# Patient Record
Sex: MALE | Race: WHITE | NOT HISPANIC OR LATINO | Employment: OTHER | URBAN - METROPOLITAN AREA
[De-identification: names, ages, dates, MRNs, and addresses within clinical notes are randomized per-mention and may not be internally consistent; named-entity substitution may affect disease eponyms.]

---

## 2017-09-26 ENCOUNTER — RECORDS - HEALTHEAST (OUTPATIENT)
Dept: LAB | Facility: CLINIC | Age: 72
End: 2017-09-26

## 2017-09-26 LAB
CHOLEST SERPL-MCNC: 187 MG/DL
FASTING STATUS PATIENT QL REPORTED: ABNORMAL
HDLC SERPL-MCNC: 43 MG/DL
LDLC SERPL CALC-MCNC: 84 MG/DL
PSA SERPL-MCNC: 1.7 NG/ML (ref 0–6.5)
TRIGL SERPL-MCNC: 300 MG/DL

## 2018-10-01 ENCOUNTER — RECORDS - HEALTHEAST (OUTPATIENT)
Dept: LAB | Facility: CLINIC | Age: 73
End: 2018-10-01

## 2018-10-01 LAB
ALBUMIN SERPL-MCNC: 3.9 G/DL (ref 3.5–5)
ALP SERPL-CCNC: 79 U/L (ref 45–120)
ALT SERPL W P-5'-P-CCNC: 17 U/L (ref 0–45)
ANION GAP SERPL CALCULATED.3IONS-SCNC: 7 MMOL/L (ref 5–18)
AST SERPL W P-5'-P-CCNC: 18 U/L (ref 0–40)
BILIRUB SERPL-MCNC: 0.4 MG/DL (ref 0–1)
BUN SERPL-MCNC: 16 MG/DL (ref 8–28)
CALCIUM SERPL-MCNC: 9.8 MG/DL (ref 8.5–10.5)
CHLORIDE BLD-SCNC: 103 MMOL/L (ref 98–107)
CHOLEST SERPL-MCNC: 175 MG/DL
CO2 SERPL-SCNC: 30 MMOL/L (ref 22–31)
CREAT SERPL-MCNC: 0.88 MG/DL (ref 0.7–1.3)
FASTING STATUS PATIENT QL REPORTED: ABNORMAL
GFR SERPL CREATININE-BSD FRML MDRD: >60 ML/MIN/1.73M2
GLUCOSE BLD-MCNC: 107 MG/DL (ref 70–125)
HDLC SERPL-MCNC: 48 MG/DL
LDLC SERPL CALC-MCNC: 92 MG/DL
POTASSIUM BLD-SCNC: 5 MMOL/L (ref 3.5–5)
PROT SERPL-MCNC: 6.8 G/DL (ref 6–8)
SODIUM SERPL-SCNC: 140 MMOL/L (ref 136–145)
TRIGL SERPL-MCNC: 174 MG/DL

## 2019-10-07 ENCOUNTER — AMBULATORY - HEALTHEAST (OUTPATIENT)
Dept: PULMONOLOGY | Facility: OTHER | Age: 74
End: 2019-10-07

## 2019-10-07 ENCOUNTER — RECORDS - HEALTHEAST (OUTPATIENT)
Dept: ADMINISTRATIVE | Facility: OTHER | Age: 74
End: 2019-10-07

## 2019-10-07 ENCOUNTER — COMMUNICATION - HEALTHEAST (OUTPATIENT)
Dept: PULMONOLOGY | Facility: OTHER | Age: 74
End: 2019-10-07

## 2019-10-07 ENCOUNTER — RECORDS - HEALTHEAST (OUTPATIENT)
Dept: LAB | Facility: CLINIC | Age: 74
End: 2019-10-07

## 2019-10-07 ENCOUNTER — TRANSFERRED RECORDS (OUTPATIENT)
Dept: HEALTH INFORMATION MANAGEMENT | Facility: CLINIC | Age: 74
End: 2019-10-07
Payer: MEDICARE

## 2019-10-07 DIAGNOSIS — J84.9 ILD (INTERSTITIAL LUNG DISEASE) (H): ICD-10-CM

## 2019-10-07 LAB
ALBUMIN SERPL-MCNC: 4 G/DL (ref 3.5–5)
ALP SERPL-CCNC: 75 U/L (ref 45–120)
ALT SERPL W P-5'-P-CCNC: 33 U/L (ref 0–45)
ANION GAP SERPL CALCULATED.3IONS-SCNC: 10 MMOL/L (ref 5–18)
AST SERPL W P-5'-P-CCNC: 26 U/L (ref 0–40)
BILIRUB SERPL-MCNC: 0.5 MG/DL (ref 0–1)
BUN SERPL-MCNC: 13 MG/DL (ref 8–28)
CALCIUM SERPL-MCNC: 9.8 MG/DL (ref 8.5–10.5)
CHLORIDE BLD-SCNC: 99 MMOL/L (ref 98–107)
CHOLEST SERPL-MCNC: 187 MG/DL
CO2 SERPL-SCNC: 26 MMOL/L (ref 22–31)
CREAT SERPL-MCNC: 1.01 MG/DL (ref 0.7–1.3)
FASTING STATUS PATIENT QL REPORTED: ABNORMAL
GFR SERPL CREATININE-BSD FRML MDRD: >60 ML/MIN/1.73M2
GLUCOSE BLD-MCNC: 103 MG/DL (ref 70–125)
HDLC SERPL-MCNC: 51 MG/DL
LDLC SERPL CALC-MCNC: 81 MG/DL
POTASSIUM BLD-SCNC: 4.2 MMOL/L (ref 3.5–5)
PROT SERPL-MCNC: 6.9 G/DL (ref 6–8)
PSA SERPL-MCNC: 1.4 NG/ML (ref 0–6.5)
SODIUM SERPL-SCNC: 135 MMOL/L (ref 136–145)
TRIGL SERPL-MCNC: 274 MG/DL

## 2019-10-14 ASSESSMENT — MIFFLIN-ST. JEOR: SCORE: 1812.2

## 2019-10-31 ENCOUNTER — SURGERY - HEALTHEAST (OUTPATIENT)
Dept: SURGERY | Facility: CLINIC | Age: 74
End: 2019-10-31

## 2019-10-31 ENCOUNTER — ANESTHESIA - HEALTHEAST (OUTPATIENT)
Dept: SURGERY | Facility: CLINIC | Age: 74
End: 2019-10-31

## 2019-10-31 ASSESSMENT — MIFFLIN-ST. JEOR: SCORE: 1823.09

## 2019-12-20 ENCOUNTER — OFFICE VISIT - HEALTHEAST (OUTPATIENT)
Dept: PULMONOLOGY | Facility: OTHER | Age: 74
End: 2019-12-20

## 2019-12-20 DIAGNOSIS — R01.1 HEART MURMUR: ICD-10-CM

## 2019-12-20 DIAGNOSIS — J84.9 ILD (INTERSTITIAL LUNG DISEASE) (H): ICD-10-CM

## 2019-12-20 LAB — RHEUMATOID FACT SERPL-ACNC: <15 IU/ML (ref 0–30)

## 2019-12-20 ASSESSMENT — MIFFLIN-ST. JEOR: SCORE: 1807.67

## 2019-12-23 LAB — ANA SER QL: 1.3 U

## 2019-12-24 LAB
ANCA IGG TITR SER IF: NORMAL {TITER}
CCP AB SER IA-ACNC: <0.5 U/ML
SCL-70 AUTOANTIBODIES - HISTORICAL: 1 EU
SS-A/RO AUTOANTIBODIES - HISTORICAL: 1 EU
SS-B/LA AUTOANTIBODIES - HISTORICAL: 0 EU

## 2020-05-12 ENCOUNTER — OFFICE VISIT - HEALTHEAST (OUTPATIENT)
Dept: PULMONOLOGY | Facility: OTHER | Age: 75
End: 2020-05-12

## 2020-05-12 DIAGNOSIS — J84.9 ILD (INTERSTITIAL LUNG DISEASE) (H): ICD-10-CM

## 2020-05-12 RX ORDER — ATORVASTATIN CALCIUM 10 MG/1
10 TABLET, FILM COATED ORAL AT BEDTIME
Status: SHIPPED | COMMUNITY
Start: 2020-05-12 | End: 2022-06-08

## 2020-05-12 RX ORDER — METOPROLOL SUCCINATE 50 MG/1
50 TABLET, EXTENDED RELEASE ORAL DAILY
Status: SHIPPED | COMMUNITY
Start: 2020-05-12

## 2020-05-12 ASSESSMENT — MIFFLIN-ST. JEOR: SCORE: 1789.52

## 2020-09-18 ENCOUNTER — HOSPITAL ENCOUNTER (OUTPATIENT)
Dept: CARDIOLOGY | Facility: HOSPITAL | Age: 75
Discharge: HOME | End: 2020-09-18
Attending: INTERNAL MEDICINE

## 2020-09-18 DIAGNOSIS — R01.1 HEART MURMUR: ICD-10-CM

## 2020-09-18 LAB
AORTIC ROOT: 3.4 CM
AORTIC VALVE MEAN VELOCITY: 188 CM/S
ASCENDING AORTA: 4 CM
AV DIMENSIONLESS INDEX VTI: 0.5
AV MEAN GRADIENT: 16 MMHG
AV PEAK GRADIENT: 24.6 MMHG
AV VALVE AREA: 1.6 CM2
BSA FOR ECHO PROCEDURE: 2.27 M2
CV BLOOD PRESSURE: ABNORMAL MMHG
CV ECHO HEIGHT: 70 IN
CV ECHO WEIGHT: 230 LBS
DOP CALC AO PEAK VEL: 248 CM/S
DOP CALC AO VTI: 53.2 CM
DOP CALC LVOT AREA: 3.46 CM2
DOP CALC LVOT DIAMETER: 2.1 CM
DOP CALC LVOT STROKE VOLUME: 87.6 CM3
DOP CALC MV VTI: 25.3 CM
DOP CALCLVOT PEAK VEL VTI: 25.3 CM
EJECTION FRACTION: 65 % (ref 55–75)
FRACTIONAL SHORTENING: 41.3 % (ref 28–44)
INTERVENTRICULAR SEPTUM IN END DIASTOLE: 1 CM (ref 0.6–1)
IVS/PW RATIO: 0.9
LA AREA 1: 16 CM2
LA AREA 2: 20.6 CM2
LEFT ATRIUM AREA: 20.6 CM2
LEFT ATRIUM LENGTH: 5.5 CM
LEFT ATRIUM SIZE: 3.6 CM
LEFT ATRIUM TO AORTIC ROOT RATIO: 1.06 NO UNITS
LEFT ATRIUM VOLUME INDEX: 22.4 ML/M2
LEFT ATRIUM VOLUME: 50.9 ML
LEFT VENTRICLE CARDIAC INDEX: 2.5 L/MIN/M2
LEFT VENTRICLE CARDIAC OUTPUT: 5.7 L/MIN
LEFT VENTRICLE DIASTOLIC VOLUME INDEX: 45.8 CM3/M2 (ref 34–74)
LEFT VENTRICLE DIASTOLIC VOLUME: 104 CM3 (ref 62–150)
LEFT VENTRICLE HEART RATE: 65 BPM
LEFT VENTRICLE MASS INDEX: 74.8 G/M2
LEFT VENTRICLE SYSTOLIC VOLUME INDEX: 15.9 CM3/M2 (ref 11–31)
LEFT VENTRICLE SYSTOLIC VOLUME: 36 CM3 (ref 21–61)
LEFT VENTRICULAR INTERNAL DIMENSION IN DIASTOLE: 4.6 CM (ref 4.2–5.8)
LEFT VENTRICULAR INTERNAL DIMENSION IN SYSTOLE: 2.7 CM (ref 2.5–4)
LEFT VENTRICULAR MASS: 169.9 G
LEFT VENTRICULAR OUTFLOW TRACT MEAN GRADIENT: 3 MMHG
LEFT VENTRICULAR OUTFLOW TRACT MEAN VELOCITY: 77.3 CM/S
LEFT VENTRICULAR POSTERIOR WALL IN END DIASTOLE: 1.1 CM (ref 0.6–1)
LV STROKE VOLUME INDEX: 38.6 ML/M2
MITRAL VALVE E/A RATIO: 0.6
MITRAL VALVE MEAN INFLOW VELOCITY: 54 CM/S
MITRAL VALVE PEAK VELOCITY: 102 CM/S
MV AREA VTI: 3.46 CM2
MV AVERAGE E/E' RATIO: 8 CM/S
MV DECELERATION TIME: 373 MS
MV E'TISSUE VEL-LAT: 8.87 CM/S
MV E'TISSUE VEL-MED: 5.75 CM/S
MV LATERAL E/E' RATIO: 6.6
MV MEAN GRADIENT: 1 MMHG
MV MEDIAL E/E' RATIO: 10.2
MV PEAK A VELOCITY: 90.8 CM/S
MV PEAK E VELOCITY: 58.7 CM/S
MV PEAK GRADIENT: 4.2 MMHG
MV VALVE AREA BY CONTINUITY EQUATION: 3.5 CM2
NUC REST DIASTOLIC VOLUME INDEX: 3680 LBS
NUC REST SYSTOLIC VOLUME INDEX: 70 IN
TRICUSPID VALVE ANULAR PLANE SYSTOLIC EXCURSION: 2.1 CM

## 2020-09-18 ASSESSMENT — MIFFLIN-ST. JEOR: SCORE: 1789.52

## 2020-10-16 ENCOUNTER — RECORDS - HEALTHEAST (OUTPATIENT)
Dept: LAB | Facility: CLINIC | Age: 75
End: 2020-10-16

## 2020-10-16 LAB
ALBUMIN SERPL-MCNC: 4.3 G/DL (ref 3.5–5)
ALP SERPL-CCNC: 83 U/L (ref 45–120)
ALT SERPL W P-5'-P-CCNC: 53 U/L (ref 0–45)
ANION GAP SERPL CALCULATED.3IONS-SCNC: 7 MMOL/L (ref 5–18)
AST SERPL W P-5'-P-CCNC: 53 U/L (ref 0–40)
BILIRUB SERPL-MCNC: 0.5 MG/DL (ref 0–1)
BUN SERPL-MCNC: 19 MG/DL (ref 8–28)
CALCIUM SERPL-MCNC: 9.8 MG/DL (ref 8.5–10.5)
CHLORIDE BLD-SCNC: 96 MMOL/L (ref 98–107)
CHOLEST SERPL-MCNC: 174 MG/DL
CO2 SERPL-SCNC: 30 MMOL/L (ref 22–31)
CREAT SERPL-MCNC: 0.91 MG/DL (ref 0.7–1.3)
FASTING STATUS PATIENT QL REPORTED: ABNORMAL
GFR SERPL CREATININE-BSD FRML MDRD: >60 ML/MIN/1.73M2
GLUCOSE BLD-MCNC: 96 MG/DL (ref 70–125)
HDLC SERPL-MCNC: 41 MG/DL
LDLC SERPL CALC-MCNC: 98 MG/DL
LDLC SERPL CALC-MCNC: ABNORMAL MG/DL
POTASSIUM BLD-SCNC: 4.3 MMOL/L (ref 3.5–5)
PROT SERPL-MCNC: 7.5 G/DL (ref 6–8)
SODIUM SERPL-SCNC: 133 MMOL/L (ref 136–145)
TRIGL SERPL-MCNC: 468 MG/DL

## 2020-10-29 ENCOUNTER — RECORDS - HEALTHEAST (OUTPATIENT)
Dept: ADMINISTRATIVE | Facility: OTHER | Age: 75
End: 2020-10-29

## 2020-11-09 ENCOUNTER — RECORDS - HEALTHEAST (OUTPATIENT)
Dept: LAB | Facility: CLINIC | Age: 75
End: 2020-11-09

## 2020-11-09 ENCOUNTER — RECORDS - HEALTHEAST (OUTPATIENT)
Dept: ADMINISTRATIVE | Facility: OTHER | Age: 75
End: 2020-11-09

## 2020-11-09 LAB
ALBUMIN SERPL-MCNC: 4.2 G/DL (ref 3.5–5)
ALP SERPL-CCNC: 69 U/L (ref 45–120)
ALT SERPL W P-5'-P-CCNC: 40 U/L (ref 0–45)
AST SERPL W P-5'-P-CCNC: 33 U/L (ref 0–40)
BILIRUB DIRECT SERPL-MCNC: 0.2 MG/DL
BILIRUB SERPL-MCNC: 0.6 MG/DL (ref 0–1)
PROT SERPL-MCNC: 7.3 G/DL (ref 6–8)

## 2020-11-10 ENCOUNTER — OFFICE VISIT - HEALTHEAST (OUTPATIENT)
Dept: PULMONOLOGY | Facility: OTHER | Age: 75
End: 2020-11-10

## 2020-11-10 DIAGNOSIS — R06.00 DYSPNEA, UNSPECIFIED TYPE: ICD-10-CM

## 2020-11-10 ASSESSMENT — MIFFLIN-ST. JEOR: SCORE: 1834.88

## 2020-11-11 ENCOUNTER — RECORDS - HEALTHEAST (OUTPATIENT)
Dept: RADIOLOGY | Facility: CLINIC | Age: 75
End: 2020-11-11

## 2021-04-06 ENCOUNTER — RECORDS - HEALTHEAST (OUTPATIENT)
Dept: ADMINISTRATIVE | Facility: OTHER | Age: 76
End: 2021-04-06

## 2021-04-07 ENCOUNTER — RECORDS - HEALTHEAST (OUTPATIENT)
Dept: ADMINISTRATIVE | Facility: OTHER | Age: 76
End: 2021-04-07

## 2021-04-07 ENCOUNTER — COMMUNICATION - HEALTHEAST (OUTPATIENT)
Dept: PULMONOLOGY | Facility: OTHER | Age: 76
End: 2021-04-07

## 2021-04-13 ENCOUNTER — RECORDS - HEALTHEAST (OUTPATIENT)
Dept: ADMINISTRATIVE | Facility: OTHER | Age: 76
End: 2021-04-13

## 2021-04-23 ENCOUNTER — HOSPITAL ENCOUNTER (OUTPATIENT)
Dept: CT IMAGING | Facility: HOSPITAL | Age: 76
Discharge: HOME | End: 2021-04-23
Attending: INTERNAL MEDICINE

## 2021-04-23 DIAGNOSIS — J84.9 ILD (INTERSTITIAL LUNG DISEASE) (H): ICD-10-CM

## 2021-05-04 ENCOUNTER — OFFICE VISIT - HEALTHEAST (OUTPATIENT)
Dept: PULMONOLOGY | Facility: OTHER | Age: 76
End: 2021-05-04

## 2021-05-04 DIAGNOSIS — J84.112 IPF (IDIOPATHIC PULMONARY FIBROSIS) (H): ICD-10-CM

## 2021-05-04 RX ORDER — ALBUTEROL SULFATE 90 UG/1
2 AEROSOL, METERED RESPIRATORY (INHALATION) EVERY 6 HOURS PRN
Qty: 1 INHALER | Refills: 6 | Status: SHIPPED | OUTPATIENT
Start: 2021-05-04 | End: 2023-07-28

## 2021-05-04 ASSESSMENT — MIFFLIN-ST. JEOR: SCORE: 1850.76

## 2021-05-27 VITALS
DIASTOLIC BLOOD PRESSURE: 70 MMHG | HEART RATE: 76 BPM | WEIGHT: 243.5 LBS | HEIGHT: 70 IN | BODY MASS INDEX: 34.86 KG/M2 | OXYGEN SATURATION: 93 % | SYSTOLIC BLOOD PRESSURE: 124 MMHG

## 2021-06-02 NOTE — ANESTHESIA PREPROCEDURE EVALUATION
Anesthesia Evaluation      Patient summary reviewed   No history of anesthetic complications     Airway   Mallampati: II  Neck ROM: full   Pulmonary - normal exam   (+) sleep apnea on CPAP, , a smoker                         Cardiovascular - normal exam  Exercise tolerance: > or = 4 METS  (+) hypertension, , hypercholesterolemia,     ECG reviewed        Neuro/Psych - negative ROS     Endo/Other    (+) arthritis, obesity,      GI/Hepatic/Renal    (+) GERD well controlled,             Dental - normal exam                        Anesthesia Plan  Planned anesthetic: spinal and peripheral nerve block  Spinal with adductor canal block for post op pain control per surgeon request.  ASA 3     Anesthetic plan and risks discussed with: patient    Post-op plan: routine recovery

## 2021-06-02 NOTE — ANESTHESIA PROCEDURE NOTES
Peripheral Block    Patient location during procedure: pre-op  Start time: 10/31/2019 9:23 AM  End time: 10/31/2019 9:28 AM  post-op analgesia per surgeon order as noted in medical record  Staffing:  Performing  Anesthesiologist: Ricardo Bright MD  Preanesthetic Checklist  Completed: patient identified, site marked, risks, benefits, and alternatives discussed, timeout performed, consent obtained, at patient's request, airway assessed, oxygen available, suction available, emergency drugs available and hand hygiene performed  Peripheral Block  Block type: saphenous, adductor canal block  Prep: ChloraPrep  Patient position: supine  Patient monitoring: blood pressure, heart rate, continuous pulse oximetry and cardiac monitor  Laterality: left  Injection technique: ultrasound guided    Ultrasound used to visualize needle placement in proximity to nerve being blocked: yes   Permanent ultrasound image captured for medical record  Sterile gel and probe cover used for ultrasound.    Needle  Needle type: Stimuplex   Needle gauge: 20G  Needle length: 4 in  no peripheral nerve catheter placed  Assessment  Injection assessment: no difficulty with injection, negative aspiration for heme, no paresthesia on injection and incremental injection

## 2021-06-02 NOTE — ANESTHESIA POSTPROCEDURE EVALUATION
Patient: Wyatt Gutierrez  LEFT MINIMALLY INVASIVE TOTAL KNEE ARTHROPLASTY  Anesthesia type: spinal    Patient location: PACU  Last vitals:   Vitals Value Taken Time   /67 10/31/2019  1:10 PM   Temp 36.1  C (97  F) 10/31/2019  1:10 PM   Pulse 71 10/31/2019  1:18 PM   Resp 18 10/31/2019  1:18 PM   SpO2 96 % 10/31/2019  1:18 PM   Vitals shown include unvalidated device data.  Post vital signs: stable  Level of consciousness: awake and responds to simple questions  Post-anesthesia pain: pain controlled  Post-anesthesia nausea and vomiting: no  Pulmonary: unassisted, room air, CPAP  Cardiovascular: stable and blood pressure at baseline  Hydration: adequate  Anesthetic events: no    QCDR Measures:  ASA# 11 - Joan-op Cardiac Arrest: ASA11B - Patient did NOT experience unanticipated cardiac arrest  ASA# 12 - Joan-op Mortality Rate: ASA12B - Patient did NOT die  ASA# 13 - PACU Re-Intubation Rate: NA - No ETT / LMA used for case  ASA# 10 - Composite Anes Safety: ASA10A - No serious adverse event    Additional Notes:

## 2021-06-02 NOTE — ANESTHESIA PROCEDURE NOTES
Spinal Block    Patient location during procedure: OR  Start time: 10/31/2019 11:10 AM  End time: 10/31/2019 11:14 AM  Reason for block: at surgeon's request and primary anesthetic    Staffing:  Performing  Anesthesiologist: Ricardo Bright MD    Preanesthetic Checklist  Completed: patient identified, risks, benefits, and alternatives discussed, timeout performed, consent obtained, at patient's request, airway assessed, oxygen available, suction available, emergency drugs available and hand hygiene performed  Spinal Block  Patient position: sitting  Prep: ChloraPrep  Patient monitoring: heart rate, cardiac monitor, continuous pulse ox and blood pressure  Approach: midline  Location: L3-4  Injection technique: single-shot  Needle type: pencil-tip   Needle gauge: 24 G    Assessment  Sensory level: T8

## 2021-06-02 NOTE — ANESTHESIA CARE TRANSFER NOTE
Last vitals:   Vitals:    10/31/19 1250   BP: 131/78   Pulse: 89   Resp: 25   Temp:    SpO2: 95%     Patient's level of consciousness is drowsy  Spontaneous respirations: yes  Maintains airway independently: yes  Dentition unchanged: yes  Oropharynx: oropharynx clear of all foreign objects    QCDR Measures:  ASA# 20 - Surgical Safety Checklist: WHO surgical safety checklist completed prior to induction    PQRS# 430 - Adult PONV Prevention: 4558F - Pt received => 2 anti-emetic agents (different classes) preop & intraop  ASA# 8 - Peds PONV Prevention: NA - Not pediatric patient, not GA or 2 or more risk factors NOT present  PQRS# 424 - Joan-op Temp Management: 4559F - At least one body temp DOCUMENTED => 35.5C or 95.9F within required timeframe  PQRS# 426 - PACU Transfer Protocol: - Transfer of care checklist used  ASA# 14 - Acute Post-op Pain: ASA14B - Patient did NOT experience pain >= 7 out of 10. To PACU coughing, placed on his own Cpap, coughing ceased. No c/o pain or nausea

## 2021-06-03 ENCOUNTER — RECORDS - HEALTHEAST (OUTPATIENT)
Dept: ADMINISTRATIVE | Facility: CLINIC | Age: 76
End: 2021-06-03

## 2021-06-03 VITALS — WEIGHT: 237.4 LBS | HEIGHT: 70 IN | BODY MASS INDEX: 33.99 KG/M2

## 2021-06-04 ENCOUNTER — OFFICE VISIT - HEALTHEAST (OUTPATIENT)
Dept: OTOLARYNGOLOGY | Facility: CLINIC | Age: 76
End: 2021-06-04

## 2021-06-04 VITALS — WEIGHT: 230 LBS | BODY MASS INDEX: 32.93 KG/M2 | HEIGHT: 70 IN

## 2021-06-04 VITALS
HEIGHT: 70 IN | BODY MASS INDEX: 33.5 KG/M2 | DIASTOLIC BLOOD PRESSURE: 82 MMHG | OXYGEN SATURATION: 97 % | HEART RATE: 80 BPM | WEIGHT: 234 LBS | RESPIRATION RATE: 17 BRPM | SYSTOLIC BLOOD PRESSURE: 128 MMHG

## 2021-06-04 DIAGNOSIS — H74.8X3 HEMATOTYMPANUM OF BOTH EARS: ICD-10-CM

## 2021-06-04 DIAGNOSIS — J30.2 SEASONAL ALLERGIC RHINITIS, UNSPECIFIED TRIGGER: ICD-10-CM

## 2021-06-04 DIAGNOSIS — Z48.00 ENCOUNTER FOR REMOVAL OF NASAL PACKING: ICD-10-CM

## 2021-06-04 DIAGNOSIS — R09.82 PND (POST-NASAL DRIP): ICD-10-CM

## 2021-06-04 DIAGNOSIS — R05.3 CHRONIC COUGH: ICD-10-CM

## 2021-06-04 RX ORDER — AZELASTINE 1 MG/ML
SPRAY, METERED NASAL
Qty: 30 ML | Refills: 12 | Status: ON HOLD | OUTPATIENT
Start: 2021-06-04 | End: 2024-06-11

## 2021-06-04 NOTE — PROGRESS NOTES
"Pulmonary Clinic Consult Note  Date of Service: 2019    Reason for Consultation: ILD    History:     HPI: Patient is a pleasant 74-year-old male, previous smoker, who was sent here for evaluation for ILD.    Patient was referred here for abnormal CXR concerning for ILD.  Pt notes that he has shortness of breath that is increasing over the past few years starting since .  Currently, patient is able to walk approximate a mile before having to rest.  He has a chronic dry cough.  He notes that his cough has improved after he quit tobacco on 2019.  He denies any hemoptysis. He describes his cough as a little productive but it is not that frequent.  He denies any fever or chills.  He is not on any inhalers.      On review of imaging, pt had a chest x-ray back in 2017.  On repeat follow-up chest x-ray done on 10/2019, there was stable mild peripheral fibrotic changes.  There was no evidence suggest any significant progression.  Patient noted that he was unable to do his PFTs on this visit because he did not know of the timing.     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 He will be there until 2020. He had been doing this since .    PMHx/PSHx:  KAITY  Hypertension  Hyperlipidemia  Total knee arthroplasty on 10/31/2019  Shoulder arthroscopy    Social Hx:    Ex Smoker. Quit smoking in 2019. Switched to vaping from 2019 to 10/2019. Then he used nicotine patch.  Used to smoke about 14 cigarettes per day for most of his life.   Work: used to work as an  before retiring. Worked at a desk.     Review of Systems - 10 point review of system negative except for what is mentioned in the HPI.    Meds and Allergies:  See EHR for the updated medication list and Allergies. These were reviewed.     Family Hx:   Father  at the age of 60 secondary to CVA.  Mother  at age 66 due to cancer.    Exam/Data:   /82   Pulse 80   Resp 17   Ht 5' 10\" (1.778 m)   Wt " (!) 234 lb (106.1 kg)   SpO2 97% Comment: RA  BMI 33.58 kg/m      EXAM:  GEN: comfortable, NAD  HEENT: NCAT, EMOI, mmm  LN: no cervical LAD   CVS: S1S2, RRR, systolic ejection murmur  Lung: good air entry bilaterally, no wheezing  Abd: soft, nt, + BS.   Ext: no c/c/e  Vasc: intact radial pulses bilaterally  Neuro: nonfocal  Skin: no visible rash  Musculoskeletal: FROM all extremities  Psych: normal affect    DATA    Chest x-ray from 5/2017: From [facility]  FINDINGS: Heart size within normal limits. Diffuse changes of interstitial fibrosis. Interstitium does appear slightly more prominent than on the comparison study, and a superimposed acute infectious process or mild fluid overload would be difficult to exclude. No pneumothorax or pleural effusion.    Chest x-ray on 10/2019 at [facility] compared to chest x-ray done on 5/2017: Official report:  Stable mild peripheral fibrotic change in both lungs would be consistent with underlying pulmonary fibrosis.  No acute new superimposed findings and nothing to suggest any significant progression.    Assessment/Plan:   Wyatt Gutierrez is a 74 y.o. male referred here for evaluation for pulmonary fibrosis.  Patient did not show up to PFTs this morning.  Per radiology based on reports, chest x-ray does not show significant change between 2019 and 2017, although not able to personally viewed these images at this time.    Recommendations:  PFTs next visit  HRCT  RA, CCP, AYDEN, cANCA, ssa, ssb, and scl 70  Echo given heart murmur    FOLLOW UP: 4 months after coming back from [place]    Whitney José MD  Pulmonary and Critical Care Medicine  12/20/2019        Allergies   Allergen Reactions     Mold      Other Drug Allergy (See Comments)      Pet dander, chalk     Trees        Medications:     Current Outpatient Medications   Medication Sig Dispense Refill     acetaminophen (TYLENOL) 500 MG tablet Take 2 tablets (1,000 mg total) by mouth 3 (three) times a day.  0      acyclovir (ZOVIRAX) 400 MG tablet Take 400 mg by mouth 3 (three) times a day as needed.              atenolol (TENORMIN) 50 MG tablet Take 50 mg by mouth at bedtime.       b complex vitamins tablet Take 1 tablet by mouth daily.       bisacodyl (DULCOLAX) 10 mg suppository Take if no bowel movement in 2- 3 days for relief of constipation.  0     glucosamine-chondroitin 500-400 mg cap Take 1 capsule by mouth 2 (two) times a day.       ibuprofen (ADVIL,MOTRIN) 200 MG tablet Take 2 tablets (400 mg total) by mouth 3 (three) times a day as needed for pain.  0     multivitamin therapeutic tablet Take 1 tablet by mouth daily.       omeprazole (PRILOSEC) 20 MG capsule Take 20 mg by mouth 2 (two) times a day before meals.       traMADol (ULTRAM) 50 mg tablet Take 1 tablet (50 mg total) by mouth every 6 (six) hours as needed for pain. 50 tablet 0     triamterene-hydrochlorothiazide (DYAZIDE) 37.5-25 mg per capsule Take 1 capsule by mouth every morning.       No current facility-administered medications for this visit.        Much or all of the text in this note was generated through the use of the Dragon Dictate voice-to-text software. Errors in spelling or words which seem out of context are unintentional. Sound alike errors, in particular, may have escaped editing.

## 2021-06-05 VITALS
HEIGHT: 70 IN | BODY MASS INDEX: 34.36 KG/M2 | WEIGHT: 240 LBS | OXYGEN SATURATION: 97 % | RESPIRATION RATE: 12 BRPM | HEART RATE: 77 BPM | SYSTOLIC BLOOD PRESSURE: 130 MMHG | DIASTOLIC BLOOD PRESSURE: 80 MMHG

## 2021-06-05 VITALS — BODY MASS INDEX: 32.93 KG/M2 | WEIGHT: 230 LBS | HEIGHT: 70 IN

## 2021-06-08 NOTE — PROGRESS NOTES
"Wyatt Gutierrez is a 75 y.o. male who is being evaluated via a billable telephone visit.      The patient has been notified of following:     \"This telephone visit will be conducted via a call between you and your physician/provider. We have found that certain health care needs can be provided without the need for a physical exam.  This service lets us provide the care you need with a short phone conversation.  If a prescription is necessary we can send it directly to your pharmacy.  If lab work is needed we can place an order for that and you can then stop by our lab to have the test done at a later time.    Telephone visits are billed at different rates depending on your insurance coverage. During this emergency period, for some insurers they may be billed the same as an in-person visit.  Please reach out to your insurance provider with any questions.    If during the course of the call the physician/provider feels a telephone visit is not appropriate, you will not be charged for this service.\"    Patient has given verbal consent to a Telephone visit? Yes    What phone number would you like to be contacted at? [phone]    Patient would like to receive their AVS by AVS Preference: Mail a copy.    Jennifer Wesley CMA    Additional provider notes: Following information was obtained from virtual visit as well as previous relevant clinical information from patient's chart.    PULMONARY CLINIC FOLLOW UP NOTE - Virtual visit    History:     HPI: Wyatt Gutierrez is a 75 y.o. male, previous smoker with PMH sig for KAITY, hypertension, and hyperlipidemia who was referred to us back on 12/20/2019 for abnormal chest x-ray that was concerning for ILD.  Patient has had chronic shortness of breath since at least 2010.  He quit smoking tobacco in June 2019.  Had smoked almost all his life.    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  He has been traveling back and forth since 2012.    Interval History: Pt " was called this morning. He came back from [place] back on 3/2020.  He denies any hospitalizations since he was last seen. He can walk about 3 miles but he notes that he gets shortness of breath. He has a chronic cough but no hemoptysis. No wheezing. He does not feel functionally limited.     PMHx/PSHx:  KAITY  Hypertension  Hyperlipidemia  Total knee arthroplasty on 10/31/2019  Shoulder arthroscopy     Social Hx:    Ex Smoker. Quit smoking in 6/2019. Switched to vaping from 6/2019 to 10/2019. Then he used nicotine patch.  Used to smoke about 14 cigarettes per day for most of his life.   Work: used to work as an  before retiring. Worked at a desk.     ROS: 10 point review of system done. Pertinent findings are noted in the HPI.    Exam/Data:     N/A    Data:     Labs personally reviewed.      IMAGING: personally reviewed images. Formal radiology interpretation noted below.  Chest x-ray from 5/2017: From [facility]  FINDINGS: Heart size within normal limits. Diffuse changes of interstitial fibrosis. Interstitium does appear slightly more prominent than on the comparison study, and a superimposed acute infectious process or mild fluid overload would be difficult to exclude. No pneumothorax or pleural effusion.     Chest x-ray on 10/2019 at [facility] compared to chest x-ray done on 5/2017: Official report:  Stable mild peripheral fibrotic change in both lungs would be consistent with underlying pulmonary fibrosis.  No acute new superimposed findings and nothing to suggest any significant progression.    Assessment/Plan:     Wyatt Gutierrez is a 75 y.o. male who was referred here for pulm fibrosis back on 12/2020. Unfortunately we don't have PFT's.  Per radiology based on reports, chest x-ray does not show significant change between 2019 and 2017, although not able to personally viewed these images at this time.    At this time, we will hold off HRCT and PFT's.  Serology for ILD was negative.      Recommendations:  HRCT and PFT's next vist.  Encouraged to lose weight    F/u in 4 months.    Phone call duration: 7 minutes      Whitney José MD  Pulmonary and Critical Care Medicine  Electronically Signed on 5/12/2020    Current Outpatient Medications   Medication Sig Dispense Refill     acyclovir (ZOVIRAX) 400 MG tablet Take 400 mg by mouth 3 (three) times a day as needed.              atenolol (TENORMIN) 50 MG tablet Take 50 mg by mouth at bedtime.       atorvastatin (LIPITOR) 10 MG tablet Take 10 mg by mouth at bedtime.       b complex vitamins tablet Take 1 tablet by mouth daily.       glucosamine-chondroitin 500-400 mg cap Take 1 capsule by mouth 2 (two) times a day.       ibuprofen (ADVIL,MOTRIN) 200 MG tablet Take 2 tablets (400 mg total) by mouth 3 (three) times a day as needed for pain.  0     metoprolol succinate (TOPROL-XL) 50 MG 24 hr tablet Take 50 mg by mouth daily.       multivitamin therapeutic tablet Take 1 tablet by mouth daily.       omeprazole (PRILOSEC) 20 MG capsule Take 20 mg by mouth 2 (two) times a day before meals.       triamterene-hydrochlorothiazide (DYAZIDE) 37.5-25 mg per capsule Take 1 capsule by mouth every morning.       acetaminophen (TYLENOL) 500 MG tablet Take 2 tablets (1,000 mg total) by mouth 3 (three) times a day.  0     bisacodyl (DULCOLAX) 10 mg suppository Take if no bowel movement in 2- 3 days for relief of constipation.  0     No current facility-administered medications for this visit.      Allergies   Allergen Reactions     Mold      Other Drug Allergy (See Comments)      Pet dander, chalk     Trees        Meds and Allergies: See EHR for the updated medication list and Allergies. These were reviewed.     Much or all of the text in this note was generated through the use of the Dragon Dictate voice-to-text software. Errors in spelling or words which seem out of context are unintentional. Sound alike errors, in particular, may have escaped editing.

## 2021-06-13 NOTE — PROGRESS NOTES
"PULMONARY CLINIC FOLLOW UP NOTE     History:     HPI: Wyatt Gutierrez is a 75 y.o. male, previous smoker with PMH sig for KAITY, hypertension, and hyperlipidemia who was referred to us back on 12/20/2019 for abnormal chest x-ray that was concerning for ILD.  Patient has had chronic shortness of breath since at least 2010.  He quit smoking tobacco in June 2019.  Had smoked almost all his life.    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  He has been traveling back and forth since 2012.    Interval History: pt is here for his scheduled visit.  He usually goes to [place] in the winter and come back to [place] in summer.  He was referred to us recently for possible ILD.  He did not have his high-resolution CT scan and PFTs earlier this year due to COVID-19 pandemic.  Patient notes that he is doing well.  He continues to stay active and still bikes, plays golf, and walks.  He notes he is able to walk 2-3 blocks before starting to feel short of breath.  He denies any hemoptysis but has a chronic cough that is unchanged.  He denies any antibiotics or steroids for respiratory related issues.    PMHx/PSHx:  KAITY  Hypertension  Hyperlipidemia  Total knee arthroplasty on 10/31/2019  Shoulder arthroscopy     Social Hx:    Ex Smoker. Quit smoking in 6/2019. Switched to vaping from 6/2019 to 10/2019. Then he used nicotine patch.  Used to smoke about 14 cigarettes per day for most of his life.   Work: used to work as an  before retiring. Worked at a desk.     ROS: 10 point review of system done. Pertinent findings are noted in the HPI.    Exam/Data:     /80   Pulse 77   Resp 12   Ht 5' 10\" (1.778 m)   Wt (!) 240 lb (108.9 kg)   SpO2 97%   BMI 34.44 kg/m      GEN: comfortable, NAD  HEENT: NCAT, EMOI, mmm, no LAD   CVS: S1S2, RRR  Lung: no wheezing, good air entry, bibasilar crackles  Abd: soft, nt, + BS.  Ext: no c/c/e  Neuro: non-focal  Psych: appropriate affect      Data:     Labs " personally reviewed.      IMAGING: personally reviewed images. Formal radiology interpretation noted below.  Chest x-ray from 5/2017: From [facility]  FINDINGS: Heart size within normal limits. Diffuse changes of interstitial fibrosis. Interstitium does appear slightly more prominent than on the comparison study, and a superimposed acute infectious process or mild fluid overload would be difficult to exclude. No pneumothorax or pleural effusion.     Chest x-ray on 10/2019 at [facility] compared to chest x-ray done on 5/2017: Official report:  Stable mild peripheral fibrotic change in both lungs would be consistent with underlying pulmonary fibrosis.  No acute new superimposed findings and nothing to suggest any significant progression.    Assessment/Plan:     Wyatt Gutierrez is a 75 y.o. male who was referred here for pulm fibrosis back on 12/2020. Unfortunately we don't have PFT's.  Per radiology based on reports, chest x-ray does not show significant change between 2019 and 2017, although not able to personally viewed these images at this time.  Serology for ILD was negative. HRCT and PFT were not done earlier in the year due to covid.     Recommendations:  HRCT and PFT's   Avoid eating 2-4 hours before going to bed  Recommend losing weight     F/u in 5-6 months.    Addendum: pt called to let us know he had a CT chest at [facility] and wonders if he needs another CT. Will hold off since he had one at [facility].        Whitney José MD  Pulmonary and Critical Care Medicine  Electronically Signed on 11/10/2020    Current Outpatient Medications   Medication Sig Dispense Refill     acyclovir (ZOVIRAX) 400 MG tablet Take 400 mg by mouth 3 (three) times a day as needed.              atenolol (TENORMIN) 50 MG tablet Take 50 mg by mouth at bedtime.       atorvastatin (LIPITOR) 10 MG tablet Take 10 mg by mouth at bedtime.       glucosamine-chondroitin 500-400 mg cap Take 1 capsule by mouth 2 (two) times a day.        ibuprofen (ADVIL,MOTRIN) 200 MG tablet Take 2 tablets (400 mg total) by mouth 3 (three) times a day as needed for pain.  0     metoprolol succinate (TOPROL-XL) 50 MG 24 hr tablet Take 50 mg by mouth daily.       multivitamin therapeutic tablet Take 1 tablet by mouth daily.       omeprazole (PRILOSEC) 20 MG capsule Take 20 mg by mouth 2 (two) times a day before meals.       triamterene-hydrochlorothiazide (DYAZIDE) 37.5-25 mg per capsule Take 1 capsule by mouth every morning.       acetaminophen (TYLENOL) 500 MG tablet Take 2 tablets (1,000 mg total) by mouth 3 (three) times a day.  0     No current facility-administered medications for this visit.      Allergies   Allergen Reactions     Mold        Meds and Allergies: See EHR for the updated medication list and Allergies. These were reviewed.     Much or all of the text in this note was generated through the use of the Dragon Dictate voice-to-text software. Errors in spelling or words which seem out of context are unintentional. Sound alike errors, in particular, may have escaped editing.

## 2021-06-14 ENCOUNTER — HOSPITAL ENCOUNTER (OUTPATIENT)
Dept: RESPIRATORY THERAPY | Facility: CLINIC | Age: 76
Discharge: HOME | End: 2021-06-14
Attending: INTERNAL MEDICINE
Payer: MEDICARE

## 2021-06-14 ENCOUNTER — RECORDS - HEALTHEAST (OUTPATIENT)
Dept: ADMINISTRATIVE | Facility: OTHER | Age: 76
End: 2021-06-14

## 2021-06-14 DIAGNOSIS — J84.112 IPF (IDIOPATHIC PULMONARY FIBROSIS) (H): ICD-10-CM

## 2021-06-16 PROBLEM — M17.0 PRIMARY OSTEOARTHRITIS OF KNEES, BILATERAL: Status: ACTIVE | Noted: 2019-10-31

## 2021-06-17 NOTE — PROGRESS NOTES
Oxygen saturation walk test    Patient oxygen saturation on RA at rest is 94%.  Oxygen saturation while ambulating 300ft on RA is 89-90%.      Patient is ambulatory within his/her home.

## 2021-06-17 NOTE — PROGRESS NOTES
"PULMONARY CLINIC FOLLOW UP NOTE     History:     HPI: Wyatt Gutierrez is a 75 y.o. male, previous smoker with PMH sig for KAITY, HTN, HPL who was referred to us back on 12/20/2019 for abnormal chest x-ray that was concerning for ILD.  Patient has had chronic shortness of breath since at least 2010.  He quit smoking tobacco in June 2019.  Had smoked almost all his life.    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  He has been traveling back and forth since 2012.    Interval History: Patient is here for his scheduled follow-up for his IPF.  He lives in [place] in the wasserman and [place] in the summers.  He denies any exacerbations or respiratory issues over the past year.  He notes that he has shortness of breath with exertion but is able to walk approximately 2-3 blocks.  He has a cough is unchanged.  He continues to play golf and rides bike.  He notes that he has gained some weight.  He received his vaccination for COVID-19.    PMHx/PSHx:  KAITY  HTN, HPL  Total knee arthroplasty on 10/31/2019  Shoulder arthroscopy     Social Hx:    Ex Smoker. Quit smoking in 6/2019. Switched to vaping from 6/2019 to 10/2019. Then he used nicotine patch.  Used to smoke about 14 cigarettes per day for most of his life.   Work: used to work as an  before retiring. Worked at a desk.     ROS: 10 point review of system done. Pertinent findings are noted in the HPI.    Exam/Data:     /70   Pulse 76   Ht 5' 10\" (1.778 m)   Wt (!) 243 lb 8 oz (110.5 kg)   SpO2 93%   BMI 34.94 kg/m      GEN: comfortable, NAD  HEENT: NCAT, EMOI, mmm, no LAD   CVS: S1S2, RRR  Lung: no wheezing, good air entry, bibasilar crackles  Abd: soft, nt, + BS.  Ext: no c/c/e  Neuro: non-focal  Psych: appropriate affect      Data:     Labs personally reviewed.      IMAGING: personally reviewed images. Formal radiology interpretation noted below.  Chest x-ray from 5/2017: From [facility]  FINDINGS: Heart size within " normal limits. Diffuse changes of interstitial fibrosis. Interstitium does appear slightly more prominent than on the comparison study, and a superimposed acute infectious process or mild fluid overload would be difficult to exclude. No pneumothorax or pleural effusion.     Chest x-ray on 10/2019 at [facility] compared to chest x-ray done on 5/2017: Official report:  Stable mild peripheral fibrotic change in both lungs would be consistent with underlying pulmonary fibrosis.  No acute new superimposed findings and nothing to suggest any significant progression.    CT chest at [facility] done on 11/2020:  Scattered bilateral interstitial fibrosis without additional lung parenchymal abnormality.  No significant bronchiectasis.    Desat study in clinic on 5/4/4021:  Patient oxygen saturation on RA at rest is 94%.  Oxygen saturation while ambulating 300ft on RA is 89-90%.    Assessment/Plan:     Wyatt Gutierrez is a 75 y.o. male who was referred here for pulm fibrosis back on 12/2020. Unfortunately we don't have PFT's.  Per radiology based on reports, chest x-ray does not show significant change between 2019 and 2017, although not able to personally viewed these images at this time.  Serology for ILD was negative. HRCT and PFT were not done earlier in the year due to covid.     Recommendations:  Discussed antifibrotic agents, would like to hold for now  desat study in clinic borderline negative.  We will repeat in a year or 2 or if patient becomes symptomatic sooner  pft's in [facility]  Avoid eating 2-4 hours before going to bed  Recommend losing weight     F/u in 6 months.      Whitney José MD  Pulmonary and Critical Care Medicine  Electronically Signed on 5/4/2021    Current Outpatient Medications   Medication Sig Dispense Refill     acyclovir (ZOVIRAX) 400 MG tablet Take 400 mg by mouth 3 (three) times a day as needed.              atenolol (TENORMIN) 50 MG tablet Take 50 mg by mouth at bedtime.       atorvastatin (LIPITOR)  10 MG tablet Take 10 mg by mouth at bedtime.       glucosamine-chondroitin 500-400 mg cap Take 1 capsule by mouth 2 (two) times a day.       ibuprofen (ADVIL,MOTRIN) 200 MG tablet Take 2 tablets (400 mg total) by mouth 3 (three) times a day as needed for pain.  0     metoprolol succinate (TOPROL-XL) 50 MG 24 hr tablet Take 50 mg by mouth daily.       multivitamin therapeutic tablet Take 1 tablet by mouth daily.       omeprazole (PRILOSEC) 20 MG capsule Take 20 mg by mouth 2 (two) times a day before meals.       triamterene-hydrochlorothiazide (DYAZIDE) 37.5-25 mg per capsule Take 1 capsule by mouth every morning.       No current facility-administered medications for this visit.      Allergies   Allergen Reactions     Mold        Meds and Allergies: See EHR for the updated medication list and Allergies. These were reviewed.     Much or all of the text in this note was generated through the use of the Dragon Dictate voice-to-text software. Errors in spelling or words which seem out of context are unintentional. Sound alike errors, in particular, may have escaped editing.

## 2021-06-19 NOTE — LETTER
Letter by Jose Virk MD at      Author: Jose Virk MD Service: -- Author Type: --    Filed:  Encounter Date: 10/7/2019 Status: Signed         Wyatt Gutierrez  [address] [place] [address]    October 7, 2019    Dear Shant Gutierrez,    Welcome to [facility]! Your appointment information is below.   Please bring the following to your appointment:    Insurance Card, so we may scan it for our records    Drivers license or valid ID, so we may scan it for our records    Co-pay (as applicable per your insurance plan)    A current list of your medications including over the counter products such as vitamins and supplements    Your medical records including copies of X-Ray films if you are transferring your care from another clinic.  If you do not have your records, please fill out the release of information form and we will request those records.     Provider: Jose Virk MD  Appointment Date:  Monday, November 18, 2019  Arrival Time:   10:00 PFT,  11:00 dr rutledge.    Location: [address]        [facility], [address]    **Please allow adequate time for your commute and parking. If you are more than 10 minutes late, you may be asked to reschedule.     If you need to cancel or reschedule your appointment, please notify us at least 24 hours prior to your appointment time so we are able to make this time available for another patient.    Thank you for choosing the [facility] for your health care needs. If you have any questions, please do not hesitate to contact us at any time at   [phone]. We look forward to caring for you.     Sincerely,     [facility] staff

## 2021-06-25 NOTE — PROGRESS NOTES
CHIEF COMPLAINT:   Epistaxis        HISTORY OF PRESENT ILLNESS    Wyatt Gutierrez   [facility].    Patient has had this several significant nosebleeds and is throughout his life.  He is not on anticoagulant.  He does believe he has seasonal allergic rhinitis but is not using any medications for this.  Also has complains of postnasal drip and chronic cough.  The postnasal drip is mostly at bedtime.  Reflux symptom questionnaire is administered the patient he scores a 17 on this questionnaire.  Moderate to severe problems include excessive mucus or postnasal drip as well as coughing after eating or lying down as well as sensation of something stuck in the throat.  Mild to moderate problem includes troublesome annoying noise some trouble or annoying cough troublesome annoying cough.  Mild problem includes clearing the throat, breathing difficulties and heartburn chest pain indigestion.             REVIEW OF SYSTEMS    Review of Systems: a 10-system review is reviewed at this encounter.  See scanned document.         PHYSICAL EXAM:        HEAD: Normal appearance and symmetry:  No cutaneous lesions.      EARS:    Normal TM's bilaterally. Normal auditory canals and external ears. Non-tender.  Hemotympanum noted bilaterally (left greater than right)         NOSE:    Dorsum:   straight  Septum: normal and deviated to left  Mucosa:  moist  Inferior turbinates:  normal    Rhinorocket in position LEFT nasal cavity (removed without difficulty)     ORAL CAVITY/OROPHARYNX:    Lips:  Normal.  Tongue: normal, midline  Mucosa:   no lesions  Tonsils:  1+      NECK:  Trachea:  midline.   Thyroid:  normal   Adenopathy:  none       NEURO:   Alert and Oriented       RESPIRATORY:   Symmetry and Respiratory effort    PSYCH:   normal mood and affect    SKIN:  warm and dry         IMPRESSION:    Encounter Diagnoses   Name Primary?     Encounter for removal of nasal packing Yes      Seasonal allergic rhinitis, unspecified trigger      Hematotympanum of both ears      PND (post-nasal drip)      Chronic cough        RECOMMENDATIONS:    No orders of the defined types were placed in this encounter.     Medications Ordered   Medications     azelastine (ASTELIN) 137 mcg (0.1 %) nasal spray     Sig: Use in each nostril as directed     Dispense:  30 mL     Refill:  12     2 sprays each nostril 1-2x daily as needed for nasal congestion (use nightly for first 2 week)        Discuss we will discuss trial of PPI agent at next visit.  We will plan laryngoscopy as well.  Patient return in 1 month with a hearing test.

## 2021-06-26 NOTE — PROGRESS NOTES
RESPIRATORY CARE NOTE     Patient Name: Wyatt Gutierrez  Today's Date: 6/14/2021     Complete PFT done. Pt performed tests with good effort. Test results meet ATS criteria. Albuterol neb given. Results scanned into epic. Pt left in no distress.       Ysabel Cristobal, LRT      RESPIRATORY CARE NOTE     Patient Name: Wyatt Gutierrez  Today's Date: 6/14/2021     Problem List  Patient Active Problem List   Diagnosis     Primary osteoarthritis of knees, bilateral     Status post total left knee replacement     Benign essential hypertension     Dyslipidemia     Gastroesophageal reflux disease, esophagitis presence not specified                           Ysabel Cristobal LRT

## 2021-07-03 NOTE — ADDENDUM NOTE
Addendum Note by Ricardo Bright MD at 10/31/2019  1:29 PM     Author: Ricardo Bright MD Service: -- Author Type: Physician    Filed: 10/31/2019  1:29 PM Date of Service: 10/31/2019  1:29 PM Status: Signed    : Ricardo Bright MD (Physician)       Addendum  created 10/31/19 1329 by Ricardo Bright MD    Order list changed, Order sets accessed

## 2021-07-12 ENCOUNTER — OFFICE VISIT (OUTPATIENT)
Dept: OTOLARYNGOLOGY | Facility: CLINIC | Age: 76
End: 2021-07-12
Payer: MEDICARE

## 2021-07-12 ENCOUNTER — OFFICE VISIT (OUTPATIENT)
Dept: AUDIOLOGY | Facility: CLINIC | Age: 76
End: 2021-07-12
Payer: MEDICARE

## 2021-07-12 DIAGNOSIS — H74.8X3 HEMATOTYMPANUM OF BOTH EARS: Primary | ICD-10-CM

## 2021-07-12 DIAGNOSIS — H90.3 ASNHL (ASYMMETRICAL SENSORINEURAL HEARING LOSS): Primary | ICD-10-CM

## 2021-07-12 DIAGNOSIS — H90.3 SNHL (SENSORY-NEURAL HEARING LOSS), ASYMMETRICAL: ICD-10-CM

## 2021-07-12 PROCEDURE — 92550 TYMPANOMETRY & REFLEX THRESH: CPT | Performed by: AUDIOLOGIST

## 2021-07-12 PROCEDURE — 99207 PR NO CHARGE LOS: CPT | Performed by: AUDIOLOGIST

## 2021-07-12 PROCEDURE — 92557 COMPREHENSIVE HEARING TEST: CPT | Performed by: AUDIOLOGIST

## 2021-07-12 PROCEDURE — 99213 OFFICE O/P EST LOW 20 MIN: CPT | Performed by: OTOLARYNGOLOGY

## 2021-07-12 RX ORDER — GLUCOSAMINE HCL 500 MG
TABLET ORAL
COMMUNITY
End: 2021-11-02

## 2021-07-12 RX ORDER — CHLORAL HYDRATE 500 MG
2000 CAPSULE ORAL
COMMUNITY
End: 2021-11-02

## 2021-07-12 RX ORDER — METOPROLOL TARTRATE 25 MG/1
50 TABLET, FILM COATED ORAL
COMMUNITY
End: 2021-11-02

## 2021-07-12 NOTE — LETTER
7/12/2021         RE: Wyatt Gutierrez  [address] [place] [address]        Dear Colleague,    Thank you for referring your patient, Wyatt Gutierrez, to the [facility]. Please see a copy of my visit note below.    CHIEF COMPLAINT:  Recheck      HISTORY OF PRESENT ILLNESS    Wyatt was seen in follow up for audiogram review.  He presents for audiogram review.  No concerns about the hearing.  He does a history of noise exposure from service in the  when he was younger in the [employer].  Denies any tinnitus or vertigo.  No further episodes of epistaxis.  Patient is also complained of excessive phlegm at nighttime along with cough.  He drinks up to 3 diet sodas a week.         REVIEW OF SYSTEMS    Review of Systems: a 10-system review is reviewed at this encounter.  See scanned document.     Dogs and Mold [molds & smuts]     PHYSICAL EXAM:        HEAD: Normal appearance and symmetry:  No cutaneous lesions.      EARS:   Auricles normal    Examination of the ear shows both middle ear clefts are clear of hemotympanum that was seen previously on exam     NOSE:    Dorsum:   straight       ORAL CAVITY/OROPHARYNX:    Lips:  Normal.     NECK:  Trachea:  midline       NEURO:   Alert and Oriented    GAIT AND STATION:  normal     RESPIRATORY:   Symmetry and Respiratory effort    PSYCH:   normal mood and affect    SKIN:  warm and dry     AUIDOGRAM:  3k notch left ear.  Excellent WR.  Audiogram is consistent with prior noise exposure from  service    IMPRESSION:       Encounter Diagnoses   Name Primary?     Hematotympanum of both ears Yes     SNHL (sensory-neural hearing loss), asymmetrical           RECOMMENDATIONS:    Avoid diet sodas  Return annually for hearing test  Return immediately for any sudden change in hearing          Again, thank you for allowing me to participate in the care of your patient.        Sincerely,        Capo Young MD

## 2021-07-12 NOTE — PATIENT INSTRUCTIONS
Upper Airway REflux.       Lifestyle changes:    Avoid eating 2-3 hours before bedtime.   You may find it helpful to elevate the head of your bed.     Avoid following foods that are likely to trigger acid reflux:    Coffee or tea (try LOW ACID coffee or herbal tea)  Anything that s fizzy or has caffeine in it  Alcohol   Citrus fruits, such as oranges and rafal  Tomato based foods (salsa, pizza, lasanga)  Chocolate   Mint or peppermint  Fatty foods (ice cream)  Spicy foods  Onions and garlic

## 2021-07-12 NOTE — PROGRESS NOTES
AUDIOLOGY REPORT    SUMMARY: Audiology visit completed. See audiogram for results.     RECOMMENDATIONS: Follow-up with ENT.    Xiomara Lee, [facility], [place]  [place] Licensed Audiologist #[id number]

## 2021-07-12 NOTE — PROGRESS NOTES
CHIEF COMPLAINT:  Recheck      HISTORY OF PRESENT ILLNESS    Wyatt was seen in follow up for audiogram review.  He presents for audiogram review.  No concerns about the hearing.  He does a history of noise exposure from service in the  when he was younger in the [employer].  Denies any tinnitus or vertigo.  No further episodes of epistaxis.  Patient is also complained of excessive phlegm at nighttime along with cough.  He drinks up to 3 diet sodas a week.         REVIEW OF SYSTEMS    Review of Systems: a 10-system review is reviewed at this encounter.  See scanned document.     Dogs and Mold [molds & smuts]     PHYSICAL EXAM:        HEAD: Normal appearance and symmetry:  No cutaneous lesions.      EARS:   Auricles normal    Examination of the ear shows both middle ear clefts are clear of hemotympanum that was seen previously on exam     NOSE:    Dorsum:   straight       ORAL CAVITY/OROPHARYNX:    Lips:  Normal.     NECK:  Trachea:  midline       NEURO:   Alert and Oriented    GAIT AND STATION:  normal     RESPIRATORY:   Symmetry and Respiratory effort    PSYCH:   normal mood and affect    SKIN:  warm and dry     AUIDOGRAM:  3k notch left ear.  Excellent WR.  Audiogram is consistent with prior noise exposure from  service    IMPRESSION:       Encounter Diagnoses   Name Primary?     Hematotympanum of both ears Yes     SNHL (sensory-neural hearing loss), asymmetrical           RECOMMENDATIONS:    Avoid diet sodas  Return annually for hearing test  Return immediately for any sudden change in hearing

## 2021-10-04 ENCOUNTER — LAB REQUISITION (OUTPATIENT)
Dept: LAB | Facility: CLINIC | Age: 76
End: 2021-10-04
Payer: MEDICARE

## 2021-10-04 DIAGNOSIS — I10 ESSENTIAL (PRIMARY) HYPERTENSION: ICD-10-CM

## 2021-10-04 DIAGNOSIS — Z12.5 ENCOUNTER FOR SCREENING FOR MALIGNANT NEOPLASM OF PROSTATE: ICD-10-CM

## 2021-10-04 DIAGNOSIS — E78.5 HYPERLIPIDEMIA, UNSPECIFIED: ICD-10-CM

## 2021-10-04 LAB
ALBUMIN SERPL-MCNC: 3.9 G/DL (ref 3.5–5)
ALP SERPL-CCNC: 100 U/L (ref 45–120)
ALT SERPL W P-5'-P-CCNC: 23 U/L (ref 0–45)
ANION GAP SERPL CALCULATED.3IONS-SCNC: 15 MMOL/L (ref 5–18)
AST SERPL W P-5'-P-CCNC: 25 U/L (ref 0–40)
BILIRUB SERPL-MCNC: 0.5 MG/DL (ref 0–1)
BUN SERPL-MCNC: 14 MG/DL (ref 8–28)
CALCIUM SERPL-MCNC: 9.9 MG/DL (ref 8.5–10.5)
CHLORIDE BLD-SCNC: 98 MMOL/L (ref 98–107)
CHOLEST SERPL-MCNC: 160 MG/DL
CO2 SERPL-SCNC: 23 MMOL/L (ref 22–31)
CREAT SERPL-MCNC: 0.9 MG/DL (ref 0.7–1.3)
FASTING STATUS PATIENT QL REPORTED: ABNORMAL
GFR SERPL CREATININE-BSD FRML MDRD: 84 ML/MIN/1.73M2
GLUCOSE BLD-MCNC: 89 MG/DL (ref 70–125)
HDLC SERPL-MCNC: 45 MG/DL
LDLC SERPL CALC-MCNC: 75 MG/DL
POTASSIUM BLD-SCNC: 4.2 MMOL/L (ref 3.5–5)
PROT SERPL-MCNC: 7.2 G/DL (ref 6–8)
PSA SERPL-MCNC: 1.62 UG/L (ref 0–6.5)
SODIUM SERPL-SCNC: 136 MMOL/L (ref 136–145)
TRIGL SERPL-MCNC: 200 MG/DL

## 2021-10-04 PROCEDURE — G0103 PSA SCREENING: HCPCS | Mod: ORL | Performed by: FAMILY MEDICINE

## 2021-10-04 PROCEDURE — 80053 COMPREHEN METABOLIC PANEL: CPT | Mod: ORL | Performed by: FAMILY MEDICINE

## 2021-10-04 PROCEDURE — 80061 LIPID PANEL: CPT | Mod: ORL | Performed by: FAMILY MEDICINE

## 2021-10-17 ENCOUNTER — HEALTH MAINTENANCE LETTER (OUTPATIENT)
Age: 76
End: 2021-10-17

## 2021-11-02 ENCOUNTER — OFFICE VISIT (OUTPATIENT)
Dept: PULMONOLOGY | Facility: OTHER | Age: 76
End: 2021-11-02
Payer: MEDICARE

## 2021-11-02 VITALS
WEIGHT: 230 LBS | BODY MASS INDEX: 33 KG/M2 | DIASTOLIC BLOOD PRESSURE: 70 MMHG | SYSTOLIC BLOOD PRESSURE: 124 MMHG | OXYGEN SATURATION: 95 % | HEART RATE: 75 BPM

## 2021-11-02 DIAGNOSIS — J84.112 IPF (IDIOPATHIC PULMONARY FIBROSIS) (H): Primary | ICD-10-CM

## 2021-11-02 PROCEDURE — 99213 OFFICE O/P EST LOW 20 MIN: CPT | Performed by: INTERNAL MEDICINE

## 2021-11-02 NOTE — PROGRESS NOTES
PULMONARY CLINIC FOLLOW UP NOTE     History:     HPI: Wyatt Gutierrez is a 75 y.o. male, previous smoker with PMH sig for KAITY, HTN, HPL who was referred to us back on 12/20/2019 for abnormal chest x-ray that was concerning for ILD.  Patient has had chronic shortness of breath since at least 2010.  He quit smoking tobacco in June 2019.  Had smoked almost all his life.    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  He has been traveling back and forth since 2012.    Interval History: pt is here for his scheduled follow up. No hospitalizations. No ED or urgent care visits. No exacerbations requiring abx/steroids.  He does some walking. He was about 2 miles. He has not done that in 6 months. Cough is still there, persistent. Does not wake him up from sleep.     He continues to play golf and rides bike but not as much.  He received his vaccination for COVID-19.  Since his last visit, he lost about 23 lbs - watching diet.     PMHx/PSHx:  KAITY - uses CPAP  HTN, HPL  Total knee arthroplasty on 10/31/2019  Shoulder arthroscopy     Social Hx:    Ex Smoker. Quit smoking in 6/2019. Switched to vaping from 6/2019 to 10/2019. Then he used nicotine patch.  Used to smoke about 14 cigarettes per day for most of his life.   Work: used to work as an  before retiring. Worked at a desk.     ROS: 10 point review of system done. Pertinent findings are noted in the HPI.    Exam/Data:     /70   Pulse 75   Wt 104.3 kg (230 lb)   SpO2 95%   BMI 33.00 kg/m        GEN: comfortable, NAD  HEENT: NCAT, EMOI, mmm, no LAD   CVS: S1S2, RRR, systolic murmur  Lung: no wheezing, good air entry, bibasilar crackles  Abd: soft, nt, + BS.  Ext: no c/c/e  Neuro: non-focal  Psych: appropriate affect      Data:     Labs personally reviewed.      IMAGING: personally reviewed images. Formal radiology interpretation noted below.  Chest x-ray from 5/2017: From [facility]  FINDINGS: Heart size within normal  limits. Diffuse changes of interstitial fibrosis. Interstitium does appear slightly more prominent than on the comparison study, and a superimposed acute infectious process or mild fluid overload would be difficult to exclude. No pneumothorax or pleural effusion.     Chest x-ray on 10/2019 at [facility] compared to chest x-ray done on 5/2017: Official report:  Stable mild peripheral fibrotic change in both lungs would be consistent with underlying pulmonary fibrosis.  No acute new superimposed findings and nothing to suggest any significant progression.    CT chest at [facility] done on 11/2020:  Scattered bilateral interstitial fibrosis without additional lung parenchymal abnormality.  No significant bronchiectasis.    Desat study in clinic on 5/4/4021:  Patient oxygen saturation on RA at rest is 94%.  Oxygen saturation while ambulating 300ft on RA is 89-90%.    PFT''s on 6/2021:  FEV1/FVC is 85 and is normal.  FEV1 is 89% predicted and is normal.  FVC is 78% predicted and is decreased  There was no improvement in spirometry after a single inhaled dose of bronchodilator.  TLC is 63% predicted and is reduced.  RV is 56% predicted and is reduced.  DLCO is 58% predicted and is reduced when it is corrected for hemoglobin. The flow volume loop is normal No.     Impression:  Full Pulmonary Function Test is abnormal. Spirometry is consistent with  restrictive ventilatory defect.  Spirometry is not consistent with reversibility.  There is no hyperinflation.  There is no air-trapping.  Diffusion capacity when corrected for hemoglobin is moderately reduced.    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 Differential diagnosis includes UIP/IPF and connective tissue disease associated ILD typically rheumatoid arthritis. The amount of lung   fibrosis is not changed from 10/29/2020    Assessment/Plan:     Wyatt Gutierrez is a  75 y.o. male who was referred here for pulm fibrosis back on 12/2020. Per radiology based on reports, chest x-ray does not show significant change between 2019 and 2017, although not able to personally viewed these images at this time.  Serology for ILD was negative. HRCT shows evidence of IPF.  PFTs consistent with restrictive pattern.      Recommendations:  Discussed antifibrotic agents.  Handout given.  Patient will think about it.  desat study in clinic previously borderline negative.  We will repeat in a year or 2 or if patient becomes symptomatic sooner  Avoid eating 2-4 hours before going to bed  On PPI for GERD  Advised to continue losing weight  Stay active  We will order PFTs next visit    F/u in 6 months.      Whitney José MD  Pulmonary and Critical Care Medicine  Electronically Signed on 5/4/2021    Meds and Allergies: See EHR for the updated medication list and Allergies. These were reviewed.     Much or all of the text in this note was generated through the use of the Dragon Dictate voice-to-text software. Errors in spelling or words which seem out of context are unintentional. Sound alike errors, in particular, may have escaped editing.

## 2022-01-15 ENCOUNTER — TRANSFERRED RECORDS (OUTPATIENT)
Dept: HEALTH INFORMATION MANAGEMENT | Facility: CLINIC | Age: 77
End: 2022-01-15
Payer: MEDICARE

## 2022-01-15 LAB
ALT SERPL-CCNC: 30 U/L (ref 16–63)
AST SERPL-CCNC: 27 U/L (ref 15–37)
INR (EXTERNAL): 1 (ref 0–2.5)

## 2022-01-16 ENCOUNTER — TRANSFERRED RECORDS (OUTPATIENT)
Dept: HEALTH INFORMATION MANAGEMENT | Facility: CLINIC | Age: 77
End: 2022-01-16
Payer: MEDICARE

## 2022-01-18 LAB
CREATININE (EXTERNAL): 0.9 MG/DL (ref 0.7–1.3)
GFR ESTIMATED (EXTERNAL): 88 ML/MIN
GFR ESTIMATED (IF AFRICAN AMERICAN) (EXTERNAL): 106 ML/MIN
GLUCOSE (EXTERNAL): 84.5 MG/DL (ref 70–100)
POTASSIUM (EXTERNAL): 4.1 MMOL/L (ref 3.5–5.1)

## 2022-02-02 ENCOUNTER — TELEPHONE (OUTPATIENT)
Dept: PULMONOLOGY | Facility: OTHER | Age: 77
End: 2022-02-02
Payer: MEDICARE

## 2022-02-02 NOTE — TELEPHONE ENCOUNTER
"Phone call from Matt. States he ended up hospitalized in [place] with pneumonia.  Just released and is asking advice on how to deal with the oxygen he was sent h ome with?    Instructed that he should keep his oxygen levels 88% or better.  States he is currently using 2 LPM when he walks. Has been walking twice daily outside and states that his oxyen sats at completion of his walk are around 88%.  When he rests, they quickly come up to 92-94%.  He does have an oximeter to watch his oxygen saturations.  Also has an incentive spirometer that he has been using daily as well.        States he has \"Zoom call\" set up with dr. José in March.  "

## 2022-04-12 ENCOUNTER — OFFICE VISIT (OUTPATIENT)
Dept: PULMONOLOGY | Facility: OTHER | Age: 77
End: 2022-04-12
Payer: MEDICARE

## 2022-04-12 VITALS
BODY MASS INDEX: 33.72 KG/M2 | OXYGEN SATURATION: 98 % | DIASTOLIC BLOOD PRESSURE: 80 MMHG | RESPIRATION RATE: 16 BRPM | WEIGHT: 235 LBS | HEART RATE: 74 BPM | SYSTOLIC BLOOD PRESSURE: 130 MMHG

## 2022-04-12 DIAGNOSIS — J84.9 ILD (INTERSTITIAL LUNG DISEASE) (H): Primary | ICD-10-CM

## 2022-04-12 DIAGNOSIS — I48.91 ATRIAL FIBRILLATION, UNSPECIFIED TYPE (H): ICD-10-CM

## 2022-04-12 PROCEDURE — 99214 OFFICE O/P EST MOD 30 MIN: CPT | Performed by: INTERNAL MEDICINE

## 2022-04-12 RX ORDER — METOPROLOL SUCCINATE 50 MG/1
1 TABLET, EXTENDED RELEASE ORAL DAILY
COMMUNITY
Start: 2022-03-04 | End: 2022-06-15

## 2022-04-12 RX ORDER — TRIAMTERENE CAPSULES 50 MG/1
25 CAPSULE ORAL DAILY
Status: ON HOLD | COMMUNITY
Start: 2022-03-04 | End: 2024-06-11

## 2022-04-12 RX ORDER — GLUCOSAM/CHONDRO/HERB 149/HYAL 750-100 MG
TABLET ORAL SEE ADMIN INSTRUCTIONS
COMMUNITY
End: 2022-06-15

## 2022-04-12 NOTE — PROGRESS NOTES
PULMONARY CLINIC FOLLOW UP NOTE     History:     HPI: Wyatt Gutierrez is a 75 y.o. male, previous smoker with PMH sig for KAITY, HTN, HPL who was referred to us back on 12/20/2019 for abnormal chest x-ray that was concerning for ILD.  Patient has had chronic shortness of breath since at least 2010.  He quit smoking tobacco in June 2019.  Had smoked almost all his life.    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  He has been traveling back and forth since 2012.    Interval History: pt is here for his scheduled follow up.  In January of this year 2022, patient was hospitalized in [place].  At that time he was found to have respiratory failure.  He had a CT scan of the chest that showed bilateral groundglass opacities.  He notes that he was given antibiotics for presumed pneumonia.  On February 2022, he was rehospitalized again with LUMA beltran with RVR.  He had established care with a cardiologist in [place] however would like to establish care here in [place].  He is on oxygen at 2 L/min which was started after his hospitalization on January 2022 in [place].  He notes that he has shortness of breath with minimal exertion.  He has a cough that is persistent.      Patient enjoys golfing.    PMHx/PSHx:  KAITY - uses CPAP  HTN, HPL  Total knee arthroplasty on 10/31/2019  Shoulder arthroscopy     Social Hx:    Ex Smoker. Quit smoking in 6/2019. Switched to vaping from 6/2019 to 10/2019. Then he used nicotine patch.  Used to smoke about 14 cigarettes per day for most of his life.   Work: used to work as an  before retiring. Worked at a desk.     ROS: 10 point review of system done. Pertinent findings are noted in the HPI.    Exam/Data:     /80 (BP Location: Right arm, Patient Position: Chair, Cuff Size: Adult Large)   Pulse 74   Resp 16   Wt 106.6 kg (235 lb)   SpO2 98%   BMI 33.72 kg/m    GEN: comfortable, NAD  HEENT: NCAT, EMOI, mmm, no LAD   CVS: S1S2, RRR, systolic  murmur  Lung: no wheezing, good air entry, bibasilar crackles  Abd: soft, nt, + BS.  Ext: no c/c/e  Neuro: non-focal  Psych: appropriate affect      Data:     Labs personally reviewed.      IMAGING: personally reviewed images. Formal radiology interpretation noted below.  Chest x-ray from 5/2017: From [facility]  FINDINGS: Heart size within normal limits. Diffuse changes of interstitial fibrosis. Interstitium does appear slightly more prominent than on the comparison study, and a superimposed acute infectious process or mild fluid overload would be difficult to exclude. No pneumothorax or pleural effusion.     Chest x-ray on 10/2019 at [facility] compared to chest x-ray done on 5/2017: Official report:  Stable mild peripheral fibrotic change in both lungs would be consistent with underlying pulmonary fibrosis.  No acute new superimposed findings and nothing to suggest any significant progression.    CT chest at [facility] done on 11/2020:  Scattered bilateral interstitial fibrosis without additional lung parenchymal abnormality.  No significant bronchiectasis.    Desat study in clinic on 5/4/4021:  Patient oxygen saturation on RA at rest is 94%.  Oxygen saturation while ambulating 300ft on RA is 89-90%.    PFT''s on 6/2021:  FEV1/FVC is 85 and is normal.  FEV1 is 89% predicted and is normal.  FVC is 78% predicted and is decreased  There was no improvement in spirometry after a single inhaled dose of bronchodilator.  TLC is 63% predicted and is reduced.  RV is 56% predicted and is reduced.  DLCO is 58% predicted and is reduced when it is corrected for hemoglobin. The flow volume loop is normal No.  Impression:  Full Pulmonary Function Test is abnormal. Spirometry is consistent with  restrictive ventilatory defect.  Spirometry is not consistent with reversibility.  There is no hyperinflation.  There is no air-trapping.  Diffusion capacity when corrected for hemoglobin is moderately reduced.    CT chest on  4/2021: IMPRESSION:   Findings of a diffuse fibrosing lung disorder/idiopathic interstitial pneumonia are present, definite UIP pattern. Differential diagnosis includes UIP/IPF and connective tissue disease associated ILD typically rheumatoid arthritis. The amount of lung   fibrosis is not changed from 10/29/2020    Assessment/Plan:     Wyatt Gutierrez is a 75 y.o. male who was referred here for pulm fibrosis back on 12/2020. Per radiology based on reports, chest x-ray does not show significant change between 2019 and 2017, although not able to personally viewed these images at this time.  Serology for ILD was negative. HRCT shows evidence of IPF.  PFTs consistent with restrictive pattern.      Patient was hospitalized on 1/2022 in [place] with presumed pneumonia.  CT scan of the chest was reviewed from that time.  That showed extensive groundglass opacities on top of pulmonary fibrosis.  Per radiology, suspicious for COVID-19.    Recommendations:  Discussed antifibrotic agents.  Handout given previously.   HRCT scan to evaluate lungs after his hospitalizations on 1/2022 for ? PNA and 2/2002 after afib.  On oxygen at 2-3 Liters per min with exertion  Would like cardiology referral - made.  Pulmonary rehab referral.    F/u in a few weeks after CT Chest   Discuss with pt starting antifibrotic agent mayelin clear worsening since he was hospitalized on 1/2022.    F/u in 3-4 weeks      Whitney José MD  Pulmonary and Critical Care Medicine  Electronically Signed on 5/4/2021    Meds and Allergies: See EHR for the updated medication list and Allergies. These were reviewed.     Much or all of the text in this note was generated through the use of the Dragon Dictate voice-to-text software. Errors in spelling or words which seem out of context are unintentional. Sound alike errors, in particular, may have escaped editing.

## 2022-04-26 ENCOUNTER — HOSPITAL ENCOUNTER (OUTPATIENT)
Dept: CT IMAGING | Facility: HOSPITAL | Age: 77
Discharge: HOME | End: 2022-04-26
Attending: INTERNAL MEDICINE | Admitting: INTERNAL MEDICINE
Payer: MEDICARE

## 2022-04-26 DIAGNOSIS — J84.9 ILD (INTERSTITIAL LUNG DISEASE) (H): ICD-10-CM

## 2022-04-26 PROCEDURE — 71250 CT THORAX DX C-: CPT | Mod: MG

## 2022-05-04 ENCOUNTER — OFFICE VISIT (OUTPATIENT)
Dept: PULMONOLOGY | Facility: OTHER | Age: 77
End: 2022-05-04
Payer: MEDICARE

## 2022-05-04 ENCOUNTER — LAB (OUTPATIENT)
Dept: LAB | Facility: HOSPITAL | Age: 77
End: 2022-05-04
Payer: MEDICARE

## 2022-05-04 VITALS
DIASTOLIC BLOOD PRESSURE: 72 MMHG | SYSTOLIC BLOOD PRESSURE: 112 MMHG | OXYGEN SATURATION: 97 % | HEART RATE: 81 BPM | WEIGHT: 241.2 LBS | BODY MASS INDEX: 34.61 KG/M2

## 2022-05-04 DIAGNOSIS — J84.9 ILD (INTERSTITIAL LUNG DISEASE) (H): ICD-10-CM

## 2022-05-04 DIAGNOSIS — J84.9 ILD (INTERSTITIAL LUNG DISEASE) (H): Primary | ICD-10-CM

## 2022-05-04 LAB
ALBUMIN SERPL-MCNC: 3.9 G/DL (ref 3.5–5)
ALP SERPL-CCNC: 81 U/L (ref 45–120)
ALT SERPL W P-5'-P-CCNC: 18 U/L (ref 0–45)
AST SERPL W P-5'-P-CCNC: 20 U/L (ref 0–40)
BILIRUB DIRECT SERPL-MCNC: <0.1 MG/DL
BILIRUB SERPL-MCNC: 0.3 MG/DL (ref 0–1)
PROT SERPL-MCNC: 7.6 G/DL (ref 6–8)

## 2022-05-04 PROCEDURE — 36415 COLL VENOUS BLD VENIPUNCTURE: CPT

## 2022-05-04 PROCEDURE — 99214 OFFICE O/P EST MOD 30 MIN: CPT | Performed by: INTERNAL MEDICINE

## 2022-05-04 PROCEDURE — 80076 HEPATIC FUNCTION PANEL: CPT

## 2022-05-04 NOTE — PROGRESS NOTES
PULMONARY CLINIC FOLLOW UP NOTE     History:     HPI: Wyatt Gutierrez is a 75 y.o. male, previous smoker with PMH sig for KAITY, HTN, HPL who was referred to us back on 12/20/2019 for abnormal chest x-ray that was concerning for ILD.  Patient has had chronic shortness of breath since at least 2010.  He quit smoking tobacco in June 2019.  Had smoked almost all his life.    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  He has been traveling back and forth since 2012.    Interval History: Patient is here for his regular scheduled follow-up visit.  He endorses a cough that is dry.  He endorses shortness of breath with exertion.  He does not do much in terms of activities although he likes and enjoys golfing and being outdoors.  He notes that he is scheduled for [facility] of this coming Monday.  He had a CT scan of the chest and would like to review.  He remains on oxygen at 2 L/min.  He notes that he would be seeing cardiology soon.    PMHx/PSHx:  KAITY - uses CPAP  HTN, HPL  Total knee arthroplasty on 10/31/2019  Shoulder arthroscopy     Social Hx:    Ex Smoker. Quit smoking in 6/2019. Switched to vaping from 6/2019 to 10/2019. Then he used nicotine patch.  Used to smoke about 14 cigarettes per day for most of his life.   Work: used to work as an  before retiring. Worked at a desk.     ROS: 10 point review of system done. Pertinent findings are noted in the HPI.    Exam/Data:     /72   Pulse 81   Wt 109.4 kg (241 lb 3.2 oz)   SpO2 97%   BMI 34.61 kg/m    GEN: comfortable, NAD  HEENT: NCAT, EMOI, mmm, no LAD   CVS: S1S2, RRR, systolic murmur  Lung: no wheezing, good air entry, bibasilar crackles  Abd: soft, nt, + BS.  Ext: no c/c/e  Neuro: non-focal  Psych: appropriate affect      Data:     Labs personally reviewed.      IMAGING: personally reviewed images. Formal radiology interpretation noted below.  Chest x-ray from 5/2017: From [facility]  FINDINGS:  Heart size within normal limits. Diffuse changes of interstitial fibrosis. Interstitium does appear slightly more prominent than on the comparison study, and a superimposed acute infectious process or mild fluid overload would be difficult to exclude. No pneumothorax or pleural effusion.     Chest x-ray on 10/2019 at [facility] compared to chest x-ray done on 5/2017: Official report:  Stable mild peripheral fibrotic change in both lungs would be consistent with underlying pulmonary fibrosis.  No acute new superimposed findings and nothing to suggest any significant progression.    CT chest at [facility] done on 11/2020:  Scattered bilateral interstitial fibrosis without additional lung parenchymal abnormality.  No significant bronchiectasis.    Desat study in clinic on 5/4/4021:  Patient oxygen saturation on RA at rest is 94%.  Oxygen saturation while ambulating 300ft on RA is 89-90%.    PFT''s on 6/2021:  FEV1/FVC is 85 and is normal.  FEV1 is 89% predicted and is normal.  FVC is 78% predicted and is decreased  There was no improvement in spirometry after a single inhaled dose of bronchodilator.  TLC is 63% predicted and is reduced.  RV is 56% predicted and is reduced.  DLCO is 58% predicted and is reduced when it is corrected for hemoglobin. The flow volume loop is normal No.  Impression:  Full Pulmonary Function Test is abnormal. Spirometry is consistent with  restrictive ventilatory defect.  Spirometry is not consistent with reversibility.  There is no hyperinflation.  There is no air-trapping.  Diffusion capacity when corrected for hemoglobin is moderately reduced.    CT chest on 4/2021: IMPRESSION:  Findings of a diffuse fibrosing lung disorder/idiopathic interstitial pneumonia are present, definite UIP pattern. Differential diagnosis includes UIP/IPF and connective tissue disease associated ILD typically rheumatoid arthritis. The amount of lung fibrosis is not changed from 10/29/2020    Assessment/Plan:      Wyatt Gutierrez is a 75 y.o. male who was referred here for pulm fibrosis back on 12/2020. Per radiology based on reports, chest x-ray does not show significant change between 2019 and 2017, although not able to personally viewed these images at this time.  Serology for ILD was negative. HRCT shows evidence of IPF.  PFTs consistent with restrictive pattern.      Patient was hospitalized on 1/2022 in [place] with presumed pneumonia.  CT scan of the chest was reviewed from that time.  That showed extensive groundglass opacities on top of pulmonary fibrosis.  Per radiology, suspicious for COVID-19.    Repeat CT scan of the chest on April 2022 showed improvement and resolution of groundglass opacities.  He does have stable diffuse fibrosing lung disease with typical UIP pattern.  Anterior distribution raises possibility of connective tissue disorder however RF, ANCA, AYDEN, anti-Ro and anti-La have been negative.    Recommendations:  Agreeable to start antifibrotic agent.  Started patient on nintedanib 150 mg twice daily  Check LFTs  Patient will need monthly LFTs x3 months, followed by LFTs every 3 months while on nintedanib.  Patient would like to have his LFTs done by his PCP.  Explained to patient risks of nausea, vomiting, liver injury, and diarrhea  Pulmonary rehab - will be starting on Monday  Referral made to [facility] for pulmonary fibrosis and possibly add pirfenidone to nintedanib  PFT's next visit. Will need yearly PFT's  Pt follows up with cardiology (murmur on exam)    F/u in 4 months    Whitney José MD  Pulmonary and Critical Care Medicine  Electronically Signed on 5/4/2021    Meds and Allergies: See EHR for the updated medication list and Allergies. These were reviewed.     Much or all of the text in this note was generated through the use of the Dragon Dictate voice-to-text software. Errors in spelling or words which seem out of context are unintentional. Sound alike errors, in  particular, may have escaped editing.

## 2022-05-05 ENCOUNTER — TELEPHONE (OUTPATIENT)
Dept: PULMONOLOGY | Facility: CLINIC | Age: 77
End: 2022-05-05
Payer: MEDICARE

## 2022-05-05 DIAGNOSIS — J84.9 ILD (INTERSTITIAL LUNG DISEASE) (H): Primary | ICD-10-CM

## 2022-05-05 NOTE — TELEPHONE ENCOUNTER
[facility] Call Center    Phone Message    May a detailed message be left on voicemail: yes     Reason for Call: Appointment Intake    Referring Provider Name: Whitney José MD in [facility]  Diagnosis and/or Symptoms: ILD (interstitial lung disease)    -Requesting Dr. Mayers     Action Taken: Message routed to:  [facility] ([facility]): Pulm.     Travel Screening: Not Applicable

## 2022-05-06 ENCOUNTER — TELEPHONE (OUTPATIENT)
Dept: PULMONOLOGY | Facility: OTHER | Age: 77
End: 2022-05-06
Payer: MEDICARE

## 2022-05-06 DIAGNOSIS — J84.9 ILD (INTERSTITIAL LUNG DISEASE) (H): ICD-10-CM

## 2022-05-06 NOTE — TELEPHONE ENCOUNTER
Prior Authorization Approval    Authorization Effective Date: 2/5/2022  Authorization Expiration Date: 5/6/2023  Medication: Ofev - Approved  Approved Dose/Quantity: 60 per 30 days  Reference #: CMM Key: [id number] - PA Case ID: [id number]   Insurance Company: [employer] - Phone [phone] Fax [phone]  Expected CoPay: $50     CoPay Card Available: No    Foundation Assistance Needed:    Which Pharmacy is filling the prescription (Not needed for infusion/clinic administered): [facility] - [place], [place] - [address]   Pharmacy Notified: Yes  Patient Notified: Yes

## 2022-05-06 NOTE — TELEPHONE ENCOUNTER
PA Initiation    Medication: Ofev - PA Pending  Insurance Company: [employer] - Phone [phone] Fax [phone]  Pharmacy Filling the Rx: [facility] - [place], [place] - [facility] [address]  Filling Pharmacy Phone:    Filling Pharmacy Fax:    Start Date: 5/6/2022    [facility] Key: [id number]

## 2022-05-06 NOTE — TELEPHONE ENCOUNTER
Patient will be seeing Dr. Estrada on 7/7/22 for new ILD Consult.  Testing orders placed for visit.     Mahogany Lara RN, BSN  ILD Nurse Care Coordinator  (P) [phone]

## 2022-05-09 ENCOUNTER — HOSPITAL ENCOUNTER (OUTPATIENT)
Dept: CARDIAC REHAB | Facility: HOSPITAL | Age: 77
Discharge: HOME | End: 2022-05-09
Attending: INTERNAL MEDICINE
Payer: MEDICARE

## 2022-05-09 DIAGNOSIS — J84.9 ILD (INTERSTITIAL LUNG DISEASE) (H): ICD-10-CM

## 2022-05-09 PROCEDURE — G0238 OTH RESP PROC, INDIV: HCPCS

## 2022-05-10 ENCOUNTER — HOSPITAL ENCOUNTER (OUTPATIENT)
Dept: CARDIAC REHAB | Facility: HOSPITAL | Age: 77
Discharge: HOME | End: 2022-05-10
Attending: INTERNAL MEDICINE
Payer: MEDICARE

## 2022-05-10 PROCEDURE — G0238 OTH RESP PROC, INDIV: HCPCS

## 2022-05-12 ENCOUNTER — HOSPITAL ENCOUNTER (OUTPATIENT)
Dept: CARDIAC REHAB | Facility: HOSPITAL | Age: 77
Discharge: HOME | End: 2022-05-12
Attending: INTERNAL MEDICINE
Payer: MEDICARE

## 2022-05-12 PROCEDURE — G0239 OTH RESP PROC, GROUP: HCPCS

## 2022-05-17 ENCOUNTER — HOSPITAL ENCOUNTER (OUTPATIENT)
Dept: RESPIRATORY THERAPY | Facility: CLINIC | Age: 77
Discharge: HOME | End: 2022-05-17
Attending: INTERNAL MEDICINE | Admitting: INTERNAL MEDICINE
Payer: MEDICARE

## 2022-05-17 ENCOUNTER — HOSPITAL ENCOUNTER (OUTPATIENT)
Dept: CARDIAC REHAB | Facility: HOSPITAL | Age: 77
Discharge: HOME | End: 2022-05-17
Attending: INTERNAL MEDICINE
Payer: MEDICARE

## 2022-05-17 DIAGNOSIS — J84.9 ILD (INTERSTITIAL LUNG DISEASE) (H): ICD-10-CM

## 2022-05-17 LAB — HGB BLD-MCNC: 11.6 G/DL (ref 13.3–17.7)

## 2022-05-17 PROCEDURE — 36415 COLL VENOUS BLD VENIPUNCTURE: CPT | Performed by: INTERNAL MEDICINE

## 2022-05-17 PROCEDURE — 94060 EVALUATION OF WHEEZING: CPT | Mod: 26 | Performed by: INTERNAL MEDICINE

## 2022-05-17 PROCEDURE — 85018 HEMOGLOBIN: CPT | Performed by: INTERNAL MEDICINE

## 2022-05-17 PROCEDURE — 94729 DIFFUSING CAPACITY: CPT | Mod: 26 | Performed by: INTERNAL MEDICINE

## 2022-05-17 PROCEDURE — 250N000009 HC RX 250: Performed by: INTERNAL MEDICINE

## 2022-05-17 PROCEDURE — 94726 PLETHYSMOGRAPHY LUNG VOLUMES: CPT

## 2022-05-17 PROCEDURE — 94726 PLETHYSMOGRAPHY LUNG VOLUMES: CPT | Mod: 26 | Performed by: INTERNAL MEDICINE

## 2022-05-17 PROCEDURE — 94729 DIFFUSING CAPACITY: CPT

## 2022-05-17 PROCEDURE — 999N000157 HC STATISTIC RCP TIME EA 10 MIN

## 2022-05-17 PROCEDURE — G0239 OTH RESP PROC, GROUP: HCPCS

## 2022-05-17 PROCEDURE — 94060 EVALUATION OF WHEEZING: CPT

## 2022-05-17 RX ORDER — ALBUTEROL SULFATE 0.83 MG/ML
2.5 SOLUTION RESPIRATORY (INHALATION) ONCE
Status: COMPLETED | OUTPATIENT
Start: 2022-05-17 | End: 2022-05-17

## 2022-05-17 RX ADMIN — ALBUTEROL SULFATE 2.5 MG: 2.5 SOLUTION RESPIRATORY (INHALATION) at 09:16

## 2022-05-17 NOTE — PROGRESS NOTES
RESPIRATORY CARE NOTE     Patient Name: Wyatt Gutierrez  Today's Date: 5/17/2022   Complete PFT done.The results of testing meet ATS criteria for acceptability & repeatability except Post FVC testing. PT was coughing and SOB.The results of Post FVC testing appear to be valid, although the ATS standard for three acceptable maneuvers was not met.   Good patient effort and cooperation.    Patient was given Albuterol 2.5 mg via nebulizer prior to Post-BD testing.  Patient left PFT lab without complaint and/or distress        Ysabel Cristobal, RT

## 2022-05-24 ENCOUNTER — HOSPITAL ENCOUNTER (OUTPATIENT)
Dept: CARDIAC REHAB | Facility: HOSPITAL | Age: 77
Discharge: HOME | End: 2022-05-24
Attending: INTERNAL MEDICINE
Payer: MEDICARE

## 2022-05-24 PROCEDURE — G0239 OTH RESP PROC, GROUP: HCPCS

## 2022-05-25 LAB
DLCOCOR-%PRED-PRE: 44 %
DLCOCOR-PRE: 11.24 ML/MIN/MMHG
DLCOUNC-%PRED-PRE: 40 %
DLCOUNC-PRE: 10.16 ML/MIN/MMHG
DLCOUNC-PRED: 25.13 ML/MIN/MMHG
ERV-%PRED-PRE: 95 %
ERV-PRE: 0.62 L
ERV-PRED: 0.65 L
EXPTIME-PRE: 5.53 SEC
FEF2575-%PRED-POST: 171 %
FEF2575-%PRED-PRE: 124 %
FEF2575-POST: 3.82 L/SEC
FEF2575-PRE: 2.78 L/SEC
FEF2575-PRED: 2.23 L/SEC
FEFMAX-%PRED-PRE: 125 %
FEFMAX-PRE: 9.81 L/SEC
FEFMAX-PRED: 7.83 L/SEC
FEV1-%PRED-PRE: 78 %
FEV1-PRE: 2.38 L
FEV1FEV6-PRE: 87 %
FEV1FEV6-PRED: 77 %
FEV1FVC-PRE: 87 %
FEV1FVC-PRED: 75 %
FEV1SVC-PRE: 92 %
FEV1SVC-PRED: 65 %
FIFMAX-PRE: 3.31 L/SEC
FRCPLETH-%PRED-PRE: 46 %
FRCPLETH-PRE: 1.73 L
FRCPLETH-PRED: 3.75 L
FVC-%PRED-PRE: 66 %
FVC-PRE: 2.72 L
FVC-PRED: 4.06 L
IC-%PRED-PRE: 49 %
IC-PRE: 1.97 L
IC-PRED: 4 L
RVPLETH-%PRED-PRE: 40 %
RVPLETH-PRE: 1.12 L
RVPLETH-PRED: 2.75 L
TLCPLETH-%PRED-PRE: 52 %
TLCPLETH-PRE: 3.71 L
TLCPLETH-PRED: 7.13 L
VA-%PRED-PRE: 63 %
VA-PRE: 4.04 L
VC-%PRED-PRE: 55 %
VC-PRE: 2.59 L
VC-PRED: 4.65 L

## 2022-05-31 ENCOUNTER — HOSPITAL ENCOUNTER (OUTPATIENT)
Dept: CARDIAC REHAB | Facility: HOSPITAL | Age: 77
Discharge: HOME | End: 2022-05-31
Attending: INTERNAL MEDICINE
Payer: MEDICARE

## 2022-05-31 PROCEDURE — G0239 OTH RESP PROC, GROUP: HCPCS

## 2022-06-02 ENCOUNTER — HOSPITAL ENCOUNTER (OUTPATIENT)
Dept: CARDIAC REHAB | Facility: HOSPITAL | Age: 77
Discharge: HOME | End: 2022-06-02
Attending: INTERNAL MEDICINE
Payer: MEDICARE

## 2022-06-02 PROCEDURE — G0239 OTH RESP PROC, GROUP: HCPCS

## 2022-06-07 ENCOUNTER — HOSPITAL ENCOUNTER (OUTPATIENT)
Dept: CARDIAC REHAB | Facility: HOSPITAL | Age: 77
Discharge: HOME | End: 2022-06-07
Attending: INTERNAL MEDICINE
Payer: MEDICARE

## 2022-06-07 ENCOUNTER — OFFICE VISIT (OUTPATIENT)
Dept: CARDIOLOGY | Facility: CLINIC | Age: 77
End: 2022-06-07
Payer: MEDICARE

## 2022-06-07 VITALS
DIASTOLIC BLOOD PRESSURE: 62 MMHG | BODY MASS INDEX: 34.03 KG/M2 | SYSTOLIC BLOOD PRESSURE: 120 MMHG | OXYGEN SATURATION: 96 % | WEIGHT: 237.2 LBS | HEART RATE: 77 BPM | RESPIRATION RATE: 18 BRPM

## 2022-06-07 DIAGNOSIS — I25.10 CORONARY ARTERY DISEASE INVOLVING NATIVE CORONARY ARTERY WITHOUT ANGINA PECTORIS, UNSPECIFIED WHETHER NATIVE OR TRANSPLANTED HEART: Primary | ICD-10-CM

## 2022-06-07 DIAGNOSIS — R06.09 DYSPNEA ON EXERTION: ICD-10-CM

## 2022-06-07 DIAGNOSIS — I35.0 NONRHEUMATIC AORTIC VALVE STENOSIS: ICD-10-CM

## 2022-06-07 DIAGNOSIS — E78.5 DYSLIPIDEMIA: ICD-10-CM

## 2022-06-07 PROCEDURE — 36415 COLL VENOUS BLD VENIPUNCTURE: CPT | Performed by: INTERNAL MEDICINE

## 2022-06-07 PROCEDURE — 83721 ASSAY OF BLOOD LIPOPROTEIN: CPT | Performed by: INTERNAL MEDICINE

## 2022-06-07 PROCEDURE — G0239 OTH RESP PROC, GROUP: HCPCS

## 2022-06-07 PROCEDURE — 99204 OFFICE O/P NEW MOD 45 MIN: CPT | Performed by: INTERNAL MEDICINE

## 2022-06-07 NOTE — LETTER
"6/7/2022    Noah Bonner MD  [facility] [address]  [facility] [address]    RE: Wyatt Gutierrez       Dear Colleague,     I had the pleasure of seeing Wyatt Gutierrez in the [facility].         [facility]   [address], [place] [address]   [email or web address]   OFFICE: [phone]          Thank you Dr. Ivory Physician for asking the [facility] team to participate in the care of your patient, Wyatt Gutierrez.     Impression and Plan     1.  Coronary artery disease.  Tai has history of coronary artery disease by virtue of CT scan of chest 24 April 2021 revealing mild to moderate coronary calcification notably in the proximal LAD. Wyatt does have chronic shortness of breath related to his pulmonary fibrosis.  He denies any actual chest pain though cannot discount possible \"silent\" ischemia.  Do feel would be reasonable and prudent to pursue ischemic evaluation to exclude possible coronary disease.    Regadenoson nuclear perfusion study.    2.  Aortic stenosis.  This was felt mild in degree on echocardiogram 18 September 2020.  Systolic murmur noted on exam.    Will obtain repeat echocardiogram to evaluate for any significant progression.    3.  Atrial fibrillation.  As noted below, Tai had suffered a syncopal spell in March 2022 and was noted to have evidence of atrial fibrillation with rapid ventricular response.  Parenthetically, documentation from [facility] indicates history of sleep apnea.  It was felt his atrial fibrillation may have been triggered in part by ethanol.  Documentation available reviewed and he was seen by cardiology in [place] and at what appeared given this was felt to be an isolated event that full anticoagulation at that time was deferred though he was set up for an ambulatory monitor that he wore for 24 days.  No atrial fibrillation was detected during the monitoring interval (see Cardiac " Diagnostic section below).  I discussed nonetheless, possibly pursuing full anticoagulation.  At this time, Wyatt states that he would rather defer and this is supported by his spouse, Alka, as well.    Continue aspirin for some mild degree of CVA prophylaxis..    Continue metoprolol.    4.  Hypertension.  Blood pressure is reasonable in the office today at 120/62 mmHg.    5.  Dyslipidemia.  Lipid profile for October 2021 revealed LDL 75 mg/dL and HDL 45 mg/dL.    Continue atorvastatin.    Will obtain direct LDL.    Follow-up and further recommendations pending test results.      40 minutes spent reviewing prior records (including documentation, laboratory studies, cardiac testing/imaging), interview with patient along with physical exam, planning, and subsequent documentation/crafting of note.       History of Present Illness    Once again I would like to thank you again for asking me to participate in the care of your patient, Wyatt Gutierrez.  As you know, but to reiterate for my own records, Wyatt Gutierrez is a 77 year old male with history of pulmonary fibrosis.  Patient has been followed in the pulmonary clinic by Dr. Whitney José.    Tai had suffered a syncopal spell in March 2022 and was evaluated at [facility] in [facility].  Episode felt possibly related to ethanol use though also had been noted to have evidence of atrial fibrillation with rapid ventricular response.    Tai also has history of coronary artery disease by virtue of CT scan of chest 24 April 2021 revealing mild to moderate coronary calcification notably in the proximal LAD.    On interview, Wyatt reports that he does have baseline dyspnea and shortness of breath due to his pulmonary fibrosis though this has been stable.  He has been participating in pulmonary rehabilitation.  He denies any actual chest pain.  He reports no subjective palpitations or lightheadedness.    Further review of systems is otherwise  negative/noncontributory (medical record and 13 point review of systems reviewed as well and pertinent positives noted).       Cardiac Diagnostics/imaging     Echocardiogram 18 September 2020 (personally reviewed):  1. Normal left ventricular size and systolic performance with a visually estimated ejection fraction of 55-60%.   2. There is mild aortic stenosis.   3. Normal right ventricular size and systolic performance.   4. There is mild enlargement of the proximal ascending aorta.    24-day ambulatory monitor March 2022:  1. Normal sinus rhythm.    2. Mobitz type I AV block during sleep.  3. No atrial fibrillation detected.    Twelve-lead ECG (personally reviewed) for March 2022: Sinus rhythm with first-degree AV block.  Nonspecific T wave changes.    CT scan of chest 24 April 2021:  1. 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 Differential diagnosis includes UIP/IPF and connective tissue disease associated ILD typically rheumatoid arthritis.   2. The amount of lung   3. fibrosis is not changed from 29 October 2020.  4. Coronary artery calcification: Mild to moderate specifically in proximal LAD.       Physical Examination       /62 (BP Location: Left arm, Patient Position: Sitting, Cuff Size: Adult Large)   Pulse 77   Resp 18   Wt 107.6 kg (237 lb 3.2 oz)   SpO2 96%   BMI 34.03 kg/m          Wt Readings from Last 3 Encounters:   06/07/22 107.6 kg (237 lb 3.2 oz)   05/04/22 109.4 kg (241 lb 3.2 oz)   04/12/22 106.6 kg (235 lb)     The patient is alert and oriented times three. Sclerae are anicteric. Mucosal membranes are moist. Jugular venous pressure is normal. No significant adenopathy/thyromegally appreciated. Lungs are clear with good expansion. On cardiovascular exam, the patient has a regular S1 and S2.  There is a 2/6 systolic murmur heard in a sash-like distribution.  Abdomen is soft and non-tender. Extremities reveal no  clubbing, cyanosis, or edema.       Family History/Social History/Risk Factors   Patient does not smoke.  Father with history of hypertension.       Medical History  Surgical History Family History Social History   Past Medical History:   Diagnosis Date     High cholesterol      Hypertension      Sleep apnea      Past Surgical History:   Procedure Laterality Date     ARTHROSCOPY SHOULDER       OTHER SURGICAL HISTORY      heel fracture     OTHER SURGICAL HISTORY      knee arthroscopies     ZZC TOTAL KNEE ARTHROPLASTY Left 10/31/2019    Procedure: LEFT MINIMALLY INVASIVE TOTAL KNEE ARTHROPLASTY;  Surgeon: Avelino Canada MD;  Location: [facility] OR;  Service: Orthopedics          Social History     Socioeconomic History     Marital status:      Spouse name: Not on file     Number of children: Not on file     Years of education: Not on file     Highest education level: Not on file   Occupational History     Not on file   Tobacco Use     Smoking status: Former Smoker     Packs/day: 0.50     Years: 55.00     Pack years: 27.50     Quit date: 10/1/2019     Years since quittin.6     Smokeless tobacco: Never Used   Substance and Sexual Activity     Alcohol use: Yes     Drug use: Not Currently     Sexual activity: Not on file   Other Topics Concern     Not on file   Social History Narrative     Not on file     Social Determinants of Health     Financial Resource Strain: Not on file   Food Insecurity: Not on file   Transportation Needs: Not on file   Physical Activity: Not on file   Stress: Not on file   Social Connections: Not on file   Intimate Partner Violence: Not on file   Housing Stability: Not on file           Medications  Allergies   Current Outpatient Medications   Medication Sig Dispense Refill     acyclovir (ZOVIRAX) 400 MG tablet [ACYCLOVIR (ZOVIRAX) 400 MG TABLET] Take 400 mg by mouth 3 (three) times a day as needed.              albuterol (PROAIR HFA;PROVENTIL HFA;VENTOLIN HFA) 90 mcg/actuation  inhaler [ALBUTEROL (PROAIR HFA;PROVENTIL HFA;VENTOLIN HFA) 90 MCG/ACTUATION INHALER] Inhale 2 puffs every 6 (six) hours as needed for wheezing or shortness of breath. 1 Inhaler 6     aspirin (ASA) 81 MG EC tablet Take by mouth every other day       atorvastatin (LIPITOR) 10 MG tablet [ATORVASTATIN (LIPITOR) 10 MG TABLET] Take 10 mg by mouth at bedtime.       azelastine (ASTELIN) 137 mcg (0.1 %) nasal spray [AZELASTINE (ASTELIN) 137 MCG (0.1 %) NASAL SPRAY] Use in each nostril as directed 30 mL 12     glucosamine-chondroitin 500-400 mg cap [GLUCOSAMINE-CHONDROITIN 500-400 MG CAP] Take 1 capsule by mouth 2 (two) times a day.       ibuprofen (ADVIL,MOTRIN) 200 MG tablet [IBUPROFEN (ADVIL,MOTRIN) 200 MG TABLET] Take 2 tablets (400 mg total) by mouth 3 (three) times a day as needed for pain.  0     metoprolol succinate (TOPROL-XL) 50 MG 24 hr tablet [METOPROLOL SUCCINATE (TOPROL-XL) 50 MG 24 HR TABLET] Take 50 mg by mouth daily.       multivitamin therapeutic tablet [MULTIVITAMIN THERAPEUTIC TABLET] Take 1 tablet by mouth daily.       nintedanib (OFEV) 150 MG capsule Take 1 capsule (150 mg) by mouth 2 times daily 60 capsule 4     omeprazole (PRILOSEC) 20 MG capsule [OMEPRAZOLE (PRILOSEC) 20 MG CAPSULE] Take 20 mg by mouth 2 (two) times a day before meals.       triamterene (DYRENIUM) 50 MG capsule        atenolol (TENORMIN) 50 MG tablet [ATENOLOL (TENORMIN) 50 MG TABLET] Take 50 mg by mouth at bedtime. (Patient not taking: No sig reported)       metoprolol succinate ER (TOPROL-XL) 50 MG 24 hr tablet Take 1 tablet by mouth in the morning. (Patient not taking: Reported on 6/7/2022)       Misc Natural Products (GLUCOSAMINE CHOND COMPLEX/MSM) TABS See Admin Instructions (Patient not taking: Reported on 6/7/2022)       omeprazole (PRILOSEC) 20 MG DR capsule Take 1 capsule by mouth in the morning and 1 capsule in the evening. (Patient not taking: Reported on 6/7/2022)       triamterene-hydrochlorothiazide (DYAZIDE) 37.5-25 mg  per capsule [TRIAMTERENE-HYDROCHLOROTHIAZIDE (DYAZIDE) 37.5-25 MG PER CAPSULE] Take 1 capsule by mouth every morning. (Patient not taking: Reported on 6/7/2022)         Allergies   Allergen Reactions     Dogs Other (See Comments)     Pet Dander, Maple trees, Chalk, Mold     Mold [Molds & Smuts] Unknown          Lab Results    Chemistry/lipid CBC Cardiac Enzymes/BNP/TSH/INR   Recent Labs   Lab Test 10/04/21  1207   CHOL 160   HDL 45   LDL 75   TRIG 200*     Recent Labs   Lab Test 10/04/21  1207 10/16/20  1605 10/07/19  1107   LDL 75 98 81     Recent Labs   Lab Test 10/04/21  1207      POTASSIUM 4.2   CHLORIDE 98   CO2 23   GLC 89   BUN 14   CR 0.90   GFRESTIMATED 84   SHALOM 9.9     Recent Labs   Lab Test 10/04/21  1207 10/16/20  1605 10/31/19  0907   CR 0.90 0.91 0.92     No results for input(s): A1C in the last 72095 hours.       Recent Labs   Lab Test 05/17/22  0901 05/30/21  1950   WBC  --  9.1   HGB 11.6* 14.7   HCT  --  41.9   MCV  --  89   PLT  --  207     Recent Labs   Lab Test 05/17/22  0901 05/30/21  1950 11/01/19  0540   HGB 11.6* 14.7 12.9*    No results for input(s): TROPONINI in the last 06464 hours.  No results for input(s): BNP, NTBNPI, NTBNP in the last 72149 hours.  No results for input(s): TSH in the last 12447 hours.  Recent Labs   Lab Test 01/15/22  0627 05/30/21  1950 10/31/19  0907   INR 1.0 1.00 0.95          Medications  Allergies   Current Outpatient Medications   Medication Sig Dispense Refill     acyclovir (ZOVIRAX) 400 MG tablet [ACYCLOVIR (ZOVIRAX) 400 MG TABLET] Take 400 mg by mouth 3 (three) times a day as needed.              albuterol (PROAIR HFA;PROVENTIL HFA;VENTOLIN HFA) 90 mcg/actuation inhaler [ALBUTEROL (PROAIR HFA;PROVENTIL HFA;VENTOLIN HFA) 90 MCG/ACTUATION INHALER] Inhale 2 puffs every 6 (six) hours as needed for wheezing or shortness of breath. 1 Inhaler 6     aspirin (ASA) 81 MG EC tablet Take by mouth every other day       atorvastatin (LIPITOR) 10 MG tablet  [ATORVASTATIN (LIPITOR) 10 MG TABLET] Take 10 mg by mouth at bedtime.       azelastine (ASTELIN) 137 mcg (0.1 %) nasal spray [AZELASTINE (ASTELIN) 137 MCG (0.1 %) NASAL SPRAY] Use in each nostril as directed 30 mL 12     glucosamine-chondroitin 500-400 mg cap [GLUCOSAMINE-CHONDROITIN 500-400 MG CAP] Take 1 capsule by mouth 2 (two) times a day.       ibuprofen (ADVIL,MOTRIN) 200 MG tablet [IBUPROFEN (ADVIL,MOTRIN) 200 MG TABLET] Take 2 tablets (400 mg total) by mouth 3 (three) times a day as needed for pain.  0     metoprolol succinate (TOPROL-XL) 50 MG 24 hr tablet [METOPROLOL SUCCINATE (TOPROL-XL) 50 MG 24 HR TABLET] Take 50 mg by mouth daily.       multivitamin therapeutic tablet [MULTIVITAMIN THERAPEUTIC TABLET] Take 1 tablet by mouth daily.       nintedanib (OFEV) 150 MG capsule Take 1 capsule (150 mg) by mouth 2 times daily 60 capsule 4     omeprazole (PRILOSEC) 20 MG capsule [OMEPRAZOLE (PRILOSEC) 20 MG CAPSULE] Take 20 mg by mouth 2 (two) times a day before meals.       triamterene (DYRENIUM) 50 MG capsule        atenolol (TENORMIN) 50 MG tablet [ATENOLOL (TENORMIN) 50 MG TABLET] Take 50 mg by mouth at bedtime. (Patient not taking: No sig reported)       metoprolol succinate ER (TOPROL-XL) 50 MG 24 hr tablet Take 1 tablet by mouth in the morning. (Patient not taking: Reported on 6/7/2022)       Misc Natural Products (GLUCOSAMINE CHOND COMPLEX/MSM) TABS See Admin Instructions (Patient not taking: Reported on 6/7/2022)       omeprazole (PRILOSEC) 20 MG DR capsule Take 1 capsule by mouth in the morning and 1 capsule in the evening. (Patient not taking: Reported on 6/7/2022)       triamterene-hydrochlorothiazide (DYAZIDE) 37.5-25 mg per capsule [TRIAMTERENE-HYDROCHLOROTHIAZIDE (DYAZIDE) 37.5-25 MG PER CAPSULE] Take 1 capsule by mouth every morning. (Patient not taking: Reported on 6/7/2022)        Allergies   Allergen Reactions     Dogs Other (See Comments)     Pet Dander, Maple trees, Chalk, Mold     Mold [Molds  & Smuts] Unknown        Lab Results   Lab Results   Component Value Date     10/04/2021    CO2 23 10/04/2021    BUN 14 10/04/2021     Lab Results   Component Value Date    WBC 9.1 05/30/2021    HGB 11.6 05/17/2022    HCT 41.9 05/30/2021    MCV 89 05/30/2021     05/30/2021     Lab Results   Component Value Date    CHOL 160 10/04/2021    TRIG 200 10/04/2021    HDL 45 10/04/2021     Lab Results   Component Value Date    INR 1.0 01/15/2022         Medical History  Surgical History   Past Medical History:   Diagnosis Date     High cholesterol      Hypertension      Sleep apnea       Past Surgical History:   Procedure Laterality Date     ARTHROSCOPY SHOULDER       OTHER SURGICAL HISTORY      heel fracture     OTHER SURGICAL HISTORY      knee arthroscopies     ZZC TOTAL KNEE ARTHROPLASTY Left 10/31/2019    Procedure: LEFT MINIMALLY INVASIVE TOTAL KNEE ARTHROPLASTY;  Surgeon: Avelino Canada MD;  Location: [facility];  Service: Orthopedics               Thank you for allowing me to participate in the care of your patient.      Sincerely,     Bubba Cao MD     [facility]  cc:   [facility] Outside Physician

## 2022-06-07 NOTE — PROGRESS NOTES
"       [facility]   [address], [place] [address]   [email or web address]   OFFICE: [phone]          Thank you Dr. Ivory Physician for asking the [facility] team to participate in the care of your patient, Wyatt Gutierrez.     Impression and Plan     1.  Coronary artery disease.  Tai has history of coronary artery disease by virtue of CT scan of chest 24 April 2021 revealing mild to moderate coronary calcification notably in the proximal LAD. Wyatt does have chronic shortness of breath related to his pulmonary fibrosis.  He denies any actual chest pain though cannot discount possible \"silent\" ischemia.  Do feel would be reasonable and prudent to pursue ischemic evaluation to exclude possible coronary disease.    Regadenoson nuclear perfusion study.    2.  Aortic stenosis.  This was felt mild in degree on echocardiogram 18 September 2020.  Systolic murmur noted on exam.    Will obtain repeat echocardiogram to evaluate for any significant progression.    3.  Atrial fibrillation.  As noted below, Tai had suffered a syncopal spell in March 2022 and was noted to have evidence of atrial fibrillation with rapid ventricular response.  Parenthetically, documentation from [facility] indicates history of sleep apnea.  It was felt his atrial fibrillation may have been triggered in part by ethanol.  Documentation available reviewed and he was seen by cardiology in [place] and at what appeared given this was felt to be an isolated event that full anticoagulation at that time was deferred though he was set up for an ambulatory monitor that he wore for 24 days.  No atrial fibrillation was detected during the monitoring interval (see Cardiac Diagnostic section below).  I discussed nonetheless, possibly pursuing full anticoagulation.  At this time, Wyatt states that he would rather defer and this is supported by his spouse, Alka, as well.    Continue aspirin " for some mild degree of CVA prophylaxis..    Continue metoprolol.    4.  Hypertension.  Blood pressure is reasonable in the office today at 120/62 mmHg.    5.  Dyslipidemia.  Lipid profile for October 2021 revealed LDL 75 mg/dL and HDL 45 mg/dL.    Continue atorvastatin.    Will obtain direct LDL.    Follow-up and further recommendations pending test results.      40 minutes spent reviewing prior records (including documentation, laboratory studies, cardiac testing/imaging), interview with patient along with physical exam, planning, and subsequent documentation/crafting of note.       History of Present Illness    Once again I would like to thank you again for asking me to participate in the care of your patient, Wyatt Gutierrez.  As you know, but to reiterate for my own records, Wyatt Gutierrez is a 77 year old male with history of pulmonary fibrosis.  Patient has been followed in the pulmonary clinic by Dr. Whitney José.    Tai had suffered a syncopal spell in March 2022 and was evaluated at [facility] in [facility].  Episode felt possibly related to ethanol use though also had been noted to have evidence of atrial fibrillation with rapid ventricular response.    Tai also has history of coronary artery disease by virtue of CT scan of chest 24 April 2021 revealing mild to moderate coronary calcification notably in the proximal LAD.    On interview, Wyatt reports that he does have baseline dyspnea and shortness of breath due to his pulmonary fibrosis though this has been stable.  He has been participating in pulmonary rehabilitation.  He denies any actual chest pain.  He reports no subjective palpitations or lightheadedness.    Further review of systems is otherwise negative/noncontributory (medical record and 13 point review of systems reviewed as well and pertinent positives noted).       Cardiac Diagnostics/imaging     Echocardiogram 18 September 2020 (personally reviewed):  1. Normal left ventricular  size and systolic performance with a visually estimated ejection fraction of 55-60%.   2. There is mild aortic stenosis.   3. Normal right ventricular size and systolic performance.   4. There is mild enlargement of the proximal ascending aorta.    24-day ambulatory monitor March 2022:  1. Normal sinus rhythm.    2. Mobitz type I AV block during sleep.  3. No atrial fibrillation detected.    Twelve-lead ECG (personally reviewed) for March 2022: Sinus rhythm with first-degree AV block.  Nonspecific T wave changes.    CT scan of chest 24 April 2021:  1. 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 Differential diagnosis includes UIP/IPF and connective tissue disease associated ILD typically rheumatoid arthritis.   2. The amount of lung   3. fibrosis is not changed from 29 October 2020.  4. Coronary artery calcification: Mild to moderate specifically in proximal LAD.       Physical Examination       /62 (BP Location: Left arm, Patient Position: Sitting, Cuff Size: Adult Large)   Pulse 77   Resp 18   Wt 107.6 kg (237 lb 3.2 oz)   SpO2 96%   BMI 34.03 kg/m          Wt Readings from Last 3 Encounters:   06/07/22 107.6 kg (237 lb 3.2 oz)   05/04/22 109.4 kg (241 lb 3.2 oz)   04/12/22 106.6 kg (235 lb)     The patient is alert and oriented times three. Sclerae are anicteric. Mucosal membranes are moist. Jugular venous pressure is normal. No significant adenopathy/thyromegally appreciated. Lungs are clear with good expansion. On cardiovascular exam, the patient has a regular S1 and S2.  There is a 2/6 systolic murmur heard in a sash-like distribution.  Abdomen is soft and non-tender. Extremities reveal no clubbing, cyanosis, or edema.       Family History/Social History/Risk Factors   Patient does not smoke.  Father with history of hypertension.       Medical History  Surgical History Family History Social History   Past Medical History:    Diagnosis Date     High cholesterol      Hypertension      Sleep apnea      Past Surgical History:   Procedure Laterality Date     ARTHROSCOPY SHOULDER       OTHER SURGICAL HISTORY      heel fracture     OTHER SURGICAL HISTORY      knee arthroscopies     ZZC TOTAL KNEE ARTHROPLASTY Left 10/31/2019    Procedure: LEFT MINIMALLY INVASIVE TOTAL KNEE ARTHROPLASTY;  Surgeon: Avelino Canada MD;  Location: [facility] OR;  Service: Orthopedics          Social History     Socioeconomic History     Marital status:      Spouse name: Not on file     Number of children: Not on file     Years of education: Not on file     Highest education level: Not on file   Occupational History     Not on file   Tobacco Use     Smoking status: Former Smoker     Packs/day: 0.50     Years: 55.00     Pack years: 27.50     Quit date: 10/1/2019     Years since quittin.6     Smokeless tobacco: Never Used   Substance and Sexual Activity     Alcohol use: Yes     Drug use: Not Currently     Sexual activity: Not on file   Other Topics Concern     Not on file   Social History Narrative     Not on file     Social Determinants of Health     Financial Resource Strain: Not on file   Food Insecurity: Not on file   Transportation Needs: Not on file   Physical Activity: Not on file   Stress: Not on file   Social Connections: Not on file   Intimate Partner Violence: Not on file   Housing Stability: Not on file           Medications  Allergies   Current Outpatient Medications   Medication Sig Dispense Refill     acyclovir (ZOVIRAX) 400 MG tablet [ACYCLOVIR (ZOVIRAX) 400 MG TABLET] Take 400 mg by mouth 3 (three) times a day as needed.              albuterol (PROAIR HFA;PROVENTIL HFA;VENTOLIN HFA) 90 mcg/actuation inhaler [ALBUTEROL (PROAIR HFA;PROVENTIL HFA;VENTOLIN HFA) 90 MCG/ACTUATION INHALER] Inhale 2 puffs every 6 (six) hours as needed for wheezing or shortness of breath. 1 Inhaler 6     aspirin (ASA) 81 MG EC tablet Take by mouth every  other day       atorvastatin (LIPITOR) 10 MG tablet [ATORVASTATIN (LIPITOR) 10 MG TABLET] Take 10 mg by mouth at bedtime.       azelastine (ASTELIN) 137 mcg (0.1 %) nasal spray [AZELASTINE (ASTELIN) 137 MCG (0.1 %) NASAL SPRAY] Use in each nostril as directed 30 mL 12     glucosamine-chondroitin 500-400 mg cap [GLUCOSAMINE-CHONDROITIN 500-400 MG CAP] Take 1 capsule by mouth 2 (two) times a day.       ibuprofen (ADVIL,MOTRIN) 200 MG tablet [IBUPROFEN (ADVIL,MOTRIN) 200 MG TABLET] Take 2 tablets (400 mg total) by mouth 3 (three) times a day as needed for pain.  0     metoprolol succinate (TOPROL-XL) 50 MG 24 hr tablet [METOPROLOL SUCCINATE (TOPROL-XL) 50 MG 24 HR TABLET] Take 50 mg by mouth daily.       multivitamin therapeutic tablet [MULTIVITAMIN THERAPEUTIC TABLET] Take 1 tablet by mouth daily.       nintedanib (OFEV) 150 MG capsule Take 1 capsule (150 mg) by mouth 2 times daily 60 capsule 4     omeprazole (PRILOSEC) 20 MG capsule [OMEPRAZOLE (PRILOSEC) 20 MG CAPSULE] Take 20 mg by mouth 2 (two) times a day before meals.       triamterene (DYRENIUM) 50 MG capsule        atenolol (TENORMIN) 50 MG tablet [ATENOLOL (TENORMIN) 50 MG TABLET] Take 50 mg by mouth at bedtime. (Patient not taking: No sig reported)       metoprolol succinate ER (TOPROL-XL) 50 MG 24 hr tablet Take 1 tablet by mouth in the morning. (Patient not taking: Reported on 6/7/2022)       Misc Natural Products (GLUCOSAMINE CHOND COMPLEX/MSM) TABS See Admin Instructions (Patient not taking: Reported on 6/7/2022)       omeprazole (PRILOSEC) 20 MG DR capsule Take 1 capsule by mouth in the morning and 1 capsule in the evening. (Patient not taking: Reported on 6/7/2022)       triamterene-hydrochlorothiazide (DYAZIDE) 37.5-25 mg per capsule [TRIAMTERENE-HYDROCHLOROTHIAZIDE (DYAZIDE) 37.5-25 MG PER CAPSULE] Take 1 capsule by mouth every morning. (Patient not taking: Reported on 6/7/2022)         Allergies   Allergen Reactions     Dogs Other (See Comments)      Pet Dander, Maple trees, Chalk, Mold     Mold [Molds & Smuts] Unknown          Lab Results    Chemistry/lipid CBC Cardiac Enzymes/BNP/TSH/INR   Recent Labs   Lab Test 10/04/21  1207   CHOL 160   HDL 45   LDL 75   TRIG 200*     Recent Labs   Lab Test 10/04/21  1207 10/16/20  1605 10/07/19  1107   LDL 75 98 81     Recent Labs   Lab Test 10/04/21  1207      POTASSIUM 4.2   CHLORIDE 98   CO2 23   GLC 89   BUN 14   CR 0.90   GFRESTIMATED 84   SHALOM 9.9     Recent Labs   Lab Test 10/04/21  1207 10/16/20  1605 10/31/19  0907   CR 0.90 0.91 0.92     No results for input(s): A1C in the last 10869 hours.       Recent Labs   Lab Test 05/17/22  0901 05/30/21  1950   WBC  --  9.1   HGB 11.6* 14.7   HCT  --  41.9   MCV  --  89   PLT  --  207     Recent Labs   Lab Test 05/17/22  0901 05/30/21  1950 11/01/19  0540   HGB 11.6* 14.7 12.9*    No results for input(s): TROPONINI in the last 52522 hours.  No results for input(s): BNP, NTBNPI, NTBNP in the last 17726 hours.  No results for input(s): TSH in the last 71995 hours.  Recent Labs   Lab Test 01/15/22  0627 05/30/21  1950 10/31/19  0907   INR 1.0 1.00 0.95          Medications  Allergies   Current Outpatient Medications   Medication Sig Dispense Refill     acyclovir (ZOVIRAX) 400 MG tablet [ACYCLOVIR (ZOVIRAX) 400 MG TABLET] Take 400 mg by mouth 3 (three) times a day as needed.              albuterol (PROAIR HFA;PROVENTIL HFA;VENTOLIN HFA) 90 mcg/actuation inhaler [ALBUTEROL (PROAIR HFA;PROVENTIL HFA;VENTOLIN HFA) 90 MCG/ACTUATION INHALER] Inhale 2 puffs every 6 (six) hours as needed for wheezing or shortness of breath. 1 Inhaler 6     aspirin (ASA) 81 MG EC tablet Take by mouth every other day       atorvastatin (LIPITOR) 10 MG tablet [ATORVASTATIN (LIPITOR) 10 MG TABLET] Take 10 mg by mouth at bedtime.       azelastine (ASTELIN) 137 mcg (0.1 %) nasal spray [AZELASTINE (ASTELIN) 137 MCG (0.1 %) NASAL SPRAY] Use in each nostril as directed 30 mL 12     glucosamine-chondroitin  500-400 mg cap [GLUCOSAMINE-CHONDROITIN 500-400 MG CAP] Take 1 capsule by mouth 2 (two) times a day.       ibuprofen (ADVIL,MOTRIN) 200 MG tablet [IBUPROFEN (ADVIL,MOTRIN) 200 MG TABLET] Take 2 tablets (400 mg total) by mouth 3 (three) times a day as needed for pain.  0     metoprolol succinate (TOPROL-XL) 50 MG 24 hr tablet [METOPROLOL SUCCINATE (TOPROL-XL) 50 MG 24 HR TABLET] Take 50 mg by mouth daily.       multivitamin therapeutic tablet [MULTIVITAMIN THERAPEUTIC TABLET] Take 1 tablet by mouth daily.       nintedanib (OFEV) 150 MG capsule Take 1 capsule (150 mg) by mouth 2 times daily 60 capsule 4     omeprazole (PRILOSEC) 20 MG capsule [OMEPRAZOLE (PRILOSEC) 20 MG CAPSULE] Take 20 mg by mouth 2 (two) times a day before meals.       triamterene (DYRENIUM) 50 MG capsule        atenolol (TENORMIN) 50 MG tablet [ATENOLOL (TENORMIN) 50 MG TABLET] Take 50 mg by mouth at bedtime. (Patient not taking: No sig reported)       metoprolol succinate ER (TOPROL-XL) 50 MG 24 hr tablet Take 1 tablet by mouth in the morning. (Patient not taking: Reported on 6/7/2022)       Misc Natural Products (GLUCOSAMINE CHOND COMPLEX/MSM) TABS See Admin Instructions (Patient not taking: Reported on 6/7/2022)       omeprazole (PRILOSEC) 20 MG DR capsule Take 1 capsule by mouth in the morning and 1 capsule in the evening. (Patient not taking: Reported on 6/7/2022)       triamterene-hydrochlorothiazide (DYAZIDE) 37.5-25 mg per capsule [TRIAMTERENE-HYDROCHLOROTHIAZIDE (DYAZIDE) 37.5-25 MG PER CAPSULE] Take 1 capsule by mouth every morning. (Patient not taking: Reported on 6/7/2022)        Allergies   Allergen Reactions     Dogs Other (See Comments)     Pet Dander, Maple trees, Chalk, Mold     Mold [Molds & Smuts] Unknown        Lab Results   Lab Results   Component Value Date     10/04/2021    CO2 23 10/04/2021    BUN 14 10/04/2021     Lab Results   Component Value Date    WBC 9.1 05/30/2021    HGB 11.6 05/17/2022    HCT 41.9 05/30/2021     MCV 89 05/30/2021     05/30/2021     Lab Results   Component Value Date    CHOL 160 10/04/2021    TRIG 200 10/04/2021    HDL 45 10/04/2021     Lab Results   Component Value Date    INR 1.0 01/15/2022         Medical History  Surgical History   Past Medical History:   Diagnosis Date     High cholesterol      Hypertension      Sleep apnea       Past Surgical History:   Procedure Laterality Date     ARTHROSCOPY SHOULDER       OTHER SURGICAL HISTORY      heel fracture     OTHER SURGICAL HISTORY      knee arthroscopies     ZZC TOTAL KNEE ARTHROPLASTY Left 10/31/2019    Procedure: LEFT MINIMALLY INVASIVE TOTAL KNEE ARTHROPLASTY;  Surgeon: Avelino Canada MD;  Location: [facility];  Service: Orthopedics

## 2022-06-08 DIAGNOSIS — E78.5 DYSLIPIDEMIA: ICD-10-CM

## 2022-06-08 DIAGNOSIS — I25.10 CORONARY ARTERY DISEASE INVOLVING NATIVE CORONARY ARTERY WITHOUT ANGINA PECTORIS, UNSPECIFIED WHETHER NATIVE OR TRANSPLANTED HEART: Primary | ICD-10-CM

## 2022-06-08 LAB — LDLC SERPL CALC-MCNC: 95 MG/DL

## 2022-06-08 RX ORDER — ATORVASTATIN CALCIUM 40 MG/1
40 TABLET, FILM COATED ORAL AT BEDTIME
Qty: 90 TABLET | Refills: 0 | Status: SHIPPED | OUTPATIENT
Start: 2022-06-08 | End: 2022-09-27

## 2022-06-09 ENCOUNTER — HOSPITAL ENCOUNTER (OUTPATIENT)
Dept: CARDIAC REHAB | Facility: HOSPITAL | Age: 77
Discharge: HOME | End: 2022-06-09
Attending: INTERNAL MEDICINE
Payer: MEDICARE

## 2022-06-09 PROCEDURE — G0239 OTH RESP PROC, GROUP: HCPCS

## 2022-06-13 ENCOUNTER — HOSPITAL ENCOUNTER (OUTPATIENT)
Dept: CARDIOLOGY | Facility: CLINIC | Age: 77
Discharge: HOME | End: 2022-06-13
Attending: INTERNAL MEDICINE | Admitting: INTERNAL MEDICINE
Payer: MEDICARE

## 2022-06-13 DIAGNOSIS — I35.0 NONRHEUMATIC AORTIC VALVE STENOSIS: ICD-10-CM

## 2022-06-13 DIAGNOSIS — R06.09 DYSPNEA ON EXERTION: ICD-10-CM

## 2022-06-13 DIAGNOSIS — I25.10 CORONARY ARTERY DISEASE INVOLVING NATIVE CORONARY ARTERY WITHOUT ANGINA PECTORIS, UNSPECIFIED WHETHER NATIVE OR TRANSPLANTED HEART: ICD-10-CM

## 2022-06-13 LAB — LVEF ECHO: NORMAL

## 2022-06-13 PROCEDURE — 93306 TTE W/DOPPLER COMPLETE: CPT

## 2022-06-13 PROCEDURE — 93306 TTE W/DOPPLER COMPLETE: CPT | Mod: 26 | Performed by: INTERNAL MEDICINE

## 2022-06-14 ENCOUNTER — HOSPITAL ENCOUNTER (OUTPATIENT)
Dept: CARDIAC REHAB | Facility: HOSPITAL | Age: 77
Discharge: HOME | End: 2022-06-14
Attending: INTERNAL MEDICINE
Payer: MEDICARE

## 2022-06-14 PROCEDURE — G0239 OTH RESP PROC, GROUP: HCPCS

## 2022-06-15 ENCOUNTER — HOSPITAL ENCOUNTER (OUTPATIENT)
Dept: NUCLEAR MEDICINE | Facility: CLINIC | Age: 77
Discharge: HOME | End: 2022-06-15
Attending: INTERNAL MEDICINE
Payer: MEDICARE

## 2022-06-15 ENCOUNTER — HOSPITAL ENCOUNTER (OUTPATIENT)
Dept: CARDIOLOGY | Facility: CLINIC | Age: 77
Discharge: HOME | End: 2022-06-15
Attending: INTERNAL MEDICINE
Payer: MEDICARE

## 2022-06-15 DIAGNOSIS — R06.09 DYSPNEA ON EXERTION: ICD-10-CM

## 2022-06-15 DIAGNOSIS — I25.10 CORONARY ARTERY DISEASE INVOLVING NATIVE CORONARY ARTERY WITHOUT ANGINA PECTORIS, UNSPECIFIED WHETHER NATIVE OR TRANSPLANTED HEART: ICD-10-CM

## 2022-06-15 LAB
CV BLOOD PRESSURE: 61 MMHG
CV STRESS CURRENT BP HE: NORMAL
CV STRESS CURRENT HR HE: 66
CV STRESS CURRENT HR HE: 67
CV STRESS CURRENT HR HE: 70
CV STRESS CURRENT HR HE: 70
CV STRESS CURRENT HR HE: 71
CV STRESS CURRENT HR HE: 77
CV STRESS CURRENT HR HE: 79
CV STRESS CURRENT HR HE: 80
CV STRESS CURRENT HR HE: 82
CV STRESS CURRENT HR HE: 83
CV STRESS DEVIATION TIME HE: NORMAL
CV STRESS ECHO PERCENT HR HE: NORMAL
CV STRESS EXERCISE STAGE HE: NORMAL
CV STRESS FINAL RESTING BP HE: NORMAL
CV STRESS FINAL RESTING HR HE: 71
CV STRESS MAX HR HE: 84
CV STRESS MAX TREADMILL GRADE HE: 0
CV STRESS MAX TREADMILL SPEED HE: 0
CV STRESS PEAK DIA BP HE: NORMAL
CV STRESS PEAK SYS BP HE: NORMAL
CV STRESS PHASE HE: NORMAL
CV STRESS PROTOCOL HE: NORMAL
CV STRESS RESTING PT POSITION HE: NORMAL
CV STRESS ST DEVIATION AMOUNT HE: NORMAL
CV STRESS ST DEVIATION ELEVATION HE: NORMAL
CV STRESS ST EVELATION AMOUNT HE: NORMAL
CV STRESS TEST TYPE HE: NORMAL
CV STRESS TOTAL STAGE TIME MIN 1 HE: NORMAL
RATE PRESSURE PRODUCT: NORMAL
STRESS ECHO BASELINE DIASTOLIC HE: 86
STRESS ECHO BASELINE HR: 67
STRESS ECHO BASELINE SYSTOLIC BP: 146
STRESS ECHO CALCULATED PERCENT HR: 58 %
STRESS ECHO LAST STRESS DIASTOLIC BP: 72
STRESS ECHO LAST STRESS HR: 77
STRESS ECHO LAST STRESS SYSTOLIC BP: 139
STRESS ECHO TARGET HR: 145

## 2022-06-15 PROCEDURE — A9500 TC99M SESTAMIBI: HCPCS | Performed by: INTERNAL MEDICINE

## 2022-06-15 PROCEDURE — 78452 HT MUSCLE IMAGE SPECT MULT: CPT | Mod: 26 | Performed by: INTERNAL MEDICINE

## 2022-06-15 PROCEDURE — 93018 CV STRESS TEST I&R ONLY: CPT | Performed by: INTERNAL MEDICINE

## 2022-06-15 PROCEDURE — 93016 CV STRESS TEST SUPVJ ONLY: CPT | Performed by: INTERNAL MEDICINE

## 2022-06-15 PROCEDURE — 343N000001 HC RX 343: Performed by: INTERNAL MEDICINE

## 2022-06-15 PROCEDURE — 78452 HT MUSCLE IMAGE SPECT MULT: CPT

## 2022-06-15 PROCEDURE — 93017 CV STRESS TEST TRACING ONLY: CPT

## 2022-06-15 PROCEDURE — 250N000011 HC RX IP 250 OP 636: Performed by: INTERNAL MEDICINE

## 2022-06-15 RX ORDER — ALBUTEROL SULFATE 0.83 MG/ML
2.5 SOLUTION RESPIRATORY (INHALATION)
Status: DISCONTINUED | OUTPATIENT
Start: 2022-06-15 | End: 2022-06-15 | Stop reason: HOSPADM

## 2022-06-15 RX ORDER — ATORVASTATIN CALCIUM 10 MG/1
TABLET, FILM COATED ORAL
COMMUNITY
Start: 2022-06-11 | End: 2022-06-15

## 2022-06-15 RX ORDER — CAFFEINE 200 MG
200 TABLET ORAL
Status: DISCONTINUED | OUTPATIENT
Start: 2022-06-15 | End: 2022-06-15 | Stop reason: HOSPADM

## 2022-06-15 RX ORDER — REGADENOSON 0.08 MG/ML
0.4 INJECTION, SOLUTION INTRAVENOUS ONCE
Status: COMPLETED | OUTPATIENT
Start: 2022-06-15 | End: 2022-06-15

## 2022-06-15 RX ORDER — AMINOPHYLLINE 25 MG/ML
50 INJECTION, SOLUTION INTRAVENOUS
Status: DISCONTINUED | OUTPATIENT
Start: 2022-06-15 | End: 2022-06-15 | Stop reason: HOSPADM

## 2022-06-15 RX ORDER — CAFFEINE CITRATE 20 MG/ML
60 SOLUTION INTRAVENOUS
Status: DISCONTINUED | OUTPATIENT
Start: 2022-06-15 | End: 2022-06-15 | Stop reason: HOSPADM

## 2022-06-15 RX ADMIN — Medication 7.83 MILLICURIE: at 07:42

## 2022-06-15 RX ADMIN — REGADENOSON 0.4 MG: 0.08 INJECTION, SOLUTION INTRAVENOUS at 07:51

## 2022-06-15 RX ADMIN — Medication 38.5 MILLICURIE: at 07:52

## 2022-06-16 ENCOUNTER — HOSPITAL ENCOUNTER (OUTPATIENT)
Dept: CARDIAC REHAB | Facility: HOSPITAL | Age: 77
Discharge: HOME | End: 2022-06-16
Attending: INTERNAL MEDICINE
Payer: MEDICARE

## 2022-06-16 DIAGNOSIS — I35.0 NONRHEUMATIC AORTIC VALVE STENOSIS: ICD-10-CM

## 2022-06-16 DIAGNOSIS — E78.5 DYSLIPIDEMIA: ICD-10-CM

## 2022-06-16 DIAGNOSIS — I25.10 CORONARY ARTERY DISEASE INVOLVING NATIVE CORONARY ARTERY WITHOUT ANGINA PECTORIS, UNSPECIFIED WHETHER NATIVE OR TRANSPLANTED HEART: Primary | ICD-10-CM

## 2022-06-16 DIAGNOSIS — R06.09 DYSPNEA ON EXERTION: ICD-10-CM

## 2022-06-16 PROCEDURE — G0239 OTH RESP PROC, GROUP: HCPCS

## 2022-06-20 ENCOUNTER — TELEPHONE (OUTPATIENT)
Dept: PULMONOLOGY | Facility: OTHER | Age: 77
End: 2022-06-20
Payer: MEDICARE

## 2022-06-20 NOTE — TELEPHONE ENCOUNTER
Ray called to request an appt walk test for pulse dose testing so he can purchase a portable oxygen tank. He will have the company fax us over an order so that we can have to nurse and doctor review and send back.

## 2022-06-21 ENCOUNTER — HOSPITAL ENCOUNTER (OUTPATIENT)
Dept: CARDIAC REHAB | Facility: HOSPITAL | Age: 77
Discharge: HOME | End: 2022-06-21
Attending: INTERNAL MEDICINE
Payer: MEDICARE

## 2022-06-21 PROCEDURE — G0239 OTH RESP PROC, GROUP: HCPCS

## 2022-06-23 ENCOUNTER — HOSPITAL ENCOUNTER (OUTPATIENT)
Dept: CARDIAC REHAB | Facility: HOSPITAL | Age: 77
Discharge: HOME | End: 2022-06-23
Attending: INTERNAL MEDICINE
Payer: MEDICARE

## 2022-06-23 PROCEDURE — G0239 OTH RESP PROC, GROUP: HCPCS

## 2022-06-27 ENCOUNTER — OFFICE VISIT (OUTPATIENT)
Dept: PULMONOLOGY | Facility: OTHER | Age: 77
End: 2022-06-27
Payer: MEDICARE

## 2022-06-27 ENCOUNTER — TELEPHONE (OUTPATIENT)
Dept: PULMONOLOGY | Facility: OTHER | Age: 77
End: 2022-06-27

## 2022-06-27 DIAGNOSIS — J44.1 COPD EXACERBATION (H): ICD-10-CM

## 2022-06-27 DIAGNOSIS — R05.3 CHRONIC COUGH: Primary | ICD-10-CM

## 2022-06-27 NOTE — PROGRESS NOTES
Pulse dose testing:    Pt oxygen sats at rest on pulse dose 3 are 95%-after  100 feets sats dropped to 82%.  Pulse dose setting 5-at room air sats are 96%-after  100 feet sats dropped to 85%    Mora Jean LPN

## 2022-06-27 NOTE — TELEPHONE ENCOUNTER
Patient's wife called with questions regarding pulse dose oxygen.     Patient here today for pulse dose testing and did not pass.  Oxygen levels drop significantly with ambulation on pulse dose settings all the way to setting of 5 pulse dose.  Patient requires the continuous flow oxygen.        Informed wife of this.  They will discuss when he gets home and decide what to do.  Currently has tanks but they tend to run out quickly.  Patient gets his oxygen from [facility] and they apparently cannot supply him with a POC (pulse dose or continuous flow).  Patient may wish to pursue purchasing his own unit in the future.

## 2022-06-28 ENCOUNTER — HOSPITAL ENCOUNTER (OUTPATIENT)
Dept: CARDIAC REHAB | Facility: HOSPITAL | Age: 77
Discharge: HOME | End: 2022-06-28
Attending: INTERNAL MEDICINE
Payer: MEDICARE

## 2022-06-28 PROCEDURE — G0239 OTH RESP PROC, GROUP: HCPCS

## 2022-07-05 ENCOUNTER — HOSPITAL ENCOUNTER (OUTPATIENT)
Dept: CARDIAC REHAB | Facility: HOSPITAL | Age: 77
Discharge: HOME | End: 2022-07-05
Attending: INTERNAL MEDICINE
Payer: MEDICARE

## 2022-07-05 PROCEDURE — G0239 OTH RESP PROC, GROUP: HCPCS

## 2022-07-06 ASSESSMENT — ENCOUNTER SYMPTOMS
RECTAL PAIN: 0
SHORTNESS OF BREATH: 1
INCREASED ENERGY: 1
HEMATURIA: 0
POLYPHAGIA: 0
CONSTIPATION: 0
VOMITING: 0
SPUTUM PRODUCTION: 1
HEMOPTYSIS: 0
FLANK PAIN: 0
JAUNDICE: 0
HALLUCINATIONS: 0
COUGH DISTURBING SLEEP: 1
CHILLS: 0
DYSURIA: 0
HEARTBURN: 1
BLOATING: 0
NAUSEA: 0
ALTERED TEMPERATURE REGULATION: 0
SNORES LOUDLY: 0
DECREASED APPETITE: 0
DYSPNEA ON EXERTION: 1
POLYDIPSIA: 0
DIARRHEA: 0
ABDOMINAL PAIN: 0
FATIGUE: 1
BOWEL INCONTINENCE: 0
WEIGHT LOSS: 0
DIFFICULTY URINATING: 0
POSTURAL DYSPNEA: 0
WHEEZING: 0
COUGH: 1
FEVER: 0
WEIGHT GAIN: 0
NIGHT SWEATS: 0
BLOOD IN STOOL: 0

## 2022-07-07 ENCOUNTER — LAB (OUTPATIENT)
Dept: LAB | Facility: CLINIC | Age: 77
End: 2022-07-07

## 2022-07-07 ENCOUNTER — REFERRAL (OUTPATIENT)
Dept: TRANSPLANT | Facility: CLINIC | Age: 77
End: 2022-07-07

## 2022-07-07 ENCOUNTER — OFFICE VISIT (OUTPATIENT)
Dept: PULMONOLOGY | Facility: CLINIC | Age: 77
End: 2022-07-07
Attending: INTERNAL MEDICINE
Payer: MEDICARE

## 2022-07-07 VITALS
BODY MASS INDEX: 34.01 KG/M2 | HEART RATE: 67 BPM | SYSTOLIC BLOOD PRESSURE: 126 MMHG | WEIGHT: 237 LBS | DIASTOLIC BLOOD PRESSURE: 71 MMHG | OXYGEN SATURATION: 98 %

## 2022-07-07 DIAGNOSIS — J84.9 ILD (INTERSTITIAL LUNG DISEASE) (H): ICD-10-CM

## 2022-07-07 DIAGNOSIS — J84.9 ILD (INTERSTITIAL LUNG DISEASE) (H): Primary | ICD-10-CM

## 2022-07-07 DIAGNOSIS — J84.112 IPF (IDIOPATHIC PULMONARY FIBROSIS) (H): ICD-10-CM

## 2022-07-07 DIAGNOSIS — R09.02 HYPOXIA: ICD-10-CM

## 2022-07-07 DIAGNOSIS — R05.3 CHRONIC COUGH: ICD-10-CM

## 2022-07-07 DIAGNOSIS — J84.112 IPF (IDIOPATHIC PULMONARY FIBROSIS) (H): Primary | ICD-10-CM

## 2022-07-07 LAB
6 MIN WALK (FT): 880 FT
6 MIN WALK (M): 268 M
ALBUMIN SERPL-MCNC: 3.6 G/DL (ref 3.4–5)
ALP SERPL-CCNC: 111 U/L (ref 40–150)
ALT SERPL W P-5'-P-CCNC: 25 U/L (ref 0–70)
ANION GAP SERPL CALCULATED.3IONS-SCNC: 8 MMOL/L (ref 3–14)
AST SERPL W P-5'-P-CCNC: 24 U/L (ref 0–45)
BILIRUB SERPL-MCNC: 0.5 MG/DL (ref 0.2–1.3)
BUN SERPL-MCNC: 10 MG/DL (ref 7–30)
CALCIUM SERPL-MCNC: 9.1 MG/DL (ref 8.5–10.1)
CHLORIDE BLD-SCNC: 97 MMOL/L (ref 94–109)
CK SERPL-CCNC: 112 U/L (ref 30–300)
CO2 SERPL-SCNC: 30 MMOL/L (ref 20–32)
CREAT SERPL-MCNC: 0.87 MG/DL (ref 0.66–1.25)
CRP SERPL-MCNC: 5.4 MG/L (ref 0–8)
ERYTHROCYTE [SEDIMENTATION RATE] IN BLOOD BY WESTERGREN METHOD: 39 MM/HR (ref 0–20)
GFR SERPL CREATININE-BSD FRML MDRD: 90 ML/MIN/1.73M2
GLUCOSE BLD-MCNC: 104 MG/DL (ref 70–99)
POTASSIUM BLD-SCNC: 3.9 MMOL/L (ref 3.4–5.3)
PROT SERPL-MCNC: 7.8 G/DL (ref 6.8–8.8)
SODIUM SERPL-SCNC: 135 MMOL/L (ref 133–144)

## 2022-07-07 PROCEDURE — 99205 OFFICE O/P NEW HI 60 MIN: CPT | Mod: 25 | Performed by: INTERNAL MEDICINE

## 2022-07-07 PROCEDURE — 80053 COMPREHEN METABOLIC PANEL: CPT | Performed by: PATHOLOGY

## 2022-07-07 PROCEDURE — 99000 SPECIMEN HANDLING OFFICE-LAB: CPT | Performed by: PATHOLOGY

## 2022-07-07 PROCEDURE — 86331 IMMUNODIFFUSION OUCHTERLONY: CPT | Mod: 90 | Performed by: PATHOLOGY

## 2022-07-07 PROCEDURE — 36415 COLL VENOUS BLD VENIPUNCTURE: CPT | Performed by: PATHOLOGY

## 2022-07-07 PROCEDURE — G0463 HOSPITAL OUTPT CLINIC VISIT: HCPCS | Mod: 25

## 2022-07-07 PROCEDURE — 82784 ASSAY IGA/IGD/IGG/IGM EACH: CPT | Performed by: INTERNAL MEDICINE

## 2022-07-07 PROCEDURE — 86036 ANCA SCREEN EACH ANTIBODY: CPT | Performed by: INTERNAL MEDICINE

## 2022-07-07 PROCEDURE — 94618 PULMONARY STRESS TESTING: CPT | Performed by: INTERNAL MEDICINE

## 2022-07-07 PROCEDURE — 85652 RBC SED RATE AUTOMATED: CPT | Performed by: PATHOLOGY

## 2022-07-07 PROCEDURE — 86606 ASPERGILLUS ANTIBODY: CPT | Mod: 90 | Performed by: PATHOLOGY

## 2022-07-07 PROCEDURE — 86140 C-REACTIVE PROTEIN: CPT | Performed by: PATHOLOGY

## 2022-07-07 PROCEDURE — 82550 ASSAY OF CK (CPK): CPT | Performed by: PATHOLOGY

## 2022-07-07 PROCEDURE — 86235 NUCLEAR ANTIGEN ANTIBODY: CPT | Performed by: INTERNAL MEDICINE

## 2022-07-07 PROCEDURE — 82085 ASSAY OF ALDOLASE: CPT | Mod: 90 | Performed by: PATHOLOGY

## 2022-07-07 RX ORDER — BUDESONIDE AND FORMOTEROL FUMARATE DIHYDRATE 160; 4.5 UG/1; UG/1
2 AEROSOL RESPIRATORY (INHALATION) 2 TIMES DAILY
Qty: 10.2 G | Refills: 4 | Status: SHIPPED | OUTPATIENT
Start: 2022-07-07 | End: 2022-12-15

## 2022-07-07 RX ORDER — BENZONATATE 200 MG/1
200 CAPSULE ORAL 3 TIMES DAILY PRN
Qty: 90 CAPSULE | Refills: 1 | Status: SHIPPED | OUTPATIENT
Start: 2022-07-07 | End: 2022-12-15

## 2022-07-07 NOTE — LETTER
July 20, 2022    From:  [facility]    To: Mr. Gutierrez  [address] [address]    Dear Wyatt Gutierrez,    Thank you for considering the [facility]. Dr. Gilson Estrada referred you to our program for consideration of lung transplant evaluation. We have reviewed your records and, unfortunately, you do not meet our criteria for lung transplant evaluation.  Although your lung disease may be severe enough to consider transplantation as an option, our opinion is that the following condition(s) would put your health and survival at great risk after lung transplantation.     Although we would like to transplant all patients with severe lung disease we unfortunately cannot overcome the negative effects of aging, despite our best technologies and medicines and we cannot offer you lung transplantation.        Thank you for considering the [facility] for your health care needs. Please feel free to contact us at [phone] if you or your physician would like to discuss our decision or with any concerns.     Sincerely,  [facility]    CC Primary Care Provider        Referring Provider

## 2022-07-07 NOTE — PROGRESS NOTES
"[facility]  Pulmonary Medicine  Visit Clinic Note  July 7, 2022    Dear patient. Thank you for visiting with me. I want you to feel respected, understood, and empowered. \"Respect\" is valuing you as much as I would a close family member. \"Empowerment\" happens when you are fully informed, and can make the best possible decision for you.  Please ask me questions!  Challenge anything that is not clear.       ASSESSMENT & PLAN     Patient is a 77 year old male who has presented for further work up of IPF.     #IPF:   -Diagnosed based on Ct chest in 2021. . Has been taking Ofev sinec May 2022.   -  He has been doing pulmonary rehab.  He has severe lung disease which has worsened after possible exacerbation in January.   - I have placed Lung transplant referral. His age and weight is a limitation.    -Will discuss if he is a candidate for any clinical trial.   -continue with PPI.   -Prescribed BReo inhaler as there was some air reactivity noted.   -Ordered ILd work up.   -I am worried that he has rapid worsening of disease.   -Will discus him in conference.    #Chronic Cough  -Tessalon perles.   -BREo inhaler.     #Hypoxemic Respiratory failure  -5 LPM with activity.       His oxygen requirements have increased over the last month. This is concerning as he did not require .       RTC in 3 months with PFts       62 minutes excluding the time spent on cigarette cessation was  spent on the date of the encounter doing chart review, history and exam, documentation and further activities as noted above.    These conclusions are made at the best of one's knowledge and belief based on the provided evidence such as patient's history and allergy test results and they can change over time or can be incomplete because of missing information's.    I explained the lab values, imagings and findings to the patient.  Patient expressed understanding I did not recognize any barriers to the understanding of the " patient.    The above note was dictated using voice recognition software and may include typographical errors. Please contact the author for any clarifications.    Gilson Estrada MD   RN Coordinators: Leticia/Radha: [phone]  ILD RN Coordinators: [phone]  Clinic Number: [phone]  Pager: [phone]       Today's visit note:     Chief Complaint: Wyatt Gutierrez is a 77 year old year old male who is being seen for Interstitial Lung Disease (ILD) (IPF)      HPI:    Wyatt Gutierrez is a 75 y.o. male, previous smoker with PMH sig for KAITY, HTN, HPL who was referred to us back on 12/20/2019 for abnormal chest x-ray that was concerning for ILD.  Patient has had chronic shortness of breath since at least 2010.  He quit smoking tobacco in Jan 2022.     -He had his Ild diagnosed in June 2021. He was not on oxygen.  At that time he was followed eventually he was started on Ofev in May 2022.  His major decompensation happened in 2022 January when he was in [place] and admitted to hospital and had to be started on oxygen after that.     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   Hot tub:  No   Portable humidifier:  Dehumidifier.   Brass or woodwind instruments:  Smoking tobacco or marijuana: Stopped smoking in Jan 2022. Smoked for almost 50 years.  Less than PPD.    Feather pillow/blanket:  Gardening:   Hobbies: golfing .  Chemotherapy or radiation therapy:  Fam hx of ILD:                 Medications:     Current Outpatient Medications   Medication     acyclovir (ZOVIRAX) 400 MG tablet     albuterol (PROAIR HFA;PROVENTIL HFA;VENTOLIN HFA) 90 mcg/actuation inhaler     aspirin (ASA) 81 MG EC tablet     atorvastatin (LIPITOR) 40 MG tablet     azelastine (ASTELIN) 137 mcg (0.1 %) nasal spray     glucosamine-chondroitin 500-400 mg cap     metoprolol succinate (TOPROL-XL) 50 MG 24 hr tablet     multivitamin therapeutic tablet     nintedanib  (OFEV) 150 MG capsule     omeprazole (PRILOSEC) 20 MG capsule     triamterene (DYRENIUM) 50 MG capsule     ibuprofen (ADVIL,MOTRIN) 200 MG tablet     No current facility-administered medications for this visit.            Review of Systems:       A complete 10 point review of systems was otherwise negative except as noted in the HPI.        PHYSICAL EXAM:  /71 (BP Location: Right arm, Patient Position: Chair, Cuff Size: Adult Large)   Pulse 67   Wt 107.5 kg (237 lb)   SpO2 98%   BMI 34.01 kg/m       General: Well developed, well nourished, No apparent distress  Eyes: Anicteric  Nose: Nasal mucosa with no edema or hyperemia.  No polyps  Ears: Hearing grossly normal  Mouth: Oral mucosa is moist, without any lesions. No oropharyngeal exudate.  Respiratory: Coarse breathing is noted.   Cardiac: RRR, normal S1, S2. No murmurs. No JVD  Abdomen: Soft, NT/ND  Musculoskeletal: Extremities normal. No clubbing. No cyanosis. No edema.  Skin: No rash on limited exam  Neuro: Normal mentation. Normal speech.  Psych:Normal affect           Data:   All laboratory and imaging data reviewed.      PFT:   5/2022 6/2022 6.2021    PFT Interpretation:  I personally reviewed and interpreted the PFTs.    ECHO:  2022  Interpretation Summary     Left ventricular size, wall motion and function are normal. The ejection  fraction is > 65%.  There is mild concentric left ventricular hypertrophy.  Normal right ventricle size and systolic function.  Moderate valvular aortic stenosis.  The ascending aorta is Mildly dilated.      Chest CT: I personally reviewed and interpreted the CT scan.  4/2022    Recent Results (from the past 168 hour(s))   6 minute walk test    Collection Time: 07/07/22 12:00 AM   Result Value Ref Range    6 min walk (FT) 880 ft    6 Min Walk (M) 268 m   General PFT Lab (Please always keep checked)    Collection Time: 07/07/22  9:55 AM   Result Value Ref Range    FIO2-Pre 21.00 %

## 2022-07-07 NOTE — LETTER
"    7/7/2022         RE: Wyatt Gutierrez  [address]  [place] [address]        Dear Colleague,    Thank you for referring your patient, Wyatt Gutierrez, to the [facility] FOR LUNG SCIENCE AND [facility]. Please see a copy of my visit note below.    [facility]  Pulmonary Medicine  Visit Clinic Note  July 7, 2022    Dear patient. Thank you for visiting with me. I want you to feel respected, understood, and empowered. \"Respect\" is valuing you as much as I would a close family member. \"Empowerment\" happens when you are fully informed, and can make the best possible decision for you.  Please ask me questions!  Challenge anything that is not clear.       ASSESSMENT & PLAN     Patient is a 77 year old male who has presented for further work up of IPF.     #IPF:   -Diagnosed based on Ct chest in 2021. . Has been taking Ofev sinec May 2022.   -  He has been doing pulmonary rehab.  He has severe lung disease which has worsened after possible exacerbation in January.   - I have placed Lung transplant referral. His age and weight is a limitation.    -Will discuss if he is a candidate for any clinical trial.   -continue with PPI.   -Prescribed BReo inhaler as there was some air reactivity noted.   -Ordered ILd work up.   -I am worried that he has rapid worsening of disease.   -Will discus him in conference.    #Chronic Cough  -Tessalon perles.   -BREo inhaler.     #Hypoxemic Respiratory failure  -5 LPM with activity.       His oxygen requirements have increased over the last month. This is concerning as he did not require .       RTC in 3 months with PFts       62 minutes excluding the time spent on cigarette cessation was  spent on the date of the encounter doing chart review, history and exam, documentation and further activities as noted above.    These conclusions are made at the best of one's knowledge and belief based on the provided evidence such as patient's history and " allergy test results and they can change over time or can be incomplete because of missing information's.    I explained the lab values, imagings and findings to the patient.  Patient expressed understanding I did not recognize any barriers to the understanding of the patient.    The above note was dictated using voice recognition software and may include typographical errors. Please contact the author for any clarifications.    Gilson Estrada MD   RN Coordinators: Maureen/Meme/Radha: [phone]  ILD RN Coordinators: [phone]  Clinic Number: [phone]  Pager: [phone]       Today's visit note:     Chief Complaint: Wyatt Gutierrez is a 77 year old year old male who is being seen for Interstitial Lung Disease (ILD) (IPF)      HPI:    Wyatt Gutierrez is a 75 y.o. male, previous smoker with PMH sig for KAITY, HTN, HPL who was referred to us back on 12/20/2019 for abnormal chest x-ray that was concerning for ILD.  Patient has had chronic shortness of breath since at least 2010.  He quit smoking tobacco in Jan 2022.     -He had his Ild diagnosed in June 2021. He was not on oxygen.  At that time he was followed eventually he was started on Ofev in May 2022.  His major decompensation happened in 2022 January when he was in [place] and admitted to hospital and had to be started on oxygen after that.     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   Hot tub:  No   Portable humidifier:  Dehumidifier.   Brass or woodwind instruments:  Smoking tobacco or marijuana: Stopped smoking in Jan 2022. Smoked for almost 50 years.  Less than PPD.    Feather pillow/blanket:  Gardening:   Hobbies: golfing .  Chemotherapy or radiation therapy:  Fam hx of ILD:                 Medications:     Current Outpatient Medications   Medication     acyclovir (ZOVIRAX) 400 MG tablet     albuterol (PROAIR HFA;PROVENTIL HFA;VENTOLIN HFA) 90 mcg/actuation inhaler      aspirin (ASA) 81 MG EC tablet     atorvastatin (LIPITOR) 40 MG tablet     azelastine (ASTELIN) 137 mcg (0.1 %) nasal spray     glucosamine-chondroitin 500-400 mg cap     metoprolol succinate (TOPROL-XL) 50 MG 24 hr tablet     multivitamin therapeutic tablet     nintedanib (OFEV) 150 MG capsule     omeprazole (PRILOSEC) 20 MG capsule     triamterene (DYRENIUM) 50 MG capsule     ibuprofen (ADVIL,MOTRIN) 200 MG tablet     No current facility-administered medications for this visit.            Review of Systems:       A complete 10 point review of systems was otherwise negative except as noted in the HPI.        PHYSICAL EXAM:  /71 (BP Location: Right arm, Patient Position: Chair, Cuff Size: Adult Large)   Pulse 67   Wt 107.5 kg (237 lb)   SpO2 98%   BMI 34.01 kg/m       General: Well developed, well nourished, No apparent distress  Eyes: Anicteric  Nose: Nasal mucosa with no edema or hyperemia.  No polyps  Ears: Hearing grossly normal  Mouth: Oral mucosa is moist, without any lesions. No oropharyngeal exudate.  Respiratory: Coarse breathing is noted.   Cardiac: RRR, normal S1, S2. No murmurs. No JVD  Abdomen: Soft, NT/ND  Musculoskeletal: Extremities normal. No clubbing. No cyanosis. No edema.  Skin: No rash on limited exam  Neuro: Normal mentation. Normal speech.  Psych:Normal affect           Data:   All laboratory and imaging data reviewed.      PFT:   5/2022 6/2022 6.2021    PFT Interpretation:  I personally reviewed and interpreted the PFTs.    ECHO:  2022  Interpretation Summary     Left ventricular size, wall motion and function are normal. The ejection  fraction is > 65%.  There is mild concentric left ventricular hypertrophy.  Normal right ventricle size and systolic function.  Moderate valvular aortic stenosis.  The ascending aorta is Mildly dilated.      Chest CT: I personally reviewed and interpreted the CT scan.  4/2022    Recent Results (from the past 168 hour(s))   6 minute walk test     Collection Time: 07/07/22 12:00 AM   Result Value Ref Range    6 min walk (FT) 880 ft    6 Min Walk (M) 268 m   General PFT Lab (Please always keep checked)    Collection Time: 07/07/22  9:55 AM   Result Value Ref Range    FIO2-Pre 21.00 %       Again, thank you for allowing me to participate in the care of your patient.        Sincerely,        Gilson Estrada MD

## 2022-07-07 NOTE — LETTER
July 14, 2022        Wyatt Gutierrez  [address]  [place] [address]        Dear Wyatt,       You have recently expressed interest in our Solid Organ Transplant Program and have requested to begin the evaluation process.  We have made several attempts to get in touch with you but have been unable to reach you.    If you are still interested and/or have any questions, please contact us at  [phone] or [phone] and ask to speak with the .      Monday - Friday, between the hours of 8:00am and 5:00pm central time.    We look forward to hearing from you.      Regards,     Solid Organ Transplant Intake Team  [facility]  [address]  [address], [facility] [address]  [place] [address]

## 2022-07-07 NOTE — NURSING NOTE
Chief Complaint   Patient presents with     Interstitial Lung Disease (ILD)     IPF     Vitals were taken and medications were reconciled.     DEMETRI Avendano

## 2022-07-07 NOTE — LETTER
July 18, 2022    Wyatt Gutierrez  [address] [facility] [address] [address]    Dear Tai,    Thank you for your interest in the Transplant Center at [facility]. We look forward to being a part of your care team and assisting you through the transplant process.    As we discussed, your transplant coordinator is Bree Guzmán (Lung).  You may call your coordinator at any time with questions or concerns.  Your first scheduled call will be on July 20, 2022 between 10:00 am and 12:00 pm. If this needs to change, call [phone].    Please complete the following.    1. Fill out and return the enclosed forms    Authorization for Electronic Communication    Authorization to Discuss Protected Health Information    Authorization for Release of Protected Health Information    2. Sign up for:    op5, access to your electronic medical record (see enclosed pamphlet)    [employer], a transplant education website    You can use these tools to learn more about your transplant, communicate with your care team, and track your medical details    Sincerely,    Solid Organ Transplant  [facility]    cc: Referring Physician PCP

## 2022-07-07 NOTE — NURSING NOTE
Chief Complaint   Patient presents with     Interstitial Lung Disease (ILD)     IPF     Vitals were taken and medications were reconciled.     Cassie SANCHEZA  11:12 AM

## 2022-07-08 ENCOUNTER — TELEPHONE (OUTPATIENT)
Dept: PULMONOLOGY | Facility: CLINIC | Age: 77
End: 2022-07-08

## 2022-07-08 LAB
ALDOLASE SERPL-CCNC: 4.7 U/L
ANCA AB PATTERN SER IF-IMP: NORMAL
C-ANCA TITR SER IF: NORMAL {TITER}
FIO2-PRE: 21 %
IGG SERPL-MCNC: 1089 MG/DL (ref 610–1616)
IGG1 SER-MCNC: 689 MG/DL (ref 382–929)
IGG2 SER-MCNC: 210 MG/DL (ref 242–700)
IGG3 SER-MCNC: 31 MG/DL (ref 22–176)
IGG4 SER-MCNC: 55 MG/DL (ref 4–86)
SUBCLASSES, PERCENT: 90 %

## 2022-07-08 NOTE — TELEPHONE ENCOUNTER
"Spoke with pt's pharmacy benefits after receiving fax stating that breo, that was recently prescribed yesterday after seeing patient for the first time, was denied as the \"preferred alternative is breo\". Spoke with Constantin who states the rejection was because \"refill too soon\". Discussed with her that this is a new prescription for a new medication for the patient and he has not filled it yet. She provided a help desk number the pharmacy should call [phone] to resolve this.     RANULFO Gillette, spoke to pharmacy regarding this and they state it actually was resolved yesterday and the issue was the prescription needed to be a brand.   "

## 2022-07-09 LAB
ENA JO1 AB SER IA-ACNC: 0.9 U/ML
ENA JO1 IGG SER-ACNC: NEGATIVE

## 2022-07-11 ENCOUNTER — TEAM CONFERENCE (OUTPATIENT)
Dept: PULMONOLOGY | Facility: CLINIC | Age: 77
End: 2022-07-11

## 2022-07-11 DIAGNOSIS — J84.9 ILD (INTERSTITIAL LUNG DISEASE) (H): Primary | ICD-10-CM

## 2022-07-12 NOTE — TELEPHONE ENCOUNTER
ILD Conference      Patient Name: Wyatt Gutierrez    Reason for conference discussion/Specific Question:  Does patient have a UIP pattern on CT scan?    Referring Physician:      Radiology Interpretation: Some honeycombing, fibrotic ILD, nobasilar predominance so not UIP. Abnormal colon. Could be UC?    Pathology Interpretation: N/A      There was a consensus recommendation for the following actions:   Refer for studies.  Refer to GI for possible UC.    Pulmonary/ILD Provider: Dr. Gilson Estrada

## 2022-07-13 LAB
A FLAVUS AB SER QL ID: NORMAL
A FUMIGATUS1 AB SER QL ID: NORMAL
A FUMIGATUS2 AB SER QL ID: NORMAL
A FUMIGATUS3 AB SER QL ID: NORMAL
A FUMIGATUS6 AB SER QL ID: NORMAL
A PULLULANS AB SER QL ID: NORMAL
PIGEON SERUM AB QL ID: NORMAL
S RECTIVIRGULA AB SER QL ID: NORMAL
S VIRIDIS AB SER QL ID: NORMAL
T CANDIDUS AB SER QL: NORMAL
T VULGARIS1 AB SER QL ID: NORMAL

## 2022-07-14 ENCOUNTER — TELEPHONE (OUTPATIENT)
Dept: TRANSPLANT | Facility: CLINIC | Age: 77
End: 2022-07-14

## 2022-07-14 NOTE — TELEPHONE ENCOUNTER
General  Route to LPN    Reason for call: Tai was returning missed call.     Call back needed? Yes  Return Call Needed  Same as documented in contacts section

## 2022-07-18 VITALS — BODY MASS INDEX: 33.64 KG/M2 | HEIGHT: 70 IN | WEIGHT: 235 LBS

## 2022-07-18 NOTE — TELEPHONE ENCOUNTER
SOT LUNG INTAKE MARIIA      Referring Provider: Dr. Gilson Estrada  Primary Provider: Dr. Eliot De La Rosa  Specialist: Cardiology: Dr. Bubba Cao  Diagnosis:IPF  Source/Facility: [facility]     Smoking/nicotine use history: Former smoker, quit 12/31/21 55 pack years  Alcohol use history: 1-2 glasses of wine/ 2x week  Drug use history: edible marijuana in the past  Cancer history:  no  Cardiac history:  Patient reports a heart murmur  BMI:33.7      Notes: Due to COVID, verbal consent received for Care Everywhere Authorization from the patient during this call.    Is patient in a group home/assisted living? no  Does patient have a guardian? no    Referral intake process completed.  Patient is aware that after financial approval is received, medical records will be requested.   Patient confirmed for a callback from transplant coordinator on July 20, 2022. (within 2 weeks)  Tentative evaluation date TBD. (within 4 weeks)    Confirmed coordinator will discuss evaluation process in more detail at the time of their call.   Patient is aware of the need to arrange age appropriate cancer screening, vaccinations, and dental care.  Reminded patient to complete questionnaire, complete medical records release, and review packet prior to evaluation visit .  Assessed patient for special needs (ie--wheelchair, assistance, guardian, and ):  none   Patient instructed to call [phone] with questions.

## 2022-07-19 ENCOUNTER — PATIENT OUTREACH (OUTPATIENT)
Dept: PULMONOLOGY | Facility: CLINIC | Age: 77
End: 2022-07-19

## 2022-07-19 ENCOUNTER — HOSPITAL ENCOUNTER (OUTPATIENT)
Dept: CARDIAC REHAB | Facility: HOSPITAL | Age: 77
Discharge: HOME | End: 2022-07-19
Attending: INTERNAL MEDICINE
Payer: MEDICARE

## 2022-07-19 PROCEDURE — G0239 OTH RESP PROC, GROUP: HCPCS

## 2022-07-19 NOTE — PROGRESS NOTES
Patient contacted regarding having to stop OFEV due to side effects, stomach pains. Stated the stomach pain have improved since stopping. Also cough is not improved with new cough medication Tessalon Perrles. Discussed that cough can be hard to control and told him to try a few more weeks with the Tessalon perrles, should give a trail of 4-6 weeks. Also recommended using Mucinex or OTC cough suppressants.    Then informed him of ILD conference and discussion. Patient stated he was not interested in studies since they go to [place] for the winter. He will contact his PCP about referral to GI.

## 2022-07-21 ENCOUNTER — HOSPITAL ENCOUNTER (OUTPATIENT)
Dept: CARDIAC REHAB | Facility: HOSPITAL | Age: 77
Discharge: HOME | End: 2022-07-21
Attending: INTERNAL MEDICINE
Payer: MEDICARE

## 2022-07-21 PROCEDURE — G0239 OTH RESP PROC, GROUP: HCPCS

## 2022-07-26 ENCOUNTER — HOSPITAL ENCOUNTER (OUTPATIENT)
Dept: CARDIAC REHAB | Facility: HOSPITAL | Age: 77
Discharge: HOME | End: 2022-07-26
Attending: INTERNAL MEDICINE
Payer: MEDICARE

## 2022-07-26 PROCEDURE — G0239 OTH RESP PROC, GROUP: HCPCS

## 2022-07-28 ENCOUNTER — HOSPITAL ENCOUNTER (OUTPATIENT)
Dept: CARDIAC REHAB | Facility: HOSPITAL | Age: 77
Discharge: HOME | End: 2022-07-28
Attending: INTERNAL MEDICINE
Payer: MEDICARE

## 2022-07-28 PROCEDURE — G0239 OTH RESP PROC, GROUP: HCPCS

## 2022-08-01 DIAGNOSIS — J84.9 ILD (INTERSTITIAL LUNG DISEASE) (H): ICD-10-CM

## 2022-08-01 DIAGNOSIS — R09.02 HYPOXIA: Primary | ICD-10-CM

## 2022-08-01 NOTE — PROGRESS NOTES
Patient contacted about needing new oxygen orders sent to [facility]. His current system is home concentrator that goes to 5 LPM and tanks. He is having trouble with them delivering enough tanks to last him a month, they are only delivering to him every 4 weeks he said. New orders faxed to [facility].

## 2022-08-02 ENCOUNTER — HOSPITAL ENCOUNTER (OUTPATIENT)
Dept: CARDIAC REHAB | Facility: HOSPITAL | Age: 77
Discharge: HOME | End: 2022-08-02
Attending: INTERNAL MEDICINE
Payer: MEDICARE

## 2022-08-02 PROCEDURE — G0239 OTH RESP PROC, GROUP: HCPCS

## 2022-08-04 ENCOUNTER — HOSPITAL ENCOUNTER (OUTPATIENT)
Dept: CARDIAC REHAB | Facility: HOSPITAL | Age: 77
Discharge: HOME | End: 2022-08-04
Attending: INTERNAL MEDICINE
Payer: MEDICARE

## 2022-08-04 PROCEDURE — G0239 OTH RESP PROC, GROUP: HCPCS

## 2022-08-09 ENCOUNTER — HOSPITAL ENCOUNTER (OUTPATIENT)
Dept: CARDIAC REHAB | Facility: HOSPITAL | Age: 77
Discharge: HOME | End: 2022-08-09
Attending: INTERNAL MEDICINE
Payer: MEDICARE

## 2022-08-09 PROCEDURE — G0239 OTH RESP PROC, GROUP: HCPCS

## 2022-08-11 ENCOUNTER — HOSPITAL ENCOUNTER (OUTPATIENT)
Dept: CARDIAC REHAB | Facility: HOSPITAL | Age: 77
Discharge: HOME | End: 2022-08-11
Attending: INTERNAL MEDICINE
Payer: MEDICARE

## 2022-08-11 PROCEDURE — G0239 OTH RESP PROC, GROUP: HCPCS

## 2022-08-16 ENCOUNTER — HOSPITAL ENCOUNTER (OUTPATIENT)
Dept: CARDIAC REHAB | Facility: HOSPITAL | Age: 77
Discharge: HOME | End: 2022-08-16
Attending: INTERNAL MEDICINE
Payer: MEDICARE

## 2022-08-16 PROCEDURE — G0239 OTH RESP PROC, GROUP: HCPCS

## 2022-08-18 ENCOUNTER — PATIENT OUTREACH (OUTPATIENT)
Dept: PULMONOLOGY | Facility: CLINIC | Age: 77
End: 2022-08-18

## 2022-08-18 ENCOUNTER — HOSPITAL ENCOUNTER (OUTPATIENT)
Dept: CARDIAC REHAB | Facility: HOSPITAL | Age: 77
Discharge: HOME | End: 2022-08-18
Attending: INTERNAL MEDICINE
Payer: MEDICARE

## 2022-08-18 PROCEDURE — G0238 OTH RESP PROC, INDIV: HCPCS

## 2022-08-18 NOTE — PROGRESS NOTES
Writer noted that patient had not been scheduled to see GI for incidental finding of intestinal thickening on CT that could be indicative of UC. (referral placed in July). Patient states that he will arrange a referral through his primary care provider as he would rather not travel to the [place].  He denies any GI symptoms but will follow through with referral.

## 2022-09-19 ENCOUNTER — TELEPHONE (OUTPATIENT)
Dept: PULMONOLOGY | Facility: CLINIC | Age: 77
End: 2022-09-19

## 2022-09-19 DIAGNOSIS — K21.9 GASTROESOPHAGEAL REFLUX DISEASE, UNSPECIFIED WHETHER ESOPHAGITIS PRESENT: Primary | ICD-10-CM

## 2022-09-19 RX ORDER — PANTOPRAZOLE SODIUM 40 MG/1
40 TABLET, DELAYED RELEASE ORAL DAILY
Qty: 90 TABLET | Refills: 1 | Status: SHIPPED | OUTPATIENT
Start: 2022-09-19 | End: 2023-07-06

## 2022-09-19 NOTE — TELEPHONE ENCOUNTER
Spoke to patient about cough. He is currently taking Omeprazole 20 mg BID. He is willing to switch to Protonix and see if that helps. We did discuss when his cough happens the most and he says when his oxygen levels go low and has higher heart rate. Currently he uses 5 LPM with activity and 8 LPM when on treadmill. Discuss that he should try increasing his oxygen level with activity to see if that will help his cough too. Also taking cough drops and cough syrups. He will continue to try those and will contact back if it does not help or worsens.

## 2022-09-19 NOTE — TELEPHONE ENCOUNTER
----- Message from Glison Estrada MD sent at 9/19/2022  1:51 PM CDT -----  Regarding: RE: cough  HI,    I dont think he has had any type of PPI.      Lets prescribne 40 daily of pantoprazole.  I sent prescription [facility].     Thanks,  Gilson  ----- Message -----  From: Dede Vicente RN  Sent: 9/19/2022  12:58 PM CDT  To: Gilson Estrada MD, #  Subject: cough                                            Hi Hem,    Patient left us message about his cough and that the benzonatate you prescribed has not helped.     Do you have another cough medication you want to try?    Leta Vicente RN  ILD Care Coordinator  [phone]

## 2022-09-27 ASSESSMENT — ENCOUNTER SYMPTOMS
MUSCLE CRAMPS: 1
COUGH: 1
JOINT SWELLING: 0
POSTURAL DYSPNEA: 0
SNORES LOUDLY: 0
HEMOPTYSIS: 0
HEMATURIA: 0
DYSPNEA ON EXERTION: 1
DIFFICULTY URINATING: 0
SPUTUM PRODUCTION: 1
ARTHRALGIAS: 0
WHEEZING: 0
SHORTNESS OF BREATH: 1
NECK PAIN: 0
STIFFNESS: 0
COUGH DISTURBING SLEEP: 1
FLANK PAIN: 0
MYALGIAS: 0
DYSURIA: 0
MUSCLE WEAKNESS: 0
BACK PAIN: 0

## 2022-09-29 ENCOUNTER — OFFICE VISIT (OUTPATIENT)
Dept: PULMONOLOGY | Facility: CLINIC | Age: 77
End: 2022-09-29
Attending: INTERNAL MEDICINE
Payer: MEDICARE

## 2022-09-29 VITALS
DIASTOLIC BLOOD PRESSURE: 71 MMHG | OXYGEN SATURATION: 97 % | SYSTOLIC BLOOD PRESSURE: 135 MMHG | HEART RATE: 73 BPM | BODY MASS INDEX: 33.5 KG/M2 | HEIGHT: 70 IN | WEIGHT: 234 LBS

## 2022-09-29 DIAGNOSIS — J84.112 IPF (IDIOPATHIC PULMONARY FIBROSIS) (H): Primary | ICD-10-CM

## 2022-09-29 DIAGNOSIS — J84.112 IPF (IDIOPATHIC PULMONARY FIBROSIS) (H): ICD-10-CM

## 2022-09-29 DIAGNOSIS — J96.11 HYPOXEMIC RESPIRATORY FAILURE, CHRONIC (H): ICD-10-CM

## 2022-09-29 DIAGNOSIS — J47.9 BRONCHIECTASIS WITHOUT COMPLICATION (H): ICD-10-CM

## 2022-09-29 LAB
DLCOUNC-%PRED-PRE: 37 %
DLCOUNC-PRE: 9.35 ML/MIN/MMHG
DLCOUNC-PRED: 25.13 ML/MIN/MMHG
ERV-%PRED-PRE: 86 %
ERV-PRE: 0.57 L
ERV-PRED: 0.66 L
EXPTIME-PRE: 6.56 SEC
FEF2575-%PRED-PRE: 139 %
FEF2575-PRE: 3.1 L/SEC
FEF2575-PRED: 2.23 L/SEC
FEFMAX-%PRED-PRE: 122 %
FEFMAX-PRE: 9.57 L/SEC
FEFMAX-PRED: 7.83 L/SEC
FEV1-%PRED-PRE: 72 %
FEV1-PRE: 2.2 L
FEV1FEV6-PRE: 91 %
FEV1FEV6-PRED: 77 %
FEV1FVC-PRE: 91 %
FEV1FVC-PRED: 75 %
FEV1SVC-PRE: 87 %
FEV1SVC-PRED: 65 %
FIFMAX-PRE: 6.39 L/SEC
FRCPLETH-%PRED-PRE: 45 %
FRCPLETH-PRE: 1.69 L
FRCPLETH-PRED: 3.75 L
FVC-%PRED-PRE: 59 %
FVC-PRE: 2.42 L
FVC-PRED: 4.06 L
IC-%PRED-PRE: 49 %
IC-PRE: 1.97 L
IC-PRED: 3.99 L
RVPLETH-%PRED-PRE: 40 %
RVPLETH-PRE: 1.12 L
RVPLETH-PRED: 2.75 L
TLCPLETH-%PRED-PRE: 51 %
TLCPLETH-PRE: 3.65 L
TLCPLETH-PRED: 7.13 L
VA-%PRED-PRE: 54 %
VA-PRE: 3.48 L
VC-%PRED-PRE: 54 %
VC-PRE: 2.54 L
VC-PRED: 4.65 L

## 2022-09-29 PROCEDURE — 94729 DIFFUSING CAPACITY: CPT | Performed by: INTERNAL MEDICINE

## 2022-09-29 PROCEDURE — 99214 OFFICE O/P EST MOD 30 MIN: CPT | Mod: 25 | Performed by: INTERNAL MEDICINE

## 2022-09-29 PROCEDURE — G0463 HOSPITAL OUTPT CLINIC VISIT: HCPCS | Mod: 25

## 2022-09-29 PROCEDURE — 94726 PLETHYSMOGRAPHY LUNG VOLUMES: CPT | Performed by: INTERNAL MEDICINE

## 2022-09-29 PROCEDURE — 94375 RESPIRATORY FLOW VOLUME LOOP: CPT | Performed by: INTERNAL MEDICINE

## 2022-09-29 RX ORDER — AZITHROMYCIN 250 MG/1
250 TABLET, FILM COATED ORAL
Qty: 12 TABLET | Refills: 0 | Status: SHIPPED | OUTPATIENT
Start: 2022-09-30 | End: 2022-10-30

## 2022-09-29 ASSESSMENT — PAIN SCALES - GENERAL: PAINLEVEL: NO PAIN (0)

## 2022-09-29 NOTE — NURSING NOTE
Chief Complaint   Patient presents with     Interstitial Lung Disease (ILD)     3 month follow up ILD      Vitals were taken and medications were reconciled.     Cassie Edwards RMA  7:25 AM

## 2022-09-29 NOTE — PROGRESS NOTES
"[facility]  Pulmonary Medicine  Visit Clinic Note  Dear patient. Thank you for visiting with me. I want you to feel respected, understood, and empowered. \"Respect\" is valuing you as much as I would a close family member. \"Empowerment\" happens when you are fully informed, and can make the best possible decision for you.  Please ask me questions!  Challenge anything that is not clear.       ASSESSMENT & PLAN     Patient is a 77 year old male who has presented for further work up of IPF.     #IPF:   -Diagnosed based on Ct chest in 2021. . Has been taking Ofev sinec May 2022.   -  He has been doing pulmonary rehab.  He has severe lung disease which has worsened after possible exacerbation in January.  His age is a limitation for lung transplant.   - Discussed clinical trials but he is not interested in it for now.   - Will go up on PPI.   -Prescribed BReo inhaler as there was some air reactivity noted.  Mild improvement with its use.   -I am worried that he has rapid worsening of disease.  Setting up a visit to address goals of care.   -He has not been able to tolerate ofev 150 BID. Will try to drop it to 100 BID and see how he responds.     #Chronic Cough  -Tessalon perles has not worked.  -Most lijkley due to his IPF/  -Will give a trial of three times a week azithromycin and see if that helps.   - Also prescribed nasal spray.   -BREo inhaler.     #Hypoxemic Respiratory failure  -5 LPM with activity.       #vaccines:  -Uptodate    -He will be driving to [place].  Gave info on physicians there.       RTC in 6 months with PFts and 6MWT      31 minutes excluding the time spent on cigarette cessation was  spent on the date of the encounter doing chart review, history and exam, documentation and further activities as noted above.    These conclusions are made at the best of one's knowledge and belief based on the provided evidence such as patient's history and allergy test results and they can change " over time or can be incomplete because of missing information's.    I explained the lab values, imagings and findings to the patient.  Patient expressed understanding I did not recognize any barriers to the understanding of the patient.    The above note was dictated using voice recognition software and may include typographical errors. Please contact the author for any clarifications.    Gilson Estrada MD   RN Coordinators: Maureen/Meme/Radha: [phone]  ILD RN Coordinators: [phone]  Clinic Number: [phone]  Pager: [phone]       Today's visit note:     Chief Complaint: Wyatt Gutierrez is a 77 year old year old male who is being seen for No chief complaint on file.      HPI:    Wyatt Gutierrez is a 75 y.o. male, previous smoker with PMH sig for KAITY, HTN, HPL who was referred to us back on 12/20/2019 for abnormal chest x-ray that was concerning for ILD.  Patient has had chronic shortness of breath since at least 2010.  He quit smoking tobacco in Jan 2022.     -He had his Ild diagnosed in June 2021. He was not on oxygen.  At that time he was followed eventually he was started on Ofev in May 2022.  His major decompensation happened in 2022 January when he was in [place] and admitted to hospital and had to be started on oxygen after that.     Interval History: 9/29/22:  -Since than he has had worsening oxygen requirement.  He continues to use treadmill with oxygen.  He will ntio be a transplant candidate.      -His mainly dry cough is bopthering him.     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   Hot tub:  No   Portable humidifier:  Dehumidifier.   Brass or woodwind instruments:  Smoking tobacco or marijuana: Stopped smoking in Jan 2022. Smoked for almost 50 years.  Less than PPD.    Feather pillow/blanket:  Gardening:   Hobbies: golfing .  Chemotherapy or radiation therapy:  Fam hx of ILD:                 Medications:      Current Outpatient Medications   Medication     acyclovir (ZOVIRAX) 400 MG tablet     albuterol (PROAIR HFA;PROVENTIL HFA;VENTOLIN HFA) 90 mcg/actuation inhaler     aspirin (ASA) 81 MG EC tablet     atorvastatin (LIPITOR) 40 MG tablet     azelastine (ASTELIN) 137 mcg (0.1 %) nasal spray     benzonatate (TESSALON) 200 MG capsule     budesonide-formoterol (SYMBICORT) 160-4.5 MCG/ACT Inhaler     fluticasone-vilanterol (BREO ELLIPTA) 100-25 MCG/INH inhaler     glucosamine-chondroitin 500-400 mg cap     ibuprofen (ADVIL,MOTRIN) 200 MG tablet     metoprolol succinate (TOPROL-XL) 50 MG 24 hr tablet     multivitamin therapeutic tablet     nintedanib (OFEV) 150 MG capsule     nintedanib (OFEV) 150 MG capsule     omeprazole (PRILOSEC) 20 MG capsule     pantoprazole (PROTONIX) 40 MG EC tablet     triamterene (DYRENIUM) 50 MG capsule     No current facility-administered medications for this visit.            Review of Systems:       A complete 10 point review of systems was otherwise negative except as noted in the HPI.        PHYSICAL EXAM:  There were no vitals taken for this visit.     General: Well developed, well nourished, No apparent distress  Eyes: Anicteric  Nose: Nasal mucosa with no edema or hyperemia.  No polyps  Ears: Hearing grossly normal  Mouth: Oral mucosa is moist, without any lesions. No oropharyngeal exudate.  Respiratory: Coarse breathing is noted.   Cardiac: RRR, normal S1, S2. No murmurs. No JVD  Abdomen: Soft, NT/ND  Musculoskeletal: Extremities normal. No clubbing. No cyanosis. No edema.  Skin: No rash on limited exam  Neuro: Normal mentation. Normal speech.  Psych:Normal affect           Data:   All laboratory and imaging data reviewed.      PFT:           5/2022 6/2022 6.2021    PFT Interpretation:  I personally reviewed and interpreted the PFTs.    ECHO:  2022  Interpretation Summary     Left ventricular size, wall motion and function are normal. The ejection  fraction is > 65%.  There  is mild concentric left ventricular hypertrophy.  Normal right ventricle size and systolic function.  Moderate valvular aortic stenosis.  The ascending aorta is Mildly dilated.      Chest CT: I personally reviewed and interpreted the CT scan.  4/2022    Recent Results (from the past 168 hour(s))   General PFT Lab (Please always keep checked)    Collection Time: 09/29/22  6:46 AM   Result Value Ref Range    FVC-Pred 4.06 L    FVC-Pre 2.42 L    FVC-%Pred-Pre 59 %    FEV1-Pre 2.20 L    FEV1-%Pred-Pre 72 %    FEV1FVC-Pred 75 %    FEV1FVC-Pre 91 %    FEFMax-Pred 7.83 L/sec    FEFMax-Pre 9.57 L/sec    FEFMax-%Pred-Pre 122 %    FEF2575-Pred 2.23 L/sec    FEF2575-Pre 3.10 L/sec    PEO3629-%Pred-Pre 139 %    ExpTime-Pre 6.56 sec    FIFMax-Pre 6.39 L/sec    VC-Pred 4.65 L    VC-Pre 2.54 L    VC-%Pred-Pre 54 %    IC-Pred 3.99 L    IC-Pre 1.97 L    IC-%Pred-Pre 49 %    ERV-Pred 0.66 L    ERV-Pre 0.57 L    ERV-%Pred-Pre 86 %    FEV1FEV6-Pred 77 %    FEV1FEV6-Pre 91 %    FRCPleth-Pred 3.75 L    FRCPleth-Pre 1.69 L    FRCPleth-%Pred-Pre 45 %    RVPleth-Pred 2.75 L    RVPleth-Pre 1.12 L    RVPleth-%Pred-Pre 40 %    TLCPleth-Pred 7.13 L    TLCPleth-Pre 3.65 L    TLCPleth-%Pred-Pre 51 %    DLCOunc-Pred 25.13 ml/min/mmHg    DLCOunc-Pre 9.35 ml/min/mmHg    DLCOunc-%Pred-Pre 37 %    VA-Pre 3.48 L    VA-%Pred-Pre 54 %    FEV1SVC-Pred 65 %    FEV1SVC-Pre 87 %

## 2022-09-29 NOTE — LETTER
"    9/29/2022         RE: Wyatt Gutierrez  [address] [address]        Dear Colleague,    Thank you for referring your patient, Wyatt Gutierrez, to the [facility] FOR LUNG SCIENCE AND [facility]. Please see a copy of my visit note below.    [facility]  Pulmonary Medicine  Visit Clinic Note  Dear patient. Thank you for visiting with me. I want you to feel respected, understood, and empowered. \"Respect\" is valuing you as much as I would a close family member. \"Empowerment\" happens when you are fully informed, and can make the best possible decision for you.  Please ask me questions!  Challenge anything that is not clear.       ASSESSMENT & PLAN     Patient is a 77 year old male who has presented for further work up of IPF.     #IPF:   -Diagnosed based on Ct chest in 2021. . Has been taking Ofev sinec May 2022.   -  He has been doing pulmonary rehab.  He has severe lung disease which has worsened after possible exacerbation in January.  His age is a limitation for lung transplant.   - Discussed clinical trials but he is not interested in it for now.   - Will go up on PPI.   -Prescribed BReo inhaler as there was some air reactivity noted.  Mild improvement with its use.   -I am worried that he has rapid worsening of disease.  Setting up a visit to address goals of care.   -He has not been able to tolerate ofev 150 BID. Will try to drop it to 100 BID and see how he responds.     #Chronic Cough  -Tessalon perles has not worked.  -Most lijkley due to his IPF/  -Will give a trial of three times a week azithromycin and see if that helps.   - Also prescribed nasal spray.   -BREo inhaler.     #Hypoxemic Respiratory failure  -5 LPM with activity.       #vaccines:  -Uptodate    -He will be driving to [place].  Gave info on physicians there.       RTC in 6 months with PFts and 6MWT      31 minutes excluding the time spent on cigarette cessation was  spent on the " date of the encounter doing chart review, history and exam, documentation and further activities as noted above.    These conclusions are made at the best of one's knowledge and belief based on the provided evidence such as patient's history and allergy test results and they can change over time or can be incomplete because of missing information's.    I explained the lab values, imagings and findings to the patient.  Patient expressed understanding I did not recognize any barriers to the understanding of the patient.    The above note was dictated using voice recognition software and may include typographical errors. Please contact the author for any clarifications.    Gilson Estrada MD   RN Coordinators: Maureen/Meme/Radha: [phone]  ILD RN Coordinators: [phone]  Clinic Number: [phone]  Pager: [phone]       Today's visit note:     Chief Complaint: Wyatt Gutierrez is a 77 year old year old male who is being seen for No chief complaint on file.      HPI:    Wyatt Gutierrez is a 75 y.o. male, previous smoker with PMH sig for KAITY, HTN, HPL who was referred to us back on 12/20/2019 for abnormal chest x-ray that was concerning for ILD.  Patient has had chronic shortness of breath since at least 2010.  He quit smoking tobacco in Jan 2022.     -He had his Ild diagnosed in June 2021. He was not on oxygen.  At that time he was followed eventually he was started on Ofev in May 2022.  His major decompensation happened in 2022 January when he was in [place] and admitted to hospital and had to be started on oxygen after that.     Interval History: 9/29/22:  -Since than he has had worsening oxygen requirement.  He continues to use treadmill with oxygen.  He will ntio be a transplant candidate.      -His mainly dry cough is bopthering him.     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   Hot tub:  No   Portable humidifier:   Dehumidifier.   Brass or woodwind instruments:  Smoking tobacco or marijuana: Stopped smoking in Jan 2022. Smoked for almost 50 years.  Less than PPD.    Feather pillow/blanket:  Gardening:   Hobbies: golfing .  Chemotherapy or radiation therapy:  Fam hx of ILD:                 Medications:     Current Outpatient Medications   Medication     acyclovir (ZOVIRAX) 400 MG tablet     albuterol (PROAIR HFA;PROVENTIL HFA;VENTOLIN HFA) 90 mcg/actuation inhaler     aspirin (ASA) 81 MG EC tablet     atorvastatin (LIPITOR) 40 MG tablet     azelastine (ASTELIN) 137 mcg (0.1 %) nasal spray     benzonatate (TESSALON) 200 MG capsule     budesonide-formoterol (SYMBICORT) 160-4.5 MCG/ACT Inhaler     fluticasone-vilanterol (BREO ELLIPTA) 100-25 MCG/INH inhaler     glucosamine-chondroitin 500-400 mg cap     ibuprofen (ADVIL,MOTRIN) 200 MG tablet     metoprolol succinate (TOPROL-XL) 50 MG 24 hr tablet     multivitamin therapeutic tablet     nintedanib (OFEV) 150 MG capsule     nintedanib (OFEV) 150 MG capsule     omeprazole (PRILOSEC) 20 MG capsule     pantoprazole (PROTONIX) 40 MG EC tablet     triamterene (DYRENIUM) 50 MG capsule     No current facility-administered medications for this visit.            Review of Systems:       A complete 10 point review of systems was otherwise negative except as noted in the HPI.        PHYSICAL EXAM:  There were no vitals taken for this visit.     General: Well developed, well nourished, No apparent distress  Eyes: Anicteric  Nose: Nasal mucosa with no edema or hyperemia.  No polyps  Ears: Hearing grossly normal  Mouth: Oral mucosa is moist, without any lesions. No oropharyngeal exudate.  Respiratory: Coarse breathing is noted.   Cardiac: RRR, normal S1, S2. No murmurs. No JVD  Abdomen: Soft, NT/ND  Musculoskeletal: Extremities normal. No clubbing. No cyanosis. No edema.  Skin: No rash on limited exam  Neuro: Normal mentation. Normal speech.  Psych:Normal affect           Data:   All laboratory  and imaging data reviewed.      PFT:           5/2022 6/2022 6.2021    PFT Interpretation:  I personally reviewed and interpreted the PFTs.    ECHO:  2022  Interpretation Summary     Left ventricular size, wall motion and function are normal. The ejection  fraction is > 65%.  There is mild concentric left ventricular hypertrophy.  Normal right ventricle size and systolic function.  Moderate valvular aortic stenosis.  The ascending aorta is Mildly dilated.      Chest CT: I personally reviewed and interpreted the CT scan.  4/2022    Recent Results (from the past 168 hour(s))   General PFT Lab (Please always keep checked)    Collection Time: 09/29/22  6:46 AM   Result Value Ref Range    FVC-Pred 4.06 L    FVC-Pre 2.42 L    FVC-%Pred-Pre 59 %    FEV1-Pre 2.20 L    FEV1-%Pred-Pre 72 %    FEV1FVC-Pred 75 %    FEV1FVC-Pre 91 %    FEFMax-Pred 7.83 L/sec    FEFMax-Pre 9.57 L/sec    FEFMax-%Pred-Pre 122 %    FEF2575-Pred 2.23 L/sec    FEF2575-Pre 3.10 L/sec    DUW9499-%Pred-Pre 139 %    ExpTime-Pre 6.56 sec    FIFMax-Pre 6.39 L/sec    VC-Pred 4.65 L    VC-Pre 2.54 L    VC-%Pred-Pre 54 %    IC-Pred 3.99 L    IC-Pre 1.97 L    IC-%Pred-Pre 49 %    ERV-Pred 0.66 L    ERV-Pre 0.57 L    ERV-%Pred-Pre 86 %    FEV1FEV6-Pred 77 %    FEV1FEV6-Pre 91 %    FRCPleth-Pred 3.75 L    FRCPleth-Pre 1.69 L    FRCPleth-%Pred-Pre 45 %    RVPleth-Pred 2.75 L    RVPleth-Pre 1.12 L    RVPleth-%Pred-Pre 40 %    TLCPleth-Pred 7.13 L    TLCPleth-Pre 3.65 L    TLCPleth-%Pred-Pre 51 %    DLCOunc-Pred 25.13 ml/min/mmHg    DLCOunc-Pre 9.35 ml/min/mmHg    DLCOunc-%Pred-Pre 37 %    VA-Pre 3.48 L    VA-%Pred-Pre 54 %    FEV1SVC-Pred 65 %    FEV1SVC-Pre 87 %          Again, thank you for allowing me to participate in the care of your patient.        Sincerely,        Gilson Estrada MD

## 2022-10-04 ENCOUNTER — VIRTUAL VISIT (OUTPATIENT)
Dept: PULMONOLOGY | Facility: CLINIC | Age: 77
End: 2022-10-04
Attending: INTERNAL MEDICINE
Payer: MEDICARE

## 2022-10-04 ENCOUNTER — LAB REQUISITION (OUTPATIENT)
Dept: LAB | Facility: CLINIC | Age: 77
End: 2022-10-04
Payer: MEDICARE

## 2022-10-04 DIAGNOSIS — E78.5 HYPERLIPIDEMIA, UNSPECIFIED: ICD-10-CM

## 2022-10-04 DIAGNOSIS — J96.11 HYPOXEMIC RESPIRATORY FAILURE, CHRONIC (H): Primary | ICD-10-CM

## 2022-10-04 DIAGNOSIS — Z12.5 ENCOUNTER FOR SCREENING FOR MALIGNANT NEOPLASM OF PROSTATE: ICD-10-CM

## 2022-10-04 LAB
ALBUMIN SERPL BCG-MCNC: 4.2 G/DL (ref 3.5–5.2)
ALP SERPL-CCNC: 122 U/L (ref 40–129)
ALT SERPL W P-5'-P-CCNC: 25 U/L (ref 10–50)
ANION GAP SERPL CALCULATED.3IONS-SCNC: 9 MMOL/L (ref 7–15)
AST SERPL W P-5'-P-CCNC: 27 U/L (ref 10–50)
BILIRUB SERPL-MCNC: 0.3 MG/DL
BUN SERPL-MCNC: 11.1 MG/DL (ref 8–23)
CALCIUM SERPL-MCNC: 9.5 MG/DL (ref 8.8–10.2)
CHLORIDE SERPL-SCNC: 95 MMOL/L (ref 98–107)
CHOLEST SERPL-MCNC: 126 MG/DL
CREAT SERPL-MCNC: 0.9 MG/DL (ref 0.67–1.17)
DEPRECATED HCO3 PLAS-SCNC: 30 MMOL/L (ref 22–29)
GFR SERPL CREATININE-BSD FRML MDRD: 89 ML/MIN/1.73M2
GLUCOSE SERPL-MCNC: 114 MG/DL (ref 70–99)
HDLC SERPL-MCNC: 45 MG/DL
LDLC SERPL CALC-MCNC: 57 MG/DL
NONHDLC SERPL-MCNC: 81 MG/DL
POTASSIUM SERPL-SCNC: 4.6 MMOL/L (ref 3.4–5.3)
PROT SERPL-MCNC: 6.9 G/DL (ref 6.4–8.3)
PSA SERPL-MCNC: 1.53 NG/ML (ref 0–6.5)
SODIUM SERPL-SCNC: 134 MMOL/L (ref 136–145)
TRIGL SERPL-MCNC: 122 MG/DL

## 2022-10-04 PROCEDURE — 80061 LIPID PANEL: CPT | Mod: ORL | Performed by: FAMILY MEDICINE

## 2022-10-04 PROCEDURE — 99213 OFFICE O/P EST LOW 20 MIN: CPT | Mod: 95 | Performed by: INTERNAL MEDICINE

## 2022-10-04 PROCEDURE — G0103 PSA SCREENING: HCPCS | Mod: ORL | Performed by: FAMILY MEDICINE

## 2022-10-04 PROCEDURE — 80053 COMPREHEN METABOLIC PANEL: CPT | Mod: ORL | Performed by: FAMILY MEDICINE

## 2022-10-04 PROCEDURE — G0463 HOSPITAL OUTPT CLINIC VISIT: HCPCS | Mod: PN,RTG | Performed by: INTERNAL MEDICINE

## 2022-10-04 NOTE — PROGRESS NOTES
"Wyatt is a 77 year old who is being evaluated via a billable video visit.      How would you like to obtain your AVS? MyChart  If the video visit is dropped, the invitation should be resent by: Send to e-mail at: [email or web address]  Will anyone else be joining your video visit? No        Video-Visit Details    Video Start Time: 10:13 AM    Type of service:  Video Visit    Video End Time: 10:35    Originating Location (pt. Location): Home    Distant Location (provider location):  [facility] LUNG SCIENCE AND [facility]     Platform used for Video Visit: [facility]  Pulmonary Medicine  Visit Clinic Note  Dear patient. Thank you for visiting with me. I want you to feel respected, understood, and empowered. \"Respect\" is valuing you as much as I would a close family member. \"Empowerment\" happens when you are fully informed, and can make the best possible decision for you.  Please ask me questions!  Challenge anything that is not clear.       ASSESSMENT & PLAN     Patient is a 77 year old male who has presented for further work up of IPF.     #IPF:   -Diagnosed based on Ct chest in 2021. . Has been taking Ofev sinec May 2022.   -  He has been doing pulmonary rehab.  He has severe lung disease which has worsened after possible exacerbation in January.  His age is a limitation for lung transplant.   - Discussed clinical trials but he is not interested in it for now.   - Will go up on PPI.   -Prescribed BReo inhaler as there was some air reactivity noted.  Mild improvement with its use.   -I am worried that he has rapid worsening of disease.    -[facility] goals of care was held with this patient.  He will dsicuss with his wife and decide on proper goals of care. Short term intubation for a clearly identifiable infectious pneumonia may be a possiblly reasonable.   - But, as his oxygen requiremetns increases and IPF worsenes it will not be advisable to stay full " code.   -He has not been able to tolerate ofev 150 BID. Will try to drop it to 100 BID and see how he responds.     #Chronic Cough  -Tessalon perles has not worked.  -Most elver due to his IPF/  -Will give a trial of three times a week azithromycin and see if that helps.   - Also prescribed nasal spray.   -BREo inhaler.     #Hypoxemic Respiratory failure  -5 LPM with activity.        #vaccines:  -Uptodate    -He will be driving to [place].  Gave info on physicians there.       RTC in 3 months with PFts and 6MWT      23 minutes excluding the time spent on cigarette cessation was  spent on the date of the encounter doing chart review, history and exam, documentation and further activities as noted above.    These conclusions are made at the best of one's knowledge and belief based on the provided evidence such as patient's history and allergy test results and they can change over time or can be incomplete because of missing information's.    I explained the lab values, imagings and findings to the patient.  Patient expressed understanding I did not recognize any barriers to the understanding of the patient.    The above note was dictated using voice recognition software and may include typographical errors. Please contact the author for any clarifications.    Gilson Estrada MD   RN Coordinators: Maureen/Meme/Radha: [phone]  ILD RN Coordinators: [phone]  Clinic Number: [phone]  Pager: [phone]       Today's visit note:     Chief Complaint: Wyatt Gutierrez is a 77 year old year old male who is being seen for Video Visit (Follow up )      HPI:    Wyatt Gutierrez is a 75 y.o. male, previous smoker with PMH sig for KAITY, HTN, HPL who was referred to us back on 12/20/2019 for abnormal chest x-ray that was concerning for ILD.  Patient has had chronic shortness of breath since at least 2010.  He quit smoking tobacco in Jan 2022.     -He had his Ild diagnosed in June 2021. He was not on oxygen.  At that time he was  followed eventually he was started on Ofev in May 2022.  His major decompensation happened in 2022 January when he was in [place] and admitted to hospital and had to be started on oxygen after that.     Interval History: 9/29/22:  -Since than he has had worsening oxygen requirement.  He continues to use treadmill with oxygen.  He will ntio be a transplant candidate.      -His mainly dry cough is bopthering him.     #Interval History: 10/4  -Discussed goals of care in detail.     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   Hot tub:  No   Portable humidifier:  Dehumidifier.   Brass or woodwind instruments:  Smoking tobacco or marijuana: Stopped smoking in Jan 2022. Smoked for almost 50 years.  Less than PPD.    Feather pillow/blanket:  Gardening:   Hobbies: golfing .  Chemotherapy or radiation therapy:  Fam hx of ILD:                 Medications:     Current Outpatient Medications   Medication     acyclovir (ZOVIRAX) 400 MG tablet     albuterol (PROAIR HFA;PROVENTIL HFA;VENTOLIN HFA) 90 mcg/actuation inhaler     aspirin (ASA) 81 MG EC tablet     atorvastatin (LIPITOR) 40 MG tablet     azelastine (ASTELIN) 137 mcg (0.1 %) nasal spray     azithromycin (ZITHROMAX) 250 MG tablet     fluticasone-vilanterol (BREO ELLIPTA) 100-25 MCG/INH inhaler     glucosamine-chondroitin 500-400 mg cap     metoprolol succinate (TOPROL-XL) 50 MG 24 hr tablet     multivitamin therapeutic tablet     pantoprazole (PROTONIX) 40 MG EC tablet     triamterene (DYRENIUM) 50 MG capsule     benzonatate (TESSALON) 200 MG capsule     budesonide-formoterol (SYMBICORT) 160-4.5 MCG/ACT Inhaler     ibuprofen (ADVIL,MOTRIN) 200 MG tablet     nintedanib (OFEV) 100 MG capsule     omeprazole (PRILOSEC) 20 MG capsule     No current facility-administered medications for this visit.            Review of Systems:       A complete 10 point review of systems was otherwise negative  except as noted in the HPI.        PHYSICAL EXAM:  There were no vitals taken for this visit.     General: Well developed, well nourished, No apparent distress  Eyes: Anicteric  Nose: Nasal mucosa with no edema or hyperemia.  No polyps  Ears: Hearing grossly normal  Mouth: Oral mucosa is moist, without any lesions. No oropharyngeal exudate.  Respiratory: Coarse breathing is noted.   Cardiac: RRR, normal S1, S2. No murmurs. No JVD  Abdomen: Soft, NT/ND  Musculoskeletal: Extremities normal. No clubbing. No cyanosis. No edema.  Skin: No rash on limited exam  Neuro: Normal mentation. Normal speech.  Psych:Normal affect           Data:   All laboratory and imaging data reviewed.      PFT:           5/2022 6/2022 6.2021    PFT Interpretation:  I personally reviewed and interpreted the PFTs.    ECHO:  2022  Interpretation Summary     Left ventricular size, wall motion and function are normal. The ejection  fraction is > 65%.  There is mild concentric left ventricular hypertrophy.  Normal right ventricle size and systolic function.  Moderate valvular aortic stenosis.  The ascending aorta is Mildly dilated.      Chest CT: I personally reviewed and interpreted the CT scan.  4/2022    Recent Results (from the past 168 hour(s))   General PFT Lab (Please always keep checked)    Collection Time: 09/29/22  6:46 AM   Result Value Ref Range    FVC-Pred 4.06 L    FVC-Pre 2.42 L    FVC-%Pred-Pre 59 %    FEV1-Pre 2.20 L    FEV1-%Pred-Pre 72 %    FEV1FVC-Pred 75 %    FEV1FVC-Pre 91 %    FEFMax-Pred 7.83 L/sec    FEFMax-Pre 9.57 L/sec    FEFMax-%Pred-Pre 122 %    FEF2575-Pred 2.23 L/sec    FEF2575-Pre 3.10 L/sec    VRY2471-%Pred-Pre 139 %    ExpTime-Pre 6.56 sec    FIFMax-Pre 6.39 L/sec    VC-Pred 4.65 L    VC-Pre 2.54 L    VC-%Pred-Pre 54 %    IC-Pred 3.99 L    IC-Pre 1.97 L    IC-%Pred-Pre 49 %    ERV-Pred 0.66 L    ERV-Pre 0.57 L    ERV-%Pred-Pre 86 %    FEV1FEV6-Pred 77 %    FEV1FEV6-Pre 91 %    FRCPleth-Pred 3.75 L     FRCPleth-Pre 1.69 L    FRCPleth-%Pred-Pre 45 %    RVPleth-Pred 2.75 L    RVPleth-Pre 1.12 L    RVPleth-%Pred-Pre 40 %    TLCPleth-Pred 7.13 L    TLCPleth-Pre 3.65 L    TLCPleth-%Pred-Pre 51 %    DLCOunc-Pred 25.13 ml/min/mmHg    DLCOunc-Pre 9.35 ml/min/mmHg    DLCOunc-%Pred-Pre 37 %    VA-Pre 3.48 L    VA-%Pred-Pre 54 %    FEV1SVC-Pred 65 %    FEV1SVC-Pre 87 %

## 2022-10-04 NOTE — LETTER
"    10/4/2022         RE: Wyatt Gutierrez  [address] [address]        Dear Colleague,    Thank you for referring your patient, Wyatt Gutierrez, to the [facility] FOR LUNG SCIENCE AND [facility]. Please see a copy of my visit note below.        [facility]  Pulmonary Medicine  Visit Clinic Note  Dear patient. Thank you for visiting with me. I want you to feel respected, understood, and empowered. \"Respect\" is valuing you as much as I would a close family member. \"Empowerment\" happens when you are fully informed, and can make the best possible decision for you.  Please ask me questions!  Challenge anything that is not clear.       ASSESSMENT & PLAN     Patient is a 77 year old male who has presented for further work up of IPF.     #IPF:   -Diagnosed based on Ct chest in 2021. . Has been taking Ofev sinec May 2022.   -  He has been doing pulmonary rehab.  He has severe lung disease which has worsened after possible exacerbation in January.  His age is a limitation for lung transplant.   - Discussed clinical trials but he is not interested in it for now.   - Will go up on PPI.   -Prescribed BReo inhaler as there was some air reactivity noted.  Mild improvement with its use.   -I am worried that he has rapid worsening of disease.    -[facility] goals of care was held with this patient.  He will dsicuss with his wife and decide on proper goals of care. Short term intubation for a clearly identifiable infectious pneumonia may be a possiblly reasonable.   - But, as his oxygen requiremetns increases and IPF worsenes it will not be advisable to stay full code.   -He has not been able to tolerate ofev 150 BID. Will try to drop it to 100 BID and see how he responds.     #Chronic Cough  -Tessalon perles has not worked.  -Most lijkley due to his IPF/  -Will give a trial of three times a week azithromycin and see if that helps.   - Also prescribed nasal spray.   -BREo " inhaler.     #Hypoxemic Respiratory failure  -5 LPM with activity.        #vaccines:  -Uptodate    -He will be driving to [place].  Gave info on physicians there.       RTC in 3 months with PFts and 6MWT      23 minutes excluding the time spent on cigarette cessation was  spent on the date of the encounter doing chart review, history and exam, documentation and further activities as noted above.    These conclusions are made at the best of one's knowledge and belief based on the provided evidence such as patient's history and allergy test results and they can change over time or can be incomplete because of missing information's.    I explained the lab values, imagings and findings to the patient.  Patient expressed understanding I did not recognize any barriers to the understanding of the patient.    The above note was dictated using voice recognition software and may include typographical errors. Please contact the author for any clarifications.    Gilson Estrada MD   RN Coordinators: Leticia/Radha: [phone]  ILD RN Coordinators: [phone]  Clinic Number: [phone]  Pager: [phone]       Today's visit note:     Chief Complaint: Wyatt Gutierrez is a 77 year old year old male who is being seen for Video Visit (Follow up )      HPI:    Wyatt Gutierrez is a 75 y.o. male, previous smoker with PMH sig for KAITY, HTN, HPL who was referred to us back on 12/20/2019 for abnormal chest x-ray that was concerning for ILD.  Patient has had chronic shortness of breath since at least 2010.  He quit smoking tobacco in Jan 2022.     -He had his Ild diagnosed in June 2021. He was not on oxygen.  At that time he was followed eventually he was started on Ofev in May 2022.  His major decompensation happened in 2022 January when he was in [place] and admitted to hospital and had to be started on oxygen after that.     Interval History: 9/29/22:  -Since than he has had worsening oxygen requirement.  He continues to use treadmill  with oxygen.  He will ntio be a transplant candidate.      -His mainly dry cough is bopthering him.     #Interval History: 10/4  -Discussed goals of care in detail.     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   Hot tub:  No   Portable humidifier:  Dehumidifier.   Brass or woodwind instruments:  Smoking tobacco or marijuana: Stopped smoking in Jan 2022. Smoked for almost 50 years.  Less than PPD.    Feather pillow/blanket:  Gardening:   Hobbies: golfing .  Chemotherapy or radiation therapy:  Fam hx of ILD:                 Medications:     Current Outpatient Medications   Medication     acyclovir (ZOVIRAX) 400 MG tablet     albuterol (PROAIR HFA;PROVENTIL HFA;VENTOLIN HFA) 90 mcg/actuation inhaler     aspirin (ASA) 81 MG EC tablet     atorvastatin (LIPITOR) 40 MG tablet     azelastine (ASTELIN) 137 mcg (0.1 %) nasal spray     azithromycin (ZITHROMAX) 250 MG tablet     fluticasone-vilanterol (BREO ELLIPTA) 100-25 MCG/INH inhaler     glucosamine-chondroitin 500-400 mg cap     metoprolol succinate (TOPROL-XL) 50 MG 24 hr tablet     multivitamin therapeutic tablet     pantoprazole (PROTONIX) 40 MG EC tablet     triamterene (DYRENIUM) 50 MG capsule     benzonatate (TESSALON) 200 MG capsule     budesonide-formoterol (SYMBICORT) 160-4.5 MCG/ACT Inhaler     ibuprofen (ADVIL,MOTRIN) 200 MG tablet     nintedanib (OFEV) 100 MG capsule     omeprazole (PRILOSEC) 20 MG capsule     No current facility-administered medications for this visit.            Review of Systems:       A complete 10 point review of systems was otherwise negative except as noted in the HPI.        PHYSICAL EXAM:  There were no vitals taken for this visit.     General: Well developed, well nourished, No apparent distress  Eyes: Anicteric  Nose: Nasal mucosa with no edema or hyperemia.  No polyps  Ears: Hearing grossly normal  Mouth: Oral mucosa is moist, without any lesions.  No oropharyngeal exudate.  Respiratory: Coarse breathing is noted.   Cardiac: RRR, normal S1, S2. No murmurs. No JVD  Abdomen: Soft, NT/ND  Musculoskeletal: Extremities normal. No clubbing. No cyanosis. No edema.  Skin: No rash on limited exam  Neuro: Normal mentation. Normal speech.  Psych:Normal affect           Data:   All laboratory and imaging data reviewed.      PFT:           5/2022 6/2022 6.2021    PFT Interpretation:  I personally reviewed and interpreted the PFTs.    ECHO:  2022  Interpretation Summary     Left ventricular size, wall motion and function are normal. The ejection  fraction is > 65%.  There is mild concentric left ventricular hypertrophy.  Normal right ventricle size and systolic function.  Moderate valvular aortic stenosis.  The ascending aorta is Mildly dilated.      Chest CT: I personally reviewed and interpreted the CT scan.  4/2022    Recent Results (from the past 168 hour(s))   General PFT Lab (Please always keep checked)    Collection Time: 09/29/22  6:46 AM   Result Value Ref Range    FVC-Pred 4.06 L    FVC-Pre 2.42 L    FVC-%Pred-Pre 59 %    FEV1-Pre 2.20 L    FEV1-%Pred-Pre 72 %    FEV1FVC-Pred 75 %    FEV1FVC-Pre 91 %    FEFMax-Pred 7.83 L/sec    FEFMax-Pre 9.57 L/sec    FEFMax-%Pred-Pre 122 %    FEF2575-Pred 2.23 L/sec    FEF2575-Pre 3.10 L/sec    OBU2271-%Pred-Pre 139 %    ExpTime-Pre 6.56 sec    FIFMax-Pre 6.39 L/sec    VC-Pred 4.65 L    VC-Pre 2.54 L    VC-%Pred-Pre 54 %    IC-Pred 3.99 L    IC-Pre 1.97 L    IC-%Pred-Pre 49 %    ERV-Pred 0.66 L    ERV-Pre 0.57 L    ERV-%Pred-Pre 86 %    FEV1FEV6-Pred 77 %    FEV1FEV6-Pre 91 %    FRCPleth-Pred 3.75 L    FRCPleth-Pre 1.69 L    FRCPleth-%Pred-Pre 45 %    RVPleth-Pred 2.75 L    RVPleth-Pre 1.12 L    RVPleth-%Pred-Pre 40 %    TLCPleth-Pred 7.13 L    TLCPleth-Pre 3.65 L    TLCPleth-%Pred-Pre 51 %    DLCOunc-Pred 25.13 ml/min/mmHg    DLCOunc-Pre 9.35 ml/min/mmHg    DLCOunc-%Pred-Pre 37 %    VA-Pre 3.48 L    VA-%Pred-Pre 54 %     FEV1SVC-Pred 65 %    FEV1SVC-Pre 87 %         Again, thank you for allowing me to participate in the care of your patient.        Sincerely,        Gilson Estrada MD

## 2022-10-12 DIAGNOSIS — J84.112 IPF (IDIOPATHIC PULMONARY FIBROSIS) (H): ICD-10-CM

## 2022-10-12 DIAGNOSIS — J84.9 ILD (INTERSTITIAL LUNG DISEASE) (H): Primary | ICD-10-CM

## 2022-10-24 ENCOUNTER — TELEPHONE (OUTPATIENT)
Dept: PULMONOLOGY | Facility: CLINIC | Age: 77
End: 2022-10-24

## 2022-10-24 NOTE — TELEPHONE ENCOUNTER
Patient called to update that pantoprazole not as effective as omeprazole; he has resumed omeprazole and stopped pantoprazole.   Update routed to Dr. Estrada.     Mahogany Lara, RN, BSN  ILD Nurse Care Coordinator  (P) [phone]

## 2022-11-08 DIAGNOSIS — J47.9 BRONCHIECTASIS WITHOUT COMPLICATION (H): Primary | ICD-10-CM

## 2022-11-08 DIAGNOSIS — J84.9 ILD (INTERSTITIAL LUNG DISEASE) (H): ICD-10-CM

## 2022-11-08 NOTE — TELEPHONE ENCOUNTER
Pharmacy requesting to refill Azithromycin 250MG tabs.  This medication is not on the patient's list.    Pharmacy:   Phone:   Fax:

## 2022-11-09 RX ORDER — AZITHROMYCIN 250 MG/1
250 TABLET, FILM COATED ORAL
Qty: 12 TABLET | Refills: 1 | Status: SHIPPED | OUTPATIENT
Start: 2022-11-09 | End: 2023-01-10

## 2022-11-09 RX ORDER — FLUTICASONE FUROATE AND VILANTEROL 100; 25 UG/1; UG/1
1 POWDER RESPIRATORY (INHALATION) DAILY
Qty: 1 EACH | Refills: 3 | Status: SHIPPED | OUTPATIENT
Start: 2022-11-09 | End: 2023-03-27

## 2022-12-12 ENCOUNTER — OFFICE VISIT (OUTPATIENT)
Dept: CARDIOLOGY | Facility: CLINIC | Age: 77
End: 2022-12-12
Attending: INTERNAL MEDICINE
Payer: MEDICARE

## 2022-12-12 VITALS
DIASTOLIC BLOOD PRESSURE: 76 MMHG | WEIGHT: 239 LBS | RESPIRATION RATE: 20 BRPM | SYSTOLIC BLOOD PRESSURE: 130 MMHG | HEART RATE: 94 BPM | BODY MASS INDEX: 34.29 KG/M2

## 2022-12-12 DIAGNOSIS — I48.0 PAROXYSMAL ATRIAL FIBRILLATION (H): Primary | ICD-10-CM

## 2022-12-12 DIAGNOSIS — E78.5 DYSLIPIDEMIA: ICD-10-CM

## 2022-12-12 DIAGNOSIS — I35.0 NONRHEUMATIC AORTIC VALVE STENOSIS: ICD-10-CM

## 2022-12-12 DIAGNOSIS — I25.10 CORONARY ARTERY DISEASE INVOLVING NATIVE CORONARY ARTERY WITHOUT ANGINA PECTORIS, UNSPECIFIED WHETHER NATIVE OR TRANSPLANTED HEART: ICD-10-CM

## 2022-12-12 DIAGNOSIS — R06.09 DYSPNEA ON EXERTION: ICD-10-CM

## 2022-12-12 PROCEDURE — 99214 OFFICE O/P EST MOD 30 MIN: CPT | Performed by: INTERNAL MEDICINE

## 2022-12-12 NOTE — PROGRESS NOTES
[facility] HEART CARE   [address], [place] [address]   [email or web address]   OFFICE: [phone]          Thank you Eliot Dean for asking the [facility] Heart Care team to participate in the care of your patient, Wyatt Gutierrez.     Impression and Plan     1.  Coronary artery disease.  Tai has history of coronary artery disease by virtue of CT scan of chest 24 April 2021 revealing mild to moderate coronary calcification notably in the proximal LAD. Wyatt underwent a regadenoson nuclear perfusion study 15 Kaylie 2022 that revealed no evidence of infarct or ischemia.    2.  Aortic stenosis.  This was felt moderate in degree on echocardiogram 13 June 2022.    Plan on follow-up surveillance echocardiogram in June 2023.     3.  Atrial fibrillation.  As noted below, Tai had suffered a syncopal spell in March 2022 and was noted to have evidence of atrial fibrillation with rapid ventricular response.  Parenthetically, documentation from [facility] indicates history of sleep apnea.  It was felt his atrial fibrillation may have been triggered in part by ethanol.  Documentation available reviewed and he was seen by cardiology in [place] and at what appeared given this was felt to be an isolated event that full anticoagulation at that time was deferred though he was set up for an ambulatory monitor that he wore for 24 days.  No atrial fibrillation was detected during the monitoring interval (see Cardiac Diagnostic section below).  At last visit I discussed nonetheless, possibly pursuing full anticoagulation.  Wyatt states that he would rather defer and this is supported by his spouse, Alka, as well.  I feel this is reasonable given no subjective or objective recurrence.    Continue aspirin for some mild degree of CVA prophylaxis..    Continue metoprolol.     4.  Hypertension.  Blood pressure is fairly reasonable in the office today.       5.  Dyslipidemia.   Lipid profile for October 2022 revealed LDL 57 mg/dL and HDL 45 mg/dL.    Continue atorvastatin.     Plan on follow-up June 2023 with echocardiogram just prior to follow-up visit.    35 minutes spent reviewing prior records (including documentation, laboratory studies, cardiac testing/imaging), interview with patient along with physical exam, planning, and subsequent documentation/crafting of note).           History of Present Illness    Once again I would like to thank you again for asking me to participate in the care of your patient, Wyatt Gutierrez.  As you know, but to reiterate for my own records, Wyatt Gutierrez is a 77 year old male with history of pulmonary fibrosis.  Patient has been followed in the pulmonary clinic by Dr. Whitney José.     Tai had suffered a syncopal spell in March 2022 and was evaluated at [facility] in [facility].  Episode felt possibly related to ethanol use though also had been noted to have evidence of atrial fibrillation with rapid ventricular response.     Tai also has history of coronary artery disease by virtue of CT scan of chest 24 April 2021 revealing mild to moderate coronary calcification notably in the proximal LAD.     On interview, Wyatt reports that he does have baseline dyspnea and shortness of breath due to his pulmonary fibrosis though this has been stable.  He has been participating in pulmonary rehabilitation.  He denies any actual chest pain.  He reports no subjective palpitations suggestive of recurrent atrial fibrillation.  No lightheadedness    Further review of systems is otherwise negative/noncontributory (medical record and 13 point review of systems reviewed as well and pertinent positives noted).         Cardiac Diagnostics      Echocardiogram 13 June 2022:  1. Normal left ventricular size and systolic performance with ejection fraction greater than 65%.  2. Mild increase in left ventricular wall thickness.  3. Moderate aortic stenosis.  4. The  mean gradient is measured at 25 mmHg with peak velocity of 3.1 m/s.  Normal right ventricular size and systolic performance.  5. Mild enlargement of ascending aorta.     Echocardiogram 18 September 2020 (personally reviewed):  1. Normal left ventricular size and systolic performance with a visually estimated ejection fraction of 55-60%.   2. There is mild aortic stenosis.   3. Normal right ventricular size and systolic performance.   4. There is mild enlargement of the proximal ascending aorta.    Regadenoson nuclear perfusion study 15 Kaylie 2022:  1. No evidence of infarct or ischemia.  2. Normal left ventricular systolic performance with ejection fraction of 61% normal regional wall motion.    24-day ambulatory monitor March 2022:  1. Normal sinus rhythm.    2. Mobitz type I AV block during sleep.  3. No atrial fibrillation detected.    Twelve-lead ECG (personally reviewed) for March 2022: Sinus rhythm with first-degree AV block.  Nonspecific T wave changes.    CT scan of chest 24 April 2021:  1. 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 Differential diagnosis includes UIP/IPF and connective tissue disease associated ILD typically rheumatoid arthritis.   2. The amount of lung   3. fibrosis is not changed from 29 October 2020.  4. Coronary artery calcification: Mild to moderate specifically in proximal LAD.            Physical Examination       /76 (BP Location: Left arm, Patient Position: Sitting, Cuff Size: Adult Regular)   Pulse 94   Resp 20   Wt 108.4 kg (239 lb)   BMI 34.29 kg/m          Wt Readings from Last 3 Encounters:   12/12/22 108.4 kg (239 lb)   09/29/22 106.1 kg (234 lb)   07/18/22 106.6 kg (235 lb)       The patient is alert and oriented times three. Sclerae are anicteric. Mucosal membranes are moist. Jugular venous pressure is normal. No significant adenopathy/thyromegally appreciated. Lungs are diminished bilaterally.  On cardiovascular exam, the patient has a regular S1 and S2.  There is a 3/6 systolic murmur heard in a sash-like distribution.  Abdomen is soft and non-tender. Extremities reveal no clubbing, cyanosis, or edema.         Medications  Allergies   Current Outpatient Medications   Medication Sig Dispense Refill     acyclovir (ZOVIRAX) 400 MG tablet [ACYCLOVIR (ZOVIRAX) 400 MG TABLET] Take 400 mg by mouth 3 (three) times a day as needed.              albuterol (PROAIR HFA;PROVENTIL HFA;VENTOLIN HFA) 90 mcg/actuation inhaler [ALBUTEROL (PROAIR HFA;PROVENTIL HFA;VENTOLIN HFA) 90 MCG/ACTUATION INHALER] Inhale 2 puffs every 6 (six) hours as needed for wheezing or shortness of breath. 1 Inhaler 6     aspirin (ASA) 81 MG EC tablet Take by mouth every other day       atorvastatin (LIPITOR) 40 MG tablet TAKE 1 TABLET(40 MG) BY MOUTH AT BEDTIME 90 tablet 1     azelastine (ASTELIN) 137 mcg (0.1 %) nasal spray [AZELASTINE (ASTELIN) 137 MCG (0.1 %) NASAL SPRAY] Use in each nostril as directed 30 mL 12     azithromycin (ZITHROMAX) 250 MG tablet Take 1 tablet (250 mg) by mouth Every Mon, Wed, Fri Morning 12 tablet 1     fluticasone-vilanterol (BREO ELLIPTA) 100-25 MCG/ACT inhaler Inhale 1 puff into the lungs daily 1 each 3     glucosamine-chondroitin 500-400 mg cap [GLUCOSAMINE-CHONDROITIN 500-400 MG CAP] Take 1 capsule by mouth 2 (two) times a day.       metoprolol succinate (TOPROL-XL) 50 MG 24 hr tablet [METOPROLOL SUCCINATE (TOPROL-XL) 50 MG 24 HR TABLET] Take 50 mg by mouth daily.       multivitamin therapeutic tablet [MULTIVITAMIN THERAPEUTIC TABLET] Take 1 tablet by mouth daily.       nintedanib (OFEV) 100 MG capsule Take 1 capsule (100 mg) by mouth 2 times daily 60 capsule 11     pantoprazole (PROTONIX) 40 MG EC tablet Take 1 tablet (40 mg) by mouth daily 90 tablet 1     triamterene (DYRENIUM) 50 MG capsule        benzonatate (TESSALON) 200 MG capsule Take 1 capsule (200 mg) by mouth 3 times daily as needed for cough 90  capsule 1     budesonide-formoterol (SYMBICORT) 160-4.5 MCG/ACT Inhaler Inhale 2 puffs into the lungs 2 times daily (Patient not taking: Reported on 10/4/2022) 10.2 g 4     ibuprofen (ADVIL,MOTRIN) 200 MG tablet [IBUPROFEN (ADVIL,MOTRIN) 200 MG TABLET] Take 2 tablets (400 mg total) by mouth 3 (three) times a day as needed for pain.  0     omeprazole (PRILOSEC) 20 MG capsule [OMEPRAZOLE (PRILOSEC) 20 MG CAPSULE] Take 20 mg by mouth 2 (two) times a day before meals.         Allergies   Allergen Reactions     Animal Dander Unknown     Chalk      Dogs Other (See Comments)     Pet Dander     Maple Tree      Mold [Molds & Smuts] Unknown          Lab Results    Chemistry/lipid CBC Cardiac Enzymes/BNP/TSH/INR   Recent Labs   Lab Test 10/04/22  0848   CHOL 126   HDL 45   LDL 57   TRIG 122     Recent Labs   Lab Test 10/04/22  0848 06/07/22  1407 10/04/21  1207   LDL 57 95 75     Recent Labs   Lab Test 10/04/22  0848   *   POTASSIUM 4.6   CHLORIDE 95*   CO2 30*   *   BUN 11.1   CR 0.90   GFRESTIMATED 89   SHALOM 9.5     Recent Labs   Lab Test 10/04/22  0848 07/07/22  1247 10/04/21  1207   CR 0.90 0.87 0.90     No results for input(s): A1C in the last 63725 hours.       Recent Labs   Lab Test 05/17/22  0901 05/30/21  1950   WBC  --  9.1   HGB 11.6* 14.7   HCT  --  41.9   MCV  --  89   PLT  --  207     Recent Labs   Lab Test 05/17/22  0901 05/30/21  1950 11/01/19  0540   HGB 11.6* 14.7 12.9*    No results for input(s): TROPONINI in the last 10719 hours.  No results for input(s): BNP, NTBNPI, NTBNP in the last 32872 hours.  No results for input(s): TSH in the last 15119 hours.  Recent Labs   Lab Test 01/15/22  0627 05/30/21  1950 10/31/19  0907   INR 1.0 1.00 0.95        Medical History  Surgical History Family History Social History   Past Medical History:   Diagnosis Date     Arthritis      High cholesterol      Hypertension      Sleep apnea      Past Surgical History:   Procedure Laterality Date     ARTHROSCOPY  SHOULDER       COLONOSCOPY       OTHER SURGICAL HISTORY Right     heel fracture     OTHER SURGICAL HISTORY      knee arthroscopies     ZZC TOTAL KNEE ARTHROPLASTY Left 10/31/2019    Procedure: LEFT MINIMALLY INVASIVE TOTAL KNEE ARTHROPLASTY;  Surgeon: Avelino Canada MD;  Location: [facility] OR;  Service: Orthopedics     No family history on file.     Social History     Socioeconomic History     Marital status:      Spouse name: Not on file     Number of children: Not on file     Years of education: Not on file     Highest education level: Not on file   Occupational History     Not on file   Tobacco Use     Smoking status: Former     Packs/day: 0.50     Years: 55.00     Pack years: 27.50     Types: Cigarettes     Quit date: 2021     Years since quittin.9     Smokeless tobacco: Never   Substance and Sexual Activity     Alcohol use: Yes     Comment: 1-2 glasses of wine/ 2 x-week     Drug use: Not Currently     Comment: edible marijuana in the past     Sexual activity: Not on file   Other Topics Concern     Not on file   Social History Narrative     Not on file     Social Determinants of Health     Financial Resource Strain: Not on file   Food Insecurity: Not on file   Transportation Needs: Not on file   Physical Activity: Not on file   Stress: Not on file   Social Connections: Not on file   Intimate Partner Violence: Not on file   Housing Stability: Not on file

## 2022-12-12 NOTE — LETTER
12/12/2022    Eliot De La Rosa MD  [address]  [facility] [address]    RE: Wyatt Gutierrez       Dear Colleague,     I had the pleasure of seeing Wyatt Gutierrez in the [facility].         [facility] HEART CARE   [address], [place] [address]   [email or web address]   OFFICE: [phone]          Thank you Eliot Dean for asking the [facility] Heart Care team to participate in the care of your patient, Wyatt Gutierrez.     Impression and Plan     1.  Coronary artery disease.  Tai has history of coronary artery disease by virtue of CT scan of chest 24 April 2021 revealing mild to moderate coronary calcification notably in the proximal LAD. Wyatt underwent a regadenoson nuclear perfusion study 15 Kaylie 2022 that revealed no evidence of infarct or ischemia.    2.  Aortic stenosis.  This was felt moderate in degree on echocardiogram 13 June 2022.    Plan on follow-up surveillance echocardiogram in June 2023.     3.  Atrial fibrillation.  As noted below, Tai had suffered a syncopal spell in March 2022 and was noted to have evidence of atrial fibrillation with rapid ventricular response.  Parenthetically, documentation from [facility] indicates history of sleep apnea.  It was felt his atrial fibrillation may have been triggered in part by ethanol.  Documentation available reviewed and he was seen by cardiology in [place] and at what appeared given this was felt to be an isolated event that full anticoagulation at that time was deferred though he was set up for an ambulatory monitor that he wore for 24 days.  No atrial fibrillation was detected during the monitoring interval (see Cardiac Diagnostic section below).  At last visit I discussed nonetheless, possibly pursuing full anticoagulation.  Wyatt states that he would rather defer and this is supported by his spouse, lAka, as well.  I feel this is reasonable given no subjective or objective  recurrence.    Continue aspirin for some mild degree of CVA prophylaxis..    Continue metoprolol.     4.  Hypertension.  Blood pressure is fairly reasonable in the office today.       5.  Dyslipidemia.  Lipid profile for October 2022 revealed LDL 57 mg/dL and HDL 45 mg/dL.    Continue atorvastatin.     Plan on follow-up June 2023 with echocardiogram just prior to follow-up visit.    35 minutes spent reviewing prior records (including documentation, laboratory studies, cardiac testing/imaging), interview with patient along with physical exam, planning, and subsequent documentation/crafting of note).           History of Present Illness    Once again I would like to thank you again for asking me to participate in the care of your patient, Wyatt Gutierrez.  As you know, but to reiterate for my own records, Wyatt Gutierrez is a 77 year old male with history of pulmonary fibrosis.  Patient has been followed in the pulmonary clinic by Dr. Whitney José.     Tai had suffered a syncopal spell in March 2022 and was evaluated at [facility] in [facility].  Episode felt possibly related to ethanol use though also had been noted to have evidence of atrial fibrillation with rapid ventricular response.     Tai also has history of coronary artery disease by virtue of CT scan of chest 24 April 2021 revealing mild to moderate coronary calcification notably in the proximal LAD.     On interview, Wyatt reports that he does have baseline dyspnea and shortness of breath due to his pulmonary fibrosis though this has been stable.  He has been participating in pulmonary rehabilitation.  He denies any actual chest pain.  He reports no subjective palpitations suggestive of recurrent atrial fibrillation.  No lightheadedness    Further review of systems is otherwise negative/noncontributory (medical record and 13 point review of systems reviewed as well and pertinent positives noted).         Cardiac Diagnostics      Echocardiogram 13  June 2022:  1. Normal left ventricular size and systolic performance with ejection fraction greater than 65%.  2. Mild increase in left ventricular wall thickness.  3. Moderate aortic stenosis.  4. The mean gradient is measured at 25 mmHg with peak velocity of 3.1 m/s.  Normal right ventricular size and systolic performance.  5. Mild enlargement of ascending aorta.     Echocardiogram 18 September 2020 (personally reviewed):  1. Normal left ventricular size and systolic performance with a visually estimated ejection fraction of 55-60%.   2. There is mild aortic stenosis.   3. Normal right ventricular size and systolic performance.   4. There is mild enlargement of the proximal ascending aorta.    Regadenoson nuclear perfusion study 15 Kaylie 2022:  1. No evidence of infarct or ischemia.  2. Normal left ventricular systolic performance with ejection fraction of 61% normal regional wall motion.    24-day ambulatory monitor March 2022:  1. Normal sinus rhythm.    2. Mobitz type I AV block during sleep.  3. No atrial fibrillation detected.    Twelve-lead ECG (personally reviewed) for March 2022: Sinus rhythm with first-degree AV block.  Nonspecific T wave changes.    CT scan of chest 24 April 2021:  1. 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 Differential diagnosis includes UIP/IPF and connective tissue disease associated ILD typically rheumatoid arthritis.   2. The amount of lung   3. fibrosis is not changed from 29 October 2020.  4. Coronary artery calcification: Mild to moderate specifically in proximal LAD.            Physical Examination       /76 (BP Location: Left arm, Patient Position: Sitting, Cuff Size: Adult Regular)   Pulse 94   Resp 20   Wt 108.4 kg (239 lb)   BMI 34.29 kg/m          Wt Readings from Last 3 Encounters:   12/12/22 108.4 kg (239 lb)   09/29/22 106.1 kg (234 lb)   07/18/22 106.6 kg (235 lb)       The patient is  alert and oriented times three. Sclerae are anicteric. Mucosal membranes are moist. Jugular venous pressure is normal. No significant adenopathy/thyromegally appreciated. Lungs are diminished bilaterally. On cardiovascular exam, the patient has a regular S1 and S2.  There is a 3/6 systolic murmur heard in a sash-like distribution.  Abdomen is soft and non-tender. Extremities reveal no clubbing, cyanosis, or edema.         Medications  Allergies   Current Outpatient Medications   Medication Sig Dispense Refill     acyclovir (ZOVIRAX) 400 MG tablet [ACYCLOVIR (ZOVIRAX) 400 MG TABLET] Take 400 mg by mouth 3 (three) times a day as needed.              albuterol (PROAIR HFA;PROVENTIL HFA;VENTOLIN HFA) 90 mcg/actuation inhaler [ALBUTEROL (PROAIR HFA;PROVENTIL HFA;VENTOLIN HFA) 90 MCG/ACTUATION INHALER] Inhale 2 puffs every 6 (six) hours as needed for wheezing or shortness of breath. 1 Inhaler 6     aspirin (ASA) 81 MG EC tablet Take by mouth every other day       atorvastatin (LIPITOR) 40 MG tablet TAKE 1 TABLET(40 MG) BY MOUTH AT BEDTIME 90 tablet 1     azelastine (ASTELIN) 137 mcg (0.1 %) nasal spray [AZELASTINE (ASTELIN) 137 MCG (0.1 %) NASAL SPRAY] Use in each nostril as directed 30 mL 12     azithromycin (ZITHROMAX) 250 MG tablet Take 1 tablet (250 mg) by mouth Every Mon, Wed, Fri Morning 12 tablet 1     fluticasone-vilanterol (BREO ELLIPTA) 100-25 MCG/ACT inhaler Inhale 1 puff into the lungs daily 1 each 3     glucosamine-chondroitin 500-400 mg cap [GLUCOSAMINE-CHONDROITIN 500-400 MG CAP] Take 1 capsule by mouth 2 (two) times a day.       metoprolol succinate (TOPROL-XL) 50 MG 24 hr tablet [METOPROLOL SUCCINATE (TOPROL-XL) 50 MG 24 HR TABLET] Take 50 mg by mouth daily.       multivitamin therapeutic tablet [MULTIVITAMIN THERAPEUTIC TABLET] Take 1 tablet by mouth daily.       nintedanib (OFEV) 100 MG capsule Take 1 capsule (100 mg) by mouth 2 times daily 60 capsule 11     pantoprazole (PROTONIX) 40 MG EC tablet Take  1 tablet (40 mg) by mouth daily 90 tablet 1     triamterene (DYRENIUM) 50 MG capsule        benzonatate (TESSALON) 200 MG capsule Take 1 capsule (200 mg) by mouth 3 times daily as needed for cough 90 capsule 1     budesonide-formoterol (SYMBICORT) 160-4.5 MCG/ACT Inhaler Inhale 2 puffs into the lungs 2 times daily (Patient not taking: Reported on 10/4/2022) 10.2 g 4     ibuprofen (ADVIL,MOTRIN) 200 MG tablet [IBUPROFEN (ADVIL,MOTRIN) 200 MG TABLET] Take 2 tablets (400 mg total) by mouth 3 (three) times a day as needed for pain.  0     omeprazole (PRILOSEC) 20 MG capsule [OMEPRAZOLE (PRILOSEC) 20 MG CAPSULE] Take 20 mg by mouth 2 (two) times a day before meals.         Allergies   Allergen Reactions     Animal Dander Unknown     Chalk      Dogs Other (See Comments)     Pet Dander     Maple Tree      Mold [Molds & Smuts] Unknown          Lab Results    Chemistry/lipid CBC Cardiac Enzymes/BNP/TSH/INR   Recent Labs   Lab Test 10/04/22  0848   CHOL 126   HDL 45   LDL 57   TRIG 122     Recent Labs   Lab Test 10/04/22  0848 06/07/22  1407 10/04/21  1207   LDL 57 95 75     Recent Labs   Lab Test 10/04/22  0848   *   POTASSIUM 4.6   CHLORIDE 95*   CO2 30*   *   BUN 11.1   CR 0.90   GFRESTIMATED 89   SHALOM 9.5     Recent Labs   Lab Test 10/04/22  0848 07/07/22  1247 10/04/21  1207   CR 0.90 0.87 0.90     No results for input(s): A1C in the last 11314 hours.       Recent Labs   Lab Test 05/17/22  0901 05/30/21  1950   WBC  --  9.1   HGB 11.6* 14.7   HCT  --  41.9   MCV  --  89   PLT  --  207     Recent Labs   Lab Test 05/17/22  0901 05/30/21  1950 11/01/19  0540   HGB 11.6* 14.7 12.9*    No results for input(s): TROPONINI in the last 00457 hours.  No results for input(s): BNP, NTBNPI, NTBNP in the last 93099 hours.  No results for input(s): TSH in the last 25691 hours.  Recent Labs   Lab Test 01/15/22  0627 05/30/21  1950 10/31/19  0907   INR 1.0 1.00 0.95        Medical History  Surgical History Family History  Social History   Past Medical History:   Diagnosis Date     Arthritis      High cholesterol      Hypertension      Sleep apnea      Past Surgical History:   Procedure Laterality Date     ARTHROSCOPY SHOULDER       COLONOSCOPY       OTHER SURGICAL HISTORY Right     heel fracture     OTHER SURGICAL HISTORY      knee arthroscopies     ZZC TOTAL KNEE ARTHROPLASTY Left 10/31/2019    Procedure: LEFT MINIMALLY INVASIVE TOTAL KNEE ARTHROPLASTY;  Surgeon: Avelino Canada MD;  Location: [facility];  Service: Orthopedics     No family history on file.     Social History     Socioeconomic History     Marital status:      Spouse name: Not on file     Number of children: Not on file     Years of education: Not on file     Highest education level: Not on file   Occupational History     Not on file   Tobacco Use     Smoking status: Former     Packs/day: 0.50     Years: 55.00     Pack years: 27.50     Types: Cigarettes     Quit date: 2021     Years since quittin.9     Smokeless tobacco: Never   Substance and Sexual Activity     Alcohol use: Yes     Comment: 1-2 glasses of wine/ 2 x-week     Drug use: Not Currently     Comment: edible marijuana in the past     Sexual activity: Not on file   Other Topics Concern     Not on file   Social History Narrative     Not on file     Social Determinants of Health     Financial Resource Strain: Not on file   Food Insecurity: Not on file   Transportation Needs: Not on file   Physical Activity: Not on file   Stress: Not on file   Social Connections: Not on file   Intimate Partner Violence: Not on file   Housing Stability: Not on file                      Thank you for allowing me to participate in the care of your patient.      Sincerely,     Bubba Cao MD     [facility] Heart Care  cc:   Bubba Cao MD  [address] [place] [address]

## 2022-12-15 ENCOUNTER — OFFICE VISIT (OUTPATIENT)
Dept: PULMONOLOGY | Facility: CLINIC | Age: 77
End: 2022-12-15
Attending: INTERNAL MEDICINE
Payer: MEDICARE

## 2022-12-15 ENCOUNTER — APPOINTMENT (OUTPATIENT)
Dept: LAB | Facility: CLINIC | Age: 77
End: 2022-12-15
Attending: INTERNAL MEDICINE
Payer: MEDICARE

## 2022-12-15 ENCOUNTER — LAB (OUTPATIENT)
Dept: LAB | Facility: CLINIC | Age: 77
End: 2022-12-15
Payer: MEDICARE

## 2022-12-15 VITALS — SYSTOLIC BLOOD PRESSURE: 120 MMHG | OXYGEN SATURATION: 98 % | HEART RATE: 63 BPM | DIASTOLIC BLOOD PRESSURE: 81 MMHG

## 2022-12-15 DIAGNOSIS — E78.5 DYSLIPIDEMIA: ICD-10-CM

## 2022-12-15 DIAGNOSIS — J84.112 IPF (IDIOPATHIC PULMONARY FIBROSIS) (H): ICD-10-CM

## 2022-12-15 DIAGNOSIS — J84.112 IPF (IDIOPATHIC PULMONARY FIBROSIS) (H): Primary | ICD-10-CM

## 2022-12-15 DIAGNOSIS — I10 BENIGN ESSENTIAL HYPERTENSION: ICD-10-CM

## 2022-12-15 DIAGNOSIS — I25.10 CORONARY ARTERY DISEASE INVOLVING NATIVE CORONARY ARTERY WITHOUT ANGINA PECTORIS, UNSPECIFIED WHETHER NATIVE OR TRANSPLANTED HEART: ICD-10-CM

## 2022-12-15 DIAGNOSIS — J96.11 HYPOXEMIC RESPIRATORY FAILURE, CHRONIC (H): ICD-10-CM

## 2022-12-15 DIAGNOSIS — J96.11 HYPOXEMIC RESPIRATORY FAILURE, CHRONIC (H): Primary | ICD-10-CM

## 2022-12-15 LAB
6 MIN WALK (FT): 700 FT
6 MIN WALK (M): 213 M
ALBUMIN SERPL BCG-MCNC: 4.3 G/DL (ref 3.5–5.2)
ALP SERPL-CCNC: 105 U/L (ref 40–129)
ALT SERPL W P-5'-P-CCNC: 23 U/L (ref 10–50)
ANION GAP SERPL CALCULATED.3IONS-SCNC: 9 MMOL/L (ref 7–15)
AST SERPL W P-5'-P-CCNC: 26 U/L (ref 10–50)
BILIRUB SERPL-MCNC: 0.4 MG/DL
BUN SERPL-MCNC: 16.2 MG/DL (ref 8–23)
CALCIUM SERPL-MCNC: 9.9 MG/DL (ref 8.8–10.2)
CHLORIDE SERPL-SCNC: 96 MMOL/L (ref 98–107)
CHOLEST SERPL-MCNC: 134 MG/DL
CREAT SERPL-MCNC: 0.97 MG/DL (ref 0.67–1.17)
DEPRECATED HCO3 PLAS-SCNC: 30 MMOL/L (ref 22–29)
GFR SERPL CREATININE-BSD FRML MDRD: 81 ML/MIN/1.73M2
GLUCOSE SERPL-MCNC: 113 MG/DL (ref 70–99)
HDLC SERPL-MCNC: 47 MG/DL
LDLC SERPL CALC-MCNC: 54 MG/DL
NONHDLC SERPL-MCNC: 87 MG/DL
POTASSIUM SERPL-SCNC: 4.2 MMOL/L (ref 3.4–5.3)
PROT SERPL-MCNC: 7.6 G/DL (ref 6.4–8.3)
SODIUM SERPL-SCNC: 135 MMOL/L (ref 136–145)
TRIGL SERPL-MCNC: 167 MG/DL

## 2022-12-15 PROCEDURE — 36415 COLL VENOUS BLD VENIPUNCTURE: CPT | Performed by: PATHOLOGY

## 2022-12-15 PROCEDURE — G0463 HOSPITAL OUTPT CLINIC VISIT: HCPCS | Mod: 25 | Performed by: INTERNAL MEDICINE

## 2022-12-15 PROCEDURE — 93005 ELECTROCARDIOGRAM TRACING: CPT | Mod: RTG

## 2022-12-15 PROCEDURE — 94618 PULMONARY STRESS TESTING: CPT | Performed by: INTERNAL MEDICINE

## 2022-12-15 PROCEDURE — 80053 COMPREHEN METABOLIC PANEL: CPT | Performed by: PATHOLOGY

## 2022-12-15 PROCEDURE — 80061 LIPID PANEL: CPT | Performed by: PATHOLOGY

## 2022-12-15 PROCEDURE — 99214 OFFICE O/P EST MOD 30 MIN: CPT | Mod: 25 | Performed by: INTERNAL MEDICINE

## 2022-12-15 PROCEDURE — G0463 HOSPITAL OUTPT CLINIC VISIT: HCPCS | Mod: 25

## 2022-12-15 RX ORDER — IPRATROPIUM BROMIDE AND ALBUTEROL SULFATE 2.5; .5 MG/3ML; MG/3ML
1 SOLUTION RESPIRATORY (INHALATION) EVERY 6 HOURS PRN
Qty: 90 ML | Refills: 1 | Status: SHIPPED | OUTPATIENT
Start: 2022-12-15 | End: 2023-09-19

## 2022-12-15 NOTE — PROGRESS NOTES
"[facility]  Pulmonary Medicine  Visit Clinic Note  Dear patient. Thank you for visiting with me. I want you to feel respected, understood, and empowered. \"Respect\" is valuing you as much as I would a close family member. \"Empowerment\" happens when you are fully informed, and can make the best possible decision for you.  Please ask me questions!  Challenge anything that is not clear.       ASSESSMENT & PLAN     Patient is a 77 year old male who has presented for further work up of IPF.     #IPF:   -Diagnosed based on Ct chest in 2021. . Has been taking Ofev sinec May 2022.   -  He has been doing pulmonary rehab.  He has severe lung disease which has worsened after possible exacerbation in January.  His age is a limitation for lung transplant.   - Discussed clinical trials but he is not interested in it for now.   -  On PPI.   -Prescribed BReo inhaler as there was some air reactivity noted.  Mild improvement with its use.   -I am worried that he has rapid worsening of disease.    -[facility] goals of care was held with this patient.  He will dsicuss with his wife and decide on proper goals of care. Short term intubation for a clearly identifiable infectious pneumonia may be a possiblly reasonable.   - But, as his oxygen requiremetns increases and IPF worsenes it will not be advisable to stay full code.   -He has not been able to tolerate ofev 150 BID.  His Ofev is switched to 100 BID which he has been able to tolerate well.   -May consider Pirfenidone switch later once he comes back from [place]. As probably he is not on maximal treatment for IPF with 100 Bid Ofev.     -Face to face encounter for nebulizer is held.  -Duonebs will be prescribed.     #Chronic Cough  -Tessalon perles has not worked.  -Most lijkley due to his IPF.    -He has responded well to Azithromycin three times a week with significant improvement.  Will continue with it.  EKG today  -BREo inhaler.     #Hypoxemic Respiratory " failure  -6 LPM with activity.        #vaccines:  -Uptodate    -He will be driving to [place].  Gave info on physicians there.       RTC in 3 months with PFts and 6MWT       24 minutes excluding the time spent on cigarette cessation was  spent on the date of the encounter doing chart review, history and exam, documentation and further activities as noted above.    These conclusions are made at the best of one's knowledge and belief based on the provided evidence such as patient's history and allergy test results and they can change over time or can be incomplete because of missing information's.    I explained the lab values, imagings and findings to the patient.  Patient expressed understanding I did not recognize any barriers to the understanding of the patient.    The above note was dictated using voice recognition software and may include typographical errors. Please contact the author for any clarifications.    Gilson Estrada MD   RN Coordinators: Leticia/Radha: [phone]  ILD RN Coordinators: [phone]  Clinic Number: [phone]  Pager: [phone]       Today's visit note:     Chief Complaint: Wyatt Gutierrez is a 77 year old year old male who is being seen for Interstitial Lung Disease (ILD) (ILD Follow up )      HPI:    Wyatt Gutierrez is a 75 y.o. male, previous smoker with PMH sig for KAITY, HTN, HPL who was referred to us back on 12/20/2019 for abnormal chest x-ray that was concerning for ILD.  Patient has had chronic shortness of breath since at least 2010.  He quit smoking tobacco in Jan 2022.     -He had his Ild diagnosed in June 2021. He was not on oxygen.  At that time he was followed eventually he was started on Ofev in May 2022.  His major decompensation happened in 2022 January when he was in [place] and admitted to hospital and had to be started on oxygen after that.     Interval History: 9/29/22:  -Since than he has had worsening oxygen requirement.  He continues to use treadmill with  oxygen.  He will ntio be a transplant candidate.      -His mainly dry cough is bopthering him.     #Interval History: 10/4  -Discussed goals of care in detail.     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   Hot tub:  No   Portable humidifier:  Dehumidifier.   Brass or woodwind instruments:  Smoking tobacco or marijuana: Stopped smoking in Jan 2022. Smoked for almost 50 years.  Less than PPD.    Feather pillow/blanket:  Gardening:   Hobbies: golfing .  Chemotherapy or radiation therapy:  Fam hx of ILD:                 Medications:     Current Outpatient Medications   Medication     acyclovir (ZOVIRAX) 400 MG tablet     albuterol (PROAIR HFA;PROVENTIL HFA;VENTOLIN HFA) 90 mcg/actuation inhaler     aspirin (ASA) 81 MG EC tablet     atorvastatin (LIPITOR) 40 MG tablet     azelastine (ASTELIN) 137 mcg (0.1 %) nasal spray     azithromycin (ZITHROMAX) 250 MG tablet     benzonatate (TESSALON) 200 MG capsule     budesonide-formoterol (SYMBICORT) 160-4.5 MCG/ACT Inhaler     fluticasone-vilanterol (BREO ELLIPTA) 100-25 MCG/ACT inhaler     glucosamine-chondroitin 500-400 mg cap     ibuprofen (ADVIL,MOTRIN) 200 MG tablet     metoprolol succinate (TOPROL-XL) 50 MG 24 hr tablet     multivitamin therapeutic tablet     nintedanib (OFEV) 100 MG capsule     omeprazole (PRILOSEC) 20 MG capsule     pantoprazole (PROTONIX) 40 MG EC tablet     triamterene (DYRENIUM) 50 MG capsule     No current facility-administered medications for this visit.            Review of Systems:       A complete 10 point review of systems was otherwise negative except as noted in the HPI.        PHYSICAL EXAM:  /81   Pulse 63   SpO2 98%      General: Well developed, well nourished, No apparent distress  Eyes: Anicteric  Nose: Nasal mucosa with no edema or hyperemia.  No polyps  Ears: Hearing grossly normal  Mouth: Oral mucosa is moist, without any lesions. No  oropharyngeal exudate.  Respiratory: Coarse breathing is noted.   Cardiac: RRR, normal S1, S2. No murmurs. No JVD  Abdomen: Soft, NT/ND  Musculoskeletal: Extremities normal. No clubbing. No cyanosis. No edema.  Skin: No rash on limited exam  Neuro: Normal mentation. Normal speech.  Psych:Normal affect           Data:   All laboratory and imaging data reviewed.      PFT:           9/2022 6.2021    PFT Interpretation:  I personally reviewed and interpreted the PFTs.    ECHO:  2022  Interpretation Summary     Left ventricular size, wall motion and function are normal. The ejection  fraction is > 65%.  There is mild concentric left ventricular hypertrophy.  Normal right ventricle size and systolic function.  Moderate valvular aortic stenosis.  The ascending aorta is Mildly dilated.      Chest CT: I personally reviewed and interpreted the CT scan.  4/2022    Recent Results (from the past 168 hour(s))   6 minute walk test    Collection Time: 12/15/22 12:00 AM   Result Value Ref Range    6 min walk (FT) 700 ft    6 Min Walk (M) 213 m   General PFT Lab (Please always keep checked)    Collection Time: 12/15/22  9:58 AM   Result Value Ref Range    FIO2-Pre 44.00 %

## 2022-12-15 NOTE — LETTER
"    12/15/2022         RE: Wyatt Gutierrez  [address] [address]        Dear Colleague,    Thank you for referring your patient, Wyatt Gutierrez, to the [facility] FOR LUNG SCIENCE AND [facility]. Please see a copy of my visit note below.    [facility]  Pulmonary Medicine  Visit Clinic Note  Dear patient. Thank you for visiting with me. I want you to feel respected, understood, and empowered. \"Respect\" is valuing you as much as I would a close family member. \"Empowerment\" happens when you are fully informed, and can make the best possible decision for you.  Please ask me questions!  Challenge anything that is not clear.       ASSESSMENT & PLAN     Patient is a 77 year old male who has presented for further work up of IPF.     #IPF:   -Diagnosed based on Ct chest in 2021. . Has been taking Ofev sinec May 2022.   -  He has been doing pulmonary rehab.  He has severe lung disease which has worsened after possible exacerbation in January.  His age is a limitation for lung transplant.   - Discussed clinical trials but he is not interested in it for now.   -  On PPI.   -Prescribed BReo inhaler as there was some air reactivity noted.  Mild improvement with its use.   -I am worried that he has rapid worsening of disease.    -[facility] goals of care was held with this patient.  He will dsicuss with his wife and decide on proper goals of care. Short term intubation for a clearly identifiable infectious pneumonia may be a possiblly reasonable.   - But, as his oxygen requiremetns increases and IPF worsenes it will not be advisable to stay full code.   -He has not been able to tolerate ofev 150 BID.  His Ofev is switched to 100 BID which he has been able to tolerate well.   -May consider Pirfenidone switch later once he comes back from [place]. As probably he is not on maximal treatment for IPF with 100 Bid Ofev.     -Face to face encounter for nebulizer is " held.  -Duonebs will be prescribed.     #Chronic Cough  -Tessalon perles has not worked.  -Most lijkley due to his IPF.    -He has responded well to Azithromycin three times a week with significant improvement.  Will continue with it.  EKG today  -BREo inhaler.     #Hypoxemic Respiratory failure  -6 LPM with activity.        #vaccines:  -Uptodate    -He will be driving to [place].  Gave info on physicians there.       RTC in 3 months with PFts and 6MWT       24 minutes excluding the time spent on cigarette cessation was  spent on the date of the encounter doing chart review, history and exam, documentation and further activities as noted above.    These conclusions are made at the best of one's knowledge and belief based on the provided evidence such as patient's history and allergy test results and they can change over time or can be incomplete because of missing information's.    I explained the lab values, imagings and findings to the patient.  Patient expressed understanding I did not recognize any barriers to the understanding of the patient.    The above note was dictated using voice recognition software and may include typographical errors. Please contact the author for any clarifications.    Gilson Estrada MD   RN Coordinators: Maureen/Meme/Radha: [phone]  ILD RN Coordinators: [phone]  Clinic Number: [phone]  Pager: [phone]       Today's visit note:     Chief Complaint: Wyatt Gutierrez is a 77 year old year old male who is being seen for Interstitial Lung Disease (ILD) (ILD Follow up )      HPI:    Wyatt Gutierrez is a 75 y.o. male, previous smoker with PMH sig for KAITY, HTN, HPL who was referred to us back on 12/20/2019 for abnormal chest x-ray that was concerning for ILD.  Patient has had chronic shortness of breath since at least 2010.  He quit smoking tobacco in Jan 2022.     -He had his Ild diagnosed in June 2021. He was not on oxygen.  At that time he was followed eventually he was started  on Ofev in May 2022.  His major decompensation happened in 2022 January when he was in [place] and admitted to hospital and had to be started on oxygen after that.     Interval History: 9/29/22:  -Since than he has had worsening oxygen requirement.  He continues to use treadmill with oxygen.  He will ntio be a transplant candidate.      -His mainly dry cough is bopthering him.     #Interval History: 10/4  -Discussed goals of care in detail.     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   Hot tub:  No   Portable humidifier:  Dehumidifier.   Brass or woodwind instruments:  Smoking tobacco or marijuana: Stopped smoking in Jan 2022. Smoked for almost 50 years.  Less than PPD.    Feather pillow/blanket:  Gardening:   Hobbies: golfing .  Chemotherapy or radiation therapy:  Fam hx of ILD:                 Medications:     Current Outpatient Medications   Medication     acyclovir (ZOVIRAX) 400 MG tablet     albuterol (PROAIR HFA;PROVENTIL HFA;VENTOLIN HFA) 90 mcg/actuation inhaler     aspirin (ASA) 81 MG EC tablet     atorvastatin (LIPITOR) 40 MG tablet     azelastine (ASTELIN) 137 mcg (0.1 %) nasal spray     azithromycin (ZITHROMAX) 250 MG tablet     benzonatate (TESSALON) 200 MG capsule     budesonide-formoterol (SYMBICORT) 160-4.5 MCG/ACT Inhaler     fluticasone-vilanterol (BREO ELLIPTA) 100-25 MCG/ACT inhaler     glucosamine-chondroitin 500-400 mg cap     ibuprofen (ADVIL,MOTRIN) 200 MG tablet     metoprolol succinate (TOPROL-XL) 50 MG 24 hr tablet     multivitamin therapeutic tablet     nintedanib (OFEV) 100 MG capsule     omeprazole (PRILOSEC) 20 MG capsule     pantoprazole (PROTONIX) 40 MG EC tablet     triamterene (DYRENIUM) 50 MG capsule     No current facility-administered medications for this visit.            Review of Systems:       A complete 10 point review of systems was otherwise negative except as noted in the  HPI.        PHYSICAL EXAM:  /81   Pulse 63   SpO2 98%      General: Well developed, well nourished, No apparent distress  Eyes: Anicteric  Nose: Nasal mucosa with no edema or hyperemia.  No polyps  Ears: Hearing grossly normal  Mouth: Oral mucosa is moist, without any lesions. No oropharyngeal exudate.  Respiratory: Coarse breathing is noted.   Cardiac: RRR, normal S1, S2. No murmurs. No JVD  Abdomen: Soft, NT/ND  Musculoskeletal: Extremities normal. No clubbing. No cyanosis. No edema.  Skin: No rash on limited exam  Neuro: Normal mentation. Normal speech.  Psych:Normal affect           Data:   All laboratory and imaging data reviewed.      PFT:           9/2022 6.2021    PFT Interpretation:  I personally reviewed and interpreted the PFTs.    ECHO:  2022  Interpretation Summary     Left ventricular size, wall motion and function are normal. The ejection  fraction is > 65%.  There is mild concentric left ventricular hypertrophy.  Normal right ventricle size and systolic function.  Moderate valvular aortic stenosis.  The ascending aorta is Mildly dilated.      Chest CT: I personally reviewed and interpreted the CT scan.  4/2022    Recent Results (from the past 168 hour(s))   6 minute walk test    Collection Time: 12/15/22 12:00 AM   Result Value Ref Range    6 min walk (FT) 700 ft    6 Min Walk (M) 213 m   General PFT Lab (Please always keep checked)    Collection Time: 12/15/22  9:58 AM   Result Value Ref Range    FIO2-Pre 44.00 %       Again, thank you for allowing me to participate in the care of your patient.        Sincerely,        Gilson Estrada MD

## 2022-12-16 DIAGNOSIS — J44.1 COPD EXACERBATION (H): Primary | ICD-10-CM

## 2022-12-16 DIAGNOSIS — E78.5 DYSLIPIDEMIA: Primary | ICD-10-CM

## 2022-12-16 LAB
ATRIAL RATE - MUSE: 64 BPM
DIASTOLIC BLOOD PRESSURE - MUSE: NORMAL MMHG
FIO2-PRE: 44 %
INTERPRETATION ECG - MUSE: NORMAL
P AXIS - MUSE: 29 DEGREES
PR INTERVAL - MUSE: 230 MS
QRS DURATION - MUSE: 86 MS
QT - MUSE: 394 MS
QTC - MUSE: 406 MS
R AXIS - MUSE: -40 DEGREES
SYSTOLIC BLOOD PRESSURE - MUSE: NORMAL MMHG
T AXIS - MUSE: -2 DEGREES
VENTRICULAR RATE- MUSE: 64 BPM

## 2022-12-16 RX ORDER — ALBUTEROL SULFATE 0.83 MG/ML
2.5 SOLUTION RESPIRATORY (INHALATION) 4 TIMES DAILY
Qty: 90 ML | Refills: 3 | Status: ON HOLD | OUTPATIENT
Start: 2022-12-16 | End: 2024-06-11

## 2022-12-26 ENCOUNTER — TELEPHONE (OUTPATIENT)
Dept: PULMONOLOGY | Facility: CLINIC | Age: 77
End: 2022-12-26

## 2022-12-26 DIAGNOSIS — B34.9 VIRAL ILLNESS: ICD-10-CM

## 2022-12-26 DIAGNOSIS — R06.02 SHORTNESS OF BREATH: ICD-10-CM

## 2022-12-26 DIAGNOSIS — J12.82 PNEUMONIA DUE TO 2019 NOVEL CORONAVIRUS: Primary | ICD-10-CM

## 2022-12-26 DIAGNOSIS — U07.1 PNEUMONIA DUE TO 2019 NOVEL CORONAVIRUS: Primary | ICD-10-CM

## 2022-12-26 NOTE — CONFIDENTIAL NOTE
"Received call from patient's wife (Alka) regarding symptoms and positive COVID-19 home test.    Briefly, Tai is a 77 year old gentleman with history of IPF currently managed with Ofev. Started developing increased shortness of breath and \"head cold\" symptoms yesterday. Wife notes several family members who were with them at a holiday gathering are also now sick. Currently at baseline oxygen requirements and SpO2 remains mostly > 90%. No objective fevers, but patient felt feverish this morning. No hemoptysis or other concerns at this time.    Recommendations:  - high risk patient for severe COVID-19 pneumonia given his age and underlying ILD  - recommend initiation of Paxlovid for five days   - no renal dose adjustments necessary, normal renal function based on recent CMP.  - close observation at home  - ED evaluation if symptoms or hypoxia worsen  - Wife agrees with the above plan.     All questions and concerns addressed.    Juan Carlos Buitrago M.D.  Pulmonary and Critical Care Medicine Fellow  12/26/2022  "

## 2023-01-09 DIAGNOSIS — J47.9 BRONCHIECTASIS WITHOUT COMPLICATION (H): ICD-10-CM

## 2023-01-10 RX ORDER — AZITHROMYCIN 250 MG/1
250 TABLET, FILM COATED ORAL
Qty: 12 TABLET | Refills: 1 | Status: SHIPPED | OUTPATIENT
Start: 2023-01-11 | End: 2023-03-03

## 2023-02-05 ENCOUNTER — HEALTH MAINTENANCE LETTER (OUTPATIENT)
Age: 78
End: 2023-02-05

## 2023-03-03 DIAGNOSIS — J47.9 BRONCHIECTASIS WITHOUT COMPLICATION (H): ICD-10-CM

## 2023-03-03 RX ORDER — AZITHROMYCIN 250 MG/1
250 TABLET, FILM COATED ORAL
Qty: 12 TABLET | Refills: 1 | Status: SHIPPED | OUTPATIENT
Start: 2023-03-03 | End: 2023-05-08

## 2023-03-27 DIAGNOSIS — J84.9 ILD (INTERSTITIAL LUNG DISEASE) (H): ICD-10-CM

## 2023-03-27 RX ORDER — FLUTICASONE FUROATE AND VILANTEROL 100; 25 UG/1; UG/1
1 POWDER RESPIRATORY (INHALATION) DAILY
Qty: 1 EACH | Refills: 3 | Status: SHIPPED | OUTPATIENT
Start: 2023-03-27 | End: 2023-07-03

## 2023-04-13 ENCOUNTER — LAB (OUTPATIENT)
Dept: LAB | Facility: CLINIC | Age: 78
End: 2023-04-13
Payer: MEDICARE

## 2023-04-13 ENCOUNTER — OFFICE VISIT (OUTPATIENT)
Dept: PULMONOLOGY | Facility: CLINIC | Age: 78
End: 2023-04-13
Attending: INTERNAL MEDICINE
Payer: MEDICARE

## 2023-04-13 VITALS
HEIGHT: 70 IN | DIASTOLIC BLOOD PRESSURE: 86 MMHG | OXYGEN SATURATION: 97 % | HEART RATE: 65 BPM | SYSTOLIC BLOOD PRESSURE: 142 MMHG | BODY MASS INDEX: 33.93 KG/M2 | WEIGHT: 237 LBS

## 2023-04-13 DIAGNOSIS — J84.9 ILD (INTERSTITIAL LUNG DISEASE) (H): ICD-10-CM

## 2023-04-13 DIAGNOSIS — E78.5 DYSLIPIDEMIA: ICD-10-CM

## 2023-04-13 DIAGNOSIS — J84.9 ILD (INTERSTITIAL LUNG DISEASE) (H): Primary | ICD-10-CM

## 2023-04-13 DIAGNOSIS — J47.9 BRONCHIECTASIS WITHOUT COMPLICATION (H): Primary | ICD-10-CM

## 2023-04-13 DIAGNOSIS — J84.112 IPF (IDIOPATHIC PULMONARY FIBROSIS) (H): ICD-10-CM

## 2023-04-13 DIAGNOSIS — I25.10 CORONARY ARTERY DISEASE INVOLVING NATIVE CORONARY ARTERY WITHOUT ANGINA PECTORIS, UNSPECIFIED WHETHER NATIVE OR TRANSPLANTED HEART: ICD-10-CM

## 2023-04-13 DIAGNOSIS — J96.11 HYPOXEMIC RESPIRATORY FAILURE, CHRONIC (H): ICD-10-CM

## 2023-04-13 DIAGNOSIS — I35.0 NONRHEUMATIC AORTIC VALVE STENOSIS: ICD-10-CM

## 2023-04-13 LAB
6 MIN WALK (FT): 715 FT
6 MIN WALK (M): 218 M
ALBUMIN SERPL BCG-MCNC: 4.1 G/DL (ref 3.5–5.2)
ALP SERPL-CCNC: 84 U/L (ref 40–129)
ALT SERPL W P-5'-P-CCNC: 18 U/L (ref 10–50)
AST SERPL W P-5'-P-CCNC: 23 U/L (ref 10–50)
BILIRUB DIRECT SERPL-MCNC: <0.2 MG/DL (ref 0–0.3)
BILIRUB SERPL-MCNC: 0.4 MG/DL
PROT SERPL-MCNC: 7.2 G/DL (ref 6.4–8.3)

## 2023-04-13 PROCEDURE — G0463 HOSPITAL OUTPT CLINIC VISIT: HCPCS | Performed by: INTERNAL MEDICINE

## 2023-04-13 PROCEDURE — 94375 RESPIRATORY FLOW VOLUME LOOP: CPT | Performed by: INTERNAL MEDICINE

## 2023-04-13 PROCEDURE — 94729 DIFFUSING CAPACITY: CPT | Performed by: INTERNAL MEDICINE

## 2023-04-13 PROCEDURE — 99207 PR NO CHARGE LOS: CPT

## 2023-04-13 PROCEDURE — 80076 HEPATIC FUNCTION PANEL: CPT | Performed by: PATHOLOGY

## 2023-04-13 PROCEDURE — 94618 PULMONARY STRESS TESTING: CPT | Performed by: INTERNAL MEDICINE

## 2023-04-13 PROCEDURE — 99214 OFFICE O/P EST MOD 30 MIN: CPT | Mod: 25 | Performed by: INTERNAL MEDICINE

## 2023-04-13 PROCEDURE — 36415 COLL VENOUS BLD VENIPUNCTURE: CPT | Performed by: PATHOLOGY

## 2023-04-13 RX ORDER — ATORVASTATIN CALCIUM 40 MG/1
TABLET, FILM COATED ORAL
Qty: 90 TABLET | Refills: 3 | Status: SHIPPED | OUTPATIENT
Start: 2023-04-13 | End: 2024-04-23

## 2023-04-13 RX ORDER — IPRATROPIUM BROMIDE AND ALBUTEROL SULFATE 2.5; .5 MG/3ML; MG/3ML
1 SOLUTION RESPIRATORY (INHALATION) EVERY 6 HOURS PRN
Qty: 90 ML | Refills: 3 | Status: SHIPPED | OUTPATIENT
Start: 2023-04-13 | End: 2023-09-19

## 2023-04-13 RX ORDER — PIRFENIDONE 267 MG/1
CAPSULE ORAL
Qty: 270 CAPSULE | Refills: 11 | Status: SHIPPED | OUTPATIENT
Start: 2023-04-13 | End: 2023-07-18 | Stop reason: SINTOL

## 2023-04-13 ASSESSMENT — PAIN SCALES - GENERAL: PAINLEVEL: NO PAIN (1)

## 2023-04-13 NOTE — PROGRESS NOTES
"[facility]  Pulmonary Medicine  Visit Clinic Note  Dear patient. Thank you for visiting with me. I want you to feel respected, understood, and empowered. \"Respect\" is valuing you as much as I would a close family member. \"Empowerment\" happens when you are fully informed, and can make the best possible decision for you.  Please ask me questions!  Challenge anything that is not clear.       ASSESSMENT & PLAN     Patient is a 77 year old male who has presented for further work up of IPF.     #IPF:   -Diagnosed based on Ct chest in 2021. . Has been taking Ofev sinec May 2022.   -  He has been doing pulmonary rehab.  He has severe lung disease which has worsened after possible exacerbation in January.  His age is a limitation for lung transplant.   - Discussed clinical trials but he is not interested in it for now.   -  On PPI.   -Prescribed BReo inhaler as there was some air reactivity noted.  Mild improvement with its use.   -I am worried that he has rapid worsening of disease.    -[facility] goals of care was held with this patient.  He will dsicuss with his wife and decide on proper goals of care. Short term intubation for a clearly identifiable infectious pneumonia may be a possiblly reasonable.   - But, as his oxygen requiremetns increases and IPF worsenes it will not be advisable to stay full code.   -He has not been able to tolerate ofev 150 BID.  His Ofev is switched to 100 BID which he has been able to tolerate well.     -Switching him to Esbriety to make sure maximal therapy is provided.     -Face to face encounter for nebulizer is held.  -Duonebs is prescribed.     #Chronic Cough  -Tessalon perles has not worked.  -Most lijkley due to his IPF.    -He has responded well to Azithromycin three times a week with significant improvement.  Will continue with it.  It is 250 mg.   -BREo inhaler.     #Hypoxemic Respiratory failure  -6 LPM with activity.        #vaccines:  -Uptodate       RTC in 3 " months with PFts and 6MWT       34 minutes excluding the time spent on cigarette cessation was  spent on the date of the encounter doing chart review, history and exam, documentation and further activities as noted above.    These conclusions are made at the best of one's knowledge and belief based on the provided evidence such as patient's history and allergy test results and they can change over time or can be incomplete because of missing information's.    I explained the lab values, imagings and findings to the patient.  Patient expressed understanding I did not recognize any barriers to the understanding of the patient.    The above note was dictated using voice recognition software and may include typographical errors. Please contact the author for any clarifications.    Gilson Estrada MD   RN Coordinators: Maureen/Meme/Radha: [phone]  ILD RN Coordinators: [phone]  Clinic Number: [phone]  Pager: [phone]       Today's visit note:     Chief Complaint: Wytat Gutierrez is a 77 year old year old male who is being seen for Interstitial Lung Disease (ILD) (ILD Follow up )      HPI:    Wyatt Gutierrez is a 75 y.o. male, previous smoker with PMH sig for KAITY, HTN, HPL who was referred to us back on 12/20/2019 for abnormal chest x-ray that was concerning for ILD.  Patient has had chronic shortness of breath since at least 2010.  He quit smoking tobacco in Jan 2022.     -He had his Ild diagnosed in June 2021. He was not on oxygen.  At that time he was followed eventually he was started on Ofev in May 2022.  His major decompensation happened in 2022 January when he was in [place] and admitted to hospital and had to be started on oxygen after that.     Interval History: 9/29/22:  -Since than he has had worsening oxygen requirement.  He continues to use treadmill with oxygen.  He will ntio be a transplant candidate.      -His mainly dry cough is bopthering him.     #Interval History: 10/4  -Discussed goals of  "care in detail.    #  Interval History: 4/13  -Patient is doing well.  His trip to [place] was well.  No significant IPF exacerbation was noted.  He was able to play golf.  - He is establish care with Dr. Maciel at [facility].  He just finished his Ofev 1 week ago.  He will be switched to Esbriet starting today.       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   Hot tub:  No   Portable humidifier:  Dehumidifier.   Brass or woodwind instruments:  Smoking tobacco or marijuana: Stopped smoking in Jan 2022. Smoked for almost 50 years.  Less than PPD.    Feather pillow/blanket:  Gardening:   Hobbies: golfing .  Chemotherapy or radiation therapy:  Fam hx of ILD:                 Medications:     Current Outpatient Medications   Medication     acyclovir (ZOVIRAX) 400 MG tablet     albuterol (PROAIR HFA;PROVENTIL HFA;VENTOLIN HFA) 90 mcg/actuation inhaler     albuterol (PROVENTIL) (2.5 MG/3ML) 0.083% neb solution     aspirin (ASA) 81 MG EC tablet     atorvastatin (LIPITOR) 40 MG tablet     azelastine (ASTELIN) 137 mcg (0.1 %) nasal spray     azithromycin (ZITHROMAX) 250 MG tablet     fluticasone-vilanterol (BREO ELLIPTA) 100-25 MCG/ACT inhaler     glucosamine-chondroitin 500-400 mg cap     ipratropium (ATROVENT) 0.02 % neb solution     ipratropium - albuterol 0.5 mg/2.5 mg/3 mL (DUONEB) 0.5-2.5 (3) MG/3ML neb solution     metoprolol succinate (TOPROL-XL) 50 MG 24 hr tablet     multivitamin therapeutic tablet     nintedanib (OFEV) 100 MG capsule     pantoprazole (PROTONIX) 40 MG EC tablet     triamterene (DYRENIUM) 50 MG capsule     omeprazole (PRILOSEC) 20 MG capsule     No current facility-administered medications for this visit.            Review of Systems:       A complete 10 point review of systems was otherwise negative except as noted in the HPI.        PHYSICAL EXAM:  Ht 1.778 m (5' 10\")   Wt 107.5 kg (237 lb)   BMI " 34.01 kg/m       General: Well developed, well nourished, No apparent distress  Eyes: Anicteric  Nose: Nasal mucosa with no edema or hyperemia.  No polyps  Ears: Hearing grossly normal  Mouth: Oral mucosa is moist, without any lesions. No oropharyngeal exudate.  Respiratory: Coarse breathing is noted.   Cardiac: RRR, normal S1, S2. No murmurs. No JVD  Abdomen: Soft, NT/ND  Musculoskeletal: Extremities normal. No clubbing. No cyanosis. No edema.  Skin: No rash on limited exam  Neuro: Normal mentation. Normal speech.  Psych:Normal affect           Data:   All laboratory and imaging data reviewed.      PFT:           9/2022 6.2021    PFT Interpretation:  I personally reviewed and interpreted the PFTs.    ECHO:  2022  Interpretation Summary     Left ventricular size, wall motion and function are normal. The ejection  fraction is > 65%.  There is mild concentric left ventricular hypertrophy.  Normal right ventricle size and systolic function.  Moderate valvular aortic stenosis.  The ascending aorta is Mildly dilated.      Chest CT: I personally reviewed and interpreted the CT scan.  4/2022    Recent Results (from the past 168 hour(s))   6 minute walk test    Collection Time: 04/13/23 12:00 AM   Result Value Ref Range    6 min walk (FT) 715 1,241 ft    6 Min Walk (M) 218 378 m   General PFT Lab (Please always keep checked)    Collection Time: 04/13/23  8:59 AM   Result Value Ref Range    FVC-Pred 3.74 L    FVC-Pre 2.43 L    FVC-%Pred-Pre 64 %    FEV1-Pre 2.17 L    FEV1-%Pred-Pre 76 %    FEV1FVC-Pred 76 %    FEV1FVC-Pre 89 %    FEFMax-Pred 7.83 L/sec    FEFMax-Pre 10.07 L/sec    FEFMax-%Pred-Pre 128 %    FEF2575-Pred 2.11 L/sec    FEF2575-Pre 2.88 L/sec    TFU6792-%Pred-Pre 136 %    ExpTime-Pre 6.86 sec    FIFMax-Pre 6.61 L/sec    VC-Pred 4.65 L    VC-Pre 2.51 L    VC-%Pred-Pre 53 %    IC-Pred 4.01 L    IC-Pre 1.95 L    IC-%Pred-Pre 48 %    ERV-Pred 0.64 L    ERV-Pre 0.56 L    ERV-%Pred-Pre 87 %    FEV1FEV6-Pred  77 %    FEV1FEV6-Pre 90 %    DLCOunc-Pred 25.13 ml/min/mmHg    DLCOunc-Pre 8.74 ml/min/mmHg    DLCOunc-%Pred-Pre 34 %    VA-Pre 3.42 L    VA-%Pred-Pre 53 %    FEV1SVC-Pred 61 %    FEV1SVC-Pre 87 %

## 2023-04-13 NOTE — NURSING NOTE
Chief Complaint   Patient presents with     Interstitial Lung Disease (ILD)     ILD Follow up      Vitals were taken and medications were reconciled.     Cassie SANCHEZA  10:13 AM

## 2023-04-13 NOTE — LETTER
"    4/13/2023         RE: Wyatt Gutierrez  [address] [facility] [address]        Dear Colleague,    Thank you for referring your patient, Wyatt Gutierrez, to the [facility] FOR LUNG SCIENCE AND [facility]. Please see a copy of my visit note below.    [facility]  Pulmonary Medicine  Visit Clinic Note  Dear patient. Thank you for visiting with me. I want you to feel respected, understood, and empowered. \"Respect\" is valuing you as much as I would a close family member. \"Empowerment\" happens when you are fully informed, and can make the best possible decision for you.  Please ask me questions!  Challenge anything that is not clear.       ASSESSMENT & PLAN     Patient is a 77 year old male who has presented for further work up of IPF.     #IPF:   -Diagnosed based on Ct chest in 2021. . Has been taking Ofev sinec May 2022.   -  He has been doing pulmonary rehab.  He has severe lung disease which has worsened after possible exacerbation in January.  His age is a limitation for lung transplant.   - Discussed clinical trials but he is not interested in it for now.   -  On PPI.   -Prescribed BReo inhaler as there was some air reactivity noted.  Mild improvement with its use.   -I am worried that he has rapid worsening of disease.    -[facility] goals of care was held with this patient.  He will dsicuss with his wife and decide on proper goals of care. Short term intubation for a clearly identifiable infectious pneumonia may be a possiblly reasonable.   - But, as his oxygen requiremetns increases and IPF worsenes it will not be advisable to stay full code.   -He has not been able to tolerate ofev 150 BID.  His Ofev is switched to 100 BID which he has been able to tolerate well.     -Switching him to Esbriety to make sure maximal therapy is provided.     -Face to face encounter for nebulizer is held.  -Duonebs is prescribed.     #Chronic Cough  -Tessalon perles has not " worked.  -Most elver due to his IPF.    -He has responded well to Azithromycin three times a week with significant improvement.  Will continue with it.  It is 250 mg.   -BREo inhaler.     #Hypoxemic Respiratory failure  -6 LPM with activity.        #vaccines:  -Uptodate       RTC in 3 months with PFts and 6MWT       34 minutes excluding the time spent on cigarette cessation was  spent on the date of the encounter doing chart review, history and exam, documentation and further activities as noted above.    These conclusions are made at the best of one's knowledge and belief based on the provided evidence such as patient's history and allergy test results and they can change over time or can be incomplete because of missing information's.    I explained the lab values, imagings and findings to the patient.  Patient expressed understanding I did not recognize any barriers to the understanding of the patient.    The above note was dictated using voice recognition software and may include typographical errors. Please contact the author for any clarifications.    Gilson Estrada MD   RN Coordinators: Maureen/Meme/Radha: [phone]  ILD RN Coordinators: [phone]  Clinic Number: [phone]  Pager: [phone]       Today's visit note:     Chief Complaint: Wyatt Gutierrez is a 77 year old year old male who is being seen for Interstitial Lung Disease (ILD) (ILD Follow up )      HPI:    Wyatt Gutierrez is a 75 y.o. male, previous smoker with PMH sig for KAITY, HTN, HPL who was referred to us back on 12/20/2019 for abnormal chest x-ray that was concerning for ILD.  Patient has had chronic shortness of breath since at least 2010.  He quit smoking tobacco in Jan 2022.     -He had his Ild diagnosed in June 2021. He was not on oxygen.  At that time he was followed eventually he was started on Ofev in May 2022.  His major decompensation happened in 2022 January when he was in [place] and admitted to hospital and had to be started on  oxygen after that.     Interval History: 9/29/22:  -Since than he has had worsening oxygen requirement.  He continues to use treadmill with oxygen.  He will ntio be a transplant candidate.      -His mainly dry cough is bopthering him.     #Interval History: 10/4  -Discussed goals of care in detail.    #  Interval History: 4/13  -Patient is doing well.  His trip to [place] was well.  No significant IPF exacerbation was noted.  He was able to play golf.  - He is establish care with Dr. Maciel at [facility].  He just finished his Ofev 1 week ago.  He will be switched to Esbriet starting today.       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   Hot tub:  No   Portable humidifier:  Dehumidifier.   Brass or woodwind instruments:  Smoking tobacco or marijuana: Stopped smoking in Jan 2022. Smoked for almost 50 years.  Less than PPD.    Feather pillow/blanket:  Gardening:   Hobbies: golfing .  Chemotherapy or radiation therapy:  Fam hx of ILD:                 Medications:     Current Outpatient Medications   Medication     acyclovir (ZOVIRAX) 400 MG tablet     albuterol (PROAIR HFA;PROVENTIL HFA;VENTOLIN HFA) 90 mcg/actuation inhaler     albuterol (PROVENTIL) (2.5 MG/3ML) 0.083% neb solution     aspirin (ASA) 81 MG EC tablet     atorvastatin (LIPITOR) 40 MG tablet     azelastine (ASTELIN) 137 mcg (0.1 %) nasal spray     azithromycin (ZITHROMAX) 250 MG tablet     fluticasone-vilanterol (BREO ELLIPTA) 100-25 MCG/ACT inhaler     glucosamine-chondroitin 500-400 mg cap     ipratropium (ATROVENT) 0.02 % neb solution     ipratropium - albuterol 0.5 mg/2.5 mg/3 mL (DUONEB) 0.5-2.5 (3) MG/3ML neb solution     metoprolol succinate (TOPROL-XL) 50 MG 24 hr tablet     multivitamin therapeutic tablet     nintedanib (OFEV) 100 MG capsule     pantoprazole (PROTONIX) 40 MG EC tablet     triamterene (DYRENIUM) 50 MG capsule     omeprazole  "(PRILOSEC) 20 MG capsule     No current facility-administered medications for this visit.            Review of Systems:       A complete 10 point review of systems was otherwise negative except as noted in the HPI.        PHYSICAL EXAM:  Ht 1.778 m (5' 10\")   Wt 107.5 kg (237 lb)   BMI 34.01 kg/m       General: Well developed, well nourished, No apparent distress  Eyes: Anicteric  Nose: Nasal mucosa with no edema or hyperemia.  No polyps  Ears: Hearing grossly normal  Mouth: Oral mucosa is moist, without any lesions. No oropharyngeal exudate.  Respiratory: Coarse breathing is noted.   Cardiac: RRR, normal S1, S2. No murmurs. No JVD  Abdomen: Soft, NT/ND  Musculoskeletal: Extremities normal. No clubbing. No cyanosis. No edema.  Skin: No rash on limited exam  Neuro: Normal mentation. Normal speech.  Psych:Normal affect           Data:   All laboratory and imaging data reviewed.      PFT:           9/2022 6.2021    PFT Interpretation:  I personally reviewed and interpreted the PFTs.    ECHO:  2022  Interpretation Summary     Left ventricular size, wall motion and function are normal. The ejection  fraction is > 65%.  There is mild concentric left ventricular hypertrophy.  Normal right ventricle size and systolic function.  Moderate valvular aortic stenosis.  The ascending aorta is Mildly dilated.      Chest CT: I personally reviewed and interpreted the CT scan.  4/2022    Recent Results (from the past 168 hour(s))   6 minute walk test    Collection Time: 04/13/23 12:00 AM   Result Value Ref Range    6 min walk (FT) 715 1,241 ft    6 Min Walk (M) 218 378 m   General PFT Lab (Please always keep checked)    Collection Time: 04/13/23  8:59 AM   Result Value Ref Range    FVC-Pred 3.74 L    FVC-Pre 2.43 L    FVC-%Pred-Pre 64 %    FEV1-Pre 2.17 L    FEV1-%Pred-Pre 76 %    FEV1FVC-Pred 76 %    FEV1FVC-Pre 89 %    FEFMax-Pred 7.83 L/sec    FEFMax-Pre 10.07 L/sec    FEFMax-%Pred-Pre 128 %    FEF2575-Pred 2.11 L/sec "    FEF2575-Pre 2.88 L/sec    YKL6312-%Pred-Pre 136 %    ExpTime-Pre 6.86 sec    FIFMax-Pre 6.61 L/sec    VC-Pred 4.65 L    VC-Pre 2.51 L    VC-%Pred-Pre 53 %    IC-Pred 4.01 L    IC-Pre 1.95 L    IC-%Pred-Pre 48 %    ERV-Pred 0.64 L    ERV-Pre 0.56 L    ERV-%Pred-Pre 87 %    FEV1FEV6-Pred 77 %    FEV1FEV6-Pre 90 %    DLCOunc-Pred 25.13 ml/min/mmHg    DLCOunc-Pre 8.74 ml/min/mmHg    DLCOunc-%Pred-Pre 34 %    VA-Pre 3.42 L    VA-%Pred-Pre 53 %    FEV1SVC-Pred 61 %    FEV1SVC-Pre 87 %                                       Again, thank you for allowing me to participate in the care of your patient.        Sincerely,        Gilson Estrada MD

## 2023-04-14 ENCOUNTER — TELEPHONE (OUTPATIENT)
Dept: PULMONOLOGY | Facility: CLINIC | Age: 78
End: 2023-04-14
Payer: MEDICARE

## 2023-04-14 LAB
DLCOUNC-%PRED-PRE: 34 %
DLCOUNC-PRE: 8.74 ML/MIN/MMHG
DLCOUNC-PRED: 25.13 ML/MIN/MMHG
ERV-%PRED-PRE: 87 %
ERV-PRE: 0.56 L
ERV-PRED: 0.64 L
EXPTIME-PRE: 6.86 SEC
FEF2575-%PRED-PRE: 136 %
FEF2575-PRE: 2.88 L/SEC
FEF2575-PRED: 2.11 L/SEC
FEFMAX-%PRED-PRE: 128 %
FEFMAX-PRE: 10.07 L/SEC
FEFMAX-PRED: 7.83 L/SEC
FEV1-%PRED-PRE: 76 %
FEV1-PRE: 2.17 L
FEV1FEV6-PRE: 90 %
FEV1FEV6-PRED: 77 %
FEV1FVC-PRE: 89 %
FEV1FVC-PRED: 76 %
FEV1SVC-PRE: 87 %
FEV1SVC-PRED: 61 %
FIFMAX-PRE: 6.61 L/SEC
FVC-%PRED-PRE: 64 %
FVC-PRE: 2.43 L
FVC-PRED: 3.74 L
IC-%PRED-PRE: 48 %
IC-PRE: 1.95 L
IC-PRED: 4.01 L
VA-%PRED-PRE: 53 %
VA-PRE: 3.42 L
VC-%PRED-PRE: 53 %
VC-PRE: 2.51 L
VC-PRED: 4.65 L

## 2023-04-14 NOTE — TELEPHONE ENCOUNTER
PA Initiation    Medication: Pirfenidone - PA Pending  Insurance Company: [employer] - Phone [phone] Fax [phone]  Pharmacy Filling the Rx:    Filling Pharmacy Phone:    Filling Pharmacy Fax:    Start Date: 4/14/2023    Key: [id number]

## 2023-04-18 DIAGNOSIS — J47.9 BRONCHIECTASIS WITHOUT COMPLICATION (H): ICD-10-CM

## 2023-04-18 DIAGNOSIS — J84.112 IPF (IDIOPATHIC PULMONARY FIBROSIS) (H): Primary | ICD-10-CM

## 2023-04-18 DIAGNOSIS — J84.9 ILD (INTERSTITIAL LUNG DISEASE) (H): ICD-10-CM

## 2023-04-18 DIAGNOSIS — J96.11 HYPOXEMIC RESPIRATORY FAILURE, CHRONIC (H): ICD-10-CM

## 2023-04-18 NOTE — TELEPHONE ENCOUNTER
Prior Authorization Approval    Authorization Effective Date: 1/14/2023  Authorization Expiration Date: 4/14/2024  Medication: Pirfenidone - PA Approved  Approved Dose/Quantity: 270 per 37 days  Reference #: Key: [id number]   Insurance Company: [employer] - Phone [phone] Fax [phone]  Expected CoPay: $100.00     CoPay Card Available: Other (see comments) Comment:  NA/Medicare  Foundation Assistance Needed:    Which Pharmacy is filling the prescription (Not needed for infusion/clinic administered):    Pharmacy Notified: Yes  Patient Notified: Yes

## 2023-05-08 DIAGNOSIS — J47.9 BRONCHIECTASIS WITHOUT COMPLICATION (H): ICD-10-CM

## 2023-05-08 RX ORDER — AZITHROMYCIN 250 MG/1
250 TABLET, FILM COATED ORAL
Qty: 12 TABLET | Refills: 1 | Status: SHIPPED | OUTPATIENT
Start: 2023-05-08 | End: 2023-07-03

## 2023-05-16 ENCOUNTER — TELEPHONE (OUTPATIENT)
Dept: CARDIOLOGY | Facility: CLINIC | Age: 78
End: 2023-05-16

## 2023-05-16 NOTE — TELEPHONE ENCOUNTER
Called patient to review recent elevated blood pressure reading.  Per [place] Community Measures guidelines, patients blood pressure is out of parameters and recheck blood pressure is recommended.    Last Blood Pressure: 142/86  Last Heart Rate: 65  Date: 4/13/2023  Location: Other Specialty    Patient will respond via [employer] with updated blood pressure readings.      Patient responded 5/17/2023 via [employer] with updated blood pressures.      152/84 p = 70  133/78 p = 72  151/87 p = 67

## 2023-05-22 ENCOUNTER — LAB (OUTPATIENT)
Dept: LAB | Facility: CLINIC | Age: 78
End: 2023-05-22
Payer: MEDICARE

## 2023-05-22 DIAGNOSIS — J84.9 ILD (INTERSTITIAL LUNG DISEASE) (H): ICD-10-CM

## 2023-05-22 LAB
ALBUMIN SERPL BCG-MCNC: 4.6 G/DL (ref 3.5–5.2)
ALP SERPL-CCNC: 100 U/L (ref 40–129)
ALT SERPL W P-5'-P-CCNC: 23 U/L (ref 10–50)
AST SERPL W P-5'-P-CCNC: 38 U/L (ref 10–50)
BILIRUB DIRECT SERPL-MCNC: <0.2 MG/DL (ref 0–0.3)
BILIRUB SERPL-MCNC: 0.3 MG/DL
PROT SERPL-MCNC: 7.6 G/DL (ref 6.4–8.3)

## 2023-05-22 PROCEDURE — 80076 HEPATIC FUNCTION PANEL: CPT

## 2023-05-22 PROCEDURE — 36415 COLL VENOUS BLD VENIPUNCTURE: CPT

## 2023-06-12 ENCOUNTER — HOSPITAL ENCOUNTER (OUTPATIENT)
Dept: CARDIOLOGY | Facility: HOSPITAL | Age: 78
Discharge: HOME | End: 2023-06-12
Attending: INTERNAL MEDICINE | Admitting: INTERNAL MEDICINE
Payer: MEDICARE

## 2023-06-12 DIAGNOSIS — I35.0 NONRHEUMATIC AORTIC VALVE STENOSIS: ICD-10-CM

## 2023-06-12 PROCEDURE — 93306 TTE W/DOPPLER COMPLETE: CPT | Mod: 26 | Performed by: INTERNAL MEDICINE

## 2023-06-12 PROCEDURE — 93306 TTE W/DOPPLER COMPLETE: CPT

## 2023-06-19 ENCOUNTER — LAB (OUTPATIENT)
Dept: LAB | Facility: CLINIC | Age: 78
End: 2023-06-19
Payer: MEDICARE

## 2023-06-19 DIAGNOSIS — J84.9 ILD (INTERSTITIAL LUNG DISEASE) (H): ICD-10-CM

## 2023-06-19 LAB
ALBUMIN SERPL BCG-MCNC: 4.7 G/DL (ref 3.5–5.2)
ALP SERPL-CCNC: 103 U/L (ref 40–129)
ALT SERPL W P-5'-P-CCNC: 27 U/L (ref 0–70)
AST SERPL W P-5'-P-CCNC: 32 U/L (ref 0–45)
BILIRUB DIRECT SERPL-MCNC: <0.2 MG/DL (ref 0–0.3)
BILIRUB SERPL-MCNC: 0.4 MG/DL
PROT SERPL-MCNC: 7.7 G/DL (ref 6.4–8.3)

## 2023-06-19 PROCEDURE — 80076 HEPATIC FUNCTION PANEL: CPT

## 2023-06-19 PROCEDURE — 36415 COLL VENOUS BLD VENIPUNCTURE: CPT

## 2023-06-27 ENCOUNTER — OFFICE VISIT (OUTPATIENT)
Dept: CARDIOLOGY | Facility: CLINIC | Age: 78
End: 2023-06-27
Payer: MEDICARE

## 2023-06-27 VITALS
HEART RATE: 68 BPM | SYSTOLIC BLOOD PRESSURE: 108 MMHG | OXYGEN SATURATION: 97 % | RESPIRATION RATE: 18 BRPM | DIASTOLIC BLOOD PRESSURE: 56 MMHG | BODY MASS INDEX: 32.57 KG/M2 | WEIGHT: 227 LBS

## 2023-06-27 DIAGNOSIS — I25.10 CORONARY ARTERY DISEASE INVOLVING NATIVE CORONARY ARTERY WITHOUT ANGINA PECTORIS, UNSPECIFIED WHETHER NATIVE OR TRANSPLANTED HEART: ICD-10-CM

## 2023-06-27 DIAGNOSIS — R06.09 DYSPNEA ON EXERTION: ICD-10-CM

## 2023-06-27 DIAGNOSIS — E78.5 DYSLIPIDEMIA: ICD-10-CM

## 2023-06-27 DIAGNOSIS — I10 HYPERTENSION, UNSPECIFIED TYPE: Primary | ICD-10-CM

## 2023-06-27 DIAGNOSIS — I35.0 NONRHEUMATIC AORTIC VALVE STENOSIS: ICD-10-CM

## 2023-06-27 PROCEDURE — 99214 OFFICE O/P EST MOD 30 MIN: CPT | Performed by: INTERNAL MEDICINE

## 2023-06-27 NOTE — PROGRESS NOTES
[facility] [address], [place] [address]   [email or web address]   OFFICE: [phone]            Impression and Plan     1.  Coronary artery disease.  Tai has history of coronary artery disease by virtue of CT scan of chest 24 April 2021 revealing mild to moderate coronary calcification notably in the proximal LAD. Wyatt underwent a regadenoson nuclear perfusion study 15 Kaylie 2022 that revealed no evidence of infarct or ischemia.     2.  Aortic stenosis.  This was felt moderate-severe on echocardiogram 12 June 2023 with mean gradient of 37 mmHg and peak velocity 3.6 m/s.  Calculated valve area 0.93-0.94 cm .  Dimensionless index measured 0.25.  This is commensurate with exam.  Although compromised at baseline from his pulmonary fibrosis, he reports no change from his baseline as far as his breathing.  He denies any chest pain symptoms concerning for angina.  No symptoms of lightheadedness and the like.  Plan:    Plan to obtain follow-up echocardiogram in early December with follow-up shortly thereafter just prior to his travels to [place].    3.  Atrial fibrillation.  As noted below, Tai had suffered a syncopal spell in March 2022 and was noted to have evidence of atrial fibrillation with rapid ventricular response.  Parenthetically, documentation from [facility] indicates history of sleep apnea.  It was felt his atrial fibrillation may have been triggered in part by ethanol.  Documentation available reviewed and he was seen by cardiology in [place] and at what appeared given this was felt to be an isolated event that full anticoagulation at that time was deferred though he was set up for an ambulatory monitor that he wore for 24 days.  No atrial fibrillation was detected during the monitoring interval (see Cardiac Diagnostic section below).      Continue aspirin for some mild degree of CVA prophylaxis..    Continue metoprolol.     4.  Hypertension.   Blood pressure is very reasonable in the office today.       5.  Dyslipidemia.  Lipid profile 4 October 2022 revealed LDL 57 mg/dL and HDL 45 mg/dL.    Continue atorvastatin.     Plan to obtain follow-up echocardiogram in early December with follow-up shortly thereafter just prior to his travels to [place].    35 minutes spent reviewing prior records (including documentation, laboratory studies, cardiac testing/imaging), interview with patient along with physical exam, planning, and subsequent documentation/crafting of note).           History of Present Illness    Once again I would like to thank you again for asking me to participate in the care of your patient, Wyatt Gutierrez.  As you know, but to reiterate for my own records, Wyatt Gutierrez is a 78 year old male with history of pulmonary fibrosis.  Patient has been followed in the pulmonary clinic by Dr. Whitney José.     Tai had suffered a syncopal spell in March 2022 and was evaluated at [facility] in [facility].  Episode felt possibly related to ethanol use though also had been noted to have evidence of atrial fibrillation with rapid ventricular response.     Tai also has history of coronary artery disease by virtue of CT scan of chest 24 April 2021 revealing mild to moderate coronary calcification notably in the proximal LAD.     On interview, Wyatt reports that he does have baseline dyspnea and shortness of breath due to his pulmonary fibrosis though this has been stable.   He denies any actual chest pain symptoms concerning for angina.  He reports no subjective palpitations suggestive of recurrent atrial fibrillation.  No lightheadedness.    Further review of systems is otherwise negative/noncontributory (medical record and 13 point review of systems reviewed as well and pertinent positives noted).         Cardiac Diagnostics/Imaging      Echocardiogram 12 June 2023 (personally reviewed):  1. Normal left ventricular size and systolic  performance with a visually estimated ejection fraction of 60%.  2. There is mild concentric increase in left ventricular wall thickness.  3. There is moderate to severe aortic stenosis.  o The mean gradient is measured at 37 mmHg with peak velocity 3.6 m/s. Calculated valve area 0.93-0.94 cmÂ . Dimensionless index measured 0.25.  4. There is mild aortic insufficiency.  5. Normal right ventricular size and systolic performance.  6. There is mild to moderate left atrial enlargement.  7. There is mild enlargement of the proximal ascending aorta.  o When compared to the prior real-time echocardiogram dated 13 June 2022, the mean gradient across the aortic valve has increased from 25mmHg to 37 mmHg.    Echocardiogram 13 June 2022:  1. Normal left ventricular size and systolic performance with ejection fraction greater than 65%.  2. Mild increase in left ventricular wall thickness.  3. Moderate aortic stenosis.  4. The mean gradient is measured at 25 mmHg with peak velocity of 3.1 m/s.  Normal right ventricular size and systolic performance.  5. Mild enlargement of ascending aorta.    Echocardiogram 18 September 2020 (personally reviewed):  1. Normal left ventricular size and systolic performance with a visually estimated ejection fraction of 55-60%.   2. There is mild aortic stenosis.   3. Normal right ventricular size and systolic performance.   4. There is mild enlargement of the proximal ascending aorta.    Regadenoson nuclear perfusion study 15 Kaylie 2022:  No evidence of infarct or ischemia.  Normal left ventricular systolic performance with ejection fraction of 61% normal regional wall motion.    24-day ambulatory monitor March 2022:  1. Normal sinus rhythm.    2. Mobitz type I AV block during sleep.  3. No atrial fibrillation detected.    Twelve-lead ECG (personally reviewed) for March 2022: Sinus rhythm with first-degree AV block.  Nonspecific T wave changes.    CT scan of chest 24 April 2021:  1. 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 Differential diagnosis includes UIP/IPF and connective tissue disease associated ILD typically rheumatoid arthritis.   2. The amount of lung   3. fibrosis is not changed from 29 October 2020.  4. Coronary artery calcification: Mild to moderate specifically in proximal LAD.              Physical Examination       /56 (BP Location: Left arm, Patient Position: Sitting, Cuff Size: Adult Large)   Pulse 68   Resp 18   Wt 103 kg (227 lb)   SpO2 97%   BMI 32.57 kg/m          Wt Readings from Last 3 Encounters:   06/27/23 103 kg (227 lb)   04/13/23 107.5 kg (237 lb)   12/12/22 108.4 kg (239 lb)       The patient is alert and oriented times three. Sclerae are anicteric. Mucosal membranes are moist. Jugular venous pressure is normal. No significant adenopathy/thyromegally appreciated. Lungs are clear with good expansion. On cardiovascular exam, the patient has a regular S1 and S2.  There is a 3/6 systolic murmur heard in a sash-like distribution with a somewhat musical quality.  Abdomen is soft and non-tender. Extremities reveal no clubbing, cyanosis, or edema.         Family History/Social History/Risk Factors   Patient does not smoke.  Family history reviewed.         Medical History  Surgical History Family History Social History   Past Medical History:   Diagnosis Date     Arthritis      High cholesterol      Hypertension      Sleep apnea      Past Surgical History:   Procedure Laterality Date     ARTHROSCOPY SHOULDER       COLONOSCOPY       OTHER SURGICAL HISTORY Right 1957    heel fracture     OTHER SURGICAL HISTORY      knee arthroscopies     [facility] TOTAL KNEE ARTHROPLASTY Left 10/31/2019    Procedure: LEFT MINIMALLY INVASIVE TOTAL KNEE ARTHROPLASTY;  Surgeon: Avelino Canada MD;  Location: [facility] OR;  Service: Orthopedics     No family history on file.     Social History     Socioeconomic History      Marital status:      Spouse name: Not on file     Number of children: Not on file     Years of education: Not on file     Highest education level: Not on file   Occupational History     Not on file   Tobacco Use     Smoking status: Former     Packs/day: 0.50     Years: 55.00     Pack years: 27.50     Types: Cigarettes     Quit date: 2021     Years since quittin.4     Smokeless tobacco: Never   Substance and Sexual Activity     Alcohol use: Yes     Comment: 1-2 glasses of wine/ 2 x-week     Drug use: Not Currently     Comment: edible marijuana in the past     Sexual activity: Not on file   Other Topics Concern     Not on file   Social History Narrative     Not on file     Social Determinants of Health     Financial Resource Strain: Not on file   Food Insecurity: Not on file   Transportation Needs: Not on file   Physical Activity: Not on file   Stress: Not on file   Social Connections: Not on file   Intimate Partner Violence: Not on file   Housing Stability: Not on file           Medications  Allergies   Current Outpatient Medications   Medication Sig Dispense Refill     acyclovir (ZOVIRAX) 400 MG tablet [ACYCLOVIR (ZOVIRAX) 400 MG TABLET] Take 400 mg by mouth 3 (three) times a day as needed.              albuterol (PROAIR HFA;PROVENTIL HFA;VENTOLIN HFA) 90 mcg/actuation inhaler [ALBUTEROL (PROAIR HFA;PROVENTIL HFA;VENTOLIN HFA) 90 MCG/ACTUATION INHALER] Inhale 2 puffs every 6 (six) hours as needed for wheezing or shortness of breath. 1 Inhaler 6     albuterol (PROVENTIL) (2.5 MG/3ML) 0.083% neb solution Take 1 vial (2.5 mg) by nebulization 4 times daily (Patient taking differently: Take 2.5 mg by nebulization 1-2 times daily) 90 mL 3     aspirin (ASA) 81 MG EC tablet Take by mouth every other day       atorvastatin (LIPITOR) 40 MG tablet TAKE 1 TABLET(40 MG) BY MOUTH AT BEDTIME 90 tablet 3     azelastine (ASTELIN) 137 mcg (0.1 %) nasal spray [AZELASTINE (ASTELIN) 137 MCG (0.1 %) NASAL SPRAY] Use in  each nostril as directed 30 mL 12     azithromycin (ZITHROMAX) 250 MG tablet Take 1 tablet (250 mg) by mouth Every Mon, Wed, Fri Morning 12 tablet 1     Dextromethorphan-guaiFENesin (MUCINEX DM PO)        fluticasone-vilanterol (BREO ELLIPTA) 100-25 MCG/ACT inhaler Inhale 1 puff into the lungs daily 1 each 3     ipratropium - albuterol 0.5 mg/2.5 mg/3 mL (DUONEB) 0.5-2.5 (3) MG/3ML neb solution Take 1 vial (3 mLs) by nebulization every 6 hours as needed for shortness of breath, wheezing or cough 90 mL 3     ipratropium - albuterol 0.5 mg/2.5 mg/3 mL (DUONEB) 0.5-2.5 (3) MG/3ML neb solution Take 1 vial (3 mLs) by nebulization every 6 hours as needed for shortness of breath, wheezing or cough 90 mL 1     metoprolol succinate (TOPROL-XL) 50 MG 24 hr tablet [METOPROLOL SUCCINATE (TOPROL-XL) 50 MG 24 HR TABLET] Take 50 mg by mouth daily.       omeprazole (PRILOSEC) 20 MG capsule [OMEPRAZOLE (PRILOSEC) 20 MG CAPSULE] Take 20 mg by mouth 2 (two) times a day before meals.       pirfenidone (ESBRIET) 267 MG capsule Take 1 capsule three times daily with food days 1-7, then 2 capsules three times daily with food days 8-14, then 3 capsules three times daily with food days 15 and onward. 270 capsule 11     triamterene (DYRENIUM) 50 MG capsule Take 50 mg by mouth daily       glucosamine-chondroitin 500-400 mg cap [GLUCOSAMINE-CHONDROITIN 500-400 MG CAP] Take 1 capsule by mouth 2 (two) times a day. (Patient not taking: Reported on 6/27/2023)       ipratropium (ATROVENT) 0.02 % neb solution Take 2.5 mLs (0.5 mg) by nebulization 4 times daily (Patient not taking: Reported on 6/27/2023) 90 mL 3     multivitamin therapeutic tablet [MULTIVITAMIN THERAPEUTIC TABLET] Take 1 tablet by mouth daily. (Patient not taking: Reported on 6/27/2023)       nintedanib (OFEV) 100 MG capsule Take 1 capsule (100 mg) by mouth 2 times daily (Patient not taking: Reported on 6/27/2023) 60 capsule 11     pantoprazole (PROTONIX) 40 MG EC tablet Take 1 tablet  (40 mg) by mouth daily (Patient not taking: Reported on 6/27/2023) 90 tablet 1       Allergies   Allergen Reactions     Animal Dander Unknown     Chalk      Dogs Other (See Comments)     Pet Dander     Maple Tree      Mold [Molds & Smuts] Unknown          Lab Results    Chemistry/lipid CBC Cardiac Enzymes/BNP/TSH/INR   Recent Labs   Lab Test 12/15/22  1159   CHOL 134   HDL 47   LDL 54   TRIG 167*     Recent Labs   Lab Test 12/15/22  1159 10/04/22  0848 06/07/22  1407   LDL 54 57 95     Recent Labs   Lab Test 12/15/22  1159   *   POTASSIUM 4.2   CHLORIDE 96*   CO2 30*   *   BUN 16.2   CR 0.97   GFRESTIMATED 81   SHALOM 9.9     Recent Labs   Lab Test 12/15/22  1159 10/04/22  0848 07/07/22  1247   CR 0.97 0.90 0.87     No results for input(s): A1C in the last 55541 hours.       Recent Labs   Lab Test 05/17/22 0901 05/30/21 1950   WBC  --  9.1   HGB 11.6* 14.7   HCT  --  41.9   MCV  --  89   PLT  --  207     Recent Labs   Lab Test 05/17/22  0901 05/30/21  1950 11/01/19  0540   HGB 11.6* 14.7 12.9*    No results for input(s): TROPONINI in the last 44917 hours.  No results for input(s): BNP, NTBNPI, NTBNP in the last 11555 hours.  No results for input(s): TSH in the last 49178 hours.  Recent Labs   Lab Test 01/15/22  0627 05/30/21  1950 10/31/19  0907   INR 1.0 1.00 0.95          Medications  Allergies   Current Outpatient Medications   Medication Sig Dispense Refill     acyclovir (ZOVIRAX) 400 MG tablet [ACYCLOVIR (ZOVIRAX) 400 MG TABLET] Take 400 mg by mouth 3 (three) times a day as needed.              albuterol (PROAIR HFA;PROVENTIL HFA;VENTOLIN HFA) 90 mcg/actuation inhaler [ALBUTEROL (PROAIR HFA;PROVENTIL HFA;VENTOLIN HFA) 90 MCG/ACTUATION INHALER] Inhale 2 puffs every 6 (six) hours as needed for wheezing or shortness of breath. 1 Inhaler 6     albuterol (PROVENTIL) (2.5 MG/3ML) 0.083% neb solution Take 1 vial (2.5 mg) by nebulization 4 times daily (Patient taking differently: Take 2.5 mg by nebulization  1-2 times daily) 90 mL 3     aspirin (ASA) 81 MG EC tablet Take by mouth every other day       atorvastatin (LIPITOR) 40 MG tablet TAKE 1 TABLET(40 MG) BY MOUTH AT BEDTIME 90 tablet 3     azelastine (ASTELIN) 137 mcg (0.1 %) nasal spray [AZELASTINE (ASTELIN) 137 MCG (0.1 %) NASAL SPRAY] Use in each nostril as directed 30 mL 12     azithromycin (ZITHROMAX) 250 MG tablet Take 1 tablet (250 mg) by mouth Every Mon, Wed, Fri Morning 12 tablet 1     Dextromethorphan-guaiFENesin (MUCINEX DM PO)        fluticasone-vilanterol (BREO ELLIPTA) 100-25 MCG/ACT inhaler Inhale 1 puff into the lungs daily 1 each 3     ipratropium - albuterol 0.5 mg/2.5 mg/3 mL (DUONEB) 0.5-2.5 (3) MG/3ML neb solution Take 1 vial (3 mLs) by nebulization every 6 hours as needed for shortness of breath, wheezing or cough 90 mL 3     ipratropium - albuterol 0.5 mg/2.5 mg/3 mL (DUONEB) 0.5-2.5 (3) MG/3ML neb solution Take 1 vial (3 mLs) by nebulization every 6 hours as needed for shortness of breath, wheezing or cough 90 mL 1     metoprolol succinate (TOPROL-XL) 50 MG 24 hr tablet [METOPROLOL SUCCINATE (TOPROL-XL) 50 MG 24 HR TABLET] Take 50 mg by mouth daily.       omeprazole (PRILOSEC) 20 MG capsule [OMEPRAZOLE (PRILOSEC) 20 MG CAPSULE] Take 20 mg by mouth 2 (two) times a day before meals.       pirfenidone (ESBRIET) 267 MG capsule Take 1 capsule three times daily with food days 1-7, then 2 capsules three times daily with food days 8-14, then 3 capsules three times daily with food days 15 and onward. 270 capsule 11     triamterene (DYRENIUM) 50 MG capsule Take 50 mg by mouth daily       glucosamine-chondroitin 500-400 mg cap [GLUCOSAMINE-CHONDROITIN 500-400 MG CAP] Take 1 capsule by mouth 2 (two) times a day. (Patient not taking: Reported on 6/27/2023)       ipratropium (ATROVENT) 0.02 % neb solution Take 2.5 mLs (0.5 mg) by nebulization 4 times daily (Patient not taking: Reported on 6/27/2023) 90 mL 3     multivitamin therapeutic tablet [MULTIVITAMIN  THERAPEUTIC TABLET] Take 1 tablet by mouth daily. (Patient not taking: Reported on 6/27/2023)       nintedanib (OFEV) 100 MG capsule Take 1 capsule (100 mg) by mouth 2 times daily (Patient not taking: Reported on 6/27/2023) 60 capsule 11     pantoprazole (PROTONIX) 40 MG EC tablet Take 1 tablet (40 mg) by mouth daily (Patient not taking: Reported on 6/27/2023) 90 tablet 1      Allergies   Allergen Reactions     Animal Dander Unknown     Chalk      Dogs Other (See Comments)     Pet Dander     Maple Tree      Mold [Molds & Smuts] Unknown          Lab Results   Lab Results   Component Value Date     12/15/2022    CO2 30 12/15/2022    CO2 30 07/07/2022    BUN 16.2 12/15/2022    BUN 10 07/07/2022     Lab Results   Component Value Date    WBC 9.1 05/30/2021    HGB 11.6 05/17/2022    HCT 41.9 05/30/2021    MCV 89 05/30/2021     05/30/2021     Lab Results   Component Value Date    CHOL 134 12/15/2022    TRIG 167 12/15/2022    HDL 47 12/15/2022     Lab Results   Component Value Date    INR 1.0 01/15/2022

## 2023-06-27 NOTE — LETTER
6/27/2023    Eliot De La Rosa MD  [address]  [facility] [address]    RE: Wyatt Gutierrez       Dear Colleague,     I had the pleasure of seeing Wyatt Gutierrez in the [facility].         [facility]   [address], [place] [address]   [email or web address]   OFFICE: [phone]            Impression and Plan     1.  Coronary artery disease.  Tai has history of coronary artery disease by virtue of CT scan of chest 24 April 2021 revealing mild to moderate coronary calcification notably in the proximal LAD. Wyatt underwent a regadenoson nuclear perfusion study 15 Kaylie 2022 that revealed no evidence of infarct or ischemia.     2.  Aortic stenosis.  This was felt moderate-severe on echocardiogram 12 June 2023 with mean gradient of 37 mmHg and peak velocity 3.6 m/s.  Calculated valve area 0.93-0.94 cm .  Dimensionless index measured 0.25.  This is commensurate with exam.  Although compromised at baseline from his pulmonary fibrosis, he reports no change from his baseline as far as his breathing.  He denies any chest pain symptoms concerning for angina.  No symptoms of lightheadedness and the like.  Plan:  Plan to obtain follow-up echocardiogram in early December with follow-up shortly thereafter just prior to his travels to [place].    3.  Atrial fibrillation.  As noted below, Tai had suffered a syncopal spell in March 2022 and was noted to have evidence of atrial fibrillation with rapid ventricular response.  Parenthetically, documentation from [facility] indicates history of sleep apnea.  It was felt his atrial fibrillation may have been triggered in part by ethanol.  Documentation available reviewed and he was seen by cardiology in [place] and at what appeared given this was felt to be an isolated event that full anticoagulation at that time was deferred though he was set up for an ambulatory monitor that he wore for 24 days.  No atrial fibrillation  was detected during the monitoring interval (see Cardiac Diagnostic section below).    Continue aspirin for some mild degree of CVA prophylaxis..  Continue metoprolol.     4.  Hypertension.  Blood pressure is very reasonable in the office today.       5.  Dyslipidemia.  Lipid profile 4 October 2022 revealed LDL 57 mg/dL and HDL 45 mg/dL.  Continue atorvastatin.     Plan to obtain follow-up echocardiogram in early December with follow-up shortly thereafter just prior to his travels to [place].    35 minutes spent reviewing prior records (including documentation, laboratory studies, cardiac testing/imaging), interview with patient along with physical exam, planning, and subsequent documentation/crafting of note).           History of Present Illness    Once again I would like to thank you again for asking me to participate in the care of your patient, Wyatt Gutierrez.  As you know, but to reiterate for my own records, Wyatt Gutierrez is a 78 year old male with history of pulmonary fibrosis.  Patient has been followed in the pulmonary clinic by Dr. Whitney José.     Tai had suffered a syncopal spell in March 2022 and was evaluated at [facility] in [facility].  Episode felt possibly related to ethanol use though also had been noted to have evidence of atrial fibrillation with rapid ventricular response.     Tai also has history of coronary artery disease by virtue of CT scan of chest 24 April 2021 revealing mild to moderate coronary calcification notably in the proximal LAD.     On interview, Wyatt reports that he does have baseline dyspnea and shortness of breath due to his pulmonary fibrosis though this has been stable.   He denies any actual chest pain symptoms concerning for angina.  He reports no subjective palpitations suggestive of recurrent atrial fibrillation.  No lightheadedness.    Further review of systems is otherwise negative/noncontributory (medical record and 13 point review of systems reviewed  as well and pertinent positives noted).         Cardiac Diagnostics/Imaging      Echocardiogram 12 June 2023 (personally reviewed):  Normal left ventricular size and systolic performance with a visually estimated ejection fraction of 60%.  There is mild concentric increase in left ventricular wall thickness.  There is moderate to severe aortic stenosis.  The mean gradient is measured at 37 mmHg with peak velocity 3.6 m/s. Calculated valve area 0.93-0.94 cmÂ . Dimensionless index measured 0.25.  There is mild aortic insufficiency.  Normal right ventricular size and systolic performance.  There is mild to moderate left atrial enlargement.  There is mild enlargement of the proximal ascending aorta.  When compared to the prior real-time echocardiogram dated 13 June 2022, the mean gradient across the aortic valve has increased from 25mmHg to 37 mmHg.    Echocardiogram 13 June 2022:  Normal left ventricular size and systolic performance with ejection fraction greater than 65%.  Mild increase in left ventricular wall thickness.  Moderate aortic stenosis.  The mean gradient is measured at 25 mmHg with peak velocity of 3.1 m/s.  Normal right ventricular size and systolic performance.  Mild enlargement of ascending aorta.    Echocardiogram 18 September 2020 (personally reviewed):  Normal left ventricular size and systolic performance with a visually estimated ejection fraction of 55-60%.   There is mild aortic stenosis.   Normal right ventricular size and systolic performance.   There is mild enlargement of the proximal ascending aorta.    Regadenoson nuclear perfusion study 15 Kaylie 2022:  No evidence of infarct or ischemia.  Normal left ventricular systolic performance with ejection fraction of 61% normal regional wall motion.    24-day ambulatory monitor March 2022:  Normal sinus rhythm.    Mobitz type I AV block during sleep.  No atrial fibrillation detected.    Twelve-lead ECG (personally reviewed) for March 2022: Sinus  rhythm with first-degree AV block.  Nonspecific T wave changes.    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 Differential diagnosis includes UIP/IPF and connective tissue disease associated ILD typically rheumatoid arthritis.   The amount of lung   fibrosis is not changed from 29 October 2020.  Coronary artery calcification: Mild to moderate specifically in proximal LAD.              Physical Examination       /56 (BP Location: Left arm, Patient Position: Sitting, Cuff Size: Adult Large)   Pulse 68   Resp 18   Wt 103 kg (227 lb)   SpO2 97%   BMI 32.57 kg/m          Wt Readings from Last 3 Encounters:   06/27/23 103 kg (227 lb)   04/13/23 107.5 kg (237 lb)   12/12/22 108.4 kg (239 lb)       The patient is alert and oriented times three. Sclerae are anicteric. Mucosal membranes are moist. Jugular venous pressure is normal. No significant adenopathy/thyromegally appreciated. Lungs are clear with good expansion. On cardiovascular exam, the patient has a regular S1 and S2.  There is a 3/6 systolic murmur heard in a sash-like distribution with a somewhat musical quality.  Abdomen is soft and non-tender. Extremities reveal no clubbing, cyanosis, or edema.         Family History/Social History/Risk Factors   Patient does not smoke.  Family history reviewed.         Medical History  Surgical History Family History Social History   Past Medical History:   Diagnosis Date    Arthritis     High cholesterol     Hypertension     Sleep apnea      Past Surgical History:   Procedure Laterality Date    ARTHROSCOPY SHOULDER      COLONOSCOPY      OTHER SURGICAL HISTORY Right 1957    heel fracture    OTHER SURGICAL HISTORY      knee arthroscopies    [facility] TOTAL KNEE ARTHROPLASTY Left 10/31/2019    Procedure: LEFT MINIMALLY INVASIVE TOTAL KNEE ARTHROPLASTY;  Surgeon: Avelino Canada MD;  Location: [facility];   Service: Orthopedics     No family history on file.     Social History     Socioeconomic History    Marital status:      Spouse name: Not on file    Number of children: Not on file    Years of education: Not on file    Highest education level: Not on file   Occupational History    Not on file   Tobacco Use    Smoking status: Former     Packs/day: 0.50     Years: 55.00     Pack years: 27.50     Types: Cigarettes     Quit date: 2021     Years since quittin.4    Smokeless tobacco: Never   Substance and Sexual Activity    Alcohol use: Yes     Comment: 1-2 glasses of wine/ 2 x-week    Drug use: Not Currently     Comment: edible marijuana in the past    Sexual activity: Not on file   Other Topics Concern    Not on file   Social History Narrative    Not on file     Social Determinants of Health     Financial Resource Strain: Not on file   Food Insecurity: Not on file   Transportation Needs: Not on file   Physical Activity: Not on file   Stress: Not on file   Social Connections: Not on file   Intimate Partner Violence: Not on file   Housing Stability: Not on file           Medications  Allergies   Current Outpatient Medications   Medication Sig Dispense Refill    acyclovir (ZOVIRAX) 400 MG tablet [ACYCLOVIR (ZOVIRAX) 400 MG TABLET] Take 400 mg by mouth 3 (three) times a day as needed.             albuterol (PROAIR HFA;PROVENTIL HFA;VENTOLIN HFA) 90 mcg/actuation inhaler [ALBUTEROL (PROAIR HFA;PROVENTIL HFA;VENTOLIN HFA) 90 MCG/ACTUATION INHALER] Inhale 2 puffs every 6 (six) hours as needed for wheezing or shortness of breath. 1 Inhaler 6    albuterol (PROVENTIL) (2.5 MG/3ML) 0.083% neb solution Take 1 vial (2.5 mg) by nebulization 4 times daily (Patient taking differently: Take 2.5 mg by nebulization 1-2 times daily) 90 mL 3    aspirin (ASA) 81 MG EC tablet Take by mouth every other day      atorvastatin (LIPITOR) 40 MG tablet TAKE 1 TABLET(40 MG) BY MOUTH AT BEDTIME 90 tablet 3    azelastine (ASTELIN) 137  mcg (0.1 %) nasal spray [AZELASTINE (ASTELIN) 137 MCG (0.1 %) NASAL SPRAY] Use in each nostril as directed 30 mL 12    azithromycin (ZITHROMAX) 250 MG tablet Take 1 tablet (250 mg) by mouth Every Mon, Wed, Fri Morning 12 tablet 1    Dextromethorphan-guaiFENesin (MUCINEX DM PO)       fluticasone-vilanterol (BREO ELLIPTA) 100-25 MCG/ACT inhaler Inhale 1 puff into the lungs daily 1 each 3    ipratropium - albuterol 0.5 mg/2.5 mg/3 mL (DUONEB) 0.5-2.5 (3) MG/3ML neb solution Take 1 vial (3 mLs) by nebulization every 6 hours as needed for shortness of breath, wheezing or cough 90 mL 3    ipratropium - albuterol 0.5 mg/2.5 mg/3 mL (DUONEB) 0.5-2.5 (3) MG/3ML neb solution Take 1 vial (3 mLs) by nebulization every 6 hours as needed for shortness of breath, wheezing or cough 90 mL 1    metoprolol succinate (TOPROL-XL) 50 MG 24 hr tablet [METOPROLOL SUCCINATE (TOPROL-XL) 50 MG 24 HR TABLET] Take 50 mg by mouth daily.      omeprazole (PRILOSEC) 20 MG capsule [OMEPRAZOLE (PRILOSEC) 20 MG CAPSULE] Take 20 mg by mouth 2 (two) times a day before meals.      pirfenidone (ESBRIET) 267 MG capsule Take 1 capsule three times daily with food days 1-7, then 2 capsules three times daily with food days 8-14, then 3 capsules three times daily with food days 15 and onward. 270 capsule 11    triamterene (DYRENIUM) 50 MG capsule Take 50 mg by mouth daily      glucosamine-chondroitin 500-400 mg cap [GLUCOSAMINE-CHONDROITIN 500-400 MG CAP] Take 1 capsule by mouth 2 (two) times a day. (Patient not taking: Reported on 6/27/2023)      ipratropium (ATROVENT) 0.02 % neb solution Take 2.5 mLs (0.5 mg) by nebulization 4 times daily (Patient not taking: Reported on 6/27/2023) 90 mL 3    multivitamin therapeutic tablet [MULTIVITAMIN THERAPEUTIC TABLET] Take 1 tablet by mouth daily. (Patient not taking: Reported on 6/27/2023)      nintedanib (OFEV) 100 MG capsule Take 1 capsule (100 mg) by mouth 2 times daily (Patient not taking: Reported on 6/27/2023) 60  capsule 11    pantoprazole (PROTONIX) 40 MG EC tablet Take 1 tablet (40 mg) by mouth daily (Patient not taking: Reported on 6/27/2023) 90 tablet 1       Allergies   Allergen Reactions    Animal Dander Unknown    Chalk     Dogs Other (See Comments)     Pet Dander    Maple Tree     Mold [Molds & Smuts] Unknown          Lab Results    Chemistry/lipid CBC Cardiac Enzymes/BNP/TSH/INR   Recent Labs   Lab Test 12/15/22  1159   CHOL 134   HDL 47   LDL 54   TRIG 167*     Recent Labs   Lab Test 12/15/22  1159 10/04/22  0848 06/07/22  1407   LDL 54 57 95     Recent Labs   Lab Test 12/15/22  1159   *   POTASSIUM 4.2   CHLORIDE 96*   CO2 30*   *   BUN 16.2   CR 0.97   GFRESTIMATED 81   SHALOM 9.9     Recent Labs   Lab Test 12/15/22  1159 10/04/22  0848 07/07/22  1247   CR 0.97 0.90 0.87     No results for input(s): A1C in the last 87355 hours.       Recent Labs   Lab Test 05/17/22  0901 05/30/21  1950   WBC  --  9.1   HGB 11.6* 14.7   HCT  --  41.9   MCV  --  89   PLT  --  207     Recent Labs   Lab Test 05/17/22  0901 05/30/21  1950 11/01/19  0540   HGB 11.6* 14.7 12.9*    No results for input(s): TROPONINI in the last 73233 hours.  No results for input(s): BNP, NTBNPI, NTBNP in the last 46309 hours.  No results for input(s): TSH in the last 80519 hours.  Recent Labs   Lab Test 01/15/22  0627 05/30/21  1950 10/31/19  0907   INR 1.0 1.00 0.95          Medications  Allergies   Current Outpatient Medications   Medication Sig Dispense Refill    acyclovir (ZOVIRAX) 400 MG tablet [ACYCLOVIR (ZOVIRAX) 400 MG TABLET] Take 400 mg by mouth 3 (three) times a day as needed.             albuterol (PROAIR HFA;PROVENTIL HFA;VENTOLIN HFA) 90 mcg/actuation inhaler [ALBUTEROL (PROAIR HFA;PROVENTIL HFA;VENTOLIN HFA) 90 MCG/ACTUATION INHALER] Inhale 2 puffs every 6 (six) hours as needed for wheezing or shortness of breath. 1 Inhaler 6    albuterol (PROVENTIL) (2.5 MG/3ML) 0.083% neb solution Take 1 vial (2.5 mg) by nebulization 4 times  daily (Patient taking differently: Take 2.5 mg by nebulization 1-2 times daily) 90 mL 3    aspirin (ASA) 81 MG EC tablet Take by mouth every other day      atorvastatin (LIPITOR) 40 MG tablet TAKE 1 TABLET(40 MG) BY MOUTH AT BEDTIME 90 tablet 3    azelastine (ASTELIN) 137 mcg (0.1 %) nasal spray [AZELASTINE (ASTELIN) 137 MCG (0.1 %) NASAL SPRAY] Use in each nostril as directed 30 mL 12    azithromycin (ZITHROMAX) 250 MG tablet Take 1 tablet (250 mg) by mouth Every Mon, Wed, Fri Morning 12 tablet 1    Dextromethorphan-guaiFENesin (MUCINEX DM PO)       fluticasone-vilanterol (BREO ELLIPTA) 100-25 MCG/ACT inhaler Inhale 1 puff into the lungs daily 1 each 3    ipratropium - albuterol 0.5 mg/2.5 mg/3 mL (DUONEB) 0.5-2.5 (3) MG/3ML neb solution Take 1 vial (3 mLs) by nebulization every 6 hours as needed for shortness of breath, wheezing or cough 90 mL 3    ipratropium - albuterol 0.5 mg/2.5 mg/3 mL (DUONEB) 0.5-2.5 (3) MG/3ML neb solution Take 1 vial (3 mLs) by nebulization every 6 hours as needed for shortness of breath, wheezing or cough 90 mL 1    metoprolol succinate (TOPROL-XL) 50 MG 24 hr tablet [METOPROLOL SUCCINATE (TOPROL-XL) 50 MG 24 HR TABLET] Take 50 mg by mouth daily.      omeprazole (PRILOSEC) 20 MG capsule [OMEPRAZOLE (PRILOSEC) 20 MG CAPSULE] Take 20 mg by mouth 2 (two) times a day before meals.      pirfenidone (ESBRIET) 267 MG capsule Take 1 capsule three times daily with food days 1-7, then 2 capsules three times daily with food days 8-14, then 3 capsules three times daily with food days 15 and onward. 270 capsule 11    triamterene (DYRENIUM) 50 MG capsule Take 50 mg by mouth daily      glucosamine-chondroitin 500-400 mg cap [GLUCOSAMINE-CHONDROITIN 500-400 MG CAP] Take 1 capsule by mouth 2 (two) times a day. (Patient not taking: Reported on 6/27/2023)      ipratropium (ATROVENT) 0.02 % neb solution Take 2.5 mLs (0.5 mg) by nebulization 4 times daily (Patient not taking: Reported on 6/27/2023) 90 mL 3     multivitamin therapeutic tablet [MULTIVITAMIN THERAPEUTIC TABLET] Take 1 tablet by mouth daily. (Patient not taking: Reported on 6/27/2023)      nintedanib (OFEV) 100 MG capsule Take 1 capsule (100 mg) by mouth 2 times daily (Patient not taking: Reported on 6/27/2023) 60 capsule 11    pantoprazole (PROTONIX) 40 MG EC tablet Take 1 tablet (40 mg) by mouth daily (Patient not taking: Reported on 6/27/2023) 90 tablet 1      Allergies   Allergen Reactions    Animal Dander Unknown    Chalk     Dogs Other (See Comments)     Pet Dander    Maple Tree     Mold [Molds & Smuts] Unknown          Lab Results   Lab Results   Component Value Date     12/15/2022    CO2 30 12/15/2022    CO2 30 07/07/2022    BUN 16.2 12/15/2022    BUN 10 07/07/2022     Lab Results   Component Value Date    WBC 9.1 05/30/2021    HGB 11.6 05/17/2022    HCT 41.9 05/30/2021    MCV 89 05/30/2021     05/30/2021     Lab Results   Component Value Date    CHOL 134 12/15/2022    TRIG 167 12/15/2022    HDL 47 12/15/2022     Lab Results   Component Value Date    INR 1.0 01/15/2022                  Thank you for allowing me to participate in the care of your patient.      Sincerely,     Bubba Cao MD   [facility]  cc:   Bubba Cao MD  [address]  [place] [id number]

## 2023-07-03 DIAGNOSIS — J47.9 BRONCHIECTASIS WITHOUT COMPLICATION (H): ICD-10-CM

## 2023-07-03 DIAGNOSIS — J84.9 ILD (INTERSTITIAL LUNG DISEASE) (H): ICD-10-CM

## 2023-07-03 RX ORDER — AZITHROMYCIN 250 MG/1
250 TABLET, FILM COATED ORAL
Qty: 12 TABLET | Refills: 1 | Status: SHIPPED | OUTPATIENT
Start: 2023-07-03 | End: 2023-07-06

## 2023-07-03 RX ORDER — FLUTICASONE FUROATE AND VILANTEROL 100; 25 UG/1; UG/1
1 POWDER RESPIRATORY (INHALATION) DAILY
Qty: 1 EACH | Refills: 3 | Status: SHIPPED | OUTPATIENT
Start: 2023-07-03 | End: 2024-05-02

## 2023-07-06 ENCOUNTER — OFFICE VISIT (OUTPATIENT)
Dept: PULMONOLOGY | Facility: CLINIC | Age: 78
End: 2023-07-06
Attending: INTERNAL MEDICINE
Payer: MEDICARE

## 2023-07-06 VITALS — DIASTOLIC BLOOD PRESSURE: 80 MMHG | SYSTOLIC BLOOD PRESSURE: 149 MMHG | OXYGEN SATURATION: 94 % | HEART RATE: 60 BPM

## 2023-07-06 DIAGNOSIS — J84.112 IPF (IDIOPATHIC PULMONARY FIBROSIS) (H): Primary | ICD-10-CM

## 2023-07-06 DIAGNOSIS — J47.9 BRONCHIECTASIS WITHOUT COMPLICATION (H): ICD-10-CM

## 2023-07-06 DIAGNOSIS — J84.9 ILD (INTERSTITIAL LUNG DISEASE) (H): ICD-10-CM

## 2023-07-06 DIAGNOSIS — R06.00 DYSPNEA, UNSPECIFIED TYPE: ICD-10-CM

## 2023-07-06 PROCEDURE — 99214 OFFICE O/P EST MOD 30 MIN: CPT | Mod: 25 | Performed by: INTERNAL MEDICINE

## 2023-07-06 PROCEDURE — 94375 RESPIRATORY FLOW VOLUME LOOP: CPT | Performed by: INTERNAL MEDICINE

## 2023-07-06 PROCEDURE — G0463 HOSPITAL OUTPT CLINIC VISIT: HCPCS | Performed by: INTERNAL MEDICINE

## 2023-07-06 PROCEDURE — 93005 ELECTROCARDIOGRAM TRACING: CPT | Mod: RTG

## 2023-07-06 PROCEDURE — 93000 ELECTROCARDIOGRAM COMPLETE: CPT | Performed by: INTERNAL MEDICINE

## 2023-07-06 RX ORDER — ARFORMOTEROL TARTRATE 15 UG/2ML
15 SOLUTION RESPIRATORY (INHALATION) 2 TIMES DAILY
Qty: 360 ML | Refills: 1 | Status: SHIPPED | OUTPATIENT
Start: 2023-07-06 | End: 2024-01-04

## 2023-07-06 RX ORDER — BUDESONIDE 0.5 MG/2ML
0.5 INHALANT ORAL 2 TIMES DAILY
Qty: 360 ML | Refills: 1 | Status: SHIPPED | OUTPATIENT
Start: 2023-07-06 | End: 2023-09-19

## 2023-07-06 RX ORDER — AZITHROMYCIN 500 MG/1
500 TABLET, FILM COATED ORAL
Qty: 36 TABLET | Refills: 1 | Status: SHIPPED | OUTPATIENT
Start: 2023-07-07 | End: 2023-10-23

## 2023-07-06 NOTE — LETTER
"    7/6/2023         RE: Wyatt Gutierrez  [address] [facility] [address]        Dear Colleague,    Thank you for referring your patient, Wyatt Gutierrez, to the [facility] LUNG SCIENCE AND [facility]. Please see a copy of my visit note below.    [facility]  Pulmonary Medicine  Visit Clinic Note  Dear patient. Thank you for visiting with me. I want you to feel respected, understood, and empowered. \"Respect\" is valuing you as much as I would a close family member. \"Empowerment\" happens when you are fully informed, and can make the best possible decision for you.  Please ask me questions!  Challenge anything that is not clear.       ASSESSMENT & PLAN     Patient is a 77 year old male who has presented for further work up of IPF.     #IPF:   -Diagnosed based on Ct chest in 2021. . Had been taking Ofev sinec May 2022.  Switched to Esbeiret on 2023 as unable to tolerate angelo dose of ofev and after lowering the dose his pfts worsneedd.   -  He has been doing pulmonary rehab.  He has severe lung disease which has worsened after possible exacerbation in January.  His age is a limitation for lung transplant.   - Discussed clinical trials but he is not interested in it for now.   -  On PPI.   -Prescribed BReo inhaler as there was some air reactivity noted.   He was not able to use it. Switched him to nebulized treatment.   -I am worried that he has rapid worsening of disease.    -Extesnvie goals of care was held with this patient.  He will dsicuss with his wife and decide on proper goals of care. Short term intubation for a clearly identifiable infectious pneumonia may be a possiblly reasonable.   - But, as his oxygen requiremetns increases and IPF worsenes it will not be advisable to stay full code.   -He has not been able to tolerate ofev 150 BID.  His Ofev is switched to 100 BID which he has been able to tolerate well.   -Switched him to Esbriet in 2023    -Face to " face encounter for nebulizer is held.  -Duonebs is prescribed.     #    #Chronic Cough  -Tessalon perles has not worked.  -Most lijkley due to his IPF.    -He has responded well to Azithromycin three times a week with significant improvement.  Increased him to 400 mg M-W-F  -EKg is ordered.   -Sputum culture is ordered.     #Hypoxemic Respiratory failure  -6 LPM with activity.        #vaccines:  -Uptodate       RTC in 3 months with PFts and 6MWT       36 minutes excluding the time spent on cigarette cessation was  spent on the date of the encounter doing chart review, history and exam, documentation and further activities as noted above.    These conclusions are made at the best of one's knowledge and belief based on the provided evidence such as patient's history and allergy test results and they can change over time or can be incomplete because of missing information's.    I explained the lab values, imagings and findings to the patient.  Patient expressed understanding I did not recognize any barriers to the understanding of the patient.    The above note was dictated using voice recognition software and may include typographical errors. Please contact the author for any clarifications.    Gilson Estrada MD   RN Coordinators: Maureen/Meme/Radha: [phone]  ILD RN Coordinators: [phone]  Clinic Number: [phone]  Pager: [phone]       Today's visit note:     Chief Complaint: Wyatt Gutierrez is a 77 year old year old male who is being seen for No chief complaint on file.      HPI:    Wyatt Gutierrez is a 75 y.o. male, previous smoker with PMH sig for KAITY, HTN, HPL who was referred to us back on 12/20/2019 for abnormal chest x-ray that was concerning for ILD.  Patient has had chronic shortness of breath since at least 2010.  He quit smoking tobacco in Jan 2022.     -He had his Ild diagnosed in June 2021. He was not on oxygen.  At that time he was followed eventually he was started on Ofev in May 2022.  His  major decompensation happened in 2022 January when he was in [place] and admitted to hospital and had to be started on oxygen after that.     Interval History: 9/29/22:  -Since than he has had worsening oxygen requirement.  He continues to use treadmill with oxygen.  He will ntio be a transplant candidate.      -His mainly dry cough is bopthering him.     #Interval History: 10/4  -Discussed goals of care in detail.    #  Interval History: 4/13  -Patient is doing well.  His trip to [place] was well.  No significant IPF exacerbation was noted.  He was able to play golf.  - He is establish care with Dr. Maciel at [facility].  He just finished his Ofev 1 week ago.  He will be switched to Esbriet starting today.     #Interval History: 7/2023  -  Patient is doing well. No significant worsening is noted.   - I don't think he is using his Breo well.  He has increased sputum production.      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   Hot tub:  No   Portable humidifier:  Dehumidifier.   Brass or woodwind instruments:  Smoking tobacco or marijuana: Stopped smoking in Jan 2022. Smoked for almost 50 years.  Less than PPD.    Feather pillow/blanket:  Gardening:   Hobbies: golfing .  Chemotherapy or radiation therapy:  Fam hx of ILD:                 Medications:     Current Outpatient Medications   Medication    acyclovir (ZOVIRAX) 400 MG tablet    albuterol (PROAIR HFA;PROVENTIL HFA;VENTOLIN HFA) 90 mcg/actuation inhaler    albuterol (PROVENTIL) (2.5 MG/3ML) 0.083% neb solution    aspirin (ASA) 81 MG EC tablet    atorvastatin (LIPITOR) 40 MG tablet    azelastine (ASTELIN) 137 mcg (0.1 %) nasal spray    azithromycin (ZITHROMAX) 250 MG tablet    Dextromethorphan-guaiFENesin (MUCINEX DM PO)    fluticasone-vilanterol (BREO ELLIPTA) 100-25 MCG/ACT inhaler    glucosamine-chondroitin 500-400 mg cap    ipratropium (ATROVENT) 0.02 % neb  solution    ipratropium - albuterol 0.5 mg/2.5 mg/3 mL (DUONEB) 0.5-2.5 (3) MG/3ML neb solution    ipratropium - albuterol 0.5 mg/2.5 mg/3 mL (DUONEB) 0.5-2.5 (3) MG/3ML neb solution    metoprolol succinate (TOPROL-XL) 50 MG 24 hr tablet    multivitamin therapeutic tablet    nintedanib (OFEV) 100 MG capsule    omeprazole (PRILOSEC) 20 MG capsule    pantoprazole (PROTONIX) 40 MG EC tablet    pirfenidone (ESBRIET) 267 MG capsule    triamterene (DYRENIUM) 50 MG capsule     No current facility-administered medications for this visit.            Review of Systems:       A complete 10 point review of systems was otherwise negative except as noted in the HPI.        PHYSICAL EXAM:  There were no vitals taken for this visit.     General: Well developed, well nourished, No apparent distress  Eyes: Anicteric  Nose: Nasal mucosa with no edema or hyperemia.  No polyps  Ears: Hearing grossly normal  Mouth: Oral mucosa is moist, without any lesions. No oropharyngeal exudate.  Respiratory: Coarse breathing is noted.   Cardiac: RRR, normal S1, S2. No murmurs. No JVD  Abdomen: Soft, NT/ND  Musculoskeletal: Extremities normal. No clubbing. No cyanosis. No edema.  Skin: No rash on limited exam  Neuro: Normal mentation. Normal speech.  Psych:Normal affect           Data:   All laboratory and imaging data reviewed.      PFT:           9/2022 6.2021    PFT Interpretation:  I personally reviewed and interpreted the PFTs.    ECHO:  2022  Interpretation Summary     Left ventricular size, wall motion and function are normal. The ejection  fraction is > 65%.  There is mild concentric left ventricular hypertrophy.  Normal right ventricle size and systolic function.  Moderate valvular aortic stenosis.  The ascending aorta is Mildly dilated.      Chest CT: I personally reviewed and interpreted the CT scan.  4/2022    No results found for this or any previous visit (from the past 168 hour(s)).          Gilson Estrada MD

## 2023-07-06 NOTE — PROGRESS NOTES
"[facility]  Pulmonary Medicine  Visit Clinic Note  Dear patient. Thank you for visiting with me. I want you to feel respected, understood, and empowered. \"Respect\" is valuing you as much as I would a close family member. \"Empowerment\" happens when you are fully informed, and can make the best possible decision for you.  Please ask me questions!  Challenge anything that is not clear.       ASSESSMENT & PLAN     Patient is a 77 year old male who has presented for further work up of IPF.     #IPF:   -Diagnosed based on Ct chest in 2021. . Had been taking Ofev sinec May 2022.  Switched to Esbeiret on 2023 as unable to tolerate angelo dose of ofev and after lowering the dose his pfts worsneedd.   -  He has been doing pulmonary rehab.  He has severe lung disease which has worsened after possible exacerbation in January.  His age is a limitation for lung transplant.   - Discussed clinical trials but he is not interested in it for now.   -  On PPI.   -Prescribed BReo inhaler as there was some air reactivity noted.   He was not able to use it. Switched him to nebulized treatment.   -I am worried that he has rapid worsening of disease.    -[facility] goals of care was held with this patient.  He will dsicuss with his wife and decide on proper goals of care. Short term intubation for a clearly identifiable infectious pneumonia may be a possiblly reasonable.   - But, as his oxygen requiremetns increases and IPF worsenes it will not be advisable to stay full code.   -He has not been able to tolerate ofev 150 BID.  His Ofev is switched to 100 BID which he has been able to tolerate well.   -Switched him to Esbriet in 2023    -Face to face encounter for nebulizer is held.  -Duonebs is prescribed.     #    #Chronic Cough  -Tessalon perles has not worked.  -Most lijkley due to his IPF.    -He has responded well to Azithromycin three times a week with significant improvement.  Increased him to 400 mg M-W-F  -EKg is " ordered.   -Sputum culture is ordered.     #Hypoxemic Respiratory failure  -6 LPM with activity.        #vaccines:  -Uptodate       RTC in 3 months with PFts and 6MWT       36 minutes excluding the time spent on cigarette cessation was  spent on the date of the encounter doing chart review, history and exam, documentation and further activities as noted above.    These conclusions are made at the best of one's knowledge and belief based on the provided evidence such as patient's history and allergy test results and they can change over time or can be incomplete because of missing information's.    I explained the lab values, imagings and findings to the patient.  Patient expressed understanding I did not recognize any barriers to the understanding of the patient.    The above note was dictated using voice recognition software and may include typographical errors. Please contact the author for any clarifications.    Gilson Estrada MD   RN Coordinators: Maureen/Meme/Radha: [phone]  ILD RN Coordinators: [phone]  Clinic Number: [phone]  Pager: [phone]       Today's visit note:     Chief Complaint: Wyatt Gutierrez is a 77 year old year old male who is being seen for No chief complaint on file.      HPI:    Wyatt Gutierrez is a 75 y.o. male, previous smoker with PMH sig for KAITY, HTN, HPL who was referred to us back on 12/20/2019 for abnormal chest x-ray that was concerning for ILD.  Patient has had chronic shortness of breath since at least 2010.  He quit smoking tobacco in Jan 2022.     -He had his Ild diagnosed in June 2021. He was not on oxygen.  At that time he was followed eventually he was started on Ofev in May 2022.  His major decompensation happened in 2022 January when he was in [place] and admitted to hospital and had to be started on oxygen after that.     Interval History: 9/29/22:  -Since than he has had worsening oxygen requirement.  He continues to use treadmill with oxygen.  He will ntio be a  transplant candidate.      -His mainly dry cough is bopthering him.     #Interval History: 10/4  -Discussed goals of care in detail.    #  Interval History: 4/13  -Patient is doing well.  His trip to [place] was well.  No significant IPF exacerbation was noted.  He was able to play golf.  - He is establish care with Dr. Maciel at [facility].  He just finished his Ofev 1 week ago.  He will be switched to Esbriet starting today.     #Interval History: 7/2023  -  Patient is doing well. No significant worsening is noted.   - I don't think he is using his Breo well.  He has increased sputum production.      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   Hot tub:  No   Portable humidifier:  Dehumidifier.   Brass or woodwind instruments:  Smoking tobacco or marijuana: Stopped smoking in Jan 2022. Smoked for almost 50 years.  Less than PPD.    Feather pillow/blanket:  Gardening:   Hobbies: golfing .  Chemotherapy or radiation therapy:  Fam hx of ILD:                 Medications:     Current Outpatient Medications   Medication     acyclovir (ZOVIRAX) 400 MG tablet     albuterol (PROAIR HFA;PROVENTIL HFA;VENTOLIN HFA) 90 mcg/actuation inhaler     albuterol (PROVENTIL) (2.5 MG/3ML) 0.083% neb solution     aspirin (ASA) 81 MG EC tablet     atorvastatin (LIPITOR) 40 MG tablet     azelastine (ASTELIN) 137 mcg (0.1 %) nasal spray     azithromycin (ZITHROMAX) 250 MG tablet     Dextromethorphan-guaiFENesin (MUCINEX DM PO)     fluticasone-vilanterol (BREO ELLIPTA) 100-25 MCG/ACT inhaler     glucosamine-chondroitin 500-400 mg cap     ipratropium (ATROVENT) 0.02 % neb solution     ipratropium - albuterol 0.5 mg/2.5 mg/3 mL (DUONEB) 0.5-2.5 (3) MG/3ML neb solution     ipratropium - albuterol 0.5 mg/2.5 mg/3 mL (DUONEB) 0.5-2.5 (3) MG/3ML neb solution     metoprolol succinate (TOPROL-XL) 50 MG 24 hr tablet     multivitamin therapeutic tablet      nintedanib (OFEV) 100 MG capsule     omeprazole (PRILOSEC) 20 MG capsule     pantoprazole (PROTONIX) 40 MG EC tablet     pirfenidone (ESBRIET) 267 MG capsule     triamterene (DYRENIUM) 50 MG capsule     No current facility-administered medications for this visit.            Review of Systems:       A complete 10 point review of systems was otherwise negative except as noted in the HPI.        PHYSICAL EXAM:  There were no vitals taken for this visit.     General: Well developed, well nourished, No apparent distress  Eyes: Anicteric  Nose: Nasal mucosa with no edema or hyperemia.  No polyps  Ears: Hearing grossly normal  Mouth: Oral mucosa is moist, without any lesions. No oropharyngeal exudate.  Respiratory: Coarse breathing is noted.   Cardiac: RRR, normal S1, S2. No murmurs. No JVD  Abdomen: Soft, NT/ND  Musculoskeletal: Extremities normal. No clubbing. No cyanosis. No edema.  Skin: No rash on limited exam  Neuro: Normal mentation. Normal speech.  Psych:Normal affect           Data:   All laboratory and imaging data reviewed.      PFT:           9/2022 6.2021    PFT Interpretation:  I personally reviewed and interpreted the PFTs.    ECHO:  2022  Interpretation Summary     Left ventricular size, wall motion and function are normal. The ejection  fraction is > 65%.  There is mild concentric left ventricular hypertrophy.  Normal right ventricle size and systolic function.  Moderate valvular aortic stenosis.  The ascending aorta is Mildly dilated.      Chest CT: I personally reviewed and interpreted the CT scan.  4/2022    No results found for this or any previous visit (from the past 168 hour(s)).

## 2023-07-06 NOTE — NURSING NOTE
Chief Complaint   Patient presents with     Interstitial Lung Disease (ILD)     ILD follow up      Vitals were taken and medications were reconciled.     Cassie SANCHEZA  8:36 AM

## 2023-07-07 LAB
ATRIAL RATE - MUSE: 58 BPM
DIASTOLIC BLOOD PRESSURE - MUSE: NORMAL MMHG
INTERPRETATION ECG - MUSE: NORMAL
P AXIS - MUSE: 25 DEGREES
PR INTERVAL - MUSE: 276 MS
QRS DURATION - MUSE: 102 MS
QT - MUSE: 414 MS
QTC - MUSE: 406 MS
R AXIS - MUSE: -42 DEGREES
SYSTOLIC BLOOD PRESSURE - MUSE: NORMAL MMHG
T AXIS - MUSE: -12 DEGREES
VENTRICULAR RATE- MUSE: 58 BPM

## 2023-07-08 LAB
DLCOUNC-%PRED-PRE: 24 %
DLCOUNC-PRE: 6.04 ML/MIN/MMHG
DLCOUNC-PRED: 24.95 ML/MIN/MMHG
ERV-%PRED-PRE: 93 %
ERV-PRE: 1.16 L
ERV-PRED: 1.24 L
EXPTIME-PRE: 5.44 SEC
FEF2575-%PRED-PRE: 186 %
FEF2575-PRE: 3.87 L/SEC
FEF2575-PRED: 2.07 L/SEC
FEFMAX-%PRED-PRE: 122 %
FEFMAX-PRE: 9.46 L/SEC
FEFMAX-PRED: 7.72 L/SEC
FEV1-%PRED-PRE: 75 %
FEV1-PRE: 2.12 L
FEV1FEV6-PRE: 91 %
FEV1FEV6-PRED: 77 %
FEV1FVC-PRE: 90 %
FEV1FVC-PRED: 76 %
FEV1SVC-PRE: 88 %
FEV1SVC-PRED: 66 %
FIFMAX-PRE: 5.42 L/SEC
FVC-%PRED-PRE: 63 %
FVC-PRE: 2.36 L
FVC-PRED: 3.71 L
IC-%PRED-PRE: 41 %
IC-PRE: 1.26 L
IC-PRED: 3.03 L
VA-%PRED-PRE: 50 %
VA-PRE: 3.21 L
VC-%PRED-PRE: 56 %
VC-PRE: 2.41 L
VC-PRED: 4.25 L

## 2023-07-18 ENCOUNTER — PATIENT OUTREACH (OUTPATIENT)
Dept: PULMONOLOGY | Facility: CLINIC | Age: 78
End: 2023-07-18
Payer: MEDICARE

## 2023-07-18 DIAGNOSIS — J84.112 IPF (IDIOPATHIC PULMONARY FIBROSIS) (H): Primary | ICD-10-CM

## 2023-07-18 NOTE — PROGRESS NOTES
"Patient called to report side effects from pirfenidone.  [place] very brief period of time.He developed a rash on every part of his body that was exposed to the sun and began sloughing some skin.  He states that he is unable to tolerate the medication.  Discussed with Dr. Estrada.  Patient will discontinue pirfenidone and restart OFEV.  He will start OFEV at 100 mg twice daily for a month with a goal of increasing to 150 mg twice daily if he tolerates the lower dose.  Patient encouraged to call with any additional medication side effects.    "

## 2023-07-19 ENCOUNTER — TELEPHONE (OUTPATIENT)
Dept: PULMONOLOGY | Facility: CLINIC | Age: 78
End: 2023-07-19

## 2023-07-19 NOTE — TELEPHONE ENCOUNTER
PA Initiation    Medication: OFEV 100 MG PO CAPS  Insurance Company: [employer] - Phone [phone] Fax [phone]  Pharmacy Filling the Rx: [facility] - [place], [place] - [facility] [address]  Filling Pharmacy Phone: [phone]  Filling Pharmacy Fax: [phone]  Start Date: 7/19/2023    Key: [id number]

## 2023-07-24 ENCOUNTER — LAB (OUTPATIENT)
Dept: LAB | Facility: CLINIC | Age: 78
End: 2023-07-24
Payer: MEDICARE

## 2023-07-24 DIAGNOSIS — J84.9 ILD (INTERSTITIAL LUNG DISEASE) (H): ICD-10-CM

## 2023-07-24 DIAGNOSIS — E78.5 DYSLIPIDEMIA: ICD-10-CM

## 2023-07-24 DIAGNOSIS — J84.112 IPF (IDIOPATHIC PULMONARY FIBROSIS) (H): ICD-10-CM

## 2023-07-24 LAB
ALBUMIN SERPL BCG-MCNC: 4.3 G/DL (ref 3.5–5.2)
ALP SERPL-CCNC: 122 U/L (ref 40–129)
ALT SERPL W P-5'-P-CCNC: 31 U/L (ref 0–70)
AST SERPL W P-5'-P-CCNC: 31 U/L (ref 0–45)
BILIRUB DIRECT SERPL-MCNC: <0.2 MG/DL (ref 0–0.3)
BILIRUB SERPL-MCNC: 0.2 MG/DL
CHOLEST SERPL-MCNC: 137 MG/DL
HDLC SERPL-MCNC: 43 MG/DL
LDLC SERPL CALC-MCNC: 30 MG/DL
NONHDLC SERPL-MCNC: 94 MG/DL
PROT SERPL-MCNC: 7.2 G/DL (ref 6.4–8.3)
TRIGL SERPL-MCNC: 319 MG/DL

## 2023-07-24 PROCEDURE — 80061 LIPID PANEL: CPT

## 2023-07-24 PROCEDURE — 80076 HEPATIC FUNCTION PANEL: CPT

## 2023-07-24 PROCEDURE — 36415 COLL VENOUS BLD VENIPUNCTURE: CPT

## 2023-07-25 ENCOUNTER — APPOINTMENT (OUTPATIENT)
Dept: LAB | Facility: CLINIC | Age: 78
End: 2023-07-25
Payer: MEDICARE

## 2023-07-25 LAB
BACTERIA SPT CULT: NORMAL
GRAM STAIN RESULT: NORMAL

## 2023-07-25 PROCEDURE — 99000 SPECIMEN HANDLING OFFICE-LAB: CPT | Performed by: PATHOLOGY

## 2023-07-25 PROCEDURE — 87116 MYCOBACTERIA CULTURE: CPT | Mod: 90

## 2023-07-25 PROCEDURE — 87070 CULTURE OTHR SPECIMN AEROBIC: CPT | Performed by: INTERNAL MEDICINE

## 2023-07-25 PROCEDURE — 87206 SMEAR FLUORESCENT/ACID STAI: CPT | Mod: 90

## 2023-07-25 NOTE — TELEPHONE ENCOUNTER
Prior Authorization Approval    Medication: OFEV 100 MG PO CAPS  Authorization Effective Date: 4/20/2023  Authorization Expiration Date: 7/19/2024  Approved Dose/Quantity: #60 per 30 days  Reference #: Key: [id number]   Insurance Company: [employer] - Phone [phone] Fax [phone]  Expected CoPay: $613.25     CoPay Card Available: No    Financial Assistance Needed: Possible, [facility]  Which Pharmacy is filling the prescription: [facility] - [place], [place] - [address]   Pharmacy Notified: Yes  Patient Notified: [facility]

## 2023-07-26 DIAGNOSIS — J84.112 IPF (IDIOPATHIC PULMONARY FIBROSIS) (H): ICD-10-CM

## 2023-07-26 RX ORDER — ALBUTEROL SULFATE 90 UG/1
2 AEROSOL, METERED RESPIRATORY (INHALATION) EVERY 6 HOURS PRN
OUTPATIENT
Start: 2023-07-26

## 2023-07-28 RX ORDER — ALBUTEROL SULFATE 90 UG/1
2 AEROSOL, METERED RESPIRATORY (INHALATION) EVERY 6 HOURS PRN
Qty: 18 G | Refills: 3 | Status: SHIPPED | OUTPATIENT
Start: 2023-07-28 | End: 2024-08-01

## 2023-07-31 NOTE — TELEPHONE ENCOUNTER
Received Secure Chat message from RANULFO Freeman. Will release order to [facility] Sent patient a infibondharCool Lum[employer] message with income limits (see infibondhart from 7/25/23).

## 2023-08-04 DIAGNOSIS — E78.5 DYSLIPIDEMIA: Primary | ICD-10-CM

## 2023-08-21 ENCOUNTER — LAB (OUTPATIENT)
Dept: LAB | Facility: CLINIC | Age: 78
End: 2023-08-21
Payer: MEDICARE

## 2023-08-21 DIAGNOSIS — E78.5 DYSLIPIDEMIA: ICD-10-CM

## 2023-08-21 LAB
CHOLEST SERPL-MCNC: 127 MG/DL
HDLC SERPL-MCNC: 41 MG/DL
LDLC SERPL CALC-MCNC: 46 MG/DL
NONHDLC SERPL-MCNC: 86 MG/DL
TRIGL SERPL-MCNC: 198 MG/DL

## 2023-08-21 PROCEDURE — 36415 COLL VENOUS BLD VENIPUNCTURE: CPT

## 2023-08-21 PROCEDURE — 80061 LIPID PANEL: CPT

## 2023-08-22 DIAGNOSIS — E78.5 DYSLIPIDEMIA: Primary | ICD-10-CM

## 2023-08-26 ENCOUNTER — TELEPHONE (OUTPATIENT)
Dept: PULMONOLOGY | Facility: CLINIC | Age: 78
End: 2023-08-26
Payer: MEDICARE

## 2023-08-26 NOTE — TELEPHONE ENCOUNTER
Patient Contacted for the patient to call back and schedule the following:    Appointment type: [facility]  Provider: Natalie  Return date: 01/04/24  Specialty phone number: [phone]  Additional appointment(s) needed:FPFT, 6MWT  Additonal Notes: N/A

## 2023-09-12 ENCOUNTER — TRANSFERRED RECORDS (OUTPATIENT)
Dept: HEALTH INFORMATION MANAGEMENT | Facility: CLINIC | Age: 78
End: 2023-09-12

## 2023-09-12 ENCOUNTER — LAB REQUISITION (OUTPATIENT)
Dept: LAB | Facility: CLINIC | Age: 78
End: 2023-09-12
Payer: MEDICARE

## 2023-09-12 DIAGNOSIS — I25.10 ATHEROSCLEROTIC HEART DISEASE OF NATIVE CORONARY ARTERY WITHOUT ANGINA PECTORIS: ICD-10-CM

## 2023-09-12 PROCEDURE — 80053 COMPREHEN METABOLIC PANEL: CPT | Mod: ORL | Performed by: FAMILY MEDICINE

## 2023-09-12 PROCEDURE — 80061 LIPID PANEL: CPT | Mod: ORL | Performed by: FAMILY MEDICINE

## 2023-09-13 LAB
ALBUMIN SERPL BCG-MCNC: 4.3 G/DL (ref 3.5–5.2)
ALP SERPL-CCNC: 123 U/L (ref 40–129)
ALT SERPL W P-5'-P-CCNC: 20 U/L (ref 0–70)
ANION GAP SERPL CALCULATED.3IONS-SCNC: 12 MMOL/L (ref 7–15)
AST SERPL W P-5'-P-CCNC: 26 U/L (ref 0–45)
BILIRUB SERPL-MCNC: 0.4 MG/DL
BUN SERPL-MCNC: 13.6 MG/DL (ref 8–23)
CALCIUM SERPL-MCNC: 9.6 MG/DL (ref 8.8–10.2)
CHLORIDE SERPL-SCNC: 94 MMOL/L (ref 98–107)
CHOLEST SERPL-MCNC: 142 MG/DL
CREAT SERPL-MCNC: 0.87 MG/DL (ref 0.67–1.17)
DEPRECATED HCO3 PLAS-SCNC: 27 MMOL/L (ref 22–29)
EGFRCR SERPLBLD CKD-EPI 2021: 89 ML/MIN/1.73M2
GLUCOSE SERPL-MCNC: 83 MG/DL (ref 70–99)
HDLC SERPL-MCNC: 42 MG/DL
LDLC SERPL CALC-MCNC: 72 MG/DL
NONHDLC SERPL-MCNC: 100 MG/DL
POTASSIUM SERPL-SCNC: 4 MMOL/L (ref 3.4–5.3)
PROT SERPL-MCNC: 7.4 G/DL (ref 6.4–8.3)
SODIUM SERPL-SCNC: 133 MMOL/L (ref 136–145)
TRIGL SERPL-MCNC: 142 MG/DL

## 2023-09-15 DIAGNOSIS — J84.112 IPF (IDIOPATHIC PULMONARY FIBROSIS) (H): ICD-10-CM

## 2023-09-18 ENCOUNTER — LAB (OUTPATIENT)
Dept: LAB | Facility: CLINIC | Age: 78
End: 2023-09-18
Payer: MEDICARE

## 2023-09-18 DIAGNOSIS — J84.9 ILD (INTERSTITIAL LUNG DISEASE) (H): ICD-10-CM

## 2023-09-18 DIAGNOSIS — E78.5 DYSLIPIDEMIA: ICD-10-CM

## 2023-09-18 LAB
ALBUMIN SERPL BCG-MCNC: 4.1 G/DL (ref 3.5–5.2)
ALP SERPL-CCNC: 126 U/L (ref 40–129)
ALT SERPL W P-5'-P-CCNC: 20 U/L (ref 0–70)
AST SERPL W P-5'-P-CCNC: 27 U/L (ref 0–45)
BILIRUB DIRECT SERPL-MCNC: <0.2 MG/DL (ref 0–0.3)
BILIRUB SERPL-MCNC: 0.3 MG/DL
CHOLEST SERPL-MCNC: 148 MG/DL
HDLC SERPL-MCNC: 42 MG/DL
LDLC SERPL CALC-MCNC: 66 MG/DL
NONHDLC SERPL-MCNC: 106 MG/DL
PROT SERPL-MCNC: 7.3 G/DL (ref 6.4–8.3)
TRIGL SERPL-MCNC: 201 MG/DL

## 2023-09-18 PROCEDURE — 36415 COLL VENOUS BLD VENIPUNCTURE: CPT

## 2023-09-18 PROCEDURE — 80076 HEPATIC FUNCTION PANEL: CPT

## 2023-09-18 PROCEDURE — 80061 LIPID PANEL: CPT

## 2023-09-19 LAB
ACID FAST STAIN (ARUP): NORMAL

## 2023-09-25 DIAGNOSIS — E78.5 DYSLIPIDEMIA: Primary | ICD-10-CM

## 2023-09-30 ENCOUNTER — ANESTHESIA EVENT (OUTPATIENT)
Dept: SURGERY | Facility: CLINIC | Age: 78
End: 2023-09-30
Payer: MEDICARE

## 2023-10-02 ENCOUNTER — HOSPITAL ENCOUNTER (OUTPATIENT)
Facility: CLINIC | Age: 78
Discharge: HOME | End: 2023-10-02
Attending: INTERNAL MEDICINE | Admitting: INTERNAL MEDICINE
Payer: MEDICARE

## 2023-10-02 ENCOUNTER — ANESTHESIA (OUTPATIENT)
Dept: SURGERY | Facility: CLINIC | Age: 78
End: 2023-10-02
Payer: MEDICARE

## 2023-10-02 VITALS
BODY MASS INDEX: 31.54 KG/M2 | WEIGHT: 220.3 LBS | RESPIRATION RATE: 19 BRPM | DIASTOLIC BLOOD PRESSURE: 66 MMHG | HEIGHT: 70 IN | HEART RATE: 69 BPM | SYSTOLIC BLOOD PRESSURE: 141 MMHG | TEMPERATURE: 97.9 F | OXYGEN SATURATION: 94 %

## 2023-10-02 LAB — UPPER GI ENDOSCOPY: NORMAL

## 2023-10-02 PROCEDURE — 999N000157 HC STATISTIC RCP TIME EA 10 MIN

## 2023-10-02 PROCEDURE — 250N000009 HC RX 250: Performed by: ANESTHESIOLOGY

## 2023-10-02 PROCEDURE — 88305 TISSUE EXAM BY PATHOLOGIST: CPT | Mod: TC | Performed by: INTERNAL MEDICINE

## 2023-10-02 PROCEDURE — 258N000003 HC RX IP 258 OP 636

## 2023-10-02 PROCEDURE — 360N000075 HC SURGERY LEVEL 2, PER MIN: Performed by: INTERNAL MEDICINE

## 2023-10-02 PROCEDURE — 999N000141 HC STATISTIC PRE-PROCEDURE NURSING ASSESSMENT: Performed by: INTERNAL MEDICINE

## 2023-10-02 PROCEDURE — 272N000001 HC OR GENERAL SUPPLY STERILE: Performed by: INTERNAL MEDICINE

## 2023-10-02 PROCEDURE — 710N000010 HC RECOVERY PHASE 1, LEVEL 2, PER MIN: Performed by: INTERNAL MEDICINE

## 2023-10-02 PROCEDURE — 94640 AIRWAY INHALATION TREATMENT: CPT

## 2023-10-02 PROCEDURE — 370N000017 HC ANESTHESIA TECHNICAL FEE, PER MIN: Performed by: INTERNAL MEDICINE

## 2023-10-02 PROCEDURE — 250N000011 HC RX IP 250 OP 636: Performed by: NURSE ANESTHETIST, CERTIFIED REGISTERED

## 2023-10-02 PROCEDURE — 710N000012 HC RECOVERY PHASE 2, PER MINUTE: Performed by: INTERNAL MEDICINE

## 2023-10-02 RX ORDER — OXYCODONE HYDROCHLORIDE 5 MG/1
5 TABLET ORAL
Status: DISCONTINUED | OUTPATIENT
Start: 2023-10-02 | End: 2023-10-02 | Stop reason: HOSPADM

## 2023-10-02 RX ORDER — LIDOCAINE 40 MG/G
CREAM TOPICAL
Status: DISCONTINUED | OUTPATIENT
Start: 2023-10-02 | End: 2023-10-02 | Stop reason: HOSPADM

## 2023-10-02 RX ORDER — ONDANSETRON 2 MG/ML
4 INJECTION INTRAMUSCULAR; INTRAVENOUS EVERY 30 MIN PRN
Status: DISCONTINUED | OUTPATIENT
Start: 2023-10-02 | End: 2023-10-02 | Stop reason: HOSPADM

## 2023-10-02 RX ORDER — IPRATROPIUM BROMIDE AND ALBUTEROL SULFATE 2.5; .5 MG/3ML; MG/3ML
3 SOLUTION RESPIRATORY (INHALATION) ONCE
Status: COMPLETED | OUTPATIENT
Start: 2023-10-02 | End: 2023-10-02

## 2023-10-02 RX ORDER — PROCHLORPERAZINE MALEATE 5 MG
5 TABLET ORAL EVERY 6 HOURS PRN
Status: DISCONTINUED | OUTPATIENT
Start: 2023-10-02 | End: 2023-10-02 | Stop reason: HOSPADM

## 2023-10-02 RX ORDER — PROPOFOL 10 MG/ML
INJECTION, EMULSION INTRAVENOUS CONTINUOUS PRN
Status: DISCONTINUED | OUTPATIENT
Start: 2023-10-02 | End: 2023-10-02

## 2023-10-02 RX ORDER — NALOXONE HYDROCHLORIDE 0.4 MG/ML
0.2 INJECTION, SOLUTION INTRAMUSCULAR; INTRAVENOUS; SUBCUTANEOUS
Status: DISCONTINUED | OUTPATIENT
Start: 2023-10-02 | End: 2023-10-02 | Stop reason: HOSPADM

## 2023-10-02 RX ORDER — FLUMAZENIL 0.1 MG/ML
0.2 INJECTION, SOLUTION INTRAVENOUS
Status: DISCONTINUED | OUTPATIENT
Start: 2023-10-02 | End: 2023-10-02 | Stop reason: HOSPADM

## 2023-10-02 RX ORDER — SODIUM CHLORIDE, SODIUM LACTATE, POTASSIUM CHLORIDE, CALCIUM CHLORIDE 600; 310; 30; 20 MG/100ML; MG/100ML; MG/100ML; MG/100ML
INJECTION, SOLUTION INTRAVENOUS CONTINUOUS
Status: DISCONTINUED | OUTPATIENT
Start: 2023-10-02 | End: 2023-10-02 | Stop reason: HOSPADM

## 2023-10-02 RX ORDER — ONDANSETRON 2 MG/ML
4 INJECTION INTRAMUSCULAR; INTRAVENOUS EVERY 6 HOURS PRN
Status: DISCONTINUED | OUTPATIENT
Start: 2023-10-02 | End: 2023-10-02 | Stop reason: HOSPADM

## 2023-10-02 RX ORDER — ONDANSETRON 4 MG/1
4 TABLET, ORALLY DISINTEGRATING ORAL EVERY 6 HOURS PRN
Status: DISCONTINUED | OUTPATIENT
Start: 2023-10-02 | End: 2023-10-02 | Stop reason: HOSPADM

## 2023-10-02 RX ORDER — ONDANSETRON 4 MG/1
4 TABLET, ORALLY DISINTEGRATING ORAL EVERY 30 MIN PRN
Status: DISCONTINUED | OUTPATIENT
Start: 2023-10-02 | End: 2023-10-02 | Stop reason: HOSPADM

## 2023-10-02 RX ORDER — NALOXONE HYDROCHLORIDE 0.4 MG/ML
0.4 INJECTION, SOLUTION INTRAMUSCULAR; INTRAVENOUS; SUBCUTANEOUS
Status: DISCONTINUED | OUTPATIENT
Start: 2023-10-02 | End: 2023-10-02 | Stop reason: HOSPADM

## 2023-10-02 RX ORDER — PROPOFOL 10 MG/ML
INJECTION, EMULSION INTRAVENOUS PRN
Status: DISCONTINUED | OUTPATIENT
Start: 2023-10-02 | End: 2023-10-02

## 2023-10-02 RX ORDER — OXYCODONE HYDROCHLORIDE 5 MG/1
10 TABLET ORAL
Status: DISCONTINUED | OUTPATIENT
Start: 2023-10-02 | End: 2023-10-02 | Stop reason: HOSPADM

## 2023-10-02 RX ADMIN — PROPOFOL 100 MCG/KG/MIN: 10 INJECTION, EMULSION INTRAVENOUS at 11:10

## 2023-10-02 RX ADMIN — IPRATROPIUM BROMIDE AND ALBUTEROL SULFATE 3 ML: .5; 3 SOLUTION RESPIRATORY (INHALATION) at 12:24

## 2023-10-02 RX ADMIN — PROPOFOL 20 MG: 10 INJECTION, EMULSION INTRAVENOUS at 11:16

## 2023-10-02 RX ADMIN — PROPOFOL 30 MG: 10 INJECTION, EMULSION INTRAVENOUS at 11:12

## 2023-10-02 RX ADMIN — PROPOFOL 30 MG: 10 INJECTION, EMULSION INTRAVENOUS at 11:14

## 2023-10-02 RX ADMIN — SODIUM CHLORIDE, POTASSIUM CHLORIDE, SODIUM LACTATE AND CALCIUM CHLORIDE: 600; 310; 30; 20 INJECTION, SOLUTION INTRAVENOUS at 10:00

## 2023-10-02 RX ADMIN — PROPOFOL 20 MG: 10 INJECTION, EMULSION INTRAVENOUS at 11:10

## 2023-10-02 ASSESSMENT — COPD QUESTIONNAIRES: COPD: 1

## 2023-10-02 ASSESSMENT — ACTIVITIES OF DAILY LIVING (ADL)
ADLS_ACUITY_SCORE: 35
ADLS_ACUITY_SCORE: 35

## 2023-10-02 NOTE — ANESTHESIA PREPROCEDURE EVALUATION
Anesthesia Pre-Procedure Evaluation    Patient: Wyatt Gutierrez   MRN: [id number] : 1945        Procedure : Procedure(s):  ESOPHAGOGASTRODUODENOSCOPY          Past Medical History:   Diagnosis Date    Arthritis     High cholesterol     Hypertension     Sleep apnea       Past Surgical History:   Procedure Laterality Date    ARTHROSCOPY SHOULDER      COLONOSCOPY      OTHER SURGICAL HISTORY Right     heel fracture    OTHER SURGICAL HISTORY      knee arthroscopies    ZZC TOTAL KNEE ARTHROPLASTY Left 10/31/2019    Procedure: LEFT MINIMALLY INVASIVE TOTAL KNEE ARTHROPLASTY;  Surgeon: Avelino Canada MD;  Location: [facility] OR;  Service: Orthopedics      Allergies   Allergen Reactions    Animal Dander Unknown    Chalk     Dogs Other (See Comments)     Pet Dander    Maple Tree     Mold [Molds & Smuts] Unknown      Social History     Tobacco Use    Smoking status: Former     Packs/day: 0.50     Years: 55.00     Pack years: 27.50     Types: Cigarettes     Quit date: 2021     Years since quittin.7    Smokeless tobacco: Never   Substance Use Topics    Alcohol use: Yes     Comment: 1-2 glasses of wine/ 2 x-week      Wt Readings from Last 1 Encounters:   10/02/23 99.9 kg (220 lb 4.8 oz)        Anesthesia Evaluation            ROS/MED HX  ENT/Pulmonary:     (+) sleep apnea,                        COPD (Pulm Fibrosis), O2 dependent,             Neurologic:       Cardiovascular:     (+) Dyslipidemia hypertension- -   -  - -                                      METS/Exercise Tolerance:     Hematologic:       Musculoskeletal:       GI/Hepatic:     (+) GERD,  esophageal disease,                 Renal/Genitourinary:       Endo:       Psychiatric/Substance Use:       Infectious Disease:       Malignancy:       Other:            Physical Exam    Airway  airway exam normal      Mallampati: II   TM distance: > 3 FB   Neck ROM: full   Mouth opening: > 3 cm    Respiratory Devices and Support         Dental  no  notable dental history         Cardiovascular   cardiovascular exam normal       Rhythm and rate: regular and normal     Pulmonary   pulmonary exam normal        breath sounds clear to auscultation           OUTSIDE LABS:  CBC:   Lab Results   Component Value Date    WBC 9.1 05/30/2021    WBC 10.2 10/31/2019    HGB 11.6 (L) 05/17/2022    HGB 14.7 05/30/2021    HCT 41.9 05/30/2021    HCT 43.0 10/31/2019     05/30/2021     10/31/2019     BMP:   Lab Results   Component Value Date     (L) 09/12/2023     (L) 12/15/2022    POTASSIUM 4.0 09/12/2023    POTASSIUM 4.2 12/15/2022    CHLORIDE 94 (L) 09/12/2023    CHLORIDE 96 (L) 12/15/2022    CO2 27 09/12/2023    CO2 30 (H) 12/15/2022    BUN 13.6 09/12/2023    BUN 16.2 12/15/2022    CR 0.87 09/12/2023    CR 0.97 12/15/2022    GLC 83 09/12/2023     (H) 12/15/2022     COAGS:   Lab Results   Component Value Date    PTT 29 05/30/2021    INR 1.0 01/15/2022     POC: No results found for: BGM, HCG, HCGS  HEPATIC:   Lab Results   Component Value Date    ALBUMIN 4.1 09/18/2023    PROTTOTAL 7.3 09/18/2023    ALT 20 09/18/2023    AST 27 09/18/2023    ALKPHOS 126 09/18/2023    BILITOTAL 0.3 09/18/2023     OTHER:   Lab Results   Component Value Date    SHALOM 9.6 09/12/2023    CRP 5.4 07/07/2022    SED 39 (H) 07/07/2022       Anesthesia Plan    ASA Status:  4    NPO Status:  NPO Appropriate    Anesthesia Type: MAC.     - Reason for MAC: immobility needed, straight local not clinically adequate              Consents    Anesthesia Plan(s) and associated risks, benefits, and realistic alternatives discussed. Questions answered and patient/representative(s) expressed understanding.     - Discussed:     - Discussed with:  Patient      - Extended Intubation/Ventilatory Support Discussed: No.      - Patient is DNR/DNI Status: No     Use of blood products discussed: No .     Postoperative Care       PONV prophylaxis: Ondansetron (or other 5HT-3), Dexamethasone or  Solumedrol     Comments:                Jose D Khalil MD

## 2023-10-02 NOTE — PROGRESS NOTES
Dr. Jose D Khalil notified of patient's incessant coughing and throat irritation. One time duoneb ordered, see MAR for details. Patient is on home oxygen. Currently on 5L nasal cannula to maintain oxygen levels.

## 2023-10-02 NOTE — PRE-PROCEDURE
Pre-procedure Note    Reason for procedure: Barretts esophagus    History and Physical Reviewed: Reviewed, no changes.    Pre-sedation assessment:    General: alert, appears stated age, and cooperative, on 4 liters of oxygen  Airway: normal  Heart: regular rate and rhythm  Lungs: bilateral crackles    Sedation Plan based on assessment: MAC    Mallampati score: Class III (visualization of the soft palate and base of uvula)          ASA Classification: ASA 3 - Patient with moderate systemic disease with functional limitations    Impression: Patient deemed adequate candidate for MAC sedation    Risks, benefits and alternatives were discussed with the patient and informed consent was obtained.    Plan: esophagogastroduodenoscopy      Reji Baptiste MD 10/2/2023 10:59 AM                                            Reji Baptiste MD  Thank you for the opportunity to participate in the care of this patient.   Please feel free to call me with any questions or concerns.  Phone number [phone].

## 2023-10-02 NOTE — ANESTHESIA CARE TRANSFER NOTE
Patient: Wyatt Gutierrez    Procedure: Procedure(s):  ESOPHAGOGASTRODUODENOSCOPY with biopsies       Diagnosis: Seaman's esophagus [K22.70]  Diagnosis Additional Information: No value filed.    Anesthesia Type:   MAC     Note:    Oropharynx: spontaneously breathing  Level of Consciousness: awake  Oxygen Supplementation: face mask  Level of Supplemental Oxygen (L/min / FiO2): 6  Independent Airway: airway patency satisfactory and stable  Dentition: dentition unchanged  Vital Signs Stable: post-procedure vital signs reviewed and stable  Report to RN Given: handoff report given  Patient transferred to: Phase II    Handoff Report: Identifed the Patient, Identified the Reponsible Provider, Reviewed the pertinent medical history, Discussed the surgical course, Reviewed Intra-OP anesthesia mangement and issues during anesthesia, Set expectations for post-procedure period and Allowed opportunity for questions and acknowledgement of understanding      Vitals:  Vitals Value Taken Time   /69 10/02/23 1141   Temp 98    Pulse 79 10/02/23 1142   Resp 14    SpO2 88 % 10/02/23 1142   Vitals shown include unvalidated device data.    Electronically Signed By: WALTER Chao CRNA  October 2, 2023  11:43 AM

## 2023-10-02 NOTE — ED NOTES
Patient ambulated to the bathroom post procedure. Vitals stable on 6L with ambulation. Patient states that is what he wears for oxygen for ambulation at home.

## 2023-10-02 NOTE — ANESTHESIA POSTPROCEDURE EVALUATION
Patient: Wyatt Gutierrez    Procedure: Procedure(s):  ESOPHAGOGASTRODUODENOSCOPY with biopsies       Anesthesia Type:  MAC    Note:  Disposition: Outpatient   Postop Pain Control: Uneventful            Sign Out: Well controlled pain   PONV: No   Neuro/Psych: Uneventful            Sign Out: Acceptable/Baseline neuro status   Airway/Respiratory: Uneventful            Sign Out: Acceptable/Baseline resp. status   CV/Hemodynamics: Uneventful            Sign Out: Acceptable CV status; No obvious hypovolemia; No obvious fluid overload   Other NRE: NONE   DID A NON-ROUTINE EVENT OCCUR? No           Last vitals:  Vitals Value Taken Time   /66 10/02/23 1250   Temp 36.6  C (97.9  F) 10/02/23 1250   Pulse 69 10/02/23 1250   Resp 19 10/02/23 1250   SpO2 94 % 10/02/23 1250       Electronically Signed By: Jose D Khalil MD  October 2, 2023  3:01 PM

## 2023-10-03 PROCEDURE — 88305 TISSUE EXAM BY PATHOLOGIST: CPT | Mod: 26 | Performed by: PATHOLOGY

## 2023-10-20 ENCOUNTER — TELEPHONE (OUTPATIENT)
Dept: PULMONOLOGY | Facility: CLINIC | Age: 78
End: 2023-10-20
Payer: MEDICARE

## 2023-10-20 DIAGNOSIS — J84.9 ILD (INTERSTITIAL LUNG DISEASE) (H): Primary | ICD-10-CM

## 2023-10-20 NOTE — TELEPHONE ENCOUNTER
Patient called an left message regarding liver labs  Patient has been switched to OFEV, and is wondering if he needs to continue liver labs  Nurse called back and left message, informed patient he will need one a month after starting and every three months after that. Standing lab order placed. Informed patient to call back if lab needs to go outside [place]     Jocelyn Marquez RN

## 2023-10-23 ENCOUNTER — LAB (OUTPATIENT)
Dept: LAB | Facility: CLINIC | Age: 78
End: 2023-10-23
Payer: MEDICARE

## 2023-10-23 DIAGNOSIS — J84.112 IPF (IDIOPATHIC PULMONARY FIBROSIS) (H): ICD-10-CM

## 2023-10-23 DIAGNOSIS — J84.9 ILD (INTERSTITIAL LUNG DISEASE) (H): ICD-10-CM

## 2023-10-23 DIAGNOSIS — J47.9 BRONCHIECTASIS WITHOUT COMPLICATION (H): ICD-10-CM

## 2023-10-23 LAB
ALBUMIN SERPL BCG-MCNC: 4.5 G/DL (ref 3.5–5.2)
ALP SERPL-CCNC: 127 U/L (ref 40–129)
ALT SERPL W P-5'-P-CCNC: 18 U/L (ref 0–70)
AST SERPL W P-5'-P-CCNC: 26 U/L (ref 0–45)
BILIRUB DIRECT SERPL-MCNC: <0.2 MG/DL (ref 0–0.3)
BILIRUB SERPL-MCNC: 0.4 MG/DL
PROT SERPL-MCNC: 7.5 G/DL (ref 6.4–8.3)

## 2023-10-23 PROCEDURE — 80076 HEPATIC FUNCTION PANEL: CPT

## 2023-10-23 PROCEDURE — 36415 COLL VENOUS BLD VENIPUNCTURE: CPT

## 2023-10-23 RX ORDER — AZITHROMYCIN 500 MG/1
500 TABLET, FILM COATED ORAL
Qty: 36 TABLET | Refills: 1 | Status: SHIPPED | OUTPATIENT
Start: 2023-10-23 | End: 2024-01-04

## 2023-10-23 RX ORDER — AZITHROMYCIN 250 MG/1
TABLET, FILM COATED ORAL
COMMUNITY
Start: 2023-09-15 | End: 2023-11-20

## 2023-10-23 RX ORDER — AZITHROMYCIN 250 MG/1
TABLET, FILM COATED ORAL
Status: CANCELLED | OUTPATIENT
Start: 2023-10-23

## 2023-11-08 LAB
PATH REPORT.ADDENDUM SPEC: NORMAL
PATH REPORT.COMMENTS IMP SPEC: NORMAL
PATH REPORT.COMMENTS IMP SPEC: NORMAL
PATH REPORT.FINAL DX SPEC: NORMAL
PATH REPORT.GROSS SPEC: NORMAL
PATH REPORT.MICROSCOPIC SPEC OTHER STN: NORMAL
PATH REPORT.RELEVANT HX SPEC: NORMAL
PHOTO IMAGE: NORMAL

## 2023-11-10 DIAGNOSIS — J84.112 IPF (IDIOPATHIC PULMONARY FIBROSIS) (H): ICD-10-CM

## 2023-11-20 DIAGNOSIS — J84.9 ILD (INTERSTITIAL LUNG DISEASE) (H): Primary | ICD-10-CM

## 2023-11-20 RX ORDER — AZITHROMYCIN 500 MG/1
500 TABLET, FILM COATED ORAL
Qty: 12 TABLET | Refills: 4 | Status: SHIPPED | OUTPATIENT
Start: 2023-11-20 | End: 2024-01-04

## 2023-12-01 ENCOUNTER — HOSPITAL ENCOUNTER (OUTPATIENT)
Dept: CARDIOLOGY | Facility: HOSPITAL | Age: 78
Discharge: HOME | End: 2023-12-01
Attending: INTERNAL MEDICINE | Admitting: INTERNAL MEDICINE
Payer: MEDICARE

## 2023-12-01 DIAGNOSIS — I35.0 NONRHEUMATIC AORTIC VALVE STENOSIS: ICD-10-CM

## 2023-12-01 LAB — LVEF ECHO: NORMAL

## 2023-12-01 PROCEDURE — 93306 TTE W/DOPPLER COMPLETE: CPT | Mod: 26 | Performed by: INTERNAL MEDICINE

## 2023-12-01 PROCEDURE — 93306 TTE W/DOPPLER COMPLETE: CPT

## 2024-01-04 ENCOUNTER — OFFICE VISIT (OUTPATIENT)
Dept: PULMONOLOGY | Facility: CLINIC | Age: 79
End: 2024-01-04
Attending: INTERNAL MEDICINE
Payer: MEDICARE

## 2024-01-04 VITALS
BODY MASS INDEX: 30.78 KG/M2 | HEIGHT: 70 IN | WEIGHT: 215 LBS | HEART RATE: 76 BPM | OXYGEN SATURATION: 97 % | DIASTOLIC BLOOD PRESSURE: 73 MMHG | SYSTOLIC BLOOD PRESSURE: 124 MMHG

## 2024-01-04 DIAGNOSIS — R05.3 CHRONIC COUGH: ICD-10-CM

## 2024-01-04 DIAGNOSIS — J47.9 BRONCHIECTASIS WITHOUT COMPLICATION (H): ICD-10-CM

## 2024-01-04 DIAGNOSIS — J84.112 IPF (IDIOPATHIC PULMONARY FIBROSIS) (H): ICD-10-CM

## 2024-01-04 DIAGNOSIS — J84.9 ILD (INTERSTITIAL LUNG DISEASE) (H): Primary | ICD-10-CM

## 2024-01-04 LAB
6 MIN WALK (FT): 650 FT
6 MIN WALK (M): 198 M

## 2024-01-04 PROCEDURE — 99214 OFFICE O/P EST MOD 30 MIN: CPT | Mod: 25 | Performed by: INTERNAL MEDICINE

## 2024-01-04 PROCEDURE — 94729 DIFFUSING CAPACITY: CPT | Performed by: INTERNAL MEDICINE

## 2024-01-04 PROCEDURE — G0463 HOSPITAL OUTPT CLINIC VISIT: HCPCS | Performed by: INTERNAL MEDICINE

## 2024-01-04 PROCEDURE — 94618 PULMONARY STRESS TESTING: CPT | Performed by: INTERNAL MEDICINE

## 2024-01-04 PROCEDURE — 94375 RESPIRATORY FLOW VOLUME LOOP: CPT | Performed by: INTERNAL MEDICINE

## 2024-01-04 RX ORDER — ARFORMOTEROL TARTRATE 15 UG/2ML
15 SOLUTION RESPIRATORY (INHALATION) 2 TIMES DAILY
Qty: 360 ML | Refills: 1 | Status: SHIPPED | OUTPATIENT
Start: 2024-01-04 | End: 2024-06-26

## 2024-01-04 RX ORDER — AZITHROMYCIN 250 MG/1
250 TABLET, FILM COATED ORAL DAILY
Qty: 90 TABLET | Refills: 1 | Status: SHIPPED | OUTPATIENT
Start: 2024-01-04 | End: 2024-07-02

## 2024-01-04 ASSESSMENT — PAIN SCALES - GENERAL: PAINLEVEL: NO PAIN (0)

## 2024-01-04 NOTE — NURSING NOTE
Chief Complaint   Patient presents with    Interstitial Lung Disease (ILD)     4 month follow up      Vitals were taken and medications were reconciled.     Cassie Edawrds RMA  2:18 PM

## 2024-01-04 NOTE — PROGRESS NOTES
"[facility]  Pulmonary Medicine  Visit Clinic Note  Dear patient. Thank you for visiting with me. I want you to feel respected, understood, and empowered. \"Respect\" is valuing you as much as I would a close family member. \"Empowerment\" happens when you are fully informed, and can make the best possible decision for you.  Please ask me questions!  Challenge anything that is not clear.       ASSESSMENT & PLAN     Patient is a 77 year old male who has presented for further work up of IPF.     #IPF:   -Diagnosed based on Ct chest in 2021. . Had been taking Ofev sinec May 2022.  Switched to Esbeiret on 2023 as unable to tolerate angelo dose of ofev and after lowering the dose his pfts worsneedd.   -  He has been doing pulmonary rehab.  He has severe lung disease which has worsened after possible exacerbation in January.  His age is a limitation for lung transplant.   - Discussed clinical trials but he is not interested in it for now.   -  On PPI.   -Prescribed BReo inhaler as there was some air reactivity noted.   He was not able to use it. Switched him to nebulized treatment.   -I am worried that he has rapid worsening of disease.    -[facility] goals of care was held with this patient.  He will dsicuss with his wife and decide on proper goals of care. Short term intubation for a clearly identifiable infectious pneumonia may be a possiblly reasonable.   - But, as his oxygen requiremetns increases and IPF worsenes it will not be advisable to stay full code.   -He has not been able to tolerate ofev 150 BID.  His Ofev is switched to 100 BID which he has been able to tolerate well.   -Switched him to Esbriet in 2023    -Face to face encounter for nebulizer is held.  -Duonebs is prescribed.       #Cardiac:  -Severe Aortic stenosis noted on echo Follows with cardiology.     #Chronic Cough  -Tessalon perles has not worked.  -Most lijkley due to his IPF.    -He has responded well to Azithromycin three times a " week with significant improvement.  Switched I t to 250 mg daily.   -ENT referral is placed.     -EKg is ordered.     #Hypoxemic Respiratory failure  -? 10/15 lt with activity. Oximizer is prescribed      #vaccines:  -Uptodate       RTC in 4 months with PFts       39 minutes excluding the time spent on cigarette cessation was  spent on the date of the encounter doing chart review, history and exam, documentation and further activities as noted above.    These conclusions are made at the best of one's knowledge and belief based on the provided evidence such as patient's history and allergy test results and they can change over time or can be incomplete because of missing information's.    I explained the lab values, imagings and findings to the patient.  Patient expressed understanding I did not recognize any barriers to the understanding of the patient.    The above note was dictated using voice recognition software and may include typographical errors. Please contact the author for any clarifications.    Gilson Estrada MD   RN Coordinators: Maureen/Meme/Radha: [phone]  ILD RN Coordinators: [phone]  Clinic Number: [phone]  Pager: [phone]       Today's visit note:     Chief Complaint: Wyatt Gutierrez is a 77 year old year old male who is being seen for No chief complaint on file.      HPI:    Wyatt Gutierrez is a 75 y.o. male, previous smoker with PMH sig for KAITY, HTN, HPL who was referred to us back on 12/20/2019 for abnormal chest x-ray that was concerning for ILD.  Patient has had chronic shortness of breath since at least 2010.  He quit smoking tobacco in Jan 2022.     -He had his Ild diagnosed in June 2021. He was not on oxygen.  At that time he was followed eventually he was started on Ofev in May 2022.  His major decompensation happened in 2022 January when he was in [place] and admitted to hospital and had to be started on oxygen after that.     Interval History: 9/29/22:  -Since than he has had  worsening oxygen requirement.  He continues to use treadmill with oxygen.  He will ntio be a transplant candidate.      -His mainly dry cough is bopthering him.     #Interval History: 10/4  -Discussed goals of care in detail.    #  Interval History: 4/13  -Patient is doing well.  His trip to [place] was well.  No significant IPF exacerbation was noted.  He was able to play golf.  - He is establish care with Dr. Maciel at [facility].  He just finished his Ofev 1 week ago.  He will be switched to Esbriet starting today.     #Interval History: 7/2023  -  Patient is doing well. No significant worsening is noted.   - I don't think he is using his Breo well.  He has increased sputum production.    #Interval History: 1/2024  -Increasing oxygen needs       ILD exposure questions:  Occupation:  Desk job   Asbestos: Np   Silica: No   Mold: Unknown   Pet bird: has an old dog.   Hot tub:  No   Portable humidifier:  Dehumidifier.   Brass or woodwind instruments:  Smoking tobacco or marijuana: Stopped smoking in Jan 2022. Smoked for almost 50 years.  Less than PPD.    Feather pillow/blanket:  Gardening:   Hobbies: golfing .  Chemotherapy or radiation therapy:  Fam hx of ILD:                 Medications:     Current Outpatient Medications   Medication    acyclovir (ZOVIRAX) 400 MG tablet    albuterol (PROAIR HFA/PROVENTIL HFA/VENTOLIN HFA) 108 (90 Base) MCG/ACT inhaler    albuterol (PROVENTIL) (2.5 MG/3ML) 0.083% neb solution    arformoterol (BROVANA) 15 MCG/2ML NEBU neb solution    aspirin (ASA) 81 MG EC tablet    atorvastatin (LIPITOR) 40 MG tablet    azelastine (ASTELIN) 137 mcg (0.1 %) nasal spray    azithromycin (ZITHROMAX) 500 MG tablet    azithromycin (ZITHROMAX) 500 MG tablet    Dextromethorphan-guaiFENesin (MUCINEX DM PO)    fluticasone-vilanterol (BREO ELLIPTA) 100-25 MCG/ACT inhaler    ipratropium (ATROVENT) 0.02 % neb solution    metoprolol succinate (TOPROL-XL) 50 MG 24 hr tablet    multivitamin  therapeutic tablet    nintedanib (OFEV) 100 MG capsule    omeprazole (PRILOSEC) 20 MG capsule    triamterene (DYRENIUM) 50 MG capsule     No current facility-administered medications for this visit.            Review of Systems:       A complete 10 point review of systems was otherwise negative except as noted in the HPI.        PHYSICAL EXAM:  There were no vitals taken for this visit.     General: Well developed, well nourished, No apparent distress  Eyes: Anicteric  Nose: Nasal mucosa with no edema or hyperemia.  No polyps  Ears: Hearing grossly normal  Mouth: Oral mucosa is moist, without any lesions. No oropharyngeal exudate.  Respiratory: Coarse breathing is noted.   Cardiac: RRR, normal S1, S2. No murmurs. No JVD  Abdomen: Soft, NT/ND  Musculoskeletal: Extremities normal. No clubbing. No cyanosis. No edema.  Skin: No rash on limited exam  Neuro: Normal mentation. Normal speech.  Psych:Normal affect           Data:   All laboratory and imaging data reviewed.      PFT:           9/2022 6.2021    PFT Interpretation:  I personally reviewed and interpreted the PFTs.    ECHO:  2022  Interpretation Summary     Left ventricular size, wall motion and function are normal. The ejection  fraction is > 65%.  There is mild concentric left ventricular hypertrophy.  Normal right ventricle size and systolic function.  Moderate valvular aortic stenosis.  The ascending aorta is Mildly dilated.      Chest CT: I personally reviewed and interpreted the CT scan.  4/2022    Recent Results (from the past 168 hour(s))   6 minute walk test    Collection Time: 01/04/24 12:00 AM   Result Value Ref Range    6 min walk (FT) 650 1,276 ft    6 Min Walk (M) 198 389 m   General PFT Lab (Please always keep checked)    Collection Time: 01/04/24 12:48 PM   Result Value Ref Range    FVC-Pred 3.69 L    FVC-Pre 2.33 L    FVC-%Pred-Pre 63 %    FEV1-Pre 2.03 L    FEV1-%Pred-Pre 73 %    FEV1FVC-Pred 76 %    FEV1FVC-Pre 87 %    FEFMax-Pred  7.64 L/sec    FEFMax-Pre 8.69 L/sec    FEFMax-%Pred-Pre 113 %    FEF2575-Pred 2.05 L/sec    FEF2575-Pre 3.21 L/sec    UTG9715-%Pred-Pre 156 %    ExpTime-Pre 5.15 sec    FIFMax-Pre 4.44 L/sec    VC-Pred 4.01 L    VC-Pre 2.53 L    VC-%Pred-Pre 63 %    IC-Pred 2.75 L    IC-Pre 1.97 L    IC-%Pred-Pre 71 %    ERV-Pred 1.38 L    ERV-Pre 0.56 L    ERV-%Pred-Pre 40 %    FEV1FEV6-Pred 77 %    FEV1FEV6-Pre 89 %    DLCOunc-Pred 24.85 ml/min/mmHg    DLCOunc-Pre 10.02 ml/min/mmHg    DLCOunc-%Pred-Pre 40 %    VA-Pre 3.59 L    VA-%Pred-Pre 56 %    FEV1SVC-Pred 69 %    FEV1SVC-Pre 80 %

## 2024-01-04 NOTE — LETTER
"    1/4/2024         RE: Wyatt Gutierrez  [address] [address]        Dear Colleague,    Thank you for referring your patient, Wyatt Gutierrez, to the [facility] FOR LUNG SCIENCE AND [facility] [[email or web address] Please see a copy of my visit note below.    [facility]  Pulmonary Medicine  Visit Clinic Note  Dear patient. Thank you for visiting with me. I want you to feel respected, understood, and empowered. \"Respect\" is valuing you as much as I would a close family member. \"Empowerment\" happens when you are fully informed, and can make the best possible decision for you.  Please ask me questions!  Challenge anything that is not clear.       ASSESSMENT & PLAN     Patient is a 77 year old male who has presented for further work up of IPF.     #IPF:   -Diagnosed based on Ct chest in 2021. . Had been taking Ofev sinec May 2022.  Switched to Esbeiret on 2023 as unable to tolerate angelo dose of ofev and after lowering the dose his pfts worsneedd.   -  He has been doing pulmonary rehab.  He has severe lung disease which has worsened after possible exacerbation in January.  His age is a limitation for lung transplant.   - Discussed clinical trials but he is not interested in it for now.   -  On PPI.   -Prescribed BReo inhaler as there was some air reactivity noted.   He was not able to use it. Switched him to nebulized treatment.   -I am worried that he has rapid worsening of disease.    -Extesnvie goals of care was held with this patient.  He will dsicuss with his wife and decide on proper goals of care. Short term intubation for a clearly identifiable infectious pneumonia may be a possiblly reasonable.   - But, as his oxygen requiremetns increases and IPF worsenes it will not be advisable to stay full code.   -He has not been able to tolerate ofev 150 BID.  His Ofev is switched to 100 BID which he has been able to tolerate well.   -Switched him to Esbriet in 2023    -Face to " face encounter for nebulizer is held.  -Duonebs is prescribed.       #Cardiac:  -Severe Aortic stenosis noted on echo Follows with cardiology.     #Chronic Cough  -Tessalon perles has not worked.  -Most lijkley due to his IPF.    -He has responded well to Azithromycin three times a week with significant improvement.  Switched I t to 250 mg daily.   -ENT referral is placed.     -EKg is ordered.     #Hypoxemic Respiratory failure  -? 10/15 lt with activity. Oximizer is prescribed      #vaccines:  -Uptodate       RTC in 4 months with PFts       39 minutes excluding the time spent on cigarette cessation was  spent on the date of the encounter doing chart review, history and exam, documentation and further activities as noted above.    These conclusions are made at the best of one's knowledge and belief based on the provided evidence such as patient's history and allergy test results and they can change over time or can be incomplete because of missing information's.    I explained the lab values, imagings and findings to the patient.  Patient expressed understanding I did not recognize any barriers to the understanding of the patient.    The above note was dictated using voice recognition software and may include typographical errors. Please contact the author for any clarifications.    Gilson Estrada MD   RN Coordinators: Maureen/Meme/Radha: [phone]  ILD RN Coordinators: [phone]  Clinic Number: [phone]  Pager: [phone]       Today's visit note:     Chief Complaint: Wyatt Gutierrez is a 77 year old year old male who is being seen for No chief complaint on file.      HPI:    Wyatt Gutierrez is a 75 y.o. male, previous smoker with PMH sig for KAITY, HTN, HPL who was referred to us back on 12/20/2019 for abnormal chest x-ray that was concerning for ILD.  Patient has had chronic shortness of breath since at least 2010.  He quit smoking tobacco in Jan 2022.     -He had his Ild diagnosed in June 2021. He was not on  oxygen.  At that time he was followed eventually he was started on Ofev in May 2022.  His major decompensation happened in 2022 January when he was in [place] and admitted to hospital and had to be started on oxygen after that.     Interval History: 9/29/22:  -Since than he has had worsening oxygen requirement.  He continues to use treadmill with oxygen.  He will ntio be a transplant candidate.      -His mainly dry cough is bopthering him.     #Interval History: 10/4  -Discussed goals of care in detail.    #  Interval History: 4/13  -Patient is doing well.  His trip to [place] was well.  No significant IPF exacerbation was noted.  He was able to play golf.  - He is establish care with Dr. Maciel at [facility].  He just finished his Ofev 1 week ago.  He will be switched to Esbriet starting today.     #Interval History: 7/2023  -  Patient is doing well. No significant worsening is noted.   - I don't think he is using his Breo well.  He has increased sputum production.    #Interval History: 1/2024  -Increasing oxygen needs       ILD exposure questions:  Occupation:  Desk job   Asbestos: Np   Silica: No   Mold: Unknown   Pet bird: has an old dog.   Hot tub:  No   Portable humidifier:  Dehumidifier.   Brass or woodwind instruments:  Smoking tobacco or marijuana: Stopped smoking in Jan 2022. Smoked for almost 50 years.  Less than PPD.    Feather pillow/blanket:  Gardening:   Hobbies: golfing .  Chemotherapy or radiation therapy:  Fam hx of ILD:                 Medications:     Current Outpatient Medications   Medication    acyclovir (ZOVIRAX) 400 MG tablet    albuterol (PROAIR HFA/PROVENTIL HFA/VENTOLIN HFA) 108 (90 Base) MCG/ACT inhaler    albuterol (PROVENTIL) (2.5 MG/3ML) 0.083% neb solution    arformoterol (BROVANA) 15 MCG/2ML NEBU neb solution    aspirin (ASA) 81 MG EC tablet    atorvastatin (LIPITOR) 40 MG tablet    azelastine (ASTELIN) 137 mcg (0.1 %) nasal spray    azithromycin (ZITHROMAX) 500  MG tablet    azithromycin (ZITHROMAX) 500 MG tablet    Dextromethorphan-guaiFENesin (MUCINEX DM PO)    fluticasone-vilanterol (BREO ELLIPTA) 100-25 MCG/ACT inhaler    ipratropium (ATROVENT) 0.02 % neb solution    metoprolol succinate (TOPROL-XL) 50 MG 24 hr tablet    multivitamin therapeutic tablet    nintedanib (OFEV) 100 MG capsule    omeprazole (PRILOSEC) 20 MG capsule    triamterene (DYRENIUM) 50 MG capsule     No current facility-administered medications for this visit.            Review of Systems:       A complete 10 point review of systems was otherwise negative except as noted in the HPI.        PHYSICAL EXAM:  There were no vitals taken for this visit.     General: Well developed, well nourished, No apparent distress  Eyes: Anicteric  Nose: Nasal mucosa with no edema or hyperemia.  No polyps  Ears: Hearing grossly normal  Mouth: Oral mucosa is moist, without any lesions. No oropharyngeal exudate.  Respiratory: Coarse breathing is noted.   Cardiac: RRR, normal S1, S2. No murmurs. No JVD  Abdomen: Soft, NT/ND  Musculoskeletal: Extremities normal. No clubbing. No cyanosis. No edema.  Skin: No rash on limited exam  Neuro: Normal mentation. Normal speech.  Psych:Normal affect           Data:   All laboratory and imaging data reviewed.      PFT:           9/2022 6.2021    PFT Interpretation:  I personally reviewed and interpreted the PFTs.    ECHO:  2022  Interpretation Summary     Left ventricular size, wall motion and function are normal. The ejection  fraction is > 65%.  There is mild concentric left ventricular hypertrophy.  Normal right ventricle size and systolic function.  Moderate valvular aortic stenosis.  The ascending aorta is Mildly dilated.      Chest CT: I personally reviewed and interpreted the CT scan.  4/2022    Recent Results (from the past 168 hour(s))   6 minute walk test    Collection Time: 01/04/24 12:00 AM   Result Value Ref Range    6 min walk (FT) 650 1,276 ft    6 Min Walk  (M) 198 389 m   General PFT Lab (Please always keep checked)    Collection Time: 01/04/24 12:48 PM   Result Value Ref Range    FVC-Pred 3.69 L    FVC-Pre 2.33 L    FVC-%Pred-Pre 63 %    FEV1-Pre 2.03 L    FEV1-%Pred-Pre 73 %    FEV1FVC-Pred 76 %    FEV1FVC-Pre 87 %    FEFMax-Pred 7.64 L/sec    FEFMax-Pre 8.69 L/sec    FEFMax-%Pred-Pre 113 %    FEF2575-Pred 2.05 L/sec    FEF2575-Pre 3.21 L/sec    [id number]-%Pred-Pre 156 %    ExpTime-Pre 5.15 sec    FIFMax-Pre 4.44 L/sec    VC-Pred 4.01 L    VC-Pre 2.53 L    VC-%Pred-Pre 63 %    IC-Pred 2.75 L    IC-Pre 1.97 L    IC-%Pred-Pre 71 %    ERV-Pred 1.38 L    ERV-Pre 0.56 L    ERV-%Pred-Pre 40 %    FEV1FEV6-Pred 77 %    FEV1FEV6-Pre 89 %    DLCOunc-Pred 24.85 ml/min/mmHg    DLCOunc-Pre 10.02 ml/min/mmHg    DLCOunc-%Pred-Pre 40 %    VA-Pre 3.59 L    VA-%Pred-Pre 56 %    FEV1SVC-Pred 69 %    FEV1SVC-Pre 80 %                                       Again, thank you for allowing me to participate in the care of your patient.        Sincerely,        Gilson Estrada MD

## 2024-01-09 LAB
DLCOUNC-%PRED-PRE: 40 %
DLCOUNC-PRE: 10.02 ML/MIN/MMHG
DLCOUNC-PRED: 24.85 ML/MIN/MMHG
ERV-%PRED-PRE: 40 %
ERV-PRE: 0.56 L
ERV-PRED: 1.38 L
EXPTIME-PRE: 5.15 SEC
FEF2575-%PRED-PRE: 156 %
FEF2575-PRE: 3.21 L/SEC
FEF2575-PRED: 2.05 L/SEC
FEFMAX-%PRED-PRE: 113 %
FEFMAX-PRE: 8.69 L/SEC
FEFMAX-PRED: 7.64 L/SEC
FEV1-%PRED-PRE: 73 %
FEV1-PRE: 2.03 L
FEV1FEV6-PRE: 89 %
FEV1FEV6-PRED: 77 %
FEV1FVC-PRE: 87 %
FEV1FVC-PRED: 76 %
FEV1SVC-PRE: 80 %
FEV1SVC-PRED: 69 %
FIFMAX-PRE: 4.44 L/SEC
FVC-%PRED-PRE: 63 %
FVC-PRE: 2.33 L
FVC-PRED: 3.69 L
IC-%PRED-PRE: 71 %
IC-PRE: 1.97 L
IC-PRED: 2.75 L
VA-%PRED-PRE: 56 %
VA-PRE: 3.59 L
VC-%PRED-PRE: 63 %
VC-PRE: 2.53 L
VC-PRED: 4.01 L

## 2024-01-18 ENCOUNTER — TELEPHONE (OUTPATIENT)
Dept: PULMONOLOGY | Facility: CLINIC | Age: 79
End: 2024-01-18
Payer: MEDICARE

## 2024-01-18 DIAGNOSIS — J84.112 IPF (IDIOPATHIC PULMONARY FIBROSIS) (H): ICD-10-CM

## 2024-01-18 NOTE — TELEPHONE ENCOUNTER
----- Message from Kay Cai sent at 1/18/2024  9:36 AM CST -----  Regarding: 3 Month Supply - Ofev  Good morning,    I ran a test claim for Tai's Ofev. He still has the same insurance, so it's still covered. BUT! He only has a $100 co-pay for a 3 month supply! ($50 for 1 month). Which still may seem like a lot but he's been paying $600 per month as he didn't qualify for financial assistance.    It looks like there might be a refill request in process, any chance we can get it for a 3 months supply (180 caps) instead?    Let me know, thanks!    Kay Cai, [facility]  Pharmacy Liaison  [facility]  [email or web address]  [email or web address]  Phone: [phone]  Fax: [phone]

## 2024-02-15 ENCOUNTER — TELEPHONE (OUTPATIENT)
Dept: PULMONOLOGY | Facility: CLINIC | Age: 79
End: 2024-02-15
Payer: MEDICARE

## 2024-02-15 DIAGNOSIS — J84.112 IPF (IDIOPATHIC PULMONARY FIBROSIS) (H): ICD-10-CM

## 2024-03-03 ENCOUNTER — HEALTH MAINTENANCE LETTER (OUTPATIENT)
Age: 79
End: 2024-03-03

## 2024-03-27 ENCOUNTER — TELEPHONE (OUTPATIENT)
Dept: PULMONOLOGY | Facility: CLINIC | Age: 79
End: 2024-03-27
Payer: MEDICARE

## 2024-03-27 NOTE — TELEPHONE ENCOUNTER
UNABLE TO LVM for the patient to call back and schedule the following:    Appointment type: MICHELLE  Provider: NANI  Return date: 05/09/24  Specialty phone number: [phone] OPT 1  Additional appointment(s) needed: FPFT  Additonal Notes: N/A

## 2024-04-10 DIAGNOSIS — J96.11 HYPOXEMIC RESPIRATORY FAILURE, CHRONIC (H): ICD-10-CM

## 2024-04-10 DIAGNOSIS — J84.112 IPF (IDIOPATHIC PULMONARY FIBROSIS) (H): Primary | ICD-10-CM

## 2024-04-10 DIAGNOSIS — R06.00 DYSPNEA, UNSPECIFIED TYPE: ICD-10-CM

## 2024-04-10 DIAGNOSIS — R05.3 CHRONIC COUGH: ICD-10-CM

## 2024-04-10 RX ORDER — IPRATROPIUM BROMIDE AND ALBUTEROL SULFATE 2.5; .5 MG/3ML; MG/3ML
1 SOLUTION RESPIRATORY (INHALATION) EVERY 6 HOURS PRN
Qty: 360 ML | Refills: 3 | Status: SHIPPED | OUTPATIENT
Start: 2024-04-10

## 2024-04-15 ENCOUNTER — OFFICE VISIT (OUTPATIENT)
Dept: CARDIOLOGY | Facility: CLINIC | Age: 79
End: 2024-04-15
Attending: INTERNAL MEDICINE
Payer: MEDICARE

## 2024-04-15 ENCOUNTER — TELEPHONE (OUTPATIENT)
Dept: CARDIOLOGY | Facility: CLINIC | Age: 79
End: 2024-04-15

## 2024-04-15 VITALS
WEIGHT: 208 LBS | HEART RATE: 87 BPM | SYSTOLIC BLOOD PRESSURE: 130 MMHG | BODY MASS INDEX: 29.78 KG/M2 | HEIGHT: 70 IN | DIASTOLIC BLOOD PRESSURE: 68 MMHG | RESPIRATION RATE: 20 BRPM

## 2024-04-15 DIAGNOSIS — I35.0 NONRHEUMATIC AORTIC VALVE STENOSIS: Primary | ICD-10-CM

## 2024-04-15 DIAGNOSIS — I35.0 NONRHEUMATIC AORTIC VALVE STENOSIS: ICD-10-CM

## 2024-04-15 DIAGNOSIS — I10 HYPERTENSION, UNSPECIFIED TYPE: ICD-10-CM

## 2024-04-15 DIAGNOSIS — E78.5 DYSLIPIDEMIA: ICD-10-CM

## 2024-04-15 DIAGNOSIS — I25.10 CORONARY ARTERY DISEASE INVOLVING NATIVE CORONARY ARTERY OF NATIVE HEART WITHOUT ANGINA PECTORIS: Primary | ICD-10-CM

## 2024-04-15 PROCEDURE — 99214 OFFICE O/P EST MOD 30 MIN: CPT | Performed by: INTERNAL MEDICINE

## 2024-04-15 NOTE — PROGRESS NOTES
[facility] [address], [place] [address]   [email or web address]   OFFICE: [phone]            Impression and Plan     1.  Coronary artery disease.  Tai has history of coronary artery disease by virtue of CT scan of chest 24 April 2021 revealing mild to moderate coronary calcification notably in the proximal LAD.     Wyatt underwent a regadenoson nuclear perfusion study 15 Kaylie 2022 that revealed no evidence of infarct or ischemia.     2.  Aortic stenosis.  This was felt moderate-severe on echocardiogram 1 December 2023 with mean gradient of 37 mmHg and peak velocity of 3.9 m/s.  Calculated valve area 0.74-0.81 cm .  Dimensionless index 0.25.  Wyatt is limited at baseline and his exercise tolerance/breathing due to his pulmonary fibrosis.  He does, however, reports somewhat of a change in his exercise tolerance and some increasing shortness of breath since last visit.  Certainly possible his aortic stenosis may be a contributor.  Plan:  Will refer to the Valve Clinic for consideration of transaortic valve replacement.     3. Atrial fibrillation.  As noted below, Tai had suffered a syncopal spell in March 2022 and was noted to have evidence of atrial fibrillation with rapid ventricular response.  Parenthetically, documentation from [facility] indicates history of sleep apnea.  It was felt his atrial fibrillation may have been triggered in part by ethanol.  Documentation available reviewed and he was seen by cardiology in [place] and at what appeared given this was felt to be an isolated event that full anticoagulation at that time was deferred though he was set up for an ambulatory monitor that he wore for 24 days.  No atrial fibrillation was detected during the monitoring interval (see Cardiac Diagnostic section below).    Continue metoprolol.     4.  Hypertension.  Blood pressure is reasonable in the office today.       5.  Dyslipidemia.  Lipid  profile 18 September 2023 revealed LDL 66 mg/dL and HDL 42 mg/dL.  Continue atorvastatin.     Follow-up to be determined pending Valve Clinic evaluation.    35 minutes spent reviewing prior records (including documentation, laboratory studies, cardiac testing/imaging), interview with patient along with physical exam, planning, and subsequent documentation/crafting of note).           History of Present Illness    Once again I would like to thank you again for asking me to participate in the care of your patient, Wyatt Gutierrez.  As you know, but to reiterate for my own records, Wyatt Gutierrez is a 78 year old male with history of pulmonary fibrosis.  Patient has been followed in the pulmonary clinic by Dr. Whitney José.     Tai had suffered a syncopal spell in March 2022 and was evaluated at [facility] in [place], [place].  Episode felt possibly related to ethanol use though also had been noted to have evidence of atrial fibrillation with rapid ventricular response.     Tai also has history of coronary artery disease by virtue of CT scan of chest 24 April 2021 revealing mild to moderate coronary calcification notably in the proximal LAD.     On interview, Wyatt does report a subjective decline in his exercise tolerance and some worsening shortness of breath since his last visit.  He does have underlying pulmonary fibrosis which limits him at baseline though states that he has noted somewhat of a change.    Further review of systems is otherwise negative/noncontributory (medical record and 13 point review of systems reviewed as well and pertinent positives noted).         Cardiac Diagnostics      Echocardiogram 1 December 2023:  Normal left ventricular size and systolic performance with ejection fraction of 55-60%.  Mild increase in left ventricular wall thickness.  Moderate to severe aortic stenosis with mean gradient of 37 mmHg and peak velocity of 3.9 m/s.  Calculated valve area 0.74-0.81 cm .  Dimensionless  index 0.25.  Normal right ventricular size with mildly reduced right ventricular systolic performance.  Mild-moderate left atrial enlargement.  Right atrium of normal dimension.    Echocardiogram 12 June 2023 (personally reviewed):  Normal left ventricular size and systolic performance with a visually estimated ejection fraction of 60%.  There is mild concentric increase in left ventricular wall thickness.  There is moderate to severe aortic stenosis.  The mean gradient is measured at 37 mmHg with peak velocity 3.6 m/s. Calculated valve area 0.93-0.94 cmÂ . Dimensionless index measured 0.25.  There is mild aortic insufficiency.  Normal right ventricular size and systolic performance.  There is mild to moderate left atrial enlargement.  There is mild enlargement of the proximal ascending aorta.  When compared to the prior real-time echocardiogram dated 13 June 2022, the mean gradient across the aortic valve has increased from 25mmHg to 37 mmHg.    Echocardiogram 13 June 2022:  Normal left ventricular size and systolic performance with ejection fraction greater than 65%.  Mild increase in left ventricular wall thickness.  Moderate aortic stenosis.  The mean gradient is measured at 25 mmHg with peak velocity of 3.1 m/s.  Normal right ventricular size and systolic performance.  Mild enlargement of ascending aorta.    Echocardiogram 18 September 2020 (personally reviewed):  Normal left ventricular size and systolic performance with a visually estimated ejection fraction of 55-60%.   There is mild aortic stenosis.   Normal right ventricular size and systolic performance.   There is mild enlargement of the proximal ascending aorta.    Regadenoson nuclear perfusion study 15 Kaylie 2022:  No evidence of infarct or ischemia.  Normal left ventricular systolic performance with ejection fraction of 61% normal regional wall motion.    24-day ambulatory monitor March 2022:  Normal sinus rhythm.    Mobitz type I AV block during  sleep.  No atrial fibrillation detected.     Twelve-lead ECG (personally reviewed) for March 2022: Sinus rhythm with first-degree AV block.  Nonspecific T wave changes.     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 Differential diagnosis includes UIP/IPF and connective tissue disease associated ILD typically rheumatoid arthritis.   The amount of lung   fibrosis is not changed from 29 October 2020.  Coronary artery calcification: Mild to moderate specifically in proximal LAD.           Physical Examination       There were no vitals taken for this visit.        Wt Readings from Last 3 Encounters:   01/04/24 97.5 kg (215 lb)   10/02/23 99.9 kg (220 lb 4.8 oz)   06/27/23 103 kg (227 lb)       The patient is alert and oriented times three. Sclerae are anicteric. Mucosal membranes are moist. Jugular venous pressure is normal. No significant adenopathy/thyromegally appreciated. Lungs are clear with good expansion. On cardiovascular exam, the patient has a regular S1 and S2.  There is a 3/6 mid to later peaking systolic murmur heard somewhat in a/like distribution with a slight musical quality.  Abdomen is soft and non-tender. Extremities reveal no clubbing, cyanosis.         Family History/Social History/Risk Factors   Patient does not smoke.  Family history reviewed.         Medical History  Surgical History Family History Social History   Past Medical History:   Diagnosis Date    Arthritis     High cholesterol     Hypertension     Sleep apnea      Past Surgical History:   Procedure Laterality Date    ARTHROSCOPY SHOULDER      COLONOSCOPY      ESOPHAGOSCOPY, GASTROSCOPY, DUODENOSCOPY (EGD), COMBINED N/A 10/2/2023    Procedure: ESOPHAGOGASTRODUODENOSCOPY with biopsies;  Surgeon: Reji Baptiste MD;  Location: [facility] Main OR    OTHER SURGICAL HISTORY Right 1957    heel fracture    OTHER SURGICAL HISTORY      knee  arthroscopies    ZZC TOTAL KNEE ARTHROPLASTY Left 10/31/2019    Procedure: LEFT MINIMALLY INVASIVE TOTAL KNEE ARTHROPLASTY;  Surgeon: Avelino Canada MD;  Location: [facility] Main OR;  Service: Orthopedics     No family history on file.     Social History     Socioeconomic History    Marital status:      Spouse name: Not on file    Number of children: Not on file    Years of education: Not on file    Highest education level: Not on file   Occupational History    Not on file   Tobacco Use    Smoking status: Former     Current packs/day: 0.00     Average packs/day: 0.5 packs/day for 55.0 years (27.5 ttl pk-yrs)     Types: Cigarettes     Start date: 1966     Quit date: 2021     Years since quittin.2    Smokeless tobacco: Never   Substance and Sexual Activity    Alcohol use: Yes     Comment: 1-2 glasses of wine/ 2 x-week    Drug use: Not Currently     Comment: edible marijuana in the past    Sexual activity: Not on file   Other Topics Concern    Not on file   Social History Narrative    Not on file     Social Determinants of Health     Financial Resource Strain: Not on file   Food Insecurity: Not on file   Transportation Needs: Not on file   Physical Activity: Not on file   Stress: Not on file   Social Connections: Not on file   Interpersonal Safety: Not on file   Housing Stability: Not on file           Medications  Allergies   Current Outpatient Medications   Medication Sig Dispense Refill    acyclovir (ZOVIRAX) 400 MG tablet [ACYCLOVIR (ZOVIRAX) 400 MG TABLET] Take 400 mg by mouth 3 (three) times a day as needed.             albuterol (PROAIR HFA/PROVENTIL HFA/VENTOLIN HFA) 108 (90 Base) MCG/ACT inhaler Inhale 2 puffs into the lungs every 6 hours as needed 18 g 3    albuterol (PROVENTIL) (2.5 MG/3ML) 0.083% neb solution Take 1 vial (2.5 mg) by nebulization 4 times daily (Patient taking differently: Take 2.5 mg by nebulization 1-2 times daily) 90 mL 3    arformoterol (BROVANA) 15 MCG/2ML NEBU  neb solution Take 2 mLs (15 mcg) by nebulization 2 times daily 360 mL 1    aspirin (ASA) 81 MG EC tablet Take by mouth every other day      atorvastatin (LIPITOR) 40 MG tablet TAKE 1 TABLET(40 MG) BY MOUTH AT BEDTIME 90 tablet 3    azelastine (ASTELIN) 137 mcg (0.1 %) nasal spray [AZELASTINE (ASTELIN) 137 MCG (0.1 %) NASAL SPRAY] Use in each nostril as directed 30 mL 12    azithromycin (ZITHROMAX) 250 MG tablet Take 1 tablet (250 mg) by mouth daily for 180 days 90 tablet 1    Dextromethorphan-guaiFENesin (MUCINEX DM PO)  (Patient not taking: Reported on 1/4/2024)      fluticasone-vilanterol (BREO ELLIPTA) 100-25 MCG/ACT inhaler Inhale 1 puff into the lungs daily (Patient not taking: Reported on 1/4/2024) 1 each 3    ipratropium (ATROVENT) 0.02 % neb solution Take 2.5 mLs (0.5 mg) by nebulization 4 times daily 90 mL 3    ipratropium - albuterol 0.5 mg/2.5 mg/3 mL (DUONEB) 0.5-2.5 (3) MG/3ML neb solution Take 1 vial (3 mLs) by nebulization every 6 hours as needed for shortness of breath, wheezing or cough 360 mL 3    metoprolol succinate (TOPROL-XL) 50 MG 24 hr tablet [METOPROLOL SUCCINATE (TOPROL-XL) 50 MG 24 HR TABLET] Take 50 mg by mouth daily.      multivitamin therapeutic tablet [MULTIVITAMIN THERAPEUTIC TABLET] Take 1 tablet by mouth daily. (Patient not taking: Reported on 6/27/2023)      nintedanib (OFEV) 100 MG capsule Take 1 capsule (100 mg) by mouth 2 times daily 180 capsule 3    omeprazole (PRILOSEC) 20 MG capsule [OMEPRAZOLE (PRILOSEC) 20 MG CAPSULE] Take 20 mg by mouth 2 (two) times a day before meals.      triamterene (DYRENIUM) 50 MG capsule Take 50 mg by mouth daily         Allergies   Allergen Reactions    Animal Dander Unknown    Chalk     Dogs Other (See Comments)     Pet Dander    Maple Tree     Mold [Molds & Smuts] Unknown          Lab Results    Chemistry/lipid CBC Cardiac Enzymes/BNP/TSH/INR   Recent Labs   Lab Test 09/18/23  1412   CHOL 148   HDL 42   LDL 66   TRIG 201*     Recent Labs   Lab  "Test 09/18/23  1412 09/12/23  1313 08/21/23  1411   LDL 66 72 46     Recent Labs   Lab Test 09/12/23  1313   *   POTASSIUM 4.0   CHLORIDE 94*   CO2 27   GLC 83   BUN 13.6   CR 0.87   GFRESTIMATED 89   SHALOM 9.6     Recent Labs   Lab Test 09/12/23  1313 12/15/22  1159 10/04/22  0848   CR 0.87 0.97 0.90     No results for input(s): \"A1C\" in the last 75522 hours.       Recent Labs   Lab Test 05/17/22  0901 05/30/21  1950   WBC  --  9.1   HGB 11.6* 14.7   HCT  --  41.9   MCV  --  89   PLT  --  207     Recent Labs   Lab Test 05/17/22  0901 05/30/21  1950 11/01/19  0540   HGB 11.6* 14.7 12.9*    No results for input(s): \"TROPONINI\" in the last 75301 hours.  No results for input(s): \"BNP\", \"NTBNPI\", \"NTBNP\" in the last 89006 hours.  No results for input(s): \"TSH\" in the last 75935 hours.  Recent Labs   Lab Test 01/15/22  0627 05/30/21  1950 10/31/19  0907   INR 1.0 1.00 0.95          Medications  Allergies   Current Outpatient Medications   Medication Sig Dispense Refill    acyclovir (ZOVIRAX) 400 MG tablet [ACYCLOVIR (ZOVIRAX) 400 MG TABLET] Take 400 mg by mouth 3 (three) times a day as needed.             albuterol (PROAIR HFA/PROVENTIL HFA/VENTOLIN HFA) 108 (90 Base) MCG/ACT inhaler Inhale 2 puffs into the lungs every 6 hours as needed 18 g 3    albuterol (PROVENTIL) (2.5 MG/3ML) 0.083% neb solution Take 1 vial (2.5 mg) by nebulization 4 times daily (Patient taking differently: Take 2.5 mg by nebulization 1-2 times daily) 90 mL 3    arformoterol (BROVANA) 15 MCG/2ML NEBU neb solution Take 2 mLs (15 mcg) by nebulization 2 times daily 360 mL 1    aspirin (ASA) 81 MG EC tablet Take by mouth every other day      atorvastatin (LIPITOR) 40 MG tablet TAKE 1 TABLET(40 MG) BY MOUTH AT BEDTIME 90 tablet 3    azelastine (ASTELIN) 137 mcg (0.1 %) nasal spray [AZELASTINE (ASTELIN) 137 MCG (0.1 %) NASAL SPRAY] Use in each nostril as directed 30 mL 12    azithromycin (ZITHROMAX) 250 MG tablet Take 1 tablet (250 mg) by mouth daily " for 180 days 90 tablet 1    Dextromethorphan-guaiFENesin (MUCINEX DM PO)  (Patient not taking: Reported on 1/4/2024)      fluticasone-vilanterol (BREO ELLIPTA) 100-25 MCG/ACT inhaler Inhale 1 puff into the lungs daily (Patient not taking: Reported on 1/4/2024) 1 each 3    ipratropium (ATROVENT) 0.02 % neb solution Take 2.5 mLs (0.5 mg) by nebulization 4 times daily 90 mL 3    ipratropium - albuterol 0.5 mg/2.5 mg/3 mL (DUONEB) 0.5-2.5 (3) MG/3ML neb solution Take 1 vial (3 mLs) by nebulization every 6 hours as needed for shortness of breath, wheezing or cough 360 mL 3    metoprolol succinate (TOPROL-XL) 50 MG 24 hr tablet [METOPROLOL SUCCINATE (TOPROL-XL) 50 MG 24 HR TABLET] Take 50 mg by mouth daily.      multivitamin therapeutic tablet [MULTIVITAMIN THERAPEUTIC TABLET] Take 1 tablet by mouth daily. (Patient not taking: Reported on 6/27/2023)      nintedanib (OFEV) 100 MG capsule Take 1 capsule (100 mg) by mouth 2 times daily 180 capsule 3    omeprazole (PRILOSEC) 20 MG capsule [OMEPRAZOLE (PRILOSEC) 20 MG CAPSULE] Take 20 mg by mouth 2 (two) times a day before meals.      triamterene (DYRENIUM) 50 MG capsule Take 50 mg by mouth daily        Allergies   Allergen Reactions    Animal Dander Unknown    Chalk     Dogs Other (See Comments)     Pet Dander    Maple Tree     Mold [Molds & Smuts] Unknown          Lab Results   Lab Results   Component Value Date     09/12/2023    CO2 27 09/12/2023    CO2 30 07/07/2022    BUN 13.6 09/12/2023    BUN 10 07/07/2022     Lab Results   Component Value Date    WBC 9.1 05/30/2021    HGB 11.6 05/17/2022    HCT 41.9 05/30/2021    MCV 89 05/30/2021     05/30/2021     Lab Results   Component Value Date    CHOL 148 09/18/2023    TRIG 201 09/18/2023    HDL 42 09/18/2023     Lab Results   Component Value Date    INR 1.0 01/15/2022

## 2024-04-15 NOTE — TELEPHONE ENCOUNTER
----- Message from Rachel Todd sent at 4/15/2024 10:55 AM CDT -----  Regarding: Structural/valve  Hello,   I always get confused on how the orders are listed but I believe this is for your department to schedule.     Thank you

## 2024-04-15 NOTE — TELEPHONE ENCOUNTER
Valve clinic referral from Dr. Cao for aortic stenosis. Please call the pt and schedule in valve clinic with a CTA prior.    Thanks,  Christina

## 2024-04-15 NOTE — LETTER
4/15/2024    Eliot De La Rosa MD  [address]  [facility] [address]    RE: Wyatt Gutierrez       Dear Colleague,     I had the pleasure of seeing Wyatt Gutierrez in the [facility].         [facility]   [address], [place] [address]   [email or web address]   OFFICE: [phone]            Impression and Plan     1.  Coronary artery disease.  Tai has history of coronary artery disease by virtue of CT scan of chest 24 April 2021 revealing mild to moderate coronary calcification notably in the proximal LAD.     Wyatt underwent a regadenoson nuclear perfusion study 15 Kaylie 2022 that revealed no evidence of infarct or ischemia.     2.  Aortic stenosis.  This was felt moderate-severe on echocardiogram 1 December 2023 with mean gradient of 37 mmHg and peak velocity of 3.9 m/s.  Calculated valve area 0.74-0.81 cm .  Dimensionless index 0.25.  Wyatt is limited at baseline and his exercise tolerance/breathing due to his pulmonary fibrosis.  He does, however, reports somewhat of a change in his exercise tolerance and some increasing shortness of breath since last visit.  Certainly possible his aortic stenosis may be a contributor.  Plan:  Will refer to the Valve Clinic for consideration of transaortic valve replacement.     3. Atrial fibrillation.  As noted below, Tai had suffered a syncopal spell in March 2022 and was noted to have evidence of atrial fibrillation with rapid ventricular response.  Parenthetically, documentation from [facility] indicates history of sleep apnea.  It was felt his atrial fibrillation may have been triggered in part by ethanol.  Documentation available reviewed and he was seen by cardiology in [place] and at what appeared given this was felt to be an isolated event that full anticoagulation at that time was deferred though he was set up for an ambulatory monitor that he wore for 24 days.  No atrial fibrillation was detected  during the monitoring interval (see Cardiac Diagnostic section below).    Continue metoprolol.     4.  Hypertension.  Blood pressure is reasonable in the office today.       5.  Dyslipidemia.  Lipid profile 18 September 2023 revealed LDL 66 mg/dL and HDL 42 mg/dL.  Continue atorvastatin.     Follow-up to be determined pending Valve Clinic evaluation.    35 minutes spent reviewing prior records (including documentation, laboratory studies, cardiac testing/imaging), interview with patient along with physical exam, planning, and subsequent documentation/crafting of note).           History of Present Illness    Once again I would like to thank you again for asking me to participate in the care of your patient, Wyatt Gutierrez.  As you know, but to reiterate for my own records, Wyatt Gutierrez is a 78 year old male with history of pulmonary fibrosis.  Patient has been followed in the pulmonary clinic by Dr. Whitney José.     Tai had suffered a syncopal spell in March 2022 and was evaluated at [facility] in [facility].  Episode felt possibly related to ethanol use though also had been noted to have evidence of atrial fibrillation with rapid ventricular response.     Tai also has history of coronary artery disease by virtue of CT scan of chest 24 April 2021 revealing mild to moderate coronary calcification notably in the proximal LAD.     On interview, Wyatt does report a subjective decline in his exercise tolerance and some worsening shortness of breath since his last visit.  He does have underlying pulmonary fibrosis which limits him at baseline though states that he has noted somewhat of a change.    Further review of systems is otherwise negative/noncontributory (medical record and 13 point review of systems reviewed as well and pertinent positives noted).         Cardiac Diagnostics      Echocardiogram 1 December 2023:  Normal left ventricular size and systolic performance with ejection fraction of  55-60%.  Mild increase in left ventricular wall thickness.  Moderate to severe aortic stenosis with mean gradient of 37 mmHg and peak velocity of 3.9 m/s.  Calculated valve area 0.74-0.81 cm .  Dimensionless index 0.25.  Normal right ventricular size with mildly reduced right ventricular systolic performance.  Mild-moderate left atrial enlargement.  Right atrium of normal dimension.    Echocardiogram 12 June 2023 (personally reviewed):  Normal left ventricular size and systolic performance with a visually estimated ejection fraction of 60%.  There is mild concentric increase in left ventricular wall thickness.  There is moderate to severe aortic stenosis.  The mean gradient is measured at 37 mmHg with peak velocity 3.6 m/s. Calculated valve area 0.93-0.94 cmÂ . Dimensionless index measured 0.25.  There is mild aortic insufficiency.  Normal right ventricular size and systolic performance.  There is mild to moderate left atrial enlargement.  There is mild enlargement of the proximal ascending aorta.  When compared to the prior real-time echocardiogram dated 13 June 2022, the mean gradient across the aortic valve has increased from 25mmHg to 37 mmHg.    Echocardiogram 13 June 2022:  Normal left ventricular size and systolic performance with ejection fraction greater than 65%.  Mild increase in left ventricular wall thickness.  Moderate aortic stenosis.  The mean gradient is measured at 25 mmHg with peak velocity of 3.1 m/s.  Normal right ventricular size and systolic performance.  Mild enlargement of ascending aorta.    Echocardiogram 18 September 2020 (personally reviewed):  Normal left ventricular size and systolic performance with a visually estimated ejection fraction of 55-60%.   There is mild aortic stenosis.   Normal right ventricular size and systolic performance.   There is mild enlargement of the proximal ascending aorta.    Regadenoson nuclear perfusion study 15 Kaylie 2022:  No evidence of infarct or  ischemia.  Normal left ventricular systolic performance with ejection fraction of 61% normal regional wall motion.    24-day ambulatory monitor March 2022:  Normal sinus rhythm.    Mobitz type I AV block during sleep.  No atrial fibrillation detected.     Twelve-lead ECG (personally reviewed) for March 2022: Sinus rhythm with first-degree AV block.  Nonspecific T wave changes.     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 Differential diagnosis includes UIP/IPF and connective tissue disease associated ILD typically rheumatoid arthritis.   The amount of lung   fibrosis is not changed from 29 October 2020.  Coronary artery calcification: Mild to moderate specifically in proximal LAD.           Physical Examination       There were no vitals taken for this visit.        Wt Readings from Last 3 Encounters:   01/04/24 97.5 kg (215 lb)   10/02/23 99.9 kg (220 lb 4.8 oz)   06/27/23 103 kg (227 lb)       The patient is alert and oriented times three. Sclerae are anicteric. Mucosal membranes are moist. Jugular venous pressure is normal. No significant adenopathy/thyromegally appreciated. Lungs are clear with good expansion. On cardiovascular exam, the patient has a regular S1 and S2.  There is a 3/6 mid to later peaking systolic murmur heard somewhat in a/like distribution with a slight musical quality.  Abdomen is soft and non-tender. Extremities reveal no clubbing, cyanosis.         Family History/Social History/Risk Factors   Patient does not smoke.  Family history reviewed.         Medical History  Surgical History Family History Social History   Past Medical History:   Diagnosis Date    Arthritis     High cholesterol     Hypertension     Sleep apnea      Past Surgical History:   Procedure Laterality Date    ARTHROSCOPY SHOULDER      COLONOSCOPY      ESOPHAGOSCOPY, GASTROSCOPY, DUODENOSCOPY (EGD), COMBINED N/A  10/2/2023    Procedure: ESOPHAGOGASTRODUODENOSCOPY with biopsies;  Surgeon: Reji Baptiste MD;  Location: [facility] Main OR    OTHER SURGICAL HISTORY Right     heel fracture    OTHER SURGICAL HISTORY      knee arthroscopies    ZC TOTAL KNEE ARTHROPLASTY Left 10/31/2019    Procedure: LEFT MINIMALLY INVASIVE TOTAL KNEE ARTHROPLASTY;  Surgeon: Avelino Canada MD;  Location: [facility] Main OR;  Service: Orthopedics     No family history on file.     Social History     Socioeconomic History    Marital status:      Spouse name: Not on file    Number of children: Not on file    Years of education: Not on file    Highest education level: Not on file   Occupational History    Not on file   Tobacco Use    Smoking status: Former     Current packs/day: 0.00     Average packs/day: 0.5 packs/day for 55.0 years (27.5 ttl pk-yrs)     Types: Cigarettes     Start date: 1966     Quit date: 2021     Years since quittin.2    Smokeless tobacco: Never   Substance and Sexual Activity    Alcohol use: Yes     Comment: 1-2 glasses of wine/ 2 x-week    Drug use: Not Currently     Comment: edible marijuana in the past    Sexual activity: Not on file   Other Topics Concern    Not on file   Social History Narrative    Not on file     Social Determinants of Health     Financial Resource Strain: Not on file   Food Insecurity: Not on file   Transportation Needs: Not on file   Physical Activity: Not on file   Stress: Not on file   Social Connections: Not on file   Interpersonal Safety: Not on file   Housing Stability: Not on file           Medications  Allergies   Current Outpatient Medications   Medication Sig Dispense Refill    acyclovir (ZOVIRAX) 400 MG tablet [ACYCLOVIR (ZOVIRAX) 400 MG TABLET] Take 400 mg by mouth 3 (three) times a day as needed.             albuterol (PROAIR HFA/PROVENTIL HFA/VENTOLIN HFA) 108 (90 Base) MCG/ACT inhaler Inhale 2 puffs into the lungs every 6 hours as needed 18 g 3    albuterol  (PROVENTIL) (2.5 MG/3ML) 0.083% neb solution Take 1 vial (2.5 mg) by nebulization 4 times daily (Patient taking differently: Take 2.5 mg by nebulization 1-2 times daily) 90 mL 3    arformoterol (BROVANA) 15 MCG/2ML NEBU neb solution Take 2 mLs (15 mcg) by nebulization 2 times daily 360 mL 1    aspirin (ASA) 81 MG EC tablet Take by mouth every other day      atorvastatin (LIPITOR) 40 MG tablet TAKE 1 TABLET(40 MG) BY MOUTH AT BEDTIME 90 tablet 3    azelastine (ASTELIN) 137 mcg (0.1 %) nasal spray [AZELASTINE (ASTELIN) 137 MCG (0.1 %) NASAL SPRAY] Use in each nostril as directed 30 mL 12    azithromycin (ZITHROMAX) 250 MG tablet Take 1 tablet (250 mg) by mouth daily for 180 days 90 tablet 1    Dextromethorphan-guaiFENesin (MUCINEX DM PO)  (Patient not taking: Reported on 1/4/2024)      fluticasone-vilanterol (BREO ELLIPTA) 100-25 MCG/ACT inhaler Inhale 1 puff into the lungs daily (Patient not taking: Reported on 1/4/2024) 1 each 3    ipratropium (ATROVENT) 0.02 % neb solution Take 2.5 mLs (0.5 mg) by nebulization 4 times daily 90 mL 3    ipratropium - albuterol 0.5 mg/2.5 mg/3 mL (DUONEB) 0.5-2.5 (3) MG/3ML neb solution Take 1 vial (3 mLs) by nebulization every 6 hours as needed for shortness of breath, wheezing or cough 360 mL 3    metoprolol succinate (TOPROL-XL) 50 MG 24 hr tablet [METOPROLOL SUCCINATE (TOPROL-XL) 50 MG 24 HR TABLET] Take 50 mg by mouth daily.      multivitamin therapeutic tablet [MULTIVITAMIN THERAPEUTIC TABLET] Take 1 tablet by mouth daily. (Patient not taking: Reported on 6/27/2023)      nintedanib (OFEV) 100 MG capsule Take 1 capsule (100 mg) by mouth 2 times daily 180 capsule 3    omeprazole (PRILOSEC) 20 MG capsule [OMEPRAZOLE (PRILOSEC) 20 MG CAPSULE] Take 20 mg by mouth 2 (two) times a day before meals.      triamterene (DYRENIUM) 50 MG capsule Take 50 mg by mouth daily         Allergies   Allergen Reactions    Animal Dander Unknown    Chalk     Dogs Other (See Comments)     Pet Dander     "Maple Tree     Mold [Molds & Smuts] Unknown          Lab Results    Chemistry/lipid CBC Cardiac Enzymes/BNP/TSH/INR   Recent Labs   Lab Test 09/18/23  1412   CHOL 148   HDL 42   LDL 66   TRIG 201*     Recent Labs   Lab Test 09/18/23  1412 09/12/23  1313 08/21/23  1411   LDL 66 72 46     Recent Labs   Lab Test 09/12/23  1313   *   POTASSIUM 4.0   CHLORIDE 94*   CO2 27   GLC 83   BUN 13.6   CR 0.87   GFRESTIMATED 89   SHALOM 9.6     Recent Labs   Lab Test 09/12/23  1313 12/15/22  1159 10/04/22  0848   CR 0.87 0.97 0.90     No results for input(s): \"A1C\" in the last 45506 hours.       Recent Labs   Lab Test 05/17/22  0901 05/30/21  1950   WBC  --  9.1   HGB 11.6* 14.7   HCT  --  41.9   MCV  --  89   PLT  --  207     Recent Labs   Lab Test 05/17/22  0901 05/30/21  1950 11/01/19  0540   HGB 11.6* 14.7 12.9*    No results for input(s): \"TROPONINI\" in the last 94217 hours.  No results for input(s): \"BNP\", \"NTBNPI\", \"NTBNP\" in the last 16247 hours.  No results for input(s): \"TSH\" in the last 49957 hours.  Recent Labs   Lab Test 01/15/22  0627 05/30/21  1950 10/31/19  0907   INR 1.0 1.00 0.95          Medications  Allergies   Current Outpatient Medications   Medication Sig Dispense Refill    acyclovir (ZOVIRAX) 400 MG tablet [ACYCLOVIR (ZOVIRAX) 400 MG TABLET] Take 400 mg by mouth 3 (three) times a day as needed.             albuterol (PROAIR HFA/PROVENTIL HFA/VENTOLIN HFA) 108 (90 Base) MCG/ACT inhaler Inhale 2 puffs into the lungs every 6 hours as needed 18 g 3    albuterol (PROVENTIL) (2.5 MG/3ML) 0.083% neb solution Take 1 vial (2.5 mg) by nebulization 4 times daily (Patient taking differently: Take 2.5 mg by nebulization 1-2 times daily) 90 mL 3    arformoterol (BROVANA) 15 MCG/2ML NEBU neb solution Take 2 mLs (15 mcg) by nebulization 2 times daily 360 mL 1    aspirin (ASA) 81 MG EC tablet Take by mouth every other day      atorvastatin (LIPITOR) 40 MG tablet TAKE 1 TABLET(40 MG) BY MOUTH AT BEDTIME 90 tablet 3    " azelastine (ASTELIN) 137 mcg (0.1 %) nasal spray [AZELASTINE (ASTELIN) 137 MCG (0.1 %) NASAL SPRAY] Use in each nostril as directed 30 mL 12    azithromycin (ZITHROMAX) 250 MG tablet Take 1 tablet (250 mg) by mouth daily for 180 days 90 tablet 1    Dextromethorphan-guaiFENesin (MUCINEX DM PO)  (Patient not taking: Reported on 1/4/2024)      fluticasone-vilanterol (BREO ELLIPTA) 100-25 MCG/ACT inhaler Inhale 1 puff into the lungs daily (Patient not taking: Reported on 1/4/2024) 1 each 3    ipratropium (ATROVENT) 0.02 % neb solution Take 2.5 mLs (0.5 mg) by nebulization 4 times daily 90 mL 3    ipratropium - albuterol 0.5 mg/2.5 mg/3 mL (DUONEB) 0.5-2.5 (3) MG/3ML neb solution Take 1 vial (3 mLs) by nebulization every 6 hours as needed for shortness of breath, wheezing or cough 360 mL 3    metoprolol succinate (TOPROL-XL) 50 MG 24 hr tablet [METOPROLOL SUCCINATE (TOPROL-XL) 50 MG 24 HR TABLET] Take 50 mg by mouth daily.      multivitamin therapeutic tablet [MULTIVITAMIN THERAPEUTIC TABLET] Take 1 tablet by mouth daily. (Patient not taking: Reported on 6/27/2023)      nintedanib (OFEV) 100 MG capsule Take 1 capsule (100 mg) by mouth 2 times daily 180 capsule 3    omeprazole (PRILOSEC) 20 MG capsule [OMEPRAZOLE (PRILOSEC) 20 MG CAPSULE] Take 20 mg by mouth 2 (two) times a day before meals.      triamterene (DYRENIUM) 50 MG capsule Take 50 mg by mouth daily        Allergies   Allergen Reactions    Animal Dander Unknown    Chalk     Dogs Other (See Comments)     Pet Dander    Maple Tree     Mold [Molds & Smuts] Unknown          Lab Results   Lab Results   Component Value Date     09/12/2023    CO2 27 09/12/2023    CO2 30 07/07/2022    BUN 13.6 09/12/2023    BUN 10 07/07/2022     Lab Results   Component Value Date    WBC 9.1 05/30/2021    HGB 11.6 05/17/2022    HCT 41.9 05/30/2021    MCV 89 05/30/2021     05/30/2021     Lab Results   Component Value Date    CHOL 148 09/18/2023    TRIG 201 09/18/2023    HDL 42  09/18/2023     Lab Results   Component Value Date    INR 1.0 01/15/2022                        Thank you for allowing me to participate in the care of your patient.      Sincerely,     Bubba Cao MD     [facility]  cc:   Bubba Cao MD  [address] [place] [address]

## 2024-04-22 ENCOUNTER — TELEPHONE (OUTPATIENT)
Dept: PULMONOLOGY | Facility: CLINIC | Age: 79
End: 2024-04-22
Payer: MEDICARE

## 2024-04-23 ENCOUNTER — TELEPHONE (OUTPATIENT)
Dept: PULMONOLOGY | Facility: CLINIC | Age: 79
End: 2024-04-23
Payer: MEDICARE

## 2024-04-23 DIAGNOSIS — E78.5 DYSLIPIDEMIA: ICD-10-CM

## 2024-04-23 DIAGNOSIS — I25.10 CORONARY ARTERY DISEASE INVOLVING NATIVE CORONARY ARTERY WITHOUT ANGINA PECTORIS, UNSPECIFIED WHETHER NATIVE OR TRANSPLANTED HEART: ICD-10-CM

## 2024-04-23 DIAGNOSIS — J84.112 IPF (IDIOPATHIC PULMONARY FIBROSIS) (H): ICD-10-CM

## 2024-04-23 DIAGNOSIS — R09.02 HYPOXIA: Primary | ICD-10-CM

## 2024-04-23 RX ORDER — ATORVASTATIN CALCIUM 40 MG/1
TABLET, FILM COATED ORAL
Qty: 90 TABLET | Refills: 2 | Status: ON HOLD | OUTPATIENT
Start: 2024-04-23 | End: 2024-05-13

## 2024-04-30 ENCOUNTER — DOCUMENTATION ONLY (OUTPATIENT)
Dept: CARDIOLOGY | Facility: CLINIC | Age: 79
End: 2024-04-30

## 2024-04-30 ENCOUNTER — HOSPITAL ENCOUNTER (OUTPATIENT)
Dept: CT IMAGING | Facility: CLINIC | Age: 79
Discharge: HOME | End: 2024-04-30
Attending: INTERNAL MEDICINE | Admitting: INTERNAL MEDICINE
Payer: MEDICARE

## 2024-04-30 DIAGNOSIS — I35.0 NONRHEUMATIC AORTIC VALVE STENOSIS: Primary | ICD-10-CM

## 2024-04-30 DIAGNOSIS — I35.0 NONRHEUMATIC AORTIC VALVE STENOSIS: ICD-10-CM

## 2024-04-30 LAB
CREAT BLD-MCNC: 0.9 MG/DL (ref 0.7–1.3)
EGFRCR SERPLBLD CKD-EPI 2021: >60 ML/MIN/1.73M2

## 2024-04-30 PROCEDURE — 82565 ASSAY OF CREATININE: CPT

## 2024-04-30 PROCEDURE — 74174 CTA ABD&PLVS W/CONTRAST: CPT | Mod: 26 | Performed by: INTERNAL MEDICINE

## 2024-04-30 PROCEDURE — 74174 CTA ABD&PLVS W/CONTRAST: CPT | Mod: MG

## 2024-04-30 PROCEDURE — 71275 CT ANGIOGRAPHY CHEST: CPT | Mod: 26 | Performed by: INTERNAL MEDICINE

## 2024-04-30 PROCEDURE — 250N000011 HC RX IP 250 OP 636: Performed by: INTERNAL MEDICINE

## 2024-04-30 PROCEDURE — G1010 CDSM STANSON: HCPCS | Performed by: INTERNAL MEDICINE

## 2024-04-30 RX ORDER — IOPAMIDOL 755 MG/ML
100 INJECTION, SOLUTION INTRAVASCULAR ONCE
Status: COMPLETED | OUTPATIENT
Start: 2024-04-30 | End: 2024-04-30

## 2024-04-30 RX ADMIN — IOPAMIDOL 100 ML: 755 INJECTION, SOLUTION INTRAVENOUS at 13:03

## 2024-05-01 LAB
ABO/RH(D): NORMAL
ANTIBODY SCREEN: NEGATIVE
SPECIMEN EXPIRATION DATE: NORMAL

## 2024-05-02 ENCOUNTER — OFFICE VISIT (OUTPATIENT)
Dept: CARDIOLOGY | Facility: CLINIC | Age: 79
End: 2024-05-02
Payer: MEDICARE

## 2024-05-02 ENCOUNTER — ALLIED HEALTH/NURSE VISIT (OUTPATIENT)
Dept: CARDIOLOGY | Facility: CLINIC | Age: 79
End: 2024-05-02
Payer: MEDICARE

## 2024-05-02 ENCOUNTER — APPOINTMENT (OUTPATIENT)
Dept: CARDIOLOGY | Facility: CLINIC | Age: 79
End: 2024-05-02
Payer: MEDICARE

## 2024-05-02 VITALS
SYSTOLIC BLOOD PRESSURE: 100 MMHG | BODY MASS INDEX: 29.92 KG/M2 | HEART RATE: 76 BPM | WEIGHT: 209 LBS | RESPIRATION RATE: 20 BRPM | HEIGHT: 70 IN | DIASTOLIC BLOOD PRESSURE: 68 MMHG

## 2024-05-02 DIAGNOSIS — I35.0 NONRHEUMATIC AORTIC VALVE STENOSIS: ICD-10-CM

## 2024-05-02 DIAGNOSIS — I51.89 OTHER ILL-DEFINED HEART DISEASES: ICD-10-CM

## 2024-05-02 DIAGNOSIS — I35.0 NONRHEUMATIC AORTIC VALVE STENOSIS: Primary | ICD-10-CM

## 2024-05-02 LAB
ANION GAP SERPL CALCULATED.3IONS-SCNC: 8 MMOL/L (ref 7–15)
BUN SERPL-MCNC: 14.3 MG/DL (ref 8–23)
CALCIUM SERPL-MCNC: 9.8 MG/DL (ref 8.8–10.2)
CHLORIDE SERPL-SCNC: 93 MMOL/L (ref 98–107)
CREAT SERPL-MCNC: 0.9 MG/DL (ref 0.67–1.17)
DEPRECATED HCO3 PLAS-SCNC: 32 MMOL/L (ref 22–29)
EGFRCR SERPLBLD CKD-EPI 2021: 89 ML/MIN/1.73M2
ERYTHROCYTE [DISTWIDTH] IN BLOOD BY AUTOMATED COUNT: 16.3 % (ref 10–15)
GLUCOSE SERPL-MCNC: 83 MG/DL (ref 70–99)
HCT VFR BLD AUTO: 35.5 % (ref 40–53)
HGB BLD-MCNC: 11.2 G/DL (ref 13.3–17.7)
MCH RBC QN AUTO: 24.5 PG (ref 26.5–33)
MCHC RBC AUTO-ENTMCNC: 31.5 G/DL (ref 31.5–36.5)
MCV RBC AUTO: 78 FL (ref 78–100)
PLATELET # BLD AUTO: 294 10E3/UL (ref 150–450)
POTASSIUM SERPL-SCNC: 5 MMOL/L (ref 3.4–5.3)
RBC # BLD AUTO: 4.58 10E6/UL (ref 4.4–5.9)
SODIUM SERPL-SCNC: 133 MMOL/L (ref 135–145)
WBC # BLD AUTO: 12.5 10E3/UL (ref 4–11)

## 2024-05-02 PROCEDURE — 99205 OFFICE O/P NEW HI 60 MIN: CPT | Performed by: INTERNAL MEDICINE

## 2024-05-02 PROCEDURE — 86850 RBC ANTIBODY SCREEN: CPT | Performed by: INTERNAL MEDICINE

## 2024-05-02 PROCEDURE — 93000 ELECTROCARDIOGRAM COMPLETE: CPT | Performed by: INTERNAL MEDICINE

## 2024-05-02 PROCEDURE — 85027 COMPLETE CBC AUTOMATED: CPT | Performed by: INTERNAL MEDICINE

## 2024-05-02 PROCEDURE — 36415 COLL VENOUS BLD VENIPUNCTURE: CPT | Performed by: INTERNAL MEDICINE

## 2024-05-02 PROCEDURE — 99207 PR NO CHARGE LOS: CPT

## 2024-05-02 PROCEDURE — 80048 BASIC METABOLIC PNL TOTAL CA: CPT | Performed by: INTERNAL MEDICINE

## 2024-05-02 PROCEDURE — 86900 BLOOD TYPING SEROLOGIC ABO: CPT | Performed by: INTERNAL MEDICINE

## 2024-05-02 PROCEDURE — 86901 BLOOD TYPING SEROLOGIC RH(D): CPT | Performed by: INTERNAL MEDICINE

## 2024-05-02 RX ORDER — LOPERAMIDE HCL 2 MG
2 CAPSULE ORAL DAILY PRN
COMMUNITY

## 2024-05-02 NOTE — PROGRESS NOTES
Valve Clinic TAVR - 5/2/24   (See consult note from Dr. Robles)    Referring provider: Dr. Cao (primary cardiologist (4/15/24)     Labs completed at visit today: Yes; including T&S ext form for cath- see media tab     Preliminary STS Score: 2%      KCCQ12 (date completed 5/2/24), scanned into chart      PMH: Severe aortic stenosis, afib, CAD, dyslipidemia, HTN, GERD, idiopathic pulmonary fibrosis (follows with pulmonary), alvarez's esophagus.     Patient presented to clinic today on 6 L of O2. He reports this is a typical amount to utilize at baseline but at times he does require higher flow.     Symptoms: SOB, DOWNS, tachycardia with minimal exertion, decreased activity tolerance     Social: Patient presented to clinic today with his spouse Alka       TTE date 12/1/23  EF 55-60 %  AV mean gradient: 54 mmHg  AV Peak Wally: 3.67 m/sec  AV area: 0.74 cm2  AV DIM IND VTI: 0.25  LV SVi: 33.8 ml/m2    Comments on valves: mitral valve has mild annular calcification, mild MR no stenosis. Tricuspid has mild regurg with no stenosis. Aortic valve has 1-2+ AI with severe stenosis and thickened trileaflet valve.       Plan: Pt would like to move forward with workup for TAVR. CTA has already been completed. Per provider TAVR plan from read includes the following:  CTA which showed adequate R TF access, annular area 516 - good for #26S3, angle NAVA 7 CRA 7    Patient will need to be scheduled for coronary angiogram and CT surgery consultaiton. Orders placed and message sent to scheduling/ to arrange. Patient understands to anticipate a phone call to arrange these appts.     Pt recently seen by his dentist who instructed him to send clearance form to office for signature. Will fax over request to [facility] in [facility].     Reviewed pre-procedure work up and procedural related education information with patient and spouse. All questions answered, no further questions at this  time. Patient has my direct contact information as well as the valve clinic line and was encouraged to call with questions or concerns.       Juan Carlos Lugo RN BSN- Valve Clinic Coordinator   [facility]  [facility]  Phone: [phone]  Fax: [phone]       110

## 2024-05-02 NOTE — H&P (VIEW-ONLY)
HEART CARE VALVE CLINIC ENCOUNTER CONSULTATON NOTE      [facility]  [phone]      Assessment/Recommendations   Assessment/Plan: Wyatt Gutierrez I a 78M w/ severe aortic stenosis, AF, CAD, dyslipidemia, HTN, GERD, idiopathic pulmonary fibrosis (follows with pulmonary), alvarez's esophagus who is here for evaluation of management options for his valvular disease.    -  I personally reviewed cardiac imaging including: TTE, which showed P/M 52/37 JOJO 0.93 DI 0.25 SVI 39.3 consistent w/ severe aortic stenosis;  -  CTA which showed adequate R TF access, annular area 516 - good for #26S3, angle NAVA 7 CRA 7;  -  after a long discussion of natural history, risk/benefit, and management options w/  the patient and family, we all agree to proceed w/ the remainder of w/up including angio +/- PCI,  CTS consult, Dental eval    A total of >60 mins was spent reviewing prior records, including documentation, lab studies, cardiac testing/imaging, interview with patient, physical exam, and documentation     History of Present Illness/Subjective    HPI: Wyatt Gutierrez is a 78 year old male w/ severe aortic stenosis, AF, CAD, dyslipidemia, HTN, GERD, idiopathic pulmonary fibrosis (follows with pulmonary), alvarez's esophagus who is here for evaluation of management options for his valvular disease.    He has SOB at baseline w/ a need for home O2 but the DOWNS and O2 requirement has been increasing ovet he past 6 mos. Walking and showering is a choir and a mjor obstacle at this point, even w/ a chair. No orthopnea/PND/edema/syncope/N/V/F/C/wt.changes. + diarrhea occasionally.    Lives w/ his wife, retired , EtOH moderate (used to be heavy). Quit smoking '21.    Recent Echocardiogram Results:  1.Left ventricular size, wall motion and function are normal. The ejection  fraction is 55-60%.  2.There is mild concentric left ventricular hypertrophy.  3.Mildly decreased right ventricular systolic function  4.Moderate to  "severe stenosis of a trileaflet thickened aortic valve, at SVI  of 32 mL/M2 dimensionless index 0.25 with peak velocity of 3.9 m/s and mean  gradient of 37 mmHg.  4.There is mild to moderate (1-2+) aortic regurgitation. Decel 1/2 time is 582  msec.  Compared to the prior study dated 6/12/2023, the LV EF looks to be slightly  less, the AI slightly more and the AS slightly worse, prior was 37 mmHg and  3.6 m/sec.    Recent Coronary Angiogram Results:  None       Physical Examination  Review of Systems   Vitals: /68 (BP Location: Right arm, Patient Position: Sitting, Cuff Size: Adult Large)   Pulse 76   Resp 20   Ht 1.778 m (5' 10\")   Wt 94.8 kg (209 lb)   BMI 29.99 kg/m    BMI= Body mass index is 29.99 kg/m .  Wt Readings from Last 3 Encounters:   05/02/24 94.8 kg (209 lb)   04/15/24 94.3 kg (208 lb)   01/04/24 97.5 kg (215 lb)       General Appearance:   no distress, normal body habitus   ENT/Mouth: membranes moist, no oral lesions or bleeding gums.      EYES:  no scleral icterus, normal conjunctivae   Neck: no carotid bruits or thyromegaly   Chest/Lungs:   lungs are clear to auscultation, no rales or wheezing, nosternal scar, equal chest wall expansion    Cardiovascular:   Regular. Normal first and second heart sounds with 2/6 systolic murmur, no rubs, or gallops; the carotid, radial and posterior tibial pulses are intact, Jugular venous pressure 6, no edema bilaterally    Abdomen:  no organomegaly, masses, bruits, or tenderness; bowel sounds are present   Extremities: no cyanosis or clubbing   Skin: no xanthelasma, warm.    Neurologic: normal  bilateral, no tremors     Psychiatric: alert and oriented x3, calm        Please refer above for cardiac ROS details.        Medical History  Surgical History Family History Social History   Past Medical History:   Diagnosis Date    Arthritis     High cholesterol     Hypertension     Sleep apnea      Past Surgical History:   Procedure Laterality Date    " ARTHROSCOPY SHOULDER      COLONOSCOPY      ESOPHAGOSCOPY, GASTROSCOPY, DUODENOSCOPY (EGD), COMBINED N/A 10/2/2023    Procedure: ESOPHAGOGASTRODUODENOSCOPY with biopsies;  Surgeon: Reji Baptiste MD;  Location: [facility]    OTHER SURGICAL HISTORY Right     heel fracture    OTHER SURGICAL HISTORY      knee arthroscopies    ZZC TOTAL KNEE ARTHROPLASTY Left 10/31/2019    Procedure: LEFT MINIMALLY INVASIVE TOTAL KNEE ARTHROPLASTY;  Surgeon: Avelino Canada MD;  Location: [facility] OR;  Service: Orthopedics     No family history on file.     Social History     Socioeconomic History    Marital status:      Spouse name: Not on file    Number of children: Not on file    Years of education: Not on file    Highest education level: Not on file   Occupational History    Not on file   Tobacco Use    Smoking status: Former     Current packs/day: 0.00     Average packs/day: 0.5 packs/day for 55.0 years (27.5 ttl pk-yrs)     Types: Cigarettes     Start date: 1966     Quit date: 2021     Years since quittin.3    Smokeless tobacco: Never   Substance and Sexual Activity    Alcohol use: Yes     Comment: 1-2 glasses of wine/ 2 x-week    Drug use: Not Currently     Comment: edible marijuana in the past    Sexual activity: Not on file   Other Topics Concern    Not on file   Social History Narrative    Not on file     Social Determinants of Health     Financial Resource Strain: Not on file   Food Insecurity: Not on file   Transportation Needs: Not on file   Physical Activity: Not on file   Stress: Not on file   Social Connections: Not on file   Interpersonal Safety: Not on file   Housing Stability: Not on file           Medications  Allergies   Current Outpatient Medications   Medication Sig Dispense Refill    acyclovir (ZOVIRAX) 400 MG tablet [ACYCLOVIR (ZOVIRAX) 400 MG TABLET] Take 400 mg by mouth 3 (three) times a day as needed.             albuterol (PROAIR HFA/PROVENTIL HFA/VENTOLIN HFA) 108 (90  Base) MCG/ACT inhaler Inhale 2 puffs into the lungs every 6 hours as needed 18 g 3    albuterol (PROVENTIL) (2.5 MG/3ML) 0.083% neb solution Take 1 vial (2.5 mg) by nebulization 4 times daily (Patient taking differently: Take 2.5 mg by nebulization 1-2 times daily) 90 mL 3    arformoterol (BROVANA) 15 MCG/2ML NEBU neb solution Take 2 mLs (15 mcg) by nebulization 2 times daily 360 mL 1    aspirin (ASA) 81 MG EC tablet Take by mouth every other day      atorvastatin (LIPITOR) 40 MG tablet TAKE 1 TABLET(40 MG) BY MOUTH AT BEDTIME 90 tablet 2    azelastine (ASTELIN) 137 mcg (0.1 %) nasal spray [AZELASTINE (ASTELIN) 137 MCG (0.1 %) NASAL SPRAY] Use in each nostril as directed (Patient taking differently: as needed) 30 mL 12    azithromycin (ZITHROMAX) 250 MG tablet Take 1 tablet (250 mg) by mouth daily for 180 days 90 tablet 1    ipratropium - albuterol 0.5 mg/2.5 mg/3 mL (DUONEB) 0.5-2.5 (3) MG/3ML neb solution Take 1 vial (3 mLs) by nebulization every 6 hours as needed for shortness of breath, wheezing or cough 360 mL 3    Loperamide HCl (IMODIUM A-D PO)       metoprolol succinate (TOPROL-XL) 50 MG 24 hr tablet [METOPROLOL SUCCINATE (TOPROL-XL) 50 MG 24 HR TABLET] Take 50 mg by mouth daily.      multivitamin therapeutic tablet Take 1 tablet by mouth daily      nintedanib (OFEV) 100 MG capsule Take 1 capsule (100 mg) by mouth 2 times daily 180 capsule 3    omeprazole (PRILOSEC) 20 MG capsule [OMEPRAZOLE (PRILOSEC) 20 MG CAPSULE] Take 20 mg by mouth 2 (two) times a day before meals.      triamterene (DYRENIUM) 50 MG capsule Take 25 mg by mouth daily Patient only takes 25mg daily      ipratropium (ATROVENT) 0.02 % neb solution Take 2.5 mLs (0.5 mg) by nebulization 4 times daily (Patient not taking: Reported on 5/2/2024) 90 mL 3       Allergies   Allergen Reactions    Animal Dander Unknown    Chalk     Dogs Other (See Comments)     Pet Dander    Maple Tree     Mold [Molds & Smuts] Unknown          Lab Results   "  Chemistry/lipid CBC Cardiac Enzymes/BNP/TSH/INR   Recent Labs   Lab Test 09/18/23  1412   CHOL 148   HDL 42   LDL 66   TRIG 201*     Recent Labs   Lab Test 09/18/23  1412 09/12/23  1313 08/21/23  1411   LDL 66 72 46     Recent Labs   Lab Test 04/30/24  1239 09/12/23  1313   NA  --  133*   POTASSIUM  --  4.0   CHLORIDE  --  94*   CO2  --  27   GLC  --  83   BUN  --  13.6   CR 0.9 0.87   GFRESTIMATED >60 89   SHALOM  --  9.6     Recent Labs   Lab Test 04/30/24  1239 09/12/23  1313 12/15/22  1159   CR 0.9 0.87 0.97     No results for input(s): \"A1C\" in the last 77424 hours.       Recent Labs   Lab Test 05/17/22  0901 05/30/21  1950   WBC  --  9.1   HGB 11.6* 14.7   HCT  --  41.9   MCV  --  89   PLT  --  207     Recent Labs   Lab Test 05/17/22  0901 05/30/21  1950 11/01/19  0540   HGB 11.6* 14.7 12.9*    No results for input(s): \"TROPONINI\" in the last 70235 hours.  No results for input(s): \"BNP\", \"NTBNPI\", \"NTBNP\" in the last 37777 hours.  No results for input(s): \"TSH\" in the last 94841 hours.  Recent Labs   Lab Test 01/15/22  0627 05/30/21  1950 10/31/19  0907   INR 1.0 1.00 0.95        Shiv Robles MD                                      "

## 2024-05-02 NOTE — LETTER
5/2/2024    Eliot De La Rosa MD  [address]  [facility] [address]    RE: Wyatt Gutierrez       Dear Colleague,     I had the pleasure of seeing Wyatt Gutierrez in the [facility].    HEART CARE VALVE CLINIC ENCOUNTER CONSULTATON NOTE      [facility]  [phone]      Assessment/Recommendations   Assessment/Plan: Wyatt Gutierrez I a 78M w/ severe aortic stenosis, AF, CAD, dyslipidemia, HTN, GERD, idiopathic pulmonary fibrosis (follows with pulmonary), alvarez's esophagus who is here for evaluation of management options for his valvular disease.    -  I personally reviewed cardiac imaging including: TTE, which showed P/M 52/37 JOJO 0.93 DI 0.25 SVI 39.3 consistent w/ severe aortic stenosis;  -  CTA which showed adequate R TF access, annular area 516 - good for #26S3, angle NAVA 7 CRA 7;  -  after a long discussion of natural history, risk/benefit, and management options w/  the patient and family, we all agree to proceed w/ the remainder of w/up including angio +/- PCI,  CTS consult, Dental eval    A total of >60 mins was spent reviewing prior records, including documentation, lab studies, cardiac testing/imaging, interview with patient, physical exam, and documentation     History of Present Illness/Subjective    HPI: Wyatt Gutierrez is a 78 year old male w/ severe aortic stenosis, AF, CAD, dyslipidemia, HTN, GERD, idiopathic pulmonary fibrosis (follows with pulmonary), alvarez's esophagus who is here for evaluation of management options for his valvular disease.    He has SOB at baseline w/ a need for home O2 but the DOWNS and O2 requirement has been increasing ovet he past 6 mos. Walking and showering is a choir and a mjor obstacle at this point, even w/ a chair. No orthopnea/PND/edema/syncope/N/V/F/C/wt.changes. + diarrhea occasionally.    Lives w/ his wife, retired , EtOH moderate (used to be heavy). Quit smoking '21.    Recent Echocardiogram Results:  1.Left ventricular size, wall  "motion and function are normal. The ejection  fraction is 55-60%.  2.There is mild concentric left ventricular hypertrophy.  3.Mildly decreased right ventricular systolic function  4.Moderate to severe stenosis of a trileaflet thickened aortic valve, at SVI  of 32 mL/M2 dimensionless index 0.25 with peak velocity of 3.9 m/s and mean  gradient of 37 mmHg.  4.There is mild to moderate (1-2+) aortic regurgitation. Decel 1/2 time is 582  msec.  Compared to the prior study dated 6/12/2023, the LV EF looks to be slightly  less, the AI slightly more and the AS slightly worse, prior was 37 mmHg and  3.6 m/sec.    Recent Coronary Angiogram Results:  None       Physical Examination  Review of Systems   Vitals: /68 (BP Location: Right arm, Patient Position: Sitting, Cuff Size: Adult Large)   Pulse 76   Resp 20   Ht 1.778 m (5' 10\")   Wt 94.8 kg (209 lb)   BMI 29.99 kg/m    BMI= Body mass index is 29.99 kg/m .  Wt Readings from Last 3 Encounters:   05/02/24 94.8 kg (209 lb)   04/15/24 94.3 kg (208 lb)   01/04/24 97.5 kg (215 lb)       General Appearance:   no distress, normal body habitus   ENT/Mouth: membranes moist, no oral lesions or bleeding gums.      EYES:  no scleral icterus, normal conjunctivae   Neck: no carotid bruits or thyromegaly   Chest/Lungs:   lungs are clear to auscultation, no rales or wheezing, nosternal scar, equal chest wall expansion    Cardiovascular:   Regular. Normal first and second heart sounds with 2/6 systolic murmur, no rubs, or gallops; the carotid, radial and posterior tibial pulses are intact, Jugular venous pressure 6, no edema bilaterally    Abdomen:  no organomegaly, masses, bruits, or tenderness; bowel sounds are present   Extremities: no cyanosis or clubbing   Skin: no xanthelasma, warm.    Neurologic: normal  bilateral, no tremors     Psychiatric: alert and oriented x3, calm        Please refer above for cardiac ROS details.        Medical History  Surgical History Family " History Social History   Past Medical History:   Diagnosis Date    Arthritis     High cholesterol     Hypertension     Sleep apnea      Past Surgical History:   Procedure Laterality Date    ARTHROSCOPY SHOULDER      COLONOSCOPY      ESOPHAGOSCOPY, GASTROSCOPY, DUODENOSCOPY (EGD), COMBINED N/A 10/2/2023    Procedure: ESOPHAGOGASTRODUODENOSCOPY with biopsies;  Surgeon: Reji Baptiste MD;  Location: [facility]    OTHER SURGICAL HISTORY Right     heel fracture    OTHER SURGICAL HISTORY      knee arthroscopies    ZZC TOTAL KNEE ARTHROPLASTY Left 10/31/2019    Procedure: LEFT MINIMALLY INVASIVE TOTAL KNEE ARTHROPLASTY;  Surgeon: Avelino Canada MD;  Location: [facility];  Service: Orthopedics     No family history on file.     Social History     Socioeconomic History    Marital status:      Spouse name: Not on file    Number of children: Not on file    Years of education: Not on file    Highest education level: Not on file   Occupational History    Not on file   Tobacco Use    Smoking status: Former     Current packs/day: 0.00     Average packs/day: 0.5 packs/day for 55.0 years (27.5 ttl pk-yrs)     Types: Cigarettes     Start date: 1966     Quit date: 2021     Years since quittin.3    Smokeless tobacco: Never   Substance and Sexual Activity    Alcohol use: Yes     Comment: 1-2 glasses of wine/ 2 x-week    Drug use: Not Currently     Comment: edible marijuana in the past    Sexual activity: Not on file   Other Topics Concern    Not on file   Social History Narrative    Not on file     Social Determinants of Health     Financial Resource Strain: Not on file   Food Insecurity: Not on file   Transportation Needs: Not on file   Physical Activity: Not on file   Stress: Not on file   Social Connections: Not on file   Interpersonal Safety: Not on file   Housing Stability: Not on file           Medications  Allergies   Current Outpatient Medications   Medication Sig Dispense Refill     acyclovir (ZOVIRAX) 400 MG tablet [ACYCLOVIR (ZOVIRAX) 400 MG TABLET] Take 400 mg by mouth 3 (three) times a day as needed.             albuterol (PROAIR HFA/PROVENTIL HFA/VENTOLIN HFA) 108 (90 Base) MCG/ACT inhaler Inhale 2 puffs into the lungs every 6 hours as needed 18 g 3    albuterol (PROVENTIL) (2.5 MG/3ML) 0.083% neb solution Take 1 vial (2.5 mg) by nebulization 4 times daily (Patient taking differently: Take 2.5 mg by nebulization 1-2 times daily) 90 mL 3    arformoterol (BROVANA) 15 MCG/2ML NEBU neb solution Take 2 mLs (15 mcg) by nebulization 2 times daily 360 mL 1    aspirin (ASA) 81 MG EC tablet Take by mouth every other day      atorvastatin (LIPITOR) 40 MG tablet TAKE 1 TABLET(40 MG) BY MOUTH AT BEDTIME 90 tablet 2    azelastine (ASTELIN) 137 mcg (0.1 %) nasal spray [AZELASTINE (ASTELIN) 137 MCG (0.1 %) NASAL SPRAY] Use in each nostril as directed (Patient taking differently: as needed) 30 mL 12    azithromycin (ZITHROMAX) 250 MG tablet Take 1 tablet (250 mg) by mouth daily for 180 days 90 tablet 1    ipratropium - albuterol 0.5 mg/2.5 mg/3 mL (DUONEB) 0.5-2.5 (3) MG/3ML neb solution Take 1 vial (3 mLs) by nebulization every 6 hours as needed for shortness of breath, wheezing or cough 360 mL 3    Loperamide HCl (IMODIUM A-D PO)       metoprolol succinate (TOPROL-XL) 50 MG 24 hr tablet [METOPROLOL SUCCINATE (TOPROL-XL) 50 MG 24 HR TABLET] Take 50 mg by mouth daily.      multivitamin therapeutic tablet Take 1 tablet by mouth daily      nintedanib (OFEV) 100 MG capsule Take 1 capsule (100 mg) by mouth 2 times daily 180 capsule 3    omeprazole (PRILOSEC) 20 MG capsule [OMEPRAZOLE (PRILOSEC) 20 MG CAPSULE] Take 20 mg by mouth 2 (two) times a day before meals.      triamterene (DYRENIUM) 50 MG capsule Take 25 mg by mouth daily Patient only takes 25mg daily      ipratropium (ATROVENT) 0.02 % neb solution Take 2.5 mLs (0.5 mg) by nebulization 4 times daily (Patient not taking: Reported on 5/2/2024) 90 mL 3      "  Allergies   Allergen Reactions    Animal Dander Unknown    Chalk     Dogs Other (See Comments)     Pet Dander    Maple Tree     Mold [Molds & Smuts] Unknown          Lab Results    Chemistry/lipid CBC Cardiac Enzymes/BNP/TSH/INR   Recent Labs   Lab Test 09/18/23  1412   CHOL 148   HDL 42   LDL 66   TRIG 201*     Recent Labs   Lab Test 09/18/23  1412 09/12/23  1313 08/21/23  1411   LDL 66 72 46     Recent Labs   Lab Test 04/30/24  1239 09/12/23  1313   NA  --  133*   POTASSIUM  --  4.0   CHLORIDE  --  94*   CO2  --  27   GLC  --  83   BUN  --  13.6   CR 0.9 0.87   GFRESTIMATED >60 89   SHALOM  --  9.6     Recent Labs   Lab Test 04/30/24  1239 09/12/23  1313 12/15/22  1159   CR 0.9 0.87 0.97     No results for input(s): \"A1C\" in the last 04250 hours.       Recent Labs   Lab Test 05/17/22  0901 05/30/21  1950   WBC  --  9.1   HGB 11.6* 14.7   HCT  --  41.9   MCV  --  89   PLT  --  207     Recent Labs   Lab Test 05/17/22  0901 05/30/21  1950 11/01/19  0540   HGB 11.6* 14.7 12.9*    No results for input(s): \"TROPONINI\" in the last 96049 hours.  No results for input(s): \"BNP\", \"NTBNPI\", \"NTBNP\" in the last 27292 hours.  No results for input(s): \"TSH\" in the last 30070 hours.  Recent Labs   Lab Test 01/15/22  0627 05/30/21  1950 10/31/19  0907   INR 1.0 1.00 0.95        Shiv Robles MD    Thank you for allowing me to participate in the care of your patient.    Sincerely,   Shiv Robles MD     [facility]  cc:   Bubba Cao MD  [address] [facility] [address]  [place] [address]      "

## 2024-05-02 NOTE — PROGRESS NOTES
5 Meter Walk Test:  1st Trial: 7.47  2nd Trial: 6.55  3rd Trial: 8.40    Score: 7.47    Pt. Used an oxygen tank while doing the 5 meter walk.

## 2024-05-02 NOTE — PROGRESS NOTES
HEART CARE VALVE CLINIC ENCOUNTER CONSULTATON NOTE      [facility]  [phone]      Assessment/Recommendations   Assessment/Plan: Wyatt Gutierrez I a 78M w/ severe aortic stenosis, AF, CAD, dyslipidemia, HTN, GERD, idiopathic pulmonary fibrosis (follows with pulmonary), alvarez's esophagus who is here for evaluation of management options for his valvular disease.    -  I personally reviewed cardiac imaging including: TTE, which showed P/M 52/37 JOJO 0.93 DI 0.25 SVI 39.3 consistent w/ severe aortic stenosis;  -  CTA which showed adequate R TF access, annular area 516 - good for #26S3, angle NAVA 7 CRA 7;  -  after a long discussion of natural history, risk/benefit, and management options w/  the patient and family, we all agree to proceed w/ the remainder of w/up including angio +/- PCI,  CTS consult, Dental eval    A total of >60 mins was spent reviewing prior records, including documentation, lab studies, cardiac testing/imaging, interview with patient, physical exam, and documentation     History of Present Illness/Subjective    HPI: Wyatt Gutierrez is a 78 year old male w/ severe aortic stenosis, AF, CAD, dyslipidemia, HTN, GERD, idiopathic pulmonary fibrosis (follows with pulmonary), alvarez's esophagus who is here for evaluation of management options for his valvular disease.    He has SOB at baseline w/ a need for home O2 but the DOWNS and O2 requirement has been increasing ovet he past 6 mos. Walking and showering is a choir and a mjor obstacle at this point, even w/ a chair. No orthopnea/PND/edema/syncope/N/V/F/C/wt.changes. + diarrhea occasionally.    Lives w/ his wife, retired , EtOH moderate (used to be heavy). Quit smoking '21.    Recent Echocardiogram Results:  1.Left ventricular size, wall motion and function are normal. The ejection  fraction is 55-60%.  2.There is mild concentric left ventricular hypertrophy.  3.Mildly decreased right ventricular systolic function  4.Moderate to  "severe stenosis of a trileaflet thickened aortic valve, at SVI  of 32 mL/M2 dimensionless index 0.25 with peak velocity of 3.9 m/s and mean  gradient of 37 mmHg.  4.There is mild to moderate (1-2+) aortic regurgitation. Decel 1/2 time is 582  msec.  Compared to the prior study dated 6/12/2023, the LV EF looks to be slightly  less, the AI slightly more and the AS slightly worse, prior was 37 mmHg and  3.6 m/sec.    Recent Coronary Angiogram Results:  None       Physical Examination  Review of Systems   Vitals: /68 (BP Location: Right arm, Patient Position: Sitting, Cuff Size: Adult Large)   Pulse 76   Resp 20   Ht 1.778 m (5' 10\")   Wt 94.8 kg (209 lb)   BMI 29.99 kg/m    BMI= Body mass index is 29.99 kg/m .  Wt Readings from Last 3 Encounters:   05/02/24 94.8 kg (209 lb)   04/15/24 94.3 kg (208 lb)   01/04/24 97.5 kg (215 lb)       General Appearance:   no distress, normal body habitus   ENT/Mouth: membranes moist, no oral lesions or bleeding gums.      EYES:  no scleral icterus, normal conjunctivae   Neck: no carotid bruits or thyromegaly   Chest/Lungs:   lungs are clear to auscultation, no rales or wheezing, nosternal scar, equal chest wall expansion    Cardiovascular:   Regular. Normal first and second heart sounds with 2/6 systolic murmur, no rubs, or gallops; the carotid, radial and posterior tibial pulses are intact, Jugular venous pressure 6, no edema bilaterally    Abdomen:  no organomegaly, masses, bruits, or tenderness; bowel sounds are present   Extremities: no cyanosis or clubbing   Skin: no xanthelasma, warm.    Neurologic: normal  bilateral, no tremors     Psychiatric: alert and oriented x3, calm        Please refer above for cardiac ROS details.        Medical History  Surgical History Family History Social History   Past Medical History:   Diagnosis Date    Arthritis     High cholesterol     Hypertension     Sleep apnea      Past Surgical History:   Procedure Laterality Date    " ARTHROSCOPY SHOULDER      COLONOSCOPY      ESOPHAGOSCOPY, GASTROSCOPY, DUODENOSCOPY (EGD), COMBINED N/A 10/2/2023    Procedure: ESOPHAGOGASTRODUODENOSCOPY with biopsies;  Surgeon: Reji Baptiste MD;  Location: [facility] Main OR    OTHER SURGICAL HISTORY Right     heel fracture    OTHER SURGICAL HISTORY      knee arthroscopies    ZZC TOTAL KNEE ARTHROPLASTY Left 10/31/2019    Procedure: LEFT MINIMALLY INVASIVE TOTAL KNEE ARTHROPLASTY;  Surgeon: Avelino Canada MD;  Location: [facility] Main OR;  Service: Orthopedics     No family history on file.     Social History     Socioeconomic History    Marital status:      Spouse name: Not on file    Number of children: Not on file    Years of education: Not on file    Highest education level: Not on file   Occupational History    Not on file   Tobacco Use    Smoking status: Former     Current packs/day: 0.00     Average packs/day: 0.5 packs/day for 55.0 years (27.5 ttl pk-yrs)     Types: Cigarettes     Start date: 1966     Quit date: 2021     Years since quittin.3    Smokeless tobacco: Never   Substance and Sexual Activity    Alcohol use: Yes     Comment: 1-2 glasses of wine/ 2 x-week    Drug use: Not Currently     Comment: edible marijuana in the past    Sexual activity: Not on file   Other Topics Concern    Not on file   Social History Narrative    Not on file     Social Determinants of Health     Financial Resource Strain: Not on file   Food Insecurity: Not on file   Transportation Needs: Not on file   Physical Activity: Not on file   Stress: Not on file   Social Connections: Not on file   Interpersonal Safety: Not on file   Housing Stability: Not on file           Medications  Allergies   Current Outpatient Medications   Medication Sig Dispense Refill    acyclovir (ZOVIRAX) 400 MG tablet [ACYCLOVIR (ZOVIRAX) 400 MG TABLET] Take 400 mg by mouth 3 (three) times a day as needed.             albuterol (PROAIR HFA/PROVENTIL HFA/VENTOLIN HFA) 108 (90  Base) MCG/ACT inhaler Inhale 2 puffs into the lungs every 6 hours as needed 18 g 3    albuterol (PROVENTIL) (2.5 MG/3ML) 0.083% neb solution Take 1 vial (2.5 mg) by nebulization 4 times daily (Patient taking differently: Take 2.5 mg by nebulization 1-2 times daily) 90 mL 3    arformoterol (BROVANA) 15 MCG/2ML NEBU neb solution Take 2 mLs (15 mcg) by nebulization 2 times daily 360 mL 1    aspirin (ASA) 81 MG EC tablet Take by mouth every other day      atorvastatin (LIPITOR) 40 MG tablet TAKE 1 TABLET(40 MG) BY MOUTH AT BEDTIME 90 tablet 2    azelastine (ASTELIN) 137 mcg (0.1 %) nasal spray [AZELASTINE (ASTELIN) 137 MCG (0.1 %) NASAL SPRAY] Use in each nostril as directed (Patient taking differently: as needed) 30 mL 12    azithromycin (ZITHROMAX) 250 MG tablet Take 1 tablet (250 mg) by mouth daily for 180 days 90 tablet 1    ipratropium - albuterol 0.5 mg/2.5 mg/3 mL (DUONEB) 0.5-2.5 (3) MG/3ML neb solution Take 1 vial (3 mLs) by nebulization every 6 hours as needed for shortness of breath, wheezing or cough 360 mL 3    Loperamide HCl (IMODIUM A-D PO)       metoprolol succinate (TOPROL-XL) 50 MG 24 hr tablet [METOPROLOL SUCCINATE (TOPROL-XL) 50 MG 24 HR TABLET] Take 50 mg by mouth daily.      multivitamin therapeutic tablet Take 1 tablet by mouth daily      nintedanib (OFEV) 100 MG capsule Take 1 capsule (100 mg) by mouth 2 times daily 180 capsule 3    omeprazole (PRILOSEC) 20 MG capsule [OMEPRAZOLE (PRILOSEC) 20 MG CAPSULE] Take 20 mg by mouth 2 (two) times a day before meals.      triamterene (DYRENIUM) 50 MG capsule Take 25 mg by mouth daily Patient only takes 25mg daily      ipratropium (ATROVENT) 0.02 % neb solution Take 2.5 mLs (0.5 mg) by nebulization 4 times daily (Patient not taking: Reported on 5/2/2024) 90 mL 3       Allergies   Allergen Reactions    Animal Dander Unknown    Chalk     Dogs Other (See Comments)     Pet Dander    Maple Tree     Mold [Molds & Smuts] Unknown          Lab Results   "  Chemistry/lipid CBC Cardiac Enzymes/BNP/TSH/INR   Recent Labs   Lab Test 09/18/23  1412   CHOL 148   HDL 42   LDL 66   TRIG 201*     Recent Labs   Lab Test 09/18/23  1412 09/12/23  1313 08/21/23  1411   LDL 66 72 46     Recent Labs   Lab Test 04/30/24  1239 09/12/23  1313   NA  --  133*   POTASSIUM  --  4.0   CHLORIDE  --  94*   CO2  --  27   GLC  --  83   BUN  --  13.6   CR 0.9 0.87   GFRESTIMATED >60 89   SHALOM  --  9.6     Recent Labs   Lab Test 04/30/24  1239 09/12/23  1313 12/15/22  1159   CR 0.9 0.87 0.97     No results for input(s): \"A1C\" in the last 35621 hours.       Recent Labs   Lab Test 05/17/22  0901 05/30/21  1950   WBC  --  9.1   HGB 11.6* 14.7   HCT  --  41.9   MCV  --  89   PLT  --  207     Recent Labs   Lab Test 05/17/22  0901 05/30/21  1950 11/01/19  0540   HGB 11.6* 14.7 12.9*    No results for input(s): \"TROPONINI\" in the last 54637 hours.  No results for input(s): \"BNP\", \"NTBNPI\", \"NTBNP\" in the last 93874 hours.  No results for input(s): \"TSH\" in the last 77375 hours.  Recent Labs   Lab Test 01/15/22  0627 05/30/21  1950 10/31/19  0907   INR 1.0 1.00 0.95        Shiv Robles MD                                      "

## 2024-05-03 LAB
ATRIAL RATE - MUSE: 76 BPM
DIASTOLIC BLOOD PRESSURE - MUSE: NORMAL MMHG
INTERPRETATION ECG - MUSE: NORMAL
P AXIS - MUSE: 29 DEGREES
PR INTERVAL - MUSE: 214 MS
QRS DURATION - MUSE: 100 MS
QT - MUSE: 362 MS
QTC - MUSE: 407 MS
R AXIS - MUSE: -63 DEGREES
SYSTOLIC BLOOD PRESSURE - MUSE: NORMAL MMHG
T AXIS - MUSE: 0 DEGREES
VENTRICULAR RATE- MUSE: 76 BPM

## 2024-05-06 ENCOUNTER — TELEPHONE (OUTPATIENT)
Dept: CARDIOLOGY | Facility: CLINIC | Age: 79
End: 2024-05-06
Payer: MEDICARE

## 2024-05-06 NOTE — TELEPHONE ENCOUNTER
[employer] message and hard copy of letter sent to patients home with angiogram instructions today 5/6/24.     Vanessa Lugo RN on 5/6/2024 at 10:32 AM

## 2024-05-08 NOTE — TELEPHONE ENCOUNTER
Received message from scheduling team, patients arrival time for angiogram needs to be changed to 630 AM.     Patient will need to be updated solid food okay until 1030 PM night before, clear liquid until 430 AM, NPO after 430 AM except sip of water with meds.     Vanessa Lugo RN on 5/8/2024 at 2:45 PM

## 2024-05-10 ENCOUNTER — PREP FOR PROCEDURE (OUTPATIENT)
Dept: CARDIOLOGY | Facility: CLINIC | Age: 79
End: 2024-05-10
Payer: MEDICARE

## 2024-05-10 DIAGNOSIS — I35.0 NONRHEUMATIC AORTIC VALVE STENOSIS: Primary | ICD-10-CM

## 2024-05-10 RX ORDER — ASPIRIN 81 MG/1
243 TABLET, CHEWABLE ORAL ONCE
Status: CANCELLED | OUTPATIENT
Start: 2024-05-10

## 2024-05-10 RX ORDER — LIDOCAINE 40 MG/G
CREAM TOPICAL
Status: CANCELLED | OUTPATIENT
Start: 2024-05-10

## 2024-05-10 RX ORDER — FENTANYL CITRATE 50 UG/ML
25 INJECTION, SOLUTION INTRAMUSCULAR; INTRAVENOUS
Status: CANCELLED | OUTPATIENT
Start: 2024-05-10

## 2024-05-10 RX ORDER — SODIUM CHLORIDE 9 MG/ML
INJECTION, SOLUTION INTRAVENOUS CONTINUOUS
Status: CANCELLED | OUTPATIENT
Start: 2024-05-10

## 2024-05-10 RX ORDER — ASPIRIN 325 MG
325 TABLET ORAL ONCE
Status: CANCELLED | OUTPATIENT
Start: 2024-05-10 | End: 2024-05-10

## 2024-05-10 NOTE — TELEPHONE ENCOUNTER
Pre-Procedure Angiogram Education     Wyatt Gutierrez  [address] [facility] [address] [facility] [address]  [phone] (home)     Procedure cardiologist:  Dr. Robles  PCP:  Eliot De La Rosa  H&P completed by:  Dr. Robles 5/2/24   Admit date 5/13/23  Arrival time:  630 AM   Anticoagulation: No   CPAP: No  Previous PCI: No  Bypass Grafts: No  Renal Issues: No  Diabetic?: No  Device?: No  Type:  N/A    Angiogram Teaching    Reason for Visit:  Telephone call to discuss pre-procedure education in preparation for: coronary angiogram with possible percutaneous coronary intervention.     Letter sent to patient home prior to phone teach, wife confirmed receipt of this. We discussed arrival time and NPO times needed to be corrected based off of new arrival time.     Procedure Prep:  Primary Cardiologist note dated: Dr. Cao 4/15/24  EKG results obtained, dated: to be obtained date of procedure   T&S- obtained 5/2/24 with T&S ext form; see media tab  BMP, CBC- to be collected date of procedure     Patient Education  Patient states understanding of procedure and risks and agrees to proceed.    Pre-procedure instructions  Patient instructed to be NPO per anesthesia guidelines; no food after 1030 PM the night prior, clear liquids until 430 AM, nothing by mouth after 430 AM except a sip of water with meds.   Patient instructed to shower the evening before or the morning of the procedure.  Leave all valuables at home (jewlery, rings, watches, large amounts of money).  Patient understands there are two visitors allowed during patients stay.   Patient instructed to arrange for transportation home following procedure from a responsible family member of friend. No driving for at least 24 hours post-procedure.  Patient instructed to have a responsible adult with them for 24 hours post-procedure.  Post-procedure follow up process.  Conscious sedation discussed.    Pre-procedure medication instructions  Patient instructed on  antiplatelet medication.  Continue medications as scheduled up until the date of procedure unless indicated below.   Patient instructed to take 325 mg of Aspirin am of procedure: Yes  Other medication: instructed to only take any breathing treatments needed (neb/inhaler) including duoneb, brovana neb, albuterol, atrovent, etc. Also ok to take Nintedanib, azithromycin, metoprolol and prilosec a.m. of the procedure.  Instructed patient to hold all other prescription medications, OTC meds, supplements and vitamins the day of procedure.     *PATIENTS RECORDS AVAILABLE IN [facility] UNLESS OTHERWISE INDICATED*      Patient Active Problem List   Diagnosis    Primary osteoarthritis of knees, bilateral    Status post total left knee replacement    Benign essential hypertension    Dyslipidemia    Gastroesophageal reflux disease, esophagitis presence not specified       Current Outpatient Medications   Medication Sig Dispense Refill    acyclovir (ZOVIRAX) 400 MG tablet [ACYCLOVIR (ZOVIRAX) 400 MG TABLET] Take 400 mg by mouth 3 (three) times a day as needed.             albuterol (PROAIR HFA/PROVENTIL HFA/VENTOLIN HFA) 108 (90 Base) MCG/ACT inhaler Inhale 2 puffs into the lungs every 6 hours as needed 18 g 3    albuterol (PROVENTIL) (2.5 MG/3ML) 0.083% neb solution Take 1 vial (2.5 mg) by nebulization 4 times daily (Patient taking differently: Take 2.5 mg by nebulization 1-2 times daily) 90 mL 3    arformoterol (BROVANA) 15 MCG/2ML NEBU neb solution Take 2 mLs (15 mcg) by nebulization 2 times daily 360 mL 1    aspirin (ASA) 81 MG EC tablet Take by mouth every other day      atorvastatin (LIPITOR) 40 MG tablet TAKE 1 TABLET(40 MG) BY MOUTH AT BEDTIME 90 tablet 2    azelastine (ASTELIN) 137 mcg (0.1 %) nasal spray [AZELASTINE (ASTELIN) 137 MCG (0.1 %) NASAL SPRAY] Use in each nostril as directed (Patient taking differently: as needed) 30 mL 12    azithromycin (ZITHROMAX) 250 MG tablet Take 1 tablet (250 mg) by mouth daily for 180 days  90 tablet 1    ipratropium (ATROVENT) 0.02 % neb solution Take 2.5 mLs (0.5 mg) by nebulization 4 times daily (Patient not taking: Reported on 5/2/2024) 90 mL 3    ipratropium - albuterol 0.5 mg/2.5 mg/3 mL (DUONEB) 0.5-2.5 (3) MG/3ML neb solution Take 1 vial (3 mLs) by nebulization every 6 hours as needed for shortness of breath, wheezing or cough 360 mL 3    Loperamide HCl (IMODIUM A-D PO)       metoprolol succinate (TOPROL-XL) 50 MG 24 hr tablet [METOPROLOL SUCCINATE (TOPROL-XL) 50 MG 24 HR TABLET] Take 50 mg by mouth daily.      multivitamin therapeutic tablet Take 1 tablet by mouth daily      nintedanib (OFEV) 100 MG capsule Take 1 capsule (100 mg) by mouth 2 times daily 180 capsule 3    omeprazole (PRILOSEC) 20 MG capsule [OMEPRAZOLE (PRILOSEC) 20 MG CAPSULE] Take 20 mg by mouth 2 (two) times a day before meals.      triamterene (DYRENIUM) 50 MG capsule Take 25 mg by mouth daily Patient only takes 25mg daily         Allergies   Allergen Reactions    Animal Dander Unknown    Chalk     Dogs Other (See Comments)     Pet Dander    Maple Tree     Mold [Molds & Smuts] Unknown       Orders placed. Patient has my contact information if they have any additional questions or concerns.     Juan Carlos Lugo RN BSN  Structural Heart Coordinator   [facility]  [phone]

## 2024-05-10 NOTE — TELEPHONE ENCOUNTER
Called and left detailed VM on home and spouse cell requesting to go over angiogram instructions and noting there is a time change for arrival of 630 AM so NPO instructions will also need to be adjusted. Requested patient call back today to go over these. Call back number provided.     Vanessa Lugo RN on 5/10/2024 at 8:59 AM

## 2024-05-13 ENCOUNTER — HOSPITAL ENCOUNTER (OUTPATIENT)
Facility: HOSPITAL | Age: 79
Discharge: HOME | End: 2024-05-13
Attending: INTERNAL MEDICINE | Admitting: INTERNAL MEDICINE
Payer: MEDICARE

## 2024-05-13 VITALS
DIASTOLIC BLOOD PRESSURE: 88 MMHG | TEMPERATURE: 97.7 F | RESPIRATION RATE: 24 BRPM | HEART RATE: 82 BPM | SYSTOLIC BLOOD PRESSURE: 148 MMHG | OXYGEN SATURATION: 87 %

## 2024-05-13 DIAGNOSIS — I25.10 CORONARY ARTERY DISEASE INVOLVING NATIVE CORONARY ARTERY WITHOUT ANGINA PECTORIS, UNSPECIFIED WHETHER NATIVE OR TRANSPLANTED HEART: ICD-10-CM

## 2024-05-13 DIAGNOSIS — I35.0 NONRHEUMATIC AORTIC VALVE STENOSIS: ICD-10-CM

## 2024-05-13 DIAGNOSIS — E78.5 DYSLIPIDEMIA: ICD-10-CM

## 2024-05-13 DIAGNOSIS — I51.89 OTHER ILL-DEFINED HEART DISEASES: ICD-10-CM

## 2024-05-13 LAB
ACT BLD: 178 SECONDS (ref 74–150)
ACT BLD: 201 SECONDS (ref 74–150)
ANION GAP SERPL CALCULATED.3IONS-SCNC: 9 MMOL/L (ref 7–15)
BUN SERPL-MCNC: 12.4 MG/DL (ref 8–23)
CALCIUM SERPL-MCNC: 10 MG/DL (ref 8.8–10.2)
CHLORIDE SERPL-SCNC: 93 MMOL/L (ref 98–107)
CREAT SERPL-MCNC: 0.92 MG/DL (ref 0.67–1.17)
DEPRECATED HCO3 PLAS-SCNC: 29 MMOL/L (ref 22–29)
EGFRCR SERPLBLD CKD-EPI 2021: 86 ML/MIN/1.73M2
ERYTHROCYTE [DISTWIDTH] IN BLOOD BY AUTOMATED COUNT: 16.2 % (ref 10–15)
GLUCOSE SERPL-MCNC: 114 MG/DL (ref 70–99)
HCT VFR BLD AUTO: 35.7 % (ref 40–53)
HGB BLD-MCNC: 11.3 G/DL (ref 13.3–17.7)
MCH RBC QN AUTO: 24.1 PG (ref 26.5–33)
MCHC RBC AUTO-ENTMCNC: 31.7 G/DL (ref 31.5–36.5)
MCV RBC AUTO: 76 FL (ref 78–100)
PLATELET # BLD AUTO: 309 10E3/UL (ref 150–450)
POTASSIUM SERPL-SCNC: 4.1 MMOL/L (ref 3.4–5.3)
RBC # BLD AUTO: 4.68 10E6/UL (ref 4.4–5.9)
SODIUM SERPL-SCNC: 131 MMOL/L (ref 135–145)
WBC # BLD AUTO: 11.3 10E3/UL (ref 4–11)

## 2024-05-13 PROCEDURE — 99152 MOD SED SAME PHYS/QHP 5/>YRS: CPT | Performed by: INTERNAL MEDICINE

## 2024-05-13 PROCEDURE — 93454 CORONARY ARTERY ANGIO S&I: CPT | Performed by: INTERNAL MEDICINE

## 2024-05-13 PROCEDURE — 85347 COAGULATION TIME ACTIVATED: CPT | Mod: 91

## 2024-05-13 PROCEDURE — C1769 GUIDE WIRE: HCPCS | Performed by: INTERNAL MEDICINE

## 2024-05-13 PROCEDURE — 272N000001 HC OR GENERAL SUPPLY STERILE: Performed by: INTERNAL MEDICINE

## 2024-05-13 PROCEDURE — C1887 CATHETER, GUIDING: HCPCS | Performed by: INTERNAL MEDICINE

## 2024-05-13 PROCEDURE — 999N000054 HC STATISTIC EKG NON-CHARGEABLE

## 2024-05-13 PROCEDURE — 82374 ASSAY BLOOD CARBON DIOXIDE: CPT | Performed by: INTERNAL MEDICINE

## 2024-05-13 PROCEDURE — 99153 MOD SED SAME PHYS/QHP EA: CPT | Performed by: INTERNAL MEDICINE

## 2024-05-13 PROCEDURE — 93005 ELECTROCARDIOGRAM TRACING: CPT

## 2024-05-13 PROCEDURE — 250N000009 HC RX 250: Performed by: INTERNAL MEDICINE

## 2024-05-13 PROCEDURE — 258N000003 HC RX IP 258 OP 636: Performed by: INTERNAL MEDICINE

## 2024-05-13 PROCEDURE — 250N000011 HC RX IP 250 OP 636: Performed by: INTERNAL MEDICINE

## 2024-05-13 PROCEDURE — 255N000002 HC RX 255 OP 636: Performed by: INTERNAL MEDICINE

## 2024-05-13 PROCEDURE — 93454 CORONARY ARTERY ANGIO S&I: CPT | Mod: 26 | Performed by: INTERNAL MEDICINE

## 2024-05-13 PROCEDURE — 85027 COMPLETE CBC AUTOMATED: CPT | Performed by: INTERNAL MEDICINE

## 2024-05-13 PROCEDURE — C1894 INTRO/SHEATH, NON-LASER: HCPCS | Performed by: INTERNAL MEDICINE

## 2024-05-13 PROCEDURE — 36415 COLL VENOUS BLD VENIPUNCTURE: CPT | Performed by: INTERNAL MEDICINE

## 2024-05-13 RX ORDER — ASPIRIN 81 MG/1
243 TABLET, CHEWABLE ORAL ONCE
Status: DISCONTINUED | OUTPATIENT
Start: 2024-05-13 | End: 2024-05-13 | Stop reason: HOSPADM

## 2024-05-13 RX ORDER — NALOXONE HYDROCHLORIDE 0.4 MG/ML
0.2 INJECTION, SOLUTION INTRAMUSCULAR; INTRAVENOUS; SUBCUTANEOUS
Status: DISCONTINUED | OUTPATIENT
Start: 2024-05-13 | End: 2024-05-13 | Stop reason: HOSPADM

## 2024-05-13 RX ORDER — FENTANYL CITRATE 50 UG/ML
25 INJECTION, SOLUTION INTRAMUSCULAR; INTRAVENOUS
Status: DISCONTINUED | OUTPATIENT
Start: 2024-05-13 | End: 2024-05-13 | Stop reason: HOSPADM

## 2024-05-13 RX ORDER — DIAZEPAM 5 MG
5 TABLET ORAL ONCE
Status: DISCONTINUED | OUTPATIENT
Start: 2024-05-13 | End: 2024-05-13 | Stop reason: HOSPADM

## 2024-05-13 RX ORDER — OXYCODONE HYDROCHLORIDE 5 MG/1
10 TABLET ORAL EVERY 4 HOURS PRN
Status: DISCONTINUED | OUTPATIENT
Start: 2024-05-13 | End: 2024-05-13 | Stop reason: HOSPADM

## 2024-05-13 RX ORDER — SODIUM CHLORIDE 9 MG/ML
INJECTION, SOLUTION INTRAVENOUS CONTINUOUS
Status: DISCONTINUED | OUTPATIENT
Start: 2024-05-13 | End: 2024-05-13 | Stop reason: HOSPADM

## 2024-05-13 RX ORDER — LIDOCAINE 40 MG/G
CREAM TOPICAL
Status: DISCONTINUED | OUTPATIENT
Start: 2024-05-13 | End: 2024-05-13 | Stop reason: HOSPADM

## 2024-05-13 RX ORDER — ASPIRIN 325 MG
325 TABLET ORAL ONCE
Status: DISCONTINUED | OUTPATIENT
Start: 2024-05-13 | End: 2024-05-13 | Stop reason: HOSPADM

## 2024-05-13 RX ORDER — ATROPINE SULFATE 0.1 MG/ML
0.5 INJECTION INTRAVENOUS
Status: DISCONTINUED | OUTPATIENT
Start: 2024-05-13 | End: 2024-05-13 | Stop reason: HOSPADM

## 2024-05-13 RX ORDER — OXYCODONE HYDROCHLORIDE 5 MG/1
5 TABLET ORAL EVERY 4 HOURS PRN
Status: DISCONTINUED | OUTPATIENT
Start: 2024-05-13 | End: 2024-05-13 | Stop reason: HOSPADM

## 2024-05-13 RX ORDER — FENTANYL CITRATE 50 UG/ML
INJECTION, SOLUTION INTRAMUSCULAR; INTRAVENOUS
Status: DISCONTINUED | OUTPATIENT
Start: 2024-05-13 | End: 2024-05-13 | Stop reason: HOSPADM

## 2024-05-13 RX ORDER — NALOXONE HYDROCHLORIDE 0.4 MG/ML
0.4 INJECTION, SOLUTION INTRAMUSCULAR; INTRAVENOUS; SUBCUTANEOUS
Status: DISCONTINUED | OUTPATIENT
Start: 2024-05-13 | End: 2024-05-13 | Stop reason: HOSPADM

## 2024-05-13 RX ORDER — FLUMAZENIL 0.1 MG/ML
0.2 INJECTION, SOLUTION INTRAVENOUS
Status: DISCONTINUED | OUTPATIENT
Start: 2024-05-13 | End: 2024-05-13 | Stop reason: HOSPADM

## 2024-05-13 RX ORDER — ACETAMINOPHEN 325 MG/1
650 TABLET ORAL EVERY 4 HOURS PRN
Status: DISCONTINUED | OUTPATIENT
Start: 2024-05-13 | End: 2024-05-13 | Stop reason: HOSPADM

## 2024-05-13 RX ORDER — ATORVASTATIN CALCIUM 80 MG/1
80 TABLET, FILM COATED ORAL AT BEDTIME
Qty: 90 TABLET | Refills: 3 | Status: SHIPPED | OUTPATIENT
Start: 2024-05-13

## 2024-05-13 RX ORDER — IODIXANOL 320 MG/ML
INJECTION, SOLUTION INTRAVASCULAR
Status: DISCONTINUED | OUTPATIENT
Start: 2024-05-13 | End: 2024-05-13 | Stop reason: HOSPADM

## 2024-05-13 RX ADMIN — SODIUM CHLORIDE 150 ML/HR: 9 INJECTION, SOLUTION INTRAVENOUS at 07:38

## 2024-05-13 ASSESSMENT — ACTIVITIES OF DAILY LIVING (ADL)
ADLS_ACUITY_SCORE: 36

## 2024-05-13 NOTE — DISCHARGE INSTRUCTIONS
Interventional Cardiology  Coronary Angiogram/Angioplasty/Stent/Atherectomy Discharge Instructions -   Femoral Approach    The instructions below are to help you understand how to take care of yourself. There is also information about when to call the doctor or emergency services    **Do not stop your aspirin or platelet inhibitor unless directed by your Cardiologist.  These medications help to prevent platelets in your blood from sticking together and forming a clot.  Examples of these medications are:  Ticagrelor (Brilinta), Clopidigrel (Plavix), Prasugrel (Effient)          For the first 72 hours after your procedure:  No driving for 24 hours  Do not lift more than 15 pounds.  If you usually lift 50 pounds or more daily, please contact your cardiologist  Avoid any hard work or tiring activities, this includes, yard work, jogging, biking, sexual activity  During the day get up and walk around every 2 hours  You may return to work after 72 hours if you are feeling well and your job does not involve heavy lifting          Groin Site / Wound Care / Bleeding    You may take off the dressing on your groin the day after your procedure  Keep the area dry and clean  It is ok to shower with regular soap.  Pat dry, do not rub  You do not need to use a bandage  No tub/pool or hot tub for 1 week  If your groin starts to bleed or begins to swell suddenly after leaving the hospital, lie flat and apply firm pressure above the site for 15 minutes.  If bleeding continues, call 9-1-1  Bruising around the groin area is normal.  It may take 2-3 weeks for this to go away.  It is normal for the bruised area to turn green and/or yellow as it is healing.  A small lump may also be present and may last 2-3 months.  Your leg may be sore or stiff for a few days.  You may take Tylenol or a pain medicine recommended by your doctor  If you have a fever over 100.4, that lasts more than one day - call your cardiologist.      Our Cardiac Rehab  staff may visit briefly with you while your in the hospital.  If they miss you, someone will contact you after you are home.  You are encouraged to enroll in an Outpatient Cardiac Rehab Program     No elective dental work for 6 weeks after having a stent.     No driving for 24 hours      Your Procedural Physician was: {CV Physicians:[id number]}  the phone number is: [phone]      [facility]:  [phone]  If you are calling after hours, please listen to the entire voicemail, a live  will answer at the end of the message

## 2024-05-13 NOTE — PRE-PROCEDURE
GENERAL PRE-PROCEDURE:   Procedure:  Coronary angiogram with possible PCI  Date/Time:  5/13/2024 7:39 AM    Written consent obtained?: Yes    Risks and benefits: Risks, benefits and alternatives were discussed    Consent given by:  Patient  Patient states understanding of procedure being performed: Yes    Patient's understanding of procedure matches consent: Yes    Procedure consent matches procedure scheduled: Yes    Expected level of sedation:  Moderate  Appropriately NPO:  Yes  ASA Class:  4 (severe AS, atrial fibrillation, CAD, dyslipidemia, pulmonary fibrosis, chronic respiratory failure, Seaman's esophagus, GERD)  Mallampati  :  Grade 2- soft palate, base of uvula, tonsillar pillars, and portion of posterior pharyngeal wall visible  Lungs:  Lungs clear with good breath sounds bilaterally  Heart:  Systolic murmur  History & Physical reviewed:  History and physical reviewed and updates made (see comment)  H&P Comments:  Clinically Significant Risk Factors Present on Admission    Cardiovascular : severe AS, atrial fibrillation, CAD, dyslipidemia    Fluid & Electrolyte Disorders : Hyponatremia; mild, stable    Gastroenterology :  Seaman's esophagus, GERD    Hematology/Oncology : Chronic anemia; stable    Nephrology : Not present on admission    Neurology : Not present on admission    Pulmonology : pulmonary fibrosis, chronic respiratory failure    Systemic : Not present on admission    Statement of review:  I have reviewed the lab findings, diagnostic data, medications, and the plan for sedation

## 2024-05-13 NOTE — INTERVAL H&P NOTE
"I have reviewed the surgical (or preoperative) H&P that is linked to this encounter, and examined the patient. There are no significant changes    Clinical Conditions Present on Arrival:  Clinically Significant Risk Factors Present on Admission         # Hyponatremia: Lowest Na = 131 mmol/L in last 30 days, will monitor as appropriate         # Drug Induced Platelet Defect: home medication list includes an antiplatelet medication  # Overweight: Estimated body mass index is 29.99 kg/m  as calculated from the following:    Height as of 5/2/24: 1.778 m (5' 10\").    Weight as of 5/2/24: 94.8 kg (209 lb).       "

## 2024-05-13 NOTE — CARE PLAN
Pt admitted to [facility] for angiogram, denies any pain, took ASA at home this am, wears home O2 2-6 L/NC and states he can't lay flat.

## 2024-05-14 LAB
ATRIAL RATE - MUSE: 86 BPM
DIASTOLIC BLOOD PRESSURE - MUSE: NORMAL MMHG
INTERPRETATION ECG - MUSE: NORMAL
P AXIS - MUSE: 40 DEGREES
PR INTERVAL - MUSE: 196 MS
QRS DURATION - MUSE: 100 MS
QT - MUSE: 380 MS
QTC - MUSE: 454 MS
R AXIS - MUSE: -59 DEGREES
SYSTOLIC BLOOD PRESSURE - MUSE: NORMAL MMHG
T AXIS - MUSE: -4 DEGREES
VENTRICULAR RATE- MUSE: 86 BPM

## 2024-05-15 ENCOUNTER — HOSPITAL ENCOUNTER (OUTPATIENT)
Dept: RESPIRATORY THERAPY | Facility: CLINIC | Age: 79
Discharge: HOME | End: 2024-05-15
Attending: INTERNAL MEDICINE | Admitting: INTERNAL MEDICINE
Payer: MEDICARE

## 2024-05-15 ENCOUNTER — TELEPHONE (OUTPATIENT)
Dept: PULMONOLOGY | Facility: CLINIC | Age: 79
End: 2024-05-15
Payer: MEDICARE

## 2024-05-15 DIAGNOSIS — J84.112 IPF (IDIOPATHIC PULMONARY FIBROSIS) (H): ICD-10-CM

## 2024-05-15 DIAGNOSIS — R09.02 HYPOXIA: Primary | ICD-10-CM

## 2024-05-15 DIAGNOSIS — R09.02 HYPOXIA: ICD-10-CM

## 2024-05-15 PROCEDURE — 94618 PULMONARY STRESS TESTING: CPT

## 2024-05-15 PROCEDURE — 999N000157 HC STATISTIC RCP TIME EA 10 MIN

## 2024-05-15 PROCEDURE — 94618 PULMONARY STRESS TESTING: CPT | Mod: 26 | Performed by: INTERNAL MEDICINE

## 2024-05-15 NOTE — PROGRESS NOTES
Pt came in for 6 minute walk 02 titration. Pt on room air at rest was 87%. Pt on 2L NC at rest was 95%. Pt walked on 6L NC with no rest or walking aides. RT had to titrate up to 10L NC, Pt SPO2 at end of test was 78%. Pt rested and returned to baseline, pt left with no distress.    Debra Avila, RT

## 2024-05-16 ENCOUNTER — CARE COORDINATION (OUTPATIENT)
Dept: PULMONOLOGY | Facility: CLINIC | Age: 79
End: 2024-05-16
Payer: MEDICARE

## 2024-05-16 LAB — FIO2-PRE: 44 %

## 2024-05-20 ENCOUNTER — TELEPHONE (OUTPATIENT)
Dept: CARDIOLOGY | Facility: CLINIC | Age: 79
End: 2024-05-20

## 2024-05-20 DIAGNOSIS — I35.0 NONRHEUMATIC AORTIC VALVE STENOSIS: Primary | ICD-10-CM

## 2024-05-20 NOTE — TELEPHONE ENCOUNTER
Patients TAVR work up will be completed as of today following CT surgery consultation appt. Patient and spouse previously requested TAVR date of 6/11/24 due to prior commitments.     Criteria for moderate sedation  Mallampati less than or equal to 3: yes  Transfemoral Access: yes  History of (If yes to any, proceed with MAC):  -Adverse anesthesia events: no  -BMI >43: no  -Intolerable chronic pain: no  -History of difficult airway: no  -History of being unable to tolerate moderate sedation: no  -Valve-in-valve (not Trifecta): no  -Any considerations: N/A    Unless otherwise determined by structural, anesthesia or cath lab teams, patient will proceed with TAVR under moderate sedation.    Orders placed and msg sent to program/.     Vanessa Lugo RN on 5/20/2024 at 9:41 AM

## 2024-05-21 ENCOUNTER — TELEPHONE (OUTPATIENT)
Dept: CARDIOLOGY | Facility: CLINIC | Age: 79
End: 2024-05-21
Payer: MEDICARE

## 2024-05-22 ENCOUNTER — OFFICE VISIT (OUTPATIENT)
Dept: CARDIOLOGY | Facility: CLINIC | Age: 79
End: 2024-05-22
Payer: MEDICARE

## 2024-05-22 VITALS
SYSTOLIC BLOOD PRESSURE: 123 MMHG | HEART RATE: 74 BPM | OXYGEN SATURATION: 96 % | BODY MASS INDEX: 29.27 KG/M2 | DIASTOLIC BLOOD PRESSURE: 84 MMHG | WEIGHT: 204 LBS | RESPIRATION RATE: 20 BRPM

## 2024-05-22 DIAGNOSIS — I35.0 NONRHEUMATIC AORTIC VALVE STENOSIS: Primary | ICD-10-CM

## 2024-05-22 PROCEDURE — 99204 OFFICE O/P NEW MOD 45 MIN: CPT | Performed by: STUDENT IN AN ORGANIZED HEALTH CARE EDUCATION/TRAINING PROGRAM

## 2024-05-22 RX ORDER — OMEPRAZOLE 40 MG/1
40 CAPSULE, DELAYED RELEASE ORAL 2 TIMES DAILY
COMMUNITY
Start: 2024-05-05

## 2024-05-22 NOTE — LETTER
5/22/2024    Eliot De La Rosa MD  [address]  [facility] [address]    RE: Wyatt Gutierrez       Dear Colleague,     I had the pleasure of seeing Wyatt Gutierrez in the [facility].  Cardiac Surgery TAVR Evaluation    Wyatt Gutierrez MRN# [id number]   YOB: 1945 Age: 79 year old            Assessment and Plan:   Wyatt Gutierrez is a 79-year-old man with a history of non-obstructive CAD, aortic stenosis, atrial fibrillation, HTN, HLD, GERD, pulmonary fibrosis (on home oxygen) who is being evaluated for severe, symptomatic aortic stenosis. We had a conversation about options for aortic valve intervention, which included TAVR and SAVR. I support the recommendation to proceed with transcatheter aortic valve replacement, pending completion of the preoperative workup.     I described the technical details, as well as the expected postoperative course and recovery to the patient. I also discussed the risks and benefits of the procedure. The risks include but are not limited to bleeding, infection, stroke, myocardial infarction, dysrhythmia and need for pacemaker, respiratory failure, kidney or liver injury, bowel or limb ischemia, and death. The patient understands these risks and wishes to undergo the operation.     A santos discussion was had with the patient regarding the possible need for surgical bailout should a rare complication arise during the TAVR procedure such as aortic rupture or dissection. Though these complications are rare at <1% incidence, they carry a major morbidity and mortality risk of at least 50%. I explained the morbidity among survivors may include dialysis, heart attack, stroke, prolonged ventilation, loss of limb, and loss of independent living situation. The patient stated that they would want all rescue efforts made in the event of a procedural complication during TAVR, and they would consent to undergo emergency open heart surgery if needed.           History of Present  Illness:   This patient is a 79-year-old man with a history of non-obstructive CAD, aortic stenosis, atrial fibrillation, HTN, HLD, and pulmonary fibrosis (on home oxygen) who is being evaluated for severe, symptomatic aortic stenosis. He did have an episode of syncope in the past but he attributes this to drinking and loss of balance. This has not recurred since. He does report shortness of breath and overall decreased exercise tolerance               Past Medical History:     Past Medical History:   Diagnosis Date    Arthritis     High cholesterol     Hypertension     Sleep apnea             Past Surgical History:     Past Surgical History:   Procedure Laterality Date    ARTHROSCOPY SHOULDER      COLONOSCOPY      CV CORONARY ANGIOGRAM N/A 5/13/2024    Procedure: Coronary Angiogram;  Surgeon: Shiv Robles MD;  Location: [facility] CV    ESOPHAGOSCOPY, GASTROSCOPY, DUODENOSCOPY (EGD), COMBINED N/A 10/2/2023    Procedure: ESOPHAGOGASTRODUODENOSCOPY with biopsies;  Surgeon: Reji Baptiste MD;  Location: [facility] Main OR    OTHER SURGICAL HISTORY Right 1957    heel fracture    OTHER SURGICAL HISTORY      knee arthroscopies    ZC TOTAL KNEE ARTHROPLASTY Left 10/31/2019    Procedure: LEFT MINIMALLY INVASIVE TOTAL KNEE ARTHROPLASTY;  Surgeon: Avelino Canada MD;  Location: [facility];  Service: Orthopedics             Family History:   No family history on file.           Social History:   . Retired . Walks on own, can walk about 1 block before having to rest. Smoked for 50 years but quit in December 2020.    Social History     Socioeconomic History    Marital status:      Spouse name: Not on file    Number of children: Not on file    Years of education: Not on file    Highest education level: Not on file   Occupational History    Not on file   Tobacco Use    Smoking status: Former     Current packs/day: 0.00     Average packs/day: 0.5 packs/day for 55.0 years (27.5 ttl pk-yrs)      Types: Cigarettes     Start date: 1966     Quit date: 2021     Years since quittin.4    Smokeless tobacco: Never   Substance and Sexual Activity    Alcohol use: Yes     Comment: 1-2 glasses of wine/ 2 x-week    Drug use: Not Currently     Comment: edible marijuana in the past    Sexual activity: Not on file   Other Topics Concern    Not on file   Social History Narrative    Not on file     Social Determinants of Health     Financial Resource Strain: Not on file   Food Insecurity: Not on file   Transportation Needs: Not on file   Physical Activity: Not on file   Stress: Not on file   Social Connections: Not on file   Interpersonal Safety: Not on file   Housing Stability: Not on file              Allergies:     Allergies   Allergen Reactions    Animal Dander Unknown    Chalk     Dogs Other (See Comments)     Pet Dander    Maple Tree     Mold [Molds & Smuts] Unknown              Medications:     Current Outpatient Medications   Medication Sig Dispense Refill    acyclovir (ZOVIRAX) 400 MG tablet [ACYCLOVIR (ZOVIRAX) 400 MG TABLET] Take 400 mg by mouth 3 (three) times a day as needed.             albuterol (PROAIR HFA/PROVENTIL HFA/VENTOLIN HFA) 108 (90 Base) MCG/ACT inhaler Inhale 2 puffs into the lungs every 6 hours as needed 18 g 3    arformoterol (BROVANA) 15 MCG/2ML NEBU neb solution Take 2 mLs (15 mcg) by nebulization 2 times daily 360 mL 1    aspirin (ASA) 81 MG EC tablet Take by mouth daily      atorvastatin (LIPITOR) 80 MG tablet Take 1 tablet (80 mg) by mouth at bedtime 90 tablet 3    azelastine (ASTELIN) 137 mcg (0.1 %) nasal spray [AZELASTINE (ASTELIN) 137 MCG (0.1 %) NASAL SPRAY] Use in each nostril as directed (Patient taking differently: as needed) 30 mL 12    azithromycin (ZITHROMAX) 250 MG tablet Take 1 tablet (250 mg) by mouth daily for 180 days 90 tablet 1    ipratropium - albuterol 0.5 mg/2.5 mg/3 mL (DUONEB) 0.5-2.5 (3) MG/3ML neb solution Take 1 vial (3 mLs) by nebulization every 6  hours as needed for shortness of breath, wheezing or cough 360 mL 3    Loperamide HCl (IMODIUM A-D PO) Take by mouth daily as needed      metoprolol succinate (TOPROL-XL) 50 MG 24 hr tablet [METOPROLOL SUCCINATE (TOPROL-XL) 50 MG 24 HR TABLET] Take 50 mg by mouth daily.      multivitamin therapeutic tablet Take 1 tablet by mouth daily      nintedanib (OFEV) 100 MG capsule Take 1 capsule (100 mg) by mouth 2 times daily 180 capsule 3    omeprazole (PRILOSEC) 40 MG DR capsule Take 40 mg by mouth 2 times daily      triamterene (DYRENIUM) 50 MG capsule Take 25 mg by mouth daily Patient only takes 25mg daily      albuterol (PROVENTIL) (2.5 MG/3ML) 0.083% neb solution Take 1 vial (2.5 mg) by nebulization 4 times daily (Patient not taking: Reported on 5/22/2024) 90 mL 3    ipratropium (ATROVENT) 0.02 % neb solution Take 2.5 mLs (0.5 mg) by nebulization 4 times daily (Patient not taking: Reported on 5/2/2024) 90 mL 3    omeprazole (PRILOSEC) 20 MG capsule [OMEPRAZOLE (PRILOSEC) 20 MG CAPSULE] Take 20 mg by mouth 2 (two) times a day before meals. (Patient not taking: Reported on 5/22/2024)       No current facility-administered medications for this visit.             Review of Systems:   ROS: 10 point review of systems was performed and negative except as described above.         Physical Exam:   /84 (BP Location: Right arm, Patient Position: Sitting, Cuff Size: Adult Regular)   Pulse 74   Resp 20   Wt 92.5 kg (204 lb)   SpO2 96%   BMI 29.27 kg/m     Alert, pleasant, in no acute distress  Sclera anicteric  Neck supple JVD flat  Trachea midline  No prior chest incisions  Breathing unlabored on room air  Regular rate and rhythm  Abdomen soft, non-tender  Extremities warm, no edema          Labs:   CBC RESULTS:   Recent Labs   Lab Test 05/13/24  0710   WBC 11.3*   RBC 4.68   HGB 11.3*   HCT 35.7*   MCV 76*   MCH 24.1*   MCHC 31.7   RDW 16.2*         Last Comprehensive Metabolic Panel:  Lab Results   Component  Value Date     (L) 05/13/2024    POTASSIUM 4.1 05/13/2024    CHLORIDE 93 (L) 05/13/2024    CO2 29 05/13/2024    ANIONGAP 9 05/13/2024     (H) 05/13/2024    BUN 12.4 05/13/2024    CR 0.92 05/13/2024    GFRESTIMATED 86 05/13/2024    SHALOM 10.0 05/13/2024            Imaging:   All imaging studies were reviewed and interpreted by me.    CTA TAVR 4/30/24:  IMPRESSION:    1.  Severe chronic interstitial lung disease, better characterized on the high-resolution chest CT from 4/26/2022.  2.  Stable bilateral mediastinal and hilar lymphadenopathy.  3.  Additional incidental extracardiac findings as above.  4.  Please refer to cardiologist's dictation for the cardiac CT report.           Cardiac catheterization 5/13/24:  Findings:  LM:no obstruction  LAD:mildly irregular  Lcx:dominant, mildly irregular  RCA:nob-dominant, 40% mid-vessel narrowing    TTE 12/1/23:  1.Left ventricular size, wall motion and function are normal. The ejection  fraction is 55-60%.  2.There is mild concentric left ventricular hypertrophy.  3.Mildly decreased right ventricular systolic function  4.Moderate to severe stenosis of a trileaflet thickened aortic valve, at SVI  of 32 mL/M2 dimensionless index 0.25 with peak velocity of 3.9 m/s and mean  gradient of 37 mmHg.  4.There is mild to moderate (1-2+) aortic regurgitation. Decel 1/2 time is 582  msec.    PFTs: FVC 63% predicted, FEV1 73% predicted, DLCO 40% predicted      Susana Mitchell MD        Thank you for allowing me to participate in the care of your patient.      Sincerely,     Susana Mitchell MD     [facility]  cc:   Bubba aCo MD  [address]  [place] [address]

## 2024-05-28 NOTE — PROGRESS NOTES
Cardiac Surgery TAVR Evaluation    Wyatt Gutierrez MRN# [id number]   YOB: 1945 Age: 79 year old            Assessment and Plan:   Wyatt Gutierrez is a 79-year-old man with a history of non-obstructive CAD, aortic stenosis, atrial fibrillation, HTN, HLD, GERD, pulmonary fibrosis (on home oxygen) who is being evaluated for severe, symptomatic aortic stenosis. We had a conversation about options for aortic valve intervention, which included TAVR and SAVR. I support the recommendation to proceed with transcatheter aortic valve replacement, pending completion of the preoperative workup.     I described the technical details, as well as the expected postoperative course and recovery to the patient. I also discussed the risks and benefits of the procedure. The risks include but are not limited to bleeding, infection, stroke, myocardial infarction, dysrhythmia and need for pacemaker, respiratory failure, kidney or liver injury, bowel or limb ischemia, and death. The patient understands these risks and wishes to undergo the operation.     A santos discussion was had with the patient regarding the possible need for surgical bailout should a rare complication arise during the TAVR procedure such as aortic rupture or dissection. Though these complications are rare at <1% incidence, they carry a major morbidity and mortality risk of at least 50%. I explained the morbidity among survivors may include dialysis, heart attack, stroke, prolonged ventilation, loss of limb, and loss of independent living situation. The patient stated that they would want all rescue efforts made in the event of a procedural complication during TAVR, and they would consent to undergo emergency open heart surgery if needed.           History of Present Illness:   This patient is a 79-year-old man with a history of non-obstructive CAD, aortic stenosis, atrial fibrillation, HTN, HLD, and pulmonary fibrosis (on home oxygen) who is being evaluated for  severe, symptomatic aortic stenosis. He did have an episode of syncope in the past but he attributes this to drinking and loss of balance. This has not recurred since. He does report shortness of breath and overall decreased exercise tolerance               Past Medical History:     Past Medical History:   Diagnosis Date    Arthritis     High cholesterol     Hypertension     Sleep apnea             Past Surgical History:     Past Surgical History:   Procedure Laterality Date    ARTHROSCOPY SHOULDER      COLONOSCOPY      CV CORONARY ANGIOGRAM N/A 2024    Procedure: Coronary Angiogram;  Surgeon: Shiv Robles MD;  Location: [facility] CV    ESOPHAGOSCOPY, GASTROSCOPY, DUODENOSCOPY (EGD), COMBINED N/A 10/2/2023    Procedure: ESOPHAGOGASTRODUODENOSCOPY with biopsies;  Surgeon: Reji Baptiste MD;  Location: [facility] Main OR    OTHER SURGICAL HISTORY Right     heel fracture    OTHER SURGICAL HISTORY      knee arthroscopies    ZZC TOTAL KNEE ARTHROPLASTY Left 10/31/2019    Procedure: LEFT MINIMALLY INVASIVE TOTAL KNEE ARTHROPLASTY;  Surgeon: Avelino Canada MD;  Location: [facility];  Service: Orthopedics             Family History:   No family history on file.           Social History:   . Retired . Walks on own, can walk about 1 block before having to rest. Smoked for 50 years but quit in 2020.    Social History     Socioeconomic History    Marital status:      Spouse name: Not on file    Number of children: Not on file    Years of education: Not on file    Highest education level: Not on file   Occupational History    Not on file   Tobacco Use    Smoking status: Former     Current packs/day: 0.00     Average packs/day: 0.5 packs/day for 55.0 years (27.5 ttl pk-yrs)     Types: Cigarettes     Start date: 1966     Quit date: 2021     Years since quittin.4    Smokeless tobacco: Never   Substance and Sexual Activity    Alcohol use: Yes     Comment: 1-2  glasses of wine/ 2 x-week    Drug use: Not Currently     Comment: edible marijuana in the past    Sexual activity: Not on file   Other Topics Concern    Not on file   Social History Narrative    Not on file     Social Determinants of Health     Financial Resource Strain: Not on file   Food Insecurity: Not on file   Transportation Needs: Not on file   Physical Activity: Not on file   Stress: Not on file   Social Connections: Not on file   Interpersonal Safety: Not on file   Housing Stability: Not on file              Allergies:     Allergies   Allergen Reactions    Animal Dander Unknown    Chalk     Dogs Other (See Comments)     Pet Dander    Maple Tree     Mold [Molds & Smuts] Unknown              Medications:     Current Outpatient Medications   Medication Sig Dispense Refill    acyclovir (ZOVIRAX) 400 MG tablet [ACYCLOVIR (ZOVIRAX) 400 MG TABLET] Take 400 mg by mouth 3 (three) times a day as needed.             albuterol (PROAIR HFA/PROVENTIL HFA/VENTOLIN HFA) 108 (90 Base) MCG/ACT inhaler Inhale 2 puffs into the lungs every 6 hours as needed 18 g 3    arformoterol (BROVANA) 15 MCG/2ML NEBU neb solution Take 2 mLs (15 mcg) by nebulization 2 times daily 360 mL 1    aspirin (ASA) 81 MG EC tablet Take by mouth daily      atorvastatin (LIPITOR) 80 MG tablet Take 1 tablet (80 mg) by mouth at bedtime 90 tablet 3    azelastine (ASTELIN) 137 mcg (0.1 %) nasal spray [AZELASTINE (ASTELIN) 137 MCG (0.1 %) NASAL SPRAY] Use in each nostril as directed (Patient taking differently: as needed) 30 mL 12    azithromycin (ZITHROMAX) 250 MG tablet Take 1 tablet (250 mg) by mouth daily for 180 days 90 tablet 1    ipratropium - albuterol 0.5 mg/2.5 mg/3 mL (DUONEB) 0.5-2.5 (3) MG/3ML neb solution Take 1 vial (3 mLs) by nebulization every 6 hours as needed for shortness of breath, wheezing or cough 360 mL 3    Loperamide HCl (IMODIUM A-D PO) Take by mouth daily as needed      metoprolol succinate (TOPROL-XL) 50 MG 24 hr tablet  [METOPROLOL SUCCINATE (TOPROL-XL) 50 MG 24 HR TABLET] Take 50 mg by mouth daily.      multivitamin therapeutic tablet Take 1 tablet by mouth daily      nintedanib (OFEV) 100 MG capsule Take 1 capsule (100 mg) by mouth 2 times daily 180 capsule 3    omeprazole (PRILOSEC) 40 MG DR capsule Take 40 mg by mouth 2 times daily      triamterene (DYRENIUM) 50 MG capsule Take 25 mg by mouth daily Patient only takes 25mg daily      albuterol (PROVENTIL) (2.5 MG/3ML) 0.083% neb solution Take 1 vial (2.5 mg) by nebulization 4 times daily (Patient not taking: Reported on 5/22/2024) 90 mL 3    ipratropium (ATROVENT) 0.02 % neb solution Take 2.5 mLs (0.5 mg) by nebulization 4 times daily (Patient not taking: Reported on 5/2/2024) 90 mL 3    omeprazole (PRILOSEC) 20 MG capsule [OMEPRAZOLE (PRILOSEC) 20 MG CAPSULE] Take 20 mg by mouth 2 (two) times a day before meals. (Patient not taking: Reported on 5/22/2024)       No current facility-administered medications for this visit.             Review of Systems:   ROS: 10 point review of systems was performed and negative except as described above.         Physical Exam:   /84 (BP Location: Right arm, Patient Position: Sitting, Cuff Size: Adult Regular)   Pulse 74   Resp 20   Wt 92.5 kg (204 lb)   SpO2 96%   BMI 29.27 kg/m     Alert, pleasant, in no acute distress  Sclera anicteric  Neck supple JVD flat  Trachea midline  No prior chest incisions  Breathing unlabored on room air  Regular rate and rhythm  Abdomen soft, non-tender  Extremities warm, no edema          Labs:   CBC RESULTS:   Recent Labs   Lab Test 05/13/24  0710   WBC 11.3*   RBC 4.68   HGB 11.3*   HCT 35.7*   MCV 76*   MCH 24.1*   MCHC 31.7   RDW 16.2*         Last Comprehensive Metabolic Panel:  Lab Results   Component Value Date     (L) 05/13/2024    POTASSIUM 4.1 05/13/2024    CHLORIDE 93 (L) 05/13/2024    CO2 29 05/13/2024    ANIONGAP 9 05/13/2024     (H) 05/13/2024    BUN 12.4 05/13/2024     CR 0.92 05/13/2024    GFRESTIMATED 86 05/13/2024    SHALOM 10.0 05/13/2024            Imaging:   All imaging studies were reviewed and interpreted by me.    CTA TAVR 4/30/24:  IMPRESSION:    1.  Severe chronic interstitial lung disease, better characterized on the high-resolution chest CT from 4/26/2022.  2.  Stable bilateral mediastinal and hilar lymphadenopathy.  3.  Additional incidental extracardiac findings as above.  4.  Please refer to cardiologist's dictation for the cardiac CT report.           Cardiac catheterization 5/13/24:  Findings:  LM:no obstruction  LAD:mildly irregular  Lcx:dominant, mildly irregular  RCA:nob-dominant, 40% mid-vessel narrowing    TTE 12/1/23:  1.Left ventricular size, wall motion and function are normal. The ejection  fraction is 55-60%.  2.There is mild concentric left ventricular hypertrophy.  3.Mildly decreased right ventricular systolic function  4.Moderate to severe stenosis of a trileaflet thickened aortic valve, at SVI  of 32 mL/M2 dimensionless index 0.25 with peak velocity of 3.9 m/s and mean  gradient of 37 mmHg.  4.There is mild to moderate (1-2+) aortic regurgitation. Decel 1/2 time is 582  msec.    PFTs: FVC 63% predicted, FEV1 73% predicted, DLCO 40% predicted      Susana Mitchell MD

## 2024-06-05 DIAGNOSIS — R06.09 DYSPNEA ON EXERTION: ICD-10-CM

## 2024-06-05 DIAGNOSIS — I35.0 NONRHEUMATIC AORTIC VALVE STENOSIS: Primary | ICD-10-CM

## 2024-06-09 LAB
ABO/RH(D): NORMAL
ANTIBODY SCREEN: NEGATIVE
SPECIMEN EXPIRATION DATE: NORMAL

## 2024-06-10 ENCOUNTER — PREP FOR PROCEDURE (OUTPATIENT)
Dept: CARDIOLOGY | Facility: CLINIC | Age: 79
End: 2024-06-10

## 2024-06-10 ENCOUNTER — OFFICE VISIT (OUTPATIENT)
Dept: CARDIOLOGY | Facility: CLINIC | Age: 79
End: 2024-06-10
Payer: MEDICARE

## 2024-06-10 ENCOUNTER — ALLIED HEALTH/NURSE VISIT (OUTPATIENT)
Dept: CARDIOLOGY | Facility: CLINIC | Age: 79
End: 2024-06-10
Payer: MEDICARE

## 2024-06-10 ENCOUNTER — LAB (OUTPATIENT)
Dept: CARDIOLOGY | Facility: CLINIC | Age: 79
End: 2024-06-10
Payer: MEDICARE

## 2024-06-10 VITALS
SYSTOLIC BLOOD PRESSURE: 128 MMHG | RESPIRATION RATE: 20 BRPM | WEIGHT: 202 LBS | HEIGHT: 70 IN | DIASTOLIC BLOOD PRESSURE: 70 MMHG | HEART RATE: 65 BPM | BODY MASS INDEX: 28.92 KG/M2

## 2024-06-10 DIAGNOSIS — I35.0 NONRHEUMATIC AORTIC VALVE STENOSIS: ICD-10-CM

## 2024-06-10 DIAGNOSIS — I25.10 CORONARY ARTERY DISEASE INVOLVING NATIVE CORONARY ARTERY OF NATIVE HEART WITHOUT ANGINA PECTORIS: ICD-10-CM

## 2024-06-10 DIAGNOSIS — I35.0 NONRHEUMATIC AORTIC VALVE STENOSIS: Primary | ICD-10-CM

## 2024-06-10 DIAGNOSIS — I25.10 CORONARY ARTERY DISEASE INVOLVING NATIVE CORONARY ARTERY WITHOUT ANGINA PECTORIS, UNSPECIFIED WHETHER NATIVE OR TRANSPLANTED HEART: ICD-10-CM

## 2024-06-10 DIAGNOSIS — R06.09 DYSPNEA ON EXERTION: ICD-10-CM

## 2024-06-10 DIAGNOSIS — I10 HYPERTENSION, UNSPECIFIED TYPE: ICD-10-CM

## 2024-06-10 DIAGNOSIS — E83.42 HYPOMAGNESEMIA: Primary | ICD-10-CM

## 2024-06-10 DIAGNOSIS — E78.5 DYSLIPIDEMIA: ICD-10-CM

## 2024-06-10 LAB
ALBUMIN SERPL BCG-MCNC: 4.5 G/DL (ref 3.5–5.2)
ALP SERPL-CCNC: 119 U/L (ref 40–150)
ALT SERPL W P-5'-P-CCNC: 19 U/L (ref 0–70)
ANION GAP SERPL CALCULATED.3IONS-SCNC: 9 MMOL/L (ref 7–15)
AST SERPL W P-5'-P-CCNC: 23 U/L (ref 0–45)
ATRIAL RATE - MUSE: 69 BPM
BASOPHILS # BLD AUTO: 0.1 10E3/UL (ref 0–0.2)
BASOPHILS NFR BLD AUTO: 1 %
BILIRUB SERPL-MCNC: 0.4 MG/DL
BUN SERPL-MCNC: 14.9 MG/DL (ref 8–23)
CALCIUM SERPL-MCNC: 9.7 MG/DL (ref 8.8–10.2)
CHLORIDE SERPL-SCNC: 91 MMOL/L (ref 98–107)
CREAT SERPL-MCNC: 0.95 MG/DL (ref 0.67–1.17)
DEPRECATED HCO3 PLAS-SCNC: 32 MMOL/L (ref 22–29)
DIASTOLIC BLOOD PRESSURE - MUSE: NORMAL MMHG
EGFRCR SERPLBLD CKD-EPI 2021: 82 ML/MIN/1.73M2
EOSINOPHIL # BLD AUTO: 0.8 10E3/UL (ref 0–0.7)
EOSINOPHIL NFR BLD AUTO: 8 %
ERYTHROCYTE [DISTWIDTH] IN BLOOD BY AUTOMATED COUNT: 15.9 % (ref 10–15)
GLUCOSE SERPL-MCNC: 107 MG/DL (ref 70–99)
HCT VFR BLD AUTO: 35.4 % (ref 40–53)
HGB BLD-MCNC: 11.2 G/DL (ref 13.3–17.7)
IMM GRANULOCYTES # BLD: 0 10E3/UL
IMM GRANULOCYTES NFR BLD: 0 %
INR PPP: 1.01 (ref 0.85–1.15)
INTERPRETATION ECG - MUSE: NORMAL
LYMPHOCYTES # BLD AUTO: 2.5 10E3/UL (ref 0.8–5.3)
LYMPHOCYTES NFR BLD AUTO: 25 %
MAGNESIUM SERPL-MCNC: 1.6 MG/DL (ref 1.7–2.3)
MCH RBC QN AUTO: 23.8 PG (ref 26.5–33)
MCHC RBC AUTO-ENTMCNC: 31.6 G/DL (ref 31.5–36.5)
MCV RBC AUTO: 75 FL (ref 78–100)
MONOCYTES # BLD AUTO: 1.2 10E3/UL (ref 0–1.3)
MONOCYTES NFR BLD AUTO: 12 %
NEUTROPHILS # BLD AUTO: 5.5 10E3/UL (ref 1.6–8.3)
NEUTROPHILS NFR BLD AUTO: 54 %
NRBC # BLD AUTO: 0 10E3/UL
NRBC BLD AUTO-RTO: 0 /100
NT-PROBNP SERPL-MCNC: 151 PG/ML (ref 0–1800)
P AXIS - MUSE: 24 DEGREES
PLATELET # BLD AUTO: 252 10E3/UL (ref 150–450)
POTASSIUM SERPL-SCNC: 3.7 MMOL/L (ref 3.4–5.3)
PR INTERVAL - MUSE: 222 MS
PROT SERPL-MCNC: 8 G/DL (ref 6.4–8.3)
QRS DURATION - MUSE: 104 MS
QT - MUSE: 380 MS
QTC - MUSE: 407 MS
R AXIS - MUSE: -63 DEGREES
RBC # BLD AUTO: 4.71 10E6/UL (ref 4.4–5.9)
SODIUM SERPL-SCNC: 132 MMOL/L (ref 135–145)
SYSTOLIC BLOOD PRESSURE - MUSE: NORMAL MMHG
T AXIS - MUSE: -6 DEGREES
VENTRICULAR RATE- MUSE: 69 BPM
WBC # BLD AUTO: 10.1 10E3/UL (ref 4–11)

## 2024-06-10 PROCEDURE — 93000 ELECTROCARDIOGRAM COMPLETE: CPT | Performed by: INTERNAL MEDICINE

## 2024-06-10 PROCEDURE — 80053 COMPREHEN METABOLIC PANEL: CPT

## 2024-06-10 PROCEDURE — 36415 COLL VENOUS BLD VENIPUNCTURE: CPT

## 2024-06-10 PROCEDURE — 99215 OFFICE O/P EST HI 40 MIN: CPT | Performed by: PHYSICIAN ASSISTANT

## 2024-06-10 PROCEDURE — 86901 BLOOD TYPING SEROLOGIC RH(D): CPT

## 2024-06-10 PROCEDURE — 83735 ASSAY OF MAGNESIUM: CPT

## 2024-06-10 PROCEDURE — 99207 PR NO CHARGE LOS: CPT

## 2024-06-10 PROCEDURE — 86900 BLOOD TYPING SEROLOGIC ABO: CPT

## 2024-06-10 PROCEDURE — 83880 ASSAY OF NATRIURETIC PEPTIDE: CPT

## 2024-06-10 PROCEDURE — 85610 PROTHROMBIN TIME: CPT

## 2024-06-10 PROCEDURE — 86850 RBC ANTIBODY SCREEN: CPT

## 2024-06-10 PROCEDURE — 85025 COMPLETE CBC W/AUTO DIFF WBC: CPT

## 2024-06-10 RX ORDER — ASPIRIN 325 MG
325 TABLET ORAL ONCE
Status: CANCELLED | OUTPATIENT
Start: 2024-06-11 | End: 2024-06-10

## 2024-06-10 RX ORDER — DIAZEPAM 5 MG
5 TABLET ORAL
Status: CANCELLED | OUTPATIENT
Start: 2024-06-11

## 2024-06-10 RX ORDER — CEFAZOLIN SODIUM/WATER 2 G/20 ML
2 SYRINGE (ML) INTRAVENOUS
Status: CANCELLED | OUTPATIENT
Start: 2024-06-11

## 2024-06-10 RX ORDER — FENTANYL CITRATE 50 UG/ML
25 INJECTION, SOLUTION INTRAMUSCULAR; INTRAVENOUS
Status: CANCELLED | OUTPATIENT
Start: 2024-06-11

## 2024-06-10 RX ORDER — SODIUM CHLORIDE 9 MG/ML
INJECTION, SOLUTION INTRAVENOUS CONTINUOUS
Status: CANCELLED | OUTPATIENT
Start: 2024-06-11

## 2024-06-10 RX ORDER — MAGNESIUM SULFATE HEPTAHYDRATE 40 MG/ML
2 INJECTION, SOLUTION INTRAVENOUS ONCE
Status: CANCELLED | OUTPATIENT
Start: 2024-06-11

## 2024-06-10 RX ORDER — LIDOCAINE 40 MG/G
CREAM TOPICAL
Status: CANCELLED | OUTPATIENT
Start: 2024-06-10

## 2024-06-10 NOTE — PROGRESS NOTES
===View-only below this line===  ----- Message -----  From: Albania Niño PA-C  Sent: 6/10/2024  12:16 PM CDT  To: [facility] Pool - [facility]    Hi, Mg a little low; can we order 2 grams of magnesium for tomorrow prior to procedure  Thank you

## 2024-06-10 NOTE — PROGRESS NOTES
HEART CARE ENCOUNTER NOTE       [facility]  [phone]    Assessment/Recommendations   Problem List Items Addressed This Visit       Dyslipidemia     Other Visit Diagnoses       Nonrheumatic aortic valve stenosis    -  Primary    Coronary artery disease involving native coronary artery without angina pectoris, unspecified whether native or transplanted heart        Coronary artery disease involving native coronary artery of native heart without angina pectoris        Hypertension, unspecified type                1.  Aortic Stenosis: This has become severe and is now likely contributing to his worsening shortness of breath.  He has seen a decline in his functional status.  He has been evaluated by a multidisciplinary team and has been deemed a good candidate for transcatheter aortic valve replacement.  He has now undergone all the required workup for TAVR and wishes to proceed.   We discussed the procedure in detail,  including post-procedure care, restrictions and follow up.   He understands that there is a 4-5% risk of bleeding, infection, stroke, heart block requiring permanent pacemaker, cardiac perforation, aortic root rupture, dissection and death.  Patient also understands that if something unexpected happens, the procedure may need to be stopped or changed to help him.     All questions were answered   Consents were signed and witnessed by me.  As per Dr. Mitchell's note dated 5/22/2024, patient would want full bailout.  He has never had trouble with anesthesia in the past.    Labs today reveal Hgb 11.2, WBC 10.1 , Mg 1.6 (replacement ordered for tomorrow), Na 132 (appears chronically hyponatremic), K is stable, creatinine is 0.95 which is stable    The plan will be for moderate sedation.  We will use the Dan valve. Wyatt Gutierrez will report tomorrow morning for the procedure and all instructions regarding times and medications were given by TAVR coordinator RN.     2.  Chronic diastolic  heart failure, NYHA class III-IV - appears euvolemic on exam. His has chronic shortness of breath at baseline given his pulmonary fibrosis which is also likely contributing to his symptoms. Continue metoprolol succinate, triamterene. His NT-proBNP is 151 today    3.  Paroxysmal atrial fibrillation - 1 episode in Feb/March 2022. He wore ambulatory monitoring for 24 days without detection/recurrence of atrial fibrillation. He is not currently anticoagulated     4.  Nonobstructive coronary artery disease -on pre-TAVR coronary angiogram.  Continue aspirin 81 mg daily indefinitely    5.  Dyslipidemia - LDL 72.  Continue Lipitor 80 mg daily    6.  Hypertension -blood pressure is controlled    7.  Idiopathic pulmonary fibrosis -follows with pulmonary. Reports wearing 2-3 L NC at rest     8.  Seaman's esophagus -continue PPI       History of Present Illness/Subjective    Wyatt Gutierrez is a 79 year old male who comes in today for history and physical prior to TAVR (transcatheter aortic valve replacement). He is accompanied by his wife for today's visit.    He has a past medical history significant for aortic stenosis, paroxysmal atrial fibrillation, coronary artery disease, dyslipidemia, hypertension, idiopathic pulmonary fibrosis, Seaman's esophagus    He is chronically short of breath and wears oxygen at home at baseline.  However he has had increasing oxygen requirement over the past several months. He currently wears 2-3 L NC at rest, but does report needing to increase to 6 L NC when exerting himself. He reports a gradual weight loss over the past 1-2 years and he reports a poor appetite. He also reports early satiety. He uses 2 pillows at night for sleep - he reports it is unclear if this helps his breathing or not. He wears a CPAP at night.     Wyatt Gutierrez denies chest discomfort, palpitations, shortness of breath, paroxysmal nocturnal dyspnea, orthopnea, lightheadedness, dizziness, pre-syncope, or syncope.  Wyatt  "GAETANO Ray also denies any weight loss, changes in appetite, nausea or vomiting.     Medical, surgical, family, social history, and medications were reviewed and updated as necessary.    ECG (personally reviewed & interpreted): 6/10/2024   Sinus rhythm with first-degree AV block  Ventricular rate 69, , , QT/QTc 380/407    ECHOCARDIOGRAM done on 12/1/2023:  Interpretation Summary     1.Left ventricular size, wall motion and function are normal. The ejection  fraction is 55-60%.  2.There is mild concentric left ventricular hypertrophy.  3.Mildly decreased right ventricular systolic function  4.Moderate to severe stenosis of a trileaflet thickened aortic valve, at SVI  of 32 mL/M2 dimensionless index 0.25 with peak velocity of 3.9 m/s and mean  gradient of 37 mmHg.  4.There is mild to moderate (1-2+) aortic regurgitation. Decel 1/2 time is 582  msec.  Compared to the prior study dated 6/12/2023, the LV EF looks to be slightly  less, the AI slightly more and the AS slightly worse, prior was 37 mmHg and  3.6 m/sec.    CATH done on 5/13/2024:  - non-obstructive CAD  - continue ASA 81mg daily indefinitely, increase atorva to 80  - proceed w/ TAVR w/up as planned  - of note, not optimal for radial access  - continue aggressive risk factor modification        Findings:  LM:no obstruction  LAD:mildly irregular  Lcx:dominant, mildly irregular  RCA:nob-dominant, 40% mid-vessel narrowing     Access:  R Radial artery switched to   R Femoral artery due to inability to pass at the level of the elbow w/ 5F catheter       Physical Examination Review of Systems   Vitals: /70 (BP Location: Left arm, Patient Position: Sitting, Cuff Size: Adult Large)   Pulse 65   Resp 20   Ht 1.778 m (5' 10\")   Wt 91.6 kg (202 lb)   BMI 28.98 kg/m    BMI= Body mass index is 28.98 kg/m .  Wt Readings from Last 3 Encounters:   06/10/24 91.6 kg (202 lb)   05/22/24 92.5 kg (204 lb)   05/02/24 94.8 kg (209 lb)       General Appearance:   " Alert, cooperative and in no acute distress   ENT/Mouth: membranes moist, no oral lesions or bleeding gums.      EYES:  no scleral icterus, normal conjunctivae   Neck:  Thyroid not visualized   Chest/Lungs:   lungs are clear to auscultation, no rales or wheezing   Cardiovascular:   Regular. Normal first and second heart sounds with 3/6 systolic murmur.  No rubs or gallops; the carotid, radial and posterior tibial pulses are intact, no edema bilaterally    Abdomen:  Soft and nontender. Bowel sounds are present in all quadrants   Extremities: no cyanosis or clubbing   Skin: no xanthelasma, warm.    Neurologic: normal gait, normal  bilateral, no tremors   Psychiatric: Normal mood and affect       Please refer above for cardiac ROS details.      Medical History  Surgical History Family History Social History   Past Medical History:   Diagnosis Date    Arthritis     High cholesterol     Hypertension     Sleep apnea      Past Surgical History:   Procedure Laterality Date    ARTHROSCOPY SHOULDER      COLONOSCOPY      CV CORONARY ANGIOGRAM N/A 5/13/2024    Procedure: Coronary Angiogram;  Surgeon: Shiv Robles MD;  Location: [facility] CV    ESOPHAGOSCOPY, GASTROSCOPY, DUODENOSCOPY (EGD), COMBINED N/A 10/2/2023    Procedure: ESOPHAGOGASTRODUODENOSCOPY with biopsies;  Surgeon: Reji Baptiste MD;  Location: [facility] Main OR    OTHER SURGICAL HISTORY Right 1957    heel fracture    OTHER SURGICAL HISTORY      knee arthroscopies    [facility] TOTAL KNEE ARTHROPLASTY Left 10/31/2019    Procedure: LEFT MINIMALLY INVASIVE TOTAL KNEE ARTHROPLASTY;  Surgeon: Avelino Canada MD;  Location: [facility];  Service: Orthopedics     History reviewed. No pertinent family history. Social History     Socioeconomic History    Marital status:      Spouse name: Not on file    Number of children: Not on file    Years of education: Not on file    Highest education level: Not on file   Occupational History    Not on file    Tobacco Use    Smoking status: Former     Current packs/day: 0.00     Average packs/day: 0.5 packs/day for 55.0 years (27.5 ttl pk-yrs)     Types: Cigarettes     Start date: 1966     Quit date: 2021     Years since quittin.4    Smokeless tobacco: Never   Substance and Sexual Activity    Alcohol use: Yes     Comment: 1-2 glasses of wine/ 2 x-week    Drug use: Not Currently     Comment: edible marijuana in the past    Sexual activity: Not on file   Other Topics Concern    Not on file   Social History Narrative    Not on file     Social Determinants of Health     Financial Resource Strain: Not on file   Food Insecurity: Not on file   Transportation Needs: Not on file   Physical Activity: Not on file   Stress: Not on file   Social Connections: Not on file   Interpersonal Safety: Not on file   Housing Stability: Not on file          Medications  Allergies   Current Outpatient Medications   Medication Sig Dispense Refill    acyclovir (ZOVIRAX) 400 MG tablet [ACYCLOVIR (ZOVIRAX) 400 MG TABLET] Take 400 mg by mouth 3 (three) times a day as needed.             albuterol (PROAIR HFA/PROVENTIL HFA/VENTOLIN HFA) 108 (90 Base) MCG/ACT inhaler Inhale 2 puffs into the lungs every 6 hours as needed 18 g 3    arformoterol (BROVANA) 15 MCG/2ML NEBU neb solution Take 2 mLs (15 mcg) by nebulization 2 times daily 360 mL 1    aspirin (ASA) 81 MG EC tablet Take by mouth daily      atorvastatin (LIPITOR) 80 MG tablet Take 1 tablet (80 mg) by mouth at bedtime 90 tablet 3    azelastine (ASTELIN) 137 mcg (0.1 %) nasal spray [AZELASTINE (ASTELIN) 137 MCG (0.1 %) NASAL SPRAY] Use in each nostril as directed (Patient taking differently: as needed) 30 mL 12    azithromycin (ZITHROMAX) 250 MG tablet Take 1 tablet (250 mg) by mouth daily for 180 days 90 tablet 1    ipratropium - albuterol 0.5 mg/2.5 mg/3 mL (DUONEB) 0.5-2.5 (3) MG/3ML neb solution Take 1 vial (3 mLs) by nebulization every 6 hours as needed for shortness of breath,  "wheezing or cough 360 mL 3    Loperamide HCl (IMODIUM A-D PO) Take by mouth daily as needed      metoprolol succinate (TOPROL-XL) 50 MG 24 hr tablet [METOPROLOL SUCCINATE (TOPROL-XL) 50 MG 24 HR TABLET] Take 50 mg by mouth daily.      multivitamin therapeutic tablet Take 1 tablet by mouth daily      nintedanib (OFEV) 100 MG capsule Take 1 capsule (100 mg) by mouth 2 times daily 180 capsule 3    omeprazole (PRILOSEC) 40 MG DR capsule Take 40 mg by mouth 2 times daily      triamterene (DYRENIUM) 50 MG capsule Take 25 mg by mouth daily Patient only takes 25mg daily      albuterol (PROVENTIL) (2.5 MG/3ML) 0.083% neb solution Take 1 vial (2.5 mg) by nebulization 4 times daily (Patient not taking: Reported on 5/22/2024) 90 mL 3    ipratropium (ATROVENT) 0.02 % neb solution Take 2.5 mLs (0.5 mg) by nebulization 4 times daily (Patient not taking: Reported on 5/2/2024) 90 mL 3    omeprazole (PRILOSEC) 20 MG capsule [OMEPRAZOLE (PRILOSEC) 20 MG CAPSULE] Take 20 mg by mouth 2 (two) times a day before meals. (Patient not taking: Reported on 5/22/2024)      Allergies   Allergen Reactions    Animal Dander Unknown    Chalk     Dogs Other (See Comments)     Pet Dander    Maple Tree     Mold [Molds & Smuts] Unknown         Lab Results    Chemistry/lipid CBC Cardiac Enzymes/BNP/TSH/INR   Recent Labs   Lab Test 09/18/23  1412   CHOL 148   HDL 42   LDL 66   TRIG 201*     Recent Labs   Lab Test 09/18/23  1412 09/12/23  1313 08/21/23  1411   LDL 66 72 46     Recent Labs   Lab Test 05/13/24  0710   *   POTASSIUM 4.1   CHLORIDE 93*   CO2 29   *   BUN 12.4   CR 0.92   GFRESTIMATED 86   SHALOM 10.0     Recent Labs   Lab Test 05/13/24  0710 05/02/24  1135 04/30/24  1239   CR 0.92 0.90 0.9     No results for input(s): \"A1C\" in the last 30107 hours. Recent Labs   Lab Test 05/13/24  0710   WBC 11.3*   HGB 11.3*   HCT 35.7*   MCV 76*        Recent Labs   Lab Test 05/13/24  0710 05/02/24  1135 05/17/22  0901   HGB 11.3* 11.2* " "11.6*    No results for input(s): \"TROPONINI\" in the last 95570 hours.  No results for input(s): \"BNP\", \"NTBNPI\", \"NTBNP\" in the last 92685 hours.  No results for input(s): \"TSH\" in the last 76699 hours.  Recent Labs   Lab Test 01/15/22  0627 05/30/21  1950 10/31/19  0907   INR 1.0 1.00 0.95          40 minutes spent on the date of encounter doing education, consent signing, chart prep/review, review of outside records, review of test results, interpretation with above tests, patient visit, documentation, and discussion with family.      This note has been dictated using voice recognition software. Any grammatical or context distortions are unintentional and inherent to the software.      Albania Niño PA-C  Structural Heart Program  [facility]            "

## 2024-06-10 NOTE — PATIENT INSTRUCTIONS
Wyatt Gutierrez,    It was a pleasure to see you today in the clinic regarding your Pre-op visit for TAVR.     My recommendations after this visit include:     - no medication changes today  - please go to [facility] at the instructed time tomorrow    **Remember you will need prophylactic antibiotics for all dental visits in the future.  You can get the Rx from your dentist, your PCP or from us.  If possible, still refrain from going to the dentist until 6 months or more after your valve replacement      If you have questions or concerns, please call using the numbers below:    Valve Clinic Phone   [phone]    After Hours/Scheduling  [phone]    Otherwise you can dial the nurse directly at:    Joyce Franklin RN  [phone]    Juan Carlos Lugo RN  [phone]                 Albania Niño PA-C  Structural Heart Program  [facility]

## 2024-06-10 NOTE — LETTER
6/10/2024    Eliot De La Rosa MD  [address]  [facility] [address]    RE: Wyatt GAETANO Gutierrez       Dear Colleague,     I had the pleasure of seeing Wyatt GAETANO Gutierrez in the [facility].  HEART CARE ENCOUNTER NOTE       [facility]  [phone]    Assessment/Recommendations   Problem List Items Addressed This Visit       Dyslipidemia     Other Visit Diagnoses       Nonrheumatic aortic valve stenosis    -  Primary    Coronary artery disease involving native coronary artery without angina pectoris, unspecified whether native or transplanted heart        Coronary artery disease involving native coronary artery of native heart without angina pectoris        Hypertension, unspecified type                1.  Aortic Stenosis: This has become severe and is now likely contributing to his worsening shortness of breath.  He has seen a decline in his functional status.  He has been evaluated by a multidisciplinary team and has been deemed a good candidate for transcatheter aortic valve replacement.  He has now undergone all the required workup for TAVR and wishes to proceed.   We discussed the procedure in detail,  including post-procedure care, restrictions and follow up.   He understands that there is a 4-5% risk of bleeding, infection, stroke, heart block requiring permanent pacemaker, cardiac perforation, aortic root rupture, dissection and death.  Patient also understands that if something unexpected happens, the procedure may need to be stopped or changed to help him.     All questions were answered   Consents were signed and witnessed by me.  As per Dr. Mitchell's note dated 5/22/2024, patient would want full bailout.  He has never had trouble with anesthesia in the past.    Labs today reveal Hgb 11.2, WBC 10.1 , Mg 1.6 (replacement ordered for tomorrow), Na 132 (appears chronically hyponatremic), K is stable, creatinine is 0.95 which is stable    The plan will be for moderate sedation.  We will  use the Dan valve. Wyatt Gutierrez will report tomorrow morning for the procedure and all instructions regarding times and medications were given by TAVR coordinator RN.     2.  Chronic diastolic heart failure, NYHA class III-IV - appears euvolemic on exam. His has chronic shortness of breath at baseline given his pulmonary fibrosis which is also likely contributing to his symptoms. Continue metoprolol succinate, triamterene. His NT-proBNP is 151 today    3.  Paroxysmal atrial fibrillation - 1 episode in Feb/March 2022. He wore ambulatory monitoring for 24 days without detection/recurrence of atrial fibrillation. He is not currently anticoagulated     4.  Nonobstructive coronary artery disease -on pre-TAVR coronary angiogram.  Continue aspirin 81 mg daily indefinitely    5.  Dyslipidemia - LDL 72.  Continue Lipitor 80 mg daily    6.  Hypertension -blood pressure is controlled    7.  Idiopathic pulmonary fibrosis -follows with pulmonary. Reports wearing 2-3 L NC at rest     8.  Seaman's esophagus -continue PPI       History of Present Illness/Subjective    Wyatt Gutierrez is a 79 year old male who comes in today for history and physical prior to TAVR (transcatheter aortic valve replacement). He is accompanied by his wife for today's visit.    He has a past medical history significant for aortic stenosis, paroxysmal atrial fibrillation, coronary artery disease, dyslipidemia, hypertension, idiopathic pulmonary fibrosis, Seaman's esophagus    He is chronically short of breath and wears oxygen at home at baseline.  However he has had increasing oxygen requirement over the past several months. He currently wears 2-3 L NC at rest, but does report needing to increase to 6 L NC when exerting himself. He reports a gradual weight loss over the past 1-2 years and he reports a poor appetite. He also reports early satiety. He uses 2 pillows at night for sleep - he reports it is unclear if this helps his breathing or not. He wears  "a CPAP at night.     Wyatt Gutierrez denies chest discomfort, palpitations, shortness of breath, paroxysmal nocturnal dyspnea, orthopnea, lightheadedness, dizziness, pre-syncope, or syncope.  Wyatt Gutierrez also denies any weight loss, changes in appetite, nausea or vomiting.     Medical, surgical, family, social history, and medications were reviewed and updated as necessary.    ECG (personally reviewed & interpreted): 6/10/2024   Sinus rhythm with first-degree AV block  Ventricular rate 69, , , QT/QTc 380/407    ECHOCARDIOGRAM done on 12/1/2023:  Interpretation Summary     1.Left ventricular size, wall motion and function are normal. The ejection  fraction is 55-60%.  2.There is mild concentric left ventricular hypertrophy.  3.Mildly decreased right ventricular systolic function  4.Moderate to severe stenosis of a trileaflet thickened aortic valve, at SVI  of 32 mL/M2 dimensionless index 0.25 with peak velocity of 3.9 m/s and mean  gradient of 37 mmHg.  4.There is mild to moderate (1-2+) aortic regurgitation. Decel 1/2 time is 582  msec.  Compared to the prior study dated 6/12/2023, the LV EF looks to be slightly  less, the AI slightly more and the AS slightly worse, prior was 37 mmHg and  3.6 m/sec.    CATH done on 5/13/2024:  - non-obstructive CAD  - continue ASA 81mg daily indefinitely, increase atorva to 80  - proceed w/ TAVR w/up as planned  - of note, not optimal for radial access  - continue aggressive risk factor modification        Findings:  LM:no obstruction  LAD:mildly irregular  Lcx:dominant, mildly irregular  RCA:nob-dominant, 40% mid-vessel narrowing     Access:  R Radial artery switched to   R Femoral artery due to inability to pass at the level of the elbow w/ 5F catheter       Physical Examination Review of Systems   Vitals: /70 (BP Location: Left arm, Patient Position: Sitting, Cuff Size: Adult Large)   Pulse 65   Resp 20   Ht 1.778 m (5' 10\")   Wt 91.6 kg (202 lb)   BMI 28.98 " kg/m    BMI= Body mass index is 28.98 kg/m .  Wt Readings from Last 3 Encounters:   06/10/24 91.6 kg (202 lb)   05/22/24 92.5 kg (204 lb)   05/02/24 94.8 kg (209 lb)       General Appearance:   Alert, cooperative and in no acute distress   ENT/Mouth: membranes moist, no oral lesions or bleeding gums.      EYES:  no scleral icterus, normal conjunctivae   Neck:  Thyroid not visualized   Chest/Lungs:   lungs are clear to auscultation, no rales or wheezing   Cardiovascular:   Regular. Normal first and second heart sounds with 3/6 systolic murmur.  No rubs or gallops; the carotid, radial and posterior tibial pulses are intact, no edema bilaterally    Abdomen:  Soft and nontender. Bowel sounds are present in all quadrants   Extremities: no cyanosis or clubbing   Skin: no xanthelasma, warm.    Neurologic: normal gait, normal  bilateral, no tremors   Psychiatric: Normal mood and affect       Please refer above for cardiac ROS details.      Medical History  Surgical History Family History Social History   Past Medical History:   Diagnosis Date    Arthritis     High cholesterol     Hypertension     Sleep apnea      Past Surgical History:   Procedure Laterality Date    ARTHROSCOPY SHOULDER      COLONOSCOPY      CV CORONARY ANGIOGRAM N/A 5/13/2024    Procedure: Coronary Angiogram;  Surgeon: Shiv Robles MD;  Location: [facility] CV    ESOPHAGOSCOPY, GASTROSCOPY, DUODENOSCOPY (EGD), COMBINED N/A 10/2/2023    Procedure: ESOPHAGOGASTRODUODENOSCOPY with biopsies;  Surgeon: Reji Baptiset MD;  Location: [facility] Main OR    OTHER SURGICAL HISTORY Right 1957    heel fracture    OTHER SURGICAL HISTORY      knee arthroscopies    [facility] TOTAL KNEE ARTHROPLASTY Left 10/31/2019    Procedure: LEFT MINIMALLY INVASIVE TOTAL KNEE ARTHROPLASTY;  Surgeon: Avelino Canada MD;  Location: [facility] Main OR;  Service: Orthopedics     History reviewed. No pertinent family history. Social History     Socioeconomic History    Marital  status:      Spouse name: Not on file    Number of children: Not on file    Years of education: Not on file    Highest education level: Not on file   Occupational History    Not on file   Tobacco Use    Smoking status: Former     Current packs/day: 0.00     Average packs/day: 0.5 packs/day for 55.0 years (27.5 ttl pk-yrs)     Types: Cigarettes     Start date: 1966     Quit date: 2021     Years since quittin.4    Smokeless tobacco: Never   Substance and Sexual Activity    Alcohol use: Yes     Comment: 1-2 glasses of wine/ 2 x-week    Drug use: Not Currently     Comment: edible marijuana in the past    Sexual activity: Not on file   Other Topics Concern    Not on file   Social History Narrative    Not on file     Social Determinants of Health     Financial Resource Strain: Not on file   Food Insecurity: Not on file   Transportation Needs: Not on file   Physical Activity: Not on file   Stress: Not on file   Social Connections: Not on file   Interpersonal Safety: Not on file   Housing Stability: Not on file          Medications  Allergies   Current Outpatient Medications   Medication Sig Dispense Refill    acyclovir (ZOVIRAX) 400 MG tablet [ACYCLOVIR (ZOVIRAX) 400 MG TABLET] Take 400 mg by mouth 3 (three) times a day as needed.             albuterol (PROAIR HFA/PROVENTIL HFA/VENTOLIN HFA) 108 (90 Base) MCG/ACT inhaler Inhale 2 puffs into the lungs every 6 hours as needed 18 g 3    arformoterol (BROVANA) 15 MCG/2ML NEBU neb solution Take 2 mLs (15 mcg) by nebulization 2 times daily 360 mL 1    aspirin (ASA) 81 MG EC tablet Take by mouth daily      atorvastatin (LIPITOR) 80 MG tablet Take 1 tablet (80 mg) by mouth at bedtime 90 tablet 3    azelastine (ASTELIN) 137 mcg (0.1 %) nasal spray [AZELASTINE (ASTELIN) 137 MCG (0.1 %) NASAL SPRAY] Use in each nostril as directed (Patient taking differently: as needed) 30 mL 12    azithromycin (ZITHROMAX) 250 MG tablet Take 1 tablet (250 mg) by mouth daily for  "180 days 90 tablet 1    ipratropium - albuterol 0.5 mg/2.5 mg/3 mL (DUONEB) 0.5-2.5 (3) MG/3ML neb solution Take 1 vial (3 mLs) by nebulization every 6 hours as needed for shortness of breath, wheezing or cough 360 mL 3    Loperamide HCl (IMODIUM A-D PO) Take by mouth daily as needed      metoprolol succinate (TOPROL-XL) 50 MG 24 hr tablet [METOPROLOL SUCCINATE (TOPROL-XL) 50 MG 24 HR TABLET] Take 50 mg by mouth daily.      multivitamin therapeutic tablet Take 1 tablet by mouth daily      nintedanib (OFEV) 100 MG capsule Take 1 capsule (100 mg) by mouth 2 times daily 180 capsule 3    omeprazole (PRILOSEC) 40 MG DR capsule Take 40 mg by mouth 2 times daily      triamterene (DYRENIUM) 50 MG capsule Take 25 mg by mouth daily Patient only takes 25mg daily      albuterol (PROVENTIL) (2.5 MG/3ML) 0.083% neb solution Take 1 vial (2.5 mg) by nebulization 4 times daily (Patient not taking: Reported on 5/22/2024) 90 mL 3    ipratropium (ATROVENT) 0.02 % neb solution Take 2.5 mLs (0.5 mg) by nebulization 4 times daily (Patient not taking: Reported on 5/2/2024) 90 mL 3    omeprazole (PRILOSEC) 20 MG capsule [OMEPRAZOLE (PRILOSEC) 20 MG CAPSULE] Take 20 mg by mouth 2 (two) times a day before meals. (Patient not taking: Reported on 5/22/2024)      Allergies   Allergen Reactions    Animal Dander Unknown    Chalk     Dogs Other (See Comments)     Pet Dander    Maple Tree     Mold [Molds & Smuts] Unknown         Lab Results    Chemistry/lipid CBC Cardiac Enzymes/BNP/TSH/INR   Recent Labs   Lab Test 09/18/23  1412   CHOL 148   HDL 42   LDL 66   TRIG 201*     Recent Labs   Lab Test 09/18/23  1412 09/12/23  1313 08/21/23  1411   LDL 66 72 46     Recent Labs   Lab Test 05/13/24  0710   *   POTASSIUM 4.1   CHLORIDE 93*   CO2 29   *   BUN 12.4   CR 0.92   GFRESTIMATED 86   SHALOM 10.0     Recent Labs   Lab Test 05/13/24  0710 05/02/24  1135 04/30/24  1239   CR 0.92 0.90 0.9     No results for input(s): \"A1C\" in the last 72000 " "hours. Recent Labs   Lab Test 05/13/24  0710   WBC 11.3*   HGB 11.3*   HCT 35.7*   MCV 76*        Recent Labs   Lab Test 05/13/24  0710 05/02/24  1135 05/17/22  0901   HGB 11.3* 11.2* 11.6*    No results for input(s): \"TROPONINI\" in the last 01341 hours.  No results for input(s): \"BNP\", \"NTBNPI\", \"NTBNP\" in the last 77933 hours.  No results for input(s): \"TSH\" in the last 14892 hours.  Recent Labs   Lab Test 01/15/22  0627 05/30/21  1950 10/31/19  0907   INR 1.0 1.00 0.95          40 minutes spent on the date of encounter doing education, consent signing, chart prep/review, review of outside records, review of test results, interpretation with above tests, patient visit, documentation, and discussion with family.      This note has been dictated using voice recognition software. Any grammatical or context distortions are unintentional and inherent to the software.      Albania Niño PA-C  Structural Heart Program  [facility]                Thank you for allowing me to participate in the care of your patient.      Sincerely,     Albania Niño PA-C     [facility]  cc:   Eliot De La Rosa MD  [address]  [place] [address]      "

## 2024-06-10 NOTE — PROGRESS NOTES
TAVR Pre-Op RN Visit     Patient in to see RN for Pre-TAVR visit on 6/10/2024    All pre-procedure labs drawn: Yes   EKG obtained: Yes   Labs reviewed: No; pending at time of visit- will be reviewed by provider [place]ia Niño PA-C     STS score: 2%      Patient instructed on the following medications:   AM of procedure take prilosec, Ofev, azithromycin, 325 mg of aspirin and ANY breathing treatment needed including nebulizer's and inhalers. HOLD all other medications you would normally take in the AM including Rx, OTC, vitamins and supplements.     Loading dose of ASA to be given in pre-procedure/CSC if not already taken at home.     Patient DOES NOT have an implanted device--  Type of device and : N/A  Implanting/Managing Provider: N/A  Phone number to reach the : N/A    Patient given instructions on bathing including no use of deodorant/lotions/creams/powders.      Patient has advanced directive: no documents on file. Previous code status listed as not listed. Patient wishes to FULL Bailout for TAVR.     Education was given to patient regarding what to expect pre and post procedure.     Patient was informed procedure will be done at [facility]: Main Entrance at [address] [place] [address]; [place] [address] and their arrival time is at 830 AM.     TAVR and blood consents signed at the time of the appt: Yes; scanned into EPIC by structural CMA.     All questions from patient and family were answered by RN.    Patients spouse present at the time of appointment. Spouse will be accompanying patient on procedure day.     Orders placed for procedure.       Juan Carlos Lugo RN BSN- Structural Heart Coordinator   Structural Heart Coordinator  [facility]  [facility]   [phone]

## 2024-06-10 NOTE — H&P (VIEW-ONLY)
HEART CARE ENCOUNTER NOTE       [facility]  [phone]    Assessment/Recommendations   Problem List Items Addressed This Visit       Dyslipidemia     Other Visit Diagnoses       Nonrheumatic aortic valve stenosis    -  Primary    Coronary artery disease involving native coronary artery without angina pectoris, unspecified whether native or transplanted heart        Coronary artery disease involving native coronary artery of native heart without angina pectoris        Hypertension, unspecified type                1.  Aortic Stenosis: This has become severe and is now likely contributing to his worsening shortness of breath.  He has seen a decline in his functional status.  He has been evaluated by a multidisciplinary team and has been deemed a good candidate for transcatheter aortic valve replacement.  He has now undergone all the required workup for TAVR and wishes to proceed.   We discussed the procedure in detail,  including post-procedure care, restrictions and follow up.   He understands that there is a 4-5% risk of bleeding, infection, stroke, heart block requiring permanent pacemaker, cardiac perforation, aortic root rupture, dissection and death.  Patient also understands that if something unexpected happens, the procedure may need to be stopped or changed to help him.     All questions were answered   Consents were signed and witnessed by me.  As per Dr. Mitchell's note dated 5/22/2024, patient would want full bailout.  He has never had trouble with anesthesia in the past.    Labs today reveal Hgb 11.2, WBC 10.1 , Mg 1.6 (replacement ordered for tomorrow), Na 132 (appears chronically hyponatremic), K is stable, creatinine is 0.95 which is stable    The plan will be for moderate sedation.  We will use the Dan valve. Wyatt Gutierrez will report tomorrow morning for the procedure and all instructions regarding times and medications were given by TAVR coordinator RN.     2.  Chronic diastolic  heart failure, NYHA class III-IV - appears euvolemic on exam. His has chronic shortness of breath at baseline given his pulmonary fibrosis which is also likely contributing to his symptoms. Continue metoprolol succinate, triamterene. His NT-proBNP is 151 today    3.  Paroxysmal atrial fibrillation - 1 episode in Feb/March 2022. He wore ambulatory monitoring for 24 days without detection/recurrence of atrial fibrillation. He is not currently anticoagulated     4.  Nonobstructive coronary artery disease -on pre-TAVR coronary angiogram.  Continue aspirin 81 mg daily indefinitely    5.  Dyslipidemia - LDL 72.  Continue Lipitor 80 mg daily    6.  Hypertension -blood pressure is controlled    7.  Idiopathic pulmonary fibrosis -follows with pulmonary. Reports wearing 2-3 L NC at rest     8.  Seaman's esophagus -continue PPI       History of Present Illness/Subjective    Wyatt Gutierrez is a 79 year old male who comes in today for history and physical prior to TAVR (transcatheter aortic valve replacement). He is accompanied by his wife for today's visit.    He has a past medical history significant for aortic stenosis, paroxysmal atrial fibrillation, coronary artery disease, dyslipidemia, hypertension, idiopathic pulmonary fibrosis, Seaman's esophagus    He is chronically short of breath and wears oxygen at home at baseline.  However he has had increasing oxygen requirement over the past several months. He currently wears 2-3 L NC at rest, but does report needing to increase to 6 L NC when exerting himself. He reports a gradual weight loss over the past 1-2 years and he reports a poor appetite. He also reports early satiety. He uses 2 pillows at night for sleep - he reports it is unclear if this helps his breathing or not. He wears a CPAP at night.     Wyatt Gutierrez denies chest discomfort, palpitations, shortness of breath, paroxysmal nocturnal dyspnea, orthopnea, lightheadedness, dizziness, pre-syncope, or syncope.  Wyatt  "GAETANO Ray also denies any weight loss, changes in appetite, nausea or vomiting.     Medical, surgical, family, social history, and medications were reviewed and updated as necessary.    ECG (personally reviewed & interpreted): 6/10/2024   Sinus rhythm with first-degree AV block  Ventricular rate 69, , , QT/QTc 380/407    ECHOCARDIOGRAM done on 12/1/2023:  Interpretation Summary     1.Left ventricular size, wall motion and function are normal. The ejection  fraction is 55-60%.  2.There is mild concentric left ventricular hypertrophy.  3.Mildly decreased right ventricular systolic function  4.Moderate to severe stenosis of a trileaflet thickened aortic valve, at SVI  of 32 mL/M2 dimensionless index 0.25 with peak velocity of 3.9 m/s and mean  gradient of 37 mmHg.  4.There is mild to moderate (1-2+) aortic regurgitation. Decel 1/2 time is 582  msec.  Compared to the prior study dated 6/12/2023, the LV EF looks to be slightly  less, the AI slightly more and the AS slightly worse, prior was 37 mmHg and  3.6 m/sec.    CATH done on 5/13/2024:  - non-obstructive CAD  - continue ASA 81mg daily indefinitely, increase atorva to 80  - proceed w/ TAVR w/up as planned  - of note, not optimal for radial access  - continue aggressive risk factor modification        Findings:  LM:no obstruction  LAD:mildly irregular  Lcx:dominant, mildly irregular  RCA:nob-dominant, 40% mid-vessel narrowing     Access:  R Radial artery switched to   R Femoral artery due to inability to pass at the level of the elbow w/ 5F catheter       Physical Examination Review of Systems   Vitals: /70 (BP Location: Left arm, Patient Position: Sitting, Cuff Size: Adult Large)   Pulse 65   Resp 20   Ht 1.778 m (5' 10\")   Wt 91.6 kg (202 lb)   BMI 28.98 kg/m    BMI= Body mass index is 28.98 kg/m .  Wt Readings from Last 3 Encounters:   06/10/24 91.6 kg (202 lb)   05/22/24 92.5 kg (204 lb)   05/02/24 94.8 kg (209 lb)       General Appearance:   " Alert, cooperative and in no acute distress   ENT/Mouth: membranes moist, no oral lesions or bleeding gums.      EYES:  no scleral icterus, normal conjunctivae   Neck:  Thyroid not visualized   Chest/Lungs:   lungs are clear to auscultation, no rales or wheezing   Cardiovascular:   Regular. Normal first and second heart sounds with 3/6 systolic murmur.  No rubs or gallops; the carotid, radial and posterior tibial pulses are intact, no edema bilaterally    Abdomen:  Soft and nontender. Bowel sounds are present in all quadrants   Extremities: no cyanosis or clubbing   Skin: no xanthelasma, warm.    Neurologic: normal gait, normal  bilateral, no tremors   Psychiatric: Normal mood and affect       Please refer above for cardiac ROS details.      Medical History  Surgical History Family History Social History   Past Medical History:   Diagnosis Date    Arthritis     High cholesterol     Hypertension     Sleep apnea      Past Surgical History:   Procedure Laterality Date    ARTHROSCOPY SHOULDER      COLONOSCOPY      CV CORONARY ANGIOGRAM N/A 5/13/2024    Procedure: Coronary Angiogram;  Surgeon: Shiv Robles MD;  Location: [facility] CV    ESOPHAGOSCOPY, GASTROSCOPY, DUODENOSCOPY (EGD), COMBINED N/A 10/2/2023    Procedure: ESOPHAGOGASTRODUODENOSCOPY with biopsies;  Surgeon: Reji Baptiste MD;  Location: [facility] Main OR    OTHER SURGICAL HISTORY Right 1957    heel fracture    OTHER SURGICAL HISTORY      knee arthroscopies    [facility] TOTAL KNEE ARTHROPLASTY Left 10/31/2019    Procedure: LEFT MINIMALLY INVASIVE TOTAL KNEE ARTHROPLASTY;  Surgeon: Avelino Canada MD;  Location: [facility];  Service: Orthopedics     History reviewed. No pertinent family history. Social History     Socioeconomic History    Marital status:      Spouse name: Not on file    Number of children: Not on file    Years of education: Not on file    Highest education level: Not on file   Occupational History    Not on file    Tobacco Use    Smoking status: Former     Current packs/day: 0.00     Average packs/day: 0.5 packs/day for 55.0 years (27.5 ttl pk-yrs)     Types: Cigarettes     Start date: 1966     Quit date: 2021     Years since quittin.4    Smokeless tobacco: Never   Substance and Sexual Activity    Alcohol use: Yes     Comment: 1-2 glasses of wine/ 2 x-week    Drug use: Not Currently     Comment: edible marijuana in the past    Sexual activity: Not on file   Other Topics Concern    Not on file   Social History Narrative    Not on file     Social Determinants of Health     Financial Resource Strain: Not on file   Food Insecurity: Not on file   Transportation Needs: Not on file   Physical Activity: Not on file   Stress: Not on file   Social Connections: Not on file   Interpersonal Safety: Not on file   Housing Stability: Not on file          Medications  Allergies   Current Outpatient Medications   Medication Sig Dispense Refill    acyclovir (ZOVIRAX) 400 MG tablet [ACYCLOVIR (ZOVIRAX) 400 MG TABLET] Take 400 mg by mouth 3 (three) times a day as needed.             albuterol (PROAIR HFA/PROVENTIL HFA/VENTOLIN HFA) 108 (90 Base) MCG/ACT inhaler Inhale 2 puffs into the lungs every 6 hours as needed 18 g 3    arformoterol (BROVANA) 15 MCG/2ML NEBU neb solution Take 2 mLs (15 mcg) by nebulization 2 times daily 360 mL 1    aspirin (ASA) 81 MG EC tablet Take by mouth daily      atorvastatin (LIPITOR) 80 MG tablet Take 1 tablet (80 mg) by mouth at bedtime 90 tablet 3    azelastine (ASTELIN) 137 mcg (0.1 %) nasal spray [AZELASTINE (ASTELIN) 137 MCG (0.1 %) NASAL SPRAY] Use in each nostril as directed (Patient taking differently: as needed) 30 mL 12    azithromycin (ZITHROMAX) 250 MG tablet Take 1 tablet (250 mg) by mouth daily for 180 days 90 tablet 1    ipratropium - albuterol 0.5 mg/2.5 mg/3 mL (DUONEB) 0.5-2.5 (3) MG/3ML neb solution Take 1 vial (3 mLs) by nebulization every 6 hours as needed for shortness of breath,  "wheezing or cough 360 mL 3    Loperamide HCl (IMODIUM A-D PO) Take by mouth daily as needed      metoprolol succinate (TOPROL-XL) 50 MG 24 hr tablet [METOPROLOL SUCCINATE (TOPROL-XL) 50 MG 24 HR TABLET] Take 50 mg by mouth daily.      multivitamin therapeutic tablet Take 1 tablet by mouth daily      nintedanib (OFEV) 100 MG capsule Take 1 capsule (100 mg) by mouth 2 times daily 180 capsule 3    omeprazole (PRILOSEC) 40 MG DR capsule Take 40 mg by mouth 2 times daily      triamterene (DYRENIUM) 50 MG capsule Take 25 mg by mouth daily Patient only takes 25mg daily      albuterol (PROVENTIL) (2.5 MG/3ML) 0.083% neb solution Take 1 vial (2.5 mg) by nebulization 4 times daily (Patient not taking: Reported on 5/22/2024) 90 mL 3    ipratropium (ATROVENT) 0.02 % neb solution Take 2.5 mLs (0.5 mg) by nebulization 4 times daily (Patient not taking: Reported on 5/2/2024) 90 mL 3    omeprazole (PRILOSEC) 20 MG capsule [OMEPRAZOLE (PRILOSEC) 20 MG CAPSULE] Take 20 mg by mouth 2 (two) times a day before meals. (Patient not taking: Reported on 5/22/2024)      Allergies   Allergen Reactions    Animal Dander Unknown    Chalk     Dogs Other (See Comments)     Pet Dander    Maple Tree     Mold [Molds & Smuts] Unknown         Lab Results    Chemistry/lipid CBC Cardiac Enzymes/BNP/TSH/INR   Recent Labs   Lab Test 09/18/23  1412   CHOL 148   HDL 42   LDL 66   TRIG 201*     Recent Labs   Lab Test 09/18/23  1412 09/12/23  1313 08/21/23  1411   LDL 66 72 46     Recent Labs   Lab Test 05/13/24  0710   *   POTASSIUM 4.1   CHLORIDE 93*   CO2 29   *   BUN 12.4   CR 0.92   GFRESTIMATED 86   SHALOM 10.0     Recent Labs   Lab Test 05/13/24  0710 05/02/24  1135 04/30/24  1239   CR 0.92 0.90 0.9     No results for input(s): \"A1C\" in the last 91133 hours. Recent Labs   Lab Test 05/13/24  0710   WBC 11.3*   HGB 11.3*   HCT 35.7*   MCV 76*        Recent Labs   Lab Test 05/13/24  0710 05/02/24  1135 05/17/22  0901   HGB 11.3* 11.2* " "11.6*    No results for input(s): \"TROPONINI\" in the last 18290 hours.  No results for input(s): \"BNP\", \"NTBNPI\", \"NTBNP\" in the last 85048 hours.  No results for input(s): \"TSH\" in the last 77642 hours.  Recent Labs   Lab Test 01/15/22  0627 05/30/21  1950 10/31/19  0907   INR 1.0 1.00 0.95          40 minutes spent on the date of encounter doing education, consent signing, chart prep/review, review of outside records, review of test results, interpretation with above tests, patient visit, documentation, and discussion with family.      This note has been dictated using voice recognition software. Any grammatical or context distortions are unintentional and inherent to the software.      Albania Niño PA-C  Structural Heart Program  [facility]            "

## 2024-06-11 ENCOUNTER — HOSPITAL ENCOUNTER (INPATIENT)
Facility: HOSPITAL | Age: 79
LOS: 1 days | Discharge: HOME | DRG: 267 | End: 2024-06-12
Attending: INTERNAL MEDICINE | Admitting: STUDENT IN AN ORGANIZED HEALTH CARE EDUCATION/TRAINING PROGRAM
Payer: MEDICARE

## 2024-06-11 ENCOUNTER — HOSPITAL ENCOUNTER (OUTPATIENT)
Dept: CARDIOLOGY | Facility: HOSPITAL | Age: 79
Discharge: HOME | DRG: 267 | End: 2024-06-11
Attending: INTERNAL MEDICINE | Admitting: INTERNAL MEDICINE
Payer: MEDICARE

## 2024-06-11 DIAGNOSIS — E83.42 HYPOMAGNESEMIA: ICD-10-CM

## 2024-06-11 DIAGNOSIS — I35.0 NONRHEUMATIC AORTIC VALVE STENOSIS: ICD-10-CM

## 2024-06-11 DIAGNOSIS — Z95.2 S/P TAVR (TRANSCATHETER AORTIC VALVE REPLACEMENT): Primary | ICD-10-CM

## 2024-06-11 PROBLEM — J84.112 IPF (IDIOPATHIC PULMONARY FIBROSIS) (H): Status: ACTIVE | Noted: 2024-06-11

## 2024-06-11 LAB
ACT BLD: 270 SECONDS (ref 74–150)
ACT BLD: 282 SECONDS (ref 74–150)
ALBUMIN SERPL BCG-MCNC: 3.9 G/DL (ref 3.5–5.2)
ALP SERPL-CCNC: 97 U/L (ref 40–150)
ALT SERPL W P-5'-P-CCNC: 16 U/L (ref 0–70)
ANION GAP SERPL CALCULATED.3IONS-SCNC: 14 MMOL/L (ref 7–15)
AST SERPL W P-5'-P-CCNC: 25 U/L (ref 0–45)
ATRIAL RATE - MUSE: 72 BPM
BILIRUB SERPL-MCNC: 0.4 MG/DL
BLD PROD TYP BPU: NORMAL
BLD PROD TYP BPU: NORMAL
BLOOD COMPONENT TYPE: NORMAL
BLOOD COMPONENT TYPE: NORMAL
BUN SERPL-MCNC: 10.3 MG/DL (ref 8–23)
CALCIUM SERPL-MCNC: 8.8 MG/DL (ref 8.8–10.2)
CHLORIDE SERPL-SCNC: 93 MMOL/L (ref 98–107)
CODING SYSTEM: NORMAL
CODING SYSTEM: NORMAL
CREAT SERPL-MCNC: 0.84 MG/DL (ref 0.67–1.17)
CROSSMATCH: NORMAL
CROSSMATCH: NORMAL
DEPRECATED HCO3 PLAS-SCNC: 27 MMOL/L (ref 22–29)
DIASTOLIC BLOOD PRESSURE - MUSE: NORMAL MMHG
EGFRCR SERPLBLD CKD-EPI 2021: 90 ML/MIN/1.73M2
ERYTHROCYTE [DISTWIDTH] IN BLOOD BY AUTOMATED COUNT: 15.9 % (ref 10–15)
GLUCOSE SERPL-MCNC: 100 MG/DL (ref 70–99)
HCT VFR BLD AUTO: 32.7 % (ref 40–53)
HGB BLD-MCNC: 10.5 G/DL (ref 13.3–17.7)
INTERPRETATION ECG - MUSE: NORMAL
ISSUE DATE AND TIME: NORMAL
ISSUE DATE AND TIME: NORMAL
LVEF ECHO: NORMAL
MAGNESIUM SERPL-MCNC: 1.9 MG/DL (ref 1.7–2.3)
MCH RBC QN AUTO: 24.3 PG (ref 26.5–33)
MCHC RBC AUTO-ENTMCNC: 32.1 G/DL (ref 31.5–36.5)
MCV RBC AUTO: 76 FL (ref 78–100)
P AXIS - MUSE: 38 DEGREES
PLATELET # BLD AUTO: 223 10E3/UL (ref 150–450)
POTASSIUM SERPL-SCNC: 3.6 MMOL/L (ref 3.4–5.3)
PR INTERVAL - MUSE: 210 MS
PROT SERPL-MCNC: 6.9 G/DL (ref 6.4–8.3)
QRS DURATION - MUSE: 98 MS
QT - MUSE: 410 MS
QTC - MUSE: 448 MS
R AXIS - MUSE: -54 DEGREES
RBC # BLD AUTO: 4.32 10E6/UL (ref 4.4–5.9)
SODIUM SERPL-SCNC: 134 MMOL/L (ref 135–145)
SYSTOLIC BLOOD PRESSURE - MUSE: NORMAL MMHG
T AXIS - MUSE: 0 DEGREES
UNIT ABO/RH: NORMAL
UNIT ABO/RH: NORMAL
UNIT NUMBER: NORMAL
UNIT NUMBER: NORMAL
UNIT STATUS: NORMAL
UNIT STATUS: NORMAL
UNIT TYPE ISBT: 5100
UNIT TYPE ISBT: 5100
VENTRICULAR RATE- MUSE: 72 BPM
WBC # BLD AUTO: 10.5 10E3/UL (ref 4–11)

## 2024-06-11 PROCEDURE — 210N000001 HC R&B IMCU HEART CARE

## 2024-06-11 PROCEDURE — 93325 DOPPLER ECHO COLOR FLOW MAPG: CPT | Mod: 26 | Performed by: INTERNAL MEDICINE

## 2024-06-11 PROCEDURE — 94640 AIRWAY INHALATION TREATMENT: CPT | Mod: 76

## 2024-06-11 PROCEDURE — 33361 REPLACE AORTIC VALVE PERQ: CPT | Performed by: INTERNAL MEDICINE

## 2024-06-11 PROCEDURE — 250N000011 HC RX IP 250 OP 636: Performed by: INTERNAL MEDICINE

## 2024-06-11 PROCEDURE — 93005 ELECTROCARDIOGRAM TRACING: CPT

## 2024-06-11 PROCEDURE — C1894 INTRO/SHEATH, NON-LASER: HCPCS | Performed by: INTERNAL MEDICINE

## 2024-06-11 PROCEDURE — 85027 COMPLETE CBC AUTOMATED: CPT | Performed by: PHYSICIAN ASSISTANT

## 2024-06-11 PROCEDURE — C1889 IMPLANT/INSERT DEVICE, NOC: HCPCS | Performed by: INTERNAL MEDICINE

## 2024-06-11 PROCEDURE — C1769 GUIDE WIRE: HCPCS | Performed by: INTERNAL MEDICINE

## 2024-06-11 PROCEDURE — C1733 CATH, EP, OTHR THAN COOL-TIP: HCPCS | Performed by: INTERNAL MEDICINE

## 2024-06-11 PROCEDURE — 99152 MOD SED SAME PHYS/QHP 5/>YRS: CPT | Performed by: INTERNAL MEDICINE

## 2024-06-11 PROCEDURE — C1760 CLOSURE DEV, VASC: HCPCS | Performed by: INTERNAL MEDICINE

## 2024-06-11 PROCEDURE — 93321 DOPPLER ECHO F-UP/LMTD STD: CPT | Mod: 26 | Performed by: INTERNAL MEDICINE

## 2024-06-11 PROCEDURE — P9016 RBC LEUKOCYTES REDUCED: HCPCS | Performed by: INTERNAL MEDICINE

## 2024-06-11 PROCEDURE — 272N000001 HC OR GENERAL SUPPLY STERILE: Performed by: INTERNAL MEDICINE

## 2024-06-11 PROCEDURE — 93010 ELECTROCARDIOGRAM REPORT: CPT | Mod: HIP | Performed by: STUDENT IN AN ORGANIZED HEALTH CARE EDUCATION/TRAINING PROGRAM

## 2024-06-11 PROCEDURE — 33361 REPLACE AORTIC VALVE PERQ: CPT | Mod: 62 | Performed by: INTERNAL MEDICINE

## 2024-06-11 PROCEDURE — 85347 COAGULATION TIME ACTIVATED: CPT

## 2024-06-11 PROCEDURE — 33361 REPLACE AORTIC VALVE PERQ: CPT | Mod: 62 | Performed by: STUDENT IN AN ORGANIZED HEALTH CARE EDUCATION/TRAINING PROGRAM

## 2024-06-11 PROCEDURE — 250N000013 HC RX MED GY IP 250 OP 250 PS 637: Performed by: PHYSICIAN ASSISTANT

## 2024-06-11 PROCEDURE — 36415 COLL VENOUS BLD VENIPUNCTURE: CPT | Performed by: PHYSICIAN ASSISTANT

## 2024-06-11 PROCEDURE — 250N000009 HC RX 250: Performed by: INTERNAL MEDICINE

## 2024-06-11 PROCEDURE — 250N000009 HC RX 250: Performed by: PHYSICIAN ASSISTANT

## 2024-06-11 PROCEDURE — 250N000011 HC RX IP 250 OP 636: Mod: JZ | Performed by: PHYSICIAN ASSISTANT

## 2024-06-11 PROCEDURE — 82565 ASSAY OF CREATININE: CPT | Performed by: PHYSICIAN ASSISTANT

## 2024-06-11 PROCEDURE — 86923 COMPATIBILITY TEST ELECTRIC: CPT | Performed by: INTERNAL MEDICINE

## 2024-06-11 PROCEDURE — 94640 AIRWAY INHALATION TREATMENT: CPT

## 2024-06-11 PROCEDURE — 258N000003 HC RX IP 258 OP 636: Mod: JZ | Performed by: INTERNAL MEDICINE

## 2024-06-11 PROCEDURE — 02RF38Z REPLACEMENT OF AORTIC VALVE WITH ZOOPLASTIC TISSUE, PERCUTANEOUS APPROACH: ICD-10-PCS | Performed by: INTERNAL MEDICINE

## 2024-06-11 PROCEDURE — 255N000002 HC RX 255 OP 636: Performed by: INTERNAL MEDICINE

## 2024-06-11 PROCEDURE — 93325 DOPPLER ECHO COLOR FLOW MAPG: CPT

## 2024-06-11 PROCEDURE — 83735 ASSAY OF MAGNESIUM: CPT | Performed by: PHYSICIAN ASSISTANT

## 2024-06-11 PROCEDURE — 250N000013 HC RX MED GY IP 250 OP 250 PS 637: Performed by: INTERNAL MEDICINE

## 2024-06-11 PROCEDURE — 99153 MOD SED SAME PHYS/QHP EA: CPT | Performed by: INTERNAL MEDICINE

## 2024-06-11 PROCEDURE — 99222 1ST HOSP IP/OBS MODERATE 55: CPT | Performed by: HOSPITALIST

## 2024-06-11 PROCEDURE — 93308 TTE F-UP OR LMTD: CPT | Mod: 26 | Performed by: INTERNAL MEDICINE

## 2024-06-11 DEVICE — VALVE AORTIC SAPEIN 3 UTLRA TAVR 26MM 9750TFX26A: Type: IMPLANTABLE DEVICE | Site: CHEST | Status: FUNCTIONAL

## 2024-06-11 RX ORDER — CEFAZOLIN SODIUM/WATER 2 G/20 ML
2 SYRINGE (ML) INTRAVENOUS
Status: DISCONTINUED | OUTPATIENT
Start: 2024-06-11 | End: 2024-06-11 | Stop reason: HOSPADM

## 2024-06-11 RX ORDER — ARFORMOTEROL TARTRATE 15 UG/2ML
15 SOLUTION RESPIRATORY (INHALATION) 2 TIMES DAILY
Status: DISCONTINUED | OUTPATIENT
Start: 2024-06-11 | End: 2024-06-11

## 2024-06-11 RX ORDER — OXYCODONE HYDROCHLORIDE 5 MG/1
10 TABLET ORAL EVERY 4 HOURS PRN
Status: DISCONTINUED | OUTPATIENT
Start: 2024-06-11 | End: 2024-06-12 | Stop reason: HOSPADM

## 2024-06-11 RX ORDER — LIDOCAINE HYDROCHLORIDE AND EPINEPHRINE 10; 10 MG/ML; UG/ML
INJECTION, SOLUTION INFILTRATION; PERINEURAL
Status: DISCONTINUED | OUTPATIENT
Start: 2024-06-11 | End: 2024-06-11 | Stop reason: HOSPADM

## 2024-06-11 RX ORDER — NICOTINE POLACRILEX 4 MG
15-30 LOZENGE BUCCAL
Status: DISCONTINUED | OUTPATIENT
Start: 2024-06-11 | End: 2024-06-12 | Stop reason: HOSPADM

## 2024-06-11 RX ORDER — ONDANSETRON 4 MG/1
4 TABLET, ORALLY DISINTEGRATING ORAL EVERY 6 HOURS PRN
Status: DISCONTINUED | OUTPATIENT
Start: 2024-06-11 | End: 2024-06-12 | Stop reason: HOSPADM

## 2024-06-11 RX ORDER — ALBUTEROL SULFATE 0.83 MG/ML
2.5 SOLUTION RESPIRATORY (INHALATION) 4 TIMES DAILY PRN
COMMUNITY
End: 2024-08-08

## 2024-06-11 RX ORDER — CEFAZOLIN SODIUM 2 G/100ML
INJECTION, SOLUTION INTRAVENOUS CONTINUOUS PRN
Status: COMPLETED | OUTPATIENT
Start: 2024-06-11 | End: 2024-06-11

## 2024-06-11 RX ORDER — PROTAMINE SULFATE 10 MG/ML
INJECTION, SOLUTION INTRAVENOUS
Status: DISCONTINUED | OUTPATIENT
Start: 2024-06-11 | End: 2024-06-11 | Stop reason: HOSPADM

## 2024-06-11 RX ORDER — SODIUM CHLORIDE 9 MG/ML
INJECTION, SOLUTION INTRAVENOUS CONTINUOUS
Status: DISCONTINUED | OUTPATIENT
Start: 2024-06-11 | End: 2024-06-11 | Stop reason: HOSPADM

## 2024-06-11 RX ORDER — FORMOTEROL FUMARATE DIHYDRATE 20 UG/2ML
20 SOLUTION RESPIRATORY (INHALATION)
Status: DISCONTINUED | OUTPATIENT
Start: 2024-06-11 | End: 2024-06-12 | Stop reason: HOSPADM

## 2024-06-11 RX ORDER — NALOXONE HYDROCHLORIDE 0.4 MG/ML
0.4 INJECTION, SOLUTION INTRAMUSCULAR; INTRAVENOUS; SUBCUTANEOUS
Status: DISCONTINUED | OUTPATIENT
Start: 2024-06-11 | End: 2024-06-12 | Stop reason: HOSPADM

## 2024-06-11 RX ORDER — ASPIRIN 325 MG
325 TABLET ORAL ONCE
Status: COMPLETED | OUTPATIENT
Start: 2024-06-11 | End: 2024-06-11

## 2024-06-11 RX ORDER — ALBUTEROL SULFATE 0.83 MG/ML
2.5 SOLUTION RESPIRATORY (INHALATION) 4 TIMES DAILY PRN
Status: DISCONTINUED | OUTPATIENT
Start: 2024-06-11 | End: 2024-06-12 | Stop reason: HOSPADM

## 2024-06-11 RX ORDER — NALOXONE HYDROCHLORIDE 0.4 MG/ML
0.2 INJECTION, SOLUTION INTRAMUSCULAR; INTRAVENOUS; SUBCUTANEOUS
Status: DISCONTINUED | OUTPATIENT
Start: 2024-06-11 | End: 2024-06-12 | Stop reason: HOSPADM

## 2024-06-11 RX ORDER — AZITHROMYCIN 250 MG/1
250 TABLET, FILM COATED ORAL DAILY
Status: DISCONTINUED | OUTPATIENT
Start: 2024-06-11 | End: 2024-06-12 | Stop reason: HOSPADM

## 2024-06-11 RX ORDER — SODIUM CHLORIDE 9 MG/ML
INJECTION, SOLUTION INTRAVENOUS CONTINUOUS
Status: ACTIVE | OUTPATIENT
Start: 2024-06-11 | End: 2024-06-11

## 2024-06-11 RX ORDER — TRIAMTERENE AND HYDROCHLOROTHIAZIDE 37.5; 25 MG/1; MG/1
1 CAPSULE ORAL EVERY MORNING
Status: ON HOLD | COMMUNITY
End: 2024-08-14

## 2024-06-11 RX ORDER — AZELASTINE 1 MG/ML
2 SPRAY, METERED NASAL 2 TIMES DAILY PRN
COMMUNITY

## 2024-06-11 RX ORDER — IODIXANOL 320 MG/ML
INJECTION, SOLUTION INTRAVASCULAR
Status: DISCONTINUED | OUTPATIENT
Start: 2024-06-11 | End: 2024-06-11 | Stop reason: HOSPADM

## 2024-06-11 RX ORDER — ONDANSETRON 2 MG/ML
4 INJECTION INTRAMUSCULAR; INTRAVENOUS EVERY 6 HOURS PRN
Status: DISCONTINUED | OUTPATIENT
Start: 2024-06-11 | End: 2024-06-12 | Stop reason: HOSPADM

## 2024-06-11 RX ORDER — HEPARIN SODIUM 1000 [USP'U]/ML
INJECTION, SOLUTION INTRAVENOUS; SUBCUTANEOUS
Status: DISCONTINUED | OUTPATIENT
Start: 2024-06-11 | End: 2024-06-11 | Stop reason: HOSPADM

## 2024-06-11 RX ORDER — IPRATROPIUM BROMIDE AND ALBUTEROL SULFATE 2.5; .5 MG/3ML; MG/3ML
1 SOLUTION RESPIRATORY (INHALATION) EVERY 6 HOURS PRN
Status: DISCONTINUED | OUTPATIENT
Start: 2024-06-11 | End: 2024-06-12 | Stop reason: HOSPADM

## 2024-06-11 RX ORDER — FENTANYL CITRATE 50 UG/ML
INJECTION, SOLUTION INTRAMUSCULAR; INTRAVENOUS
Status: DISCONTINUED | OUTPATIENT
Start: 2024-06-11 | End: 2024-06-11 | Stop reason: HOSPADM

## 2024-06-11 RX ORDER — ALBUTEROL SULFATE 90 UG/1
2 AEROSOL, METERED RESPIRATORY (INHALATION) EVERY 6 HOURS PRN
Status: DISCONTINUED | OUTPATIENT
Start: 2024-06-11 | End: 2024-06-12 | Stop reason: HOSPADM

## 2024-06-11 RX ORDER — LIDOCAINE 40 MG/G
CREAM TOPICAL
Status: DISCONTINUED | OUTPATIENT
Start: 2024-06-11 | End: 2024-06-11 | Stop reason: HOSPADM

## 2024-06-11 RX ORDER — HYDRALAZINE HYDROCHLORIDE 20 MG/ML
10 INJECTION INTRAMUSCULAR; INTRAVENOUS
Status: DISCONTINUED | OUTPATIENT
Start: 2024-06-11 | End: 2024-06-12 | Stop reason: HOSPADM

## 2024-06-11 RX ORDER — FENTANYL CITRATE 50 UG/ML
25 INJECTION, SOLUTION INTRAMUSCULAR; INTRAVENOUS
Status: DISCONTINUED | OUTPATIENT
Start: 2024-06-11 | End: 2024-06-11 | Stop reason: HOSPADM

## 2024-06-11 RX ORDER — PANTOPRAZOLE SODIUM 40 MG/1
40 TABLET, DELAYED RELEASE ORAL
Status: DISCONTINUED | OUTPATIENT
Start: 2024-06-11 | End: 2024-06-12 | Stop reason: HOSPADM

## 2024-06-11 RX ORDER — DEXTROSE MONOHYDRATE 25 G/50ML
25-50 INJECTION, SOLUTION INTRAVENOUS
Status: DISCONTINUED | OUTPATIENT
Start: 2024-06-11 | End: 2024-06-12 | Stop reason: HOSPADM

## 2024-06-11 RX ORDER — DIAZEPAM 5 MG
5 TABLET ORAL
Status: DISCONTINUED | OUTPATIENT
Start: 2024-06-11 | End: 2024-06-11 | Stop reason: HOSPADM

## 2024-06-11 RX ORDER — METOPROLOL SUCCINATE 50 MG/1
50 TABLET, EXTENDED RELEASE ORAL DAILY
Status: DISCONTINUED | OUTPATIENT
Start: 2024-06-11 | End: 2024-06-12 | Stop reason: HOSPADM

## 2024-06-11 RX ORDER — ASPIRIN 81 MG/1
81 TABLET ORAL DAILY
Status: DISCONTINUED | OUTPATIENT
Start: 2024-06-12 | End: 2024-06-12 | Stop reason: HOSPADM

## 2024-06-11 RX ORDER — ACETAMINOPHEN 325 MG/1
650 TABLET ORAL EVERY 4 HOURS PRN
Status: DISCONTINUED | OUTPATIENT
Start: 2024-06-11 | End: 2024-06-12 | Stop reason: HOSPADM

## 2024-06-11 RX ORDER — MAGNESIUM SULFATE HEPTAHYDRATE 40 MG/ML
2 INJECTION, SOLUTION INTRAVENOUS ONCE
Status: COMPLETED | OUTPATIENT
Start: 2024-06-11 | End: 2024-06-11

## 2024-06-11 RX ORDER — OXYCODONE HYDROCHLORIDE 5 MG/1
5 TABLET ORAL EVERY 4 HOURS PRN
Status: DISCONTINUED | OUTPATIENT
Start: 2024-06-11 | End: 2024-06-12 | Stop reason: HOSPADM

## 2024-06-11 RX ORDER — NITROGLYCERIN 0.4 MG/1
0.4 TABLET SUBLINGUAL EVERY 5 MIN PRN
Status: DISCONTINUED | OUTPATIENT
Start: 2024-06-11 | End: 2024-06-12 | Stop reason: HOSPADM

## 2024-06-11 RX ORDER — ATORVASTATIN CALCIUM 40 MG/1
80 TABLET, FILM COATED ORAL AT BEDTIME
Status: DISCONTINUED | OUTPATIENT
Start: 2024-06-11 | End: 2024-06-12 | Stop reason: HOSPADM

## 2024-06-11 RX ADMIN — IPRATROPIUM BROMIDE 0.5 MG: 0.5 SOLUTION RESPIRATORY (INHALATION) at 16:34

## 2024-06-11 RX ADMIN — ACETAMINOPHEN 650 MG: 325 TABLET ORAL at 17:30

## 2024-06-11 RX ADMIN — AZITHROMYCIN DIHYDRATE 250 MG: 250 TABLET, FILM COATED ORAL at 17:31

## 2024-06-11 RX ADMIN — FORMOTEROL FUMARATE DIHYDRATE 20 MCG: 20 SOLUTION RESPIRATORY (INHALATION) at 20:30

## 2024-06-11 RX ADMIN — ATORVASTATIN CALCIUM 80 MG: 40 TABLET, FILM COATED ORAL at 20:55

## 2024-06-11 RX ADMIN — METOPROLOL SUCCINATE 50 MG: 50 TABLET, EXTENDED RELEASE ORAL at 16:08

## 2024-06-11 RX ADMIN — SODIUM CHLORIDE: 9 INJECTION, SOLUTION INTRAVENOUS at 09:02

## 2024-06-11 RX ADMIN — ACETAMINOPHEN 650 MG: 325 TABLET ORAL at 22:29

## 2024-06-11 RX ADMIN — PANTOPRAZOLE SODIUM 40 MG: 40 TABLET, DELAYED RELEASE ORAL at 16:08

## 2024-06-11 RX ADMIN — MAGNESIUM SULFATE HEPTAHYDRATE 2 G: 40 INJECTION, SOLUTION INTRAVENOUS at 09:02

## 2024-06-11 ASSESSMENT — ACTIVITIES OF DAILY LIVING (ADL)
ADLS_ACUITY_SCORE: 28
ADLS_ACUITY_SCORE: 36
ADLS_ACUITY_SCORE: 28
ADLS_ACUITY_SCORE: 35
ADLS_ACUITY_SCORE: 28
ADLS_ACUITY_SCORE: 35
ADLS_ACUITY_SCORE: 28
ADLS_ACUITY_SCORE: 28
ADLS_ACUITY_SCORE: 35
ADLS_ACUITY_SCORE: 28
ADLS_ACUITY_SCORE: 28

## 2024-06-11 NOTE — OP NOTE
DATE OF OPERATION: 6/11/2024    PREOPERATIVE DIAGNOSIS: Severe, symptomatic aortic stenosis    POSTOPERATIVE DIAGNOSIS: Same    CARDIAC SURGEON:?Susana Mitchell MD    STRUCTURAL HEART CARDIOLOGIST: Serjio Gustafson MD    PROCEDURES PERFORMED:   1) Transfemoral transcatheter aortic valve replacement with 26 mm Dan Gabe Ultra    INDICATION:  The patient is a 79-year-old man with a history of non-obstructive CAD, aortic stenosis, atrial fibrillation, HTN, HLD, GERD, pulmonary fibrosis (on home oxygen) who is being evaluated for severe, symptomatic aortic stenosis. He was evaluated by the multi-disciplinary TAVR team of both cardiology and cardiac surgery and the decision was made to proceed with TAVR. The risks and benefits of the procedure were discussed and informed consent was obtained.    FINDINGS:  After valve deployment by transthoracic echo there was no aortic insufficiency and no pericardial effusion or perivalvular leak.     DESCRIPTION OF OPERATION:  The patient arrived in the operating and was positioned supine. Moderate sedation was given. The patient's neck, chest, abdomen, right wrist, and both groins were prepped and draped in a standard sterile fashion. Local anesthetic was applied to all access sites. The right femoral artery, right wrist, and right femoral vein were cannulated. Through the right femoral vein a temporary pacing wire was placed into the right ventricle. It was tested and captured well. Heparin was administered. The necessary intracardiac wires were placed via the right radial artery. Through the right femoral artery the Dan eSheath was passed without difficulty. On the back table a 26 mm Dan Gabe Ultra valve was prepared. When the ACT was appropriate, the valve was brought onto the operative field. It was passed up the Dan eSheath, prepared and positioned. Rapid pacing was performed and the valve was slowly deployed at +1cc. The valve deployment apparatus was removed  from the heart and transthoracic echo was performed which revealed with the above findings. The valve deployment apparatus was removed as was one of the intracardiac wires. The Dan eSheath was removed and the Perclose vascular closure device was deployed. Distal pulses were palpable. The estimated blood loss was minimal. The patient was brought to the [facility] in stable condition.     Susana Mitchell MD

## 2024-06-11 NOTE — PLAN OF CARE
Problem: Cardiac Catheterization (Diagnostic/Interventional)  Goal: Absence of Bleeding  Outcome: Progressing  Goal: Absence of Contrast-Induced Injury  Outcome: Progressing  Goal: Stable Heart Rate and Rhythm  Outcome: Progressing  Goal: Absence of Embolism Signs and Symptoms  Outcome: Progressing  Goal: Anesthesia/Sedation Recovery  Outcome: Progressing  Goal: Optimal Pain Control and Function  Outcome: Progressing  Goal: Absence of Vascular Access Complication  Outcome: Progressing  Intervention: Prevent and Manage Access Complications  Recent Flowsheet Documentation  Taken 6/11/2024 1500 by Her, Laura SALMON RN  Activity Management: bedrest    Goal Outcome Evaluation:    Pt is SBA/1 assist to bathroom. PRN tylenol given once. Groin site C/D/I. Tele NSR with 1st AVB. Pt on 4-5L NC at rest, and 8L with activity. Pt on CPAP machine.

## 2024-06-11 NOTE — INTERVAL H&P NOTE
"I have reviewed the surgical (or preoperative) H&P that is linked to this encounter, and examined the patient. There are no significant changes    Clinical Conditions Present on Arrival:  Clinically Significant Risk Factors Present on Admission         # Hyponatremia: Lowest Na = 131 mmol/L in last 30 days, will monitor as appropriate    # Hypomagnesemia: Lowest Mg = 1.6 mg/dL in last 2 days, will replace as needed      # Drug Induced Platelet Defect: home medication list includes an antiplatelet medication      # Overweight: Estimated body mass index is 28.98 kg/m  as calculated from the following:    Height as of 6/10/24: 1.778 m (5' 10\").    Weight as of 6/10/24: 91.6 kg (202 lb).       "

## 2024-06-11 NOTE — Clinical Note
Temporary pacemaker Rate= 40bpmPaced mA= 102Pacer wire was captured appropriately, Pacer is set, Demand on Standby and Pacing catheter was removed per order

## 2024-06-11 NOTE — PRE-PROCEDURE
GENERAL PRE-PROCEDURE:   Procedure:  Transvatheter aortic valve replacement  Date/Time:  6/11/2024 9:34 AM    Written consent obtained?: Yes    Risks and benefits: Risks, benefits and alternatives were discussed    Consent given by:  Patient  Patient states understanding of procedure being performed: Yes    Patient's understanding of procedure matches consent: Yes    Procedure consent matches procedure scheduled: Yes    Expected level of sedation:  Moderate  Appropriately NPO:  Yes  ASA Class:  4  Mallampati  :  Grade 1- soft palate, uvula, tonsillar pillars, and posterior pharyngeal wall visible  Lungs:  Other (comment)  Lung exam comment:  Fine crackles bialteral bases otherwise clear to auscultation  Heart:  RRR and systolic murmur  History & Physical reviewed:  History and physical reviewed and updates made (see comment)  H&P Comments:  Clinically Significant Risk Factors Present on Admission    Cardiovascular: Non-Rheumatic Valve Disease: Aortic valve stenosis, paroxysmal atrial fibrillation    Fluid & Electrolyte Disorders : Hypomagnesemia - replaced     Gastroenterology : Not present on admission    Hematology/Oncology : Not present on admission    Nephrology : Not present on admission    Neurology : Not present on admission    Pulmonology: Other Pulmonary Conditions: Pulmonary fibrosis unspecified    Systemic : Not present on admission    [unfilled]    Statement of review:  I have reviewed the lab findings, diagnostic data, medications, and the plan for sedation

## 2024-06-11 NOTE — PHARMACY-ADMISSION MEDICATION HISTORY
Pharmacist Admission Medication History    Admission medication history is complete. The information provided in this note is only as accurate as the sources available at the time of the update.    Information Source(s): Patient via in-person    Pertinent Information: Left Voicemail with patient's wife Alka about bringing in home supply of OFEV with call back number of  central pharmacy [phone].     Changes made to PTA medication list:  Added: None  Deleted: None  Changed: Triamterene 50 mg capsule changed to Triamterene/hydrochlorothiazide 37.5/25 mg capsule, one capsule once daily per fill history and called [facility] pharmacy to confirm fill. PTA medlist had Triamterene 50 mg capsule -patient takes 25 mg on it.     Allergies reviewed with patient and updates made in EHR: yes    Medication History Completed By: BELL JESUS RPH 6/11/2024 1:51 PM    PTA Med List   Medication Sig Last Dose    acyclovir (ZOVIRAX) 400 MG tablet [ACYCLOVIR (ZOVIRAX) 400 MG TABLET] Take 400 mg by mouth 3 (three) times a day as needed.        Past Month at prn    albuterol (PROAIR HFA/PROVENTIL HFA/VENTOLIN HFA) 108 (90 Base) MCG/ACT inhaler Inhale 2 puffs into the lungs every 6 hours as needed Past Month at prn    albuterol (PROVENTIL) (2.5 MG/3ML) 0.083% neb solution Take 2.5 mg by nebulization 4 times daily as needed for shortness of breath, wheezing or cough More than a month at prn    arformoterol (BROVANA) 15 MCG/2ML NEBU neb solution Take 2 mLs (15 mcg) by nebulization 2 times daily 6/11/2024 at am    aspirin (ASA) 81 MG EC tablet Take by mouth daily 6/11/2024 at am    atorvastatin (LIPITOR) 80 MG tablet Take 1 tablet (80 mg) by mouth at bedtime 6/10/2024 at hs    azelastine (ASTELIN) 0.1 % nasal spray Spray 2 sprays into both nostrils 2 times daily as needed for rhinitis More than a month at prn    azithromycin (ZITHROMAX) 250 MG tablet Take 1 tablet (250 mg) by mouth daily for 180 days 6/10/2024    ipratropium  (ATROVENT) 0.02 % neb solution Take 2.5 mLs (0.5 mg) by nebulization 4 times daily Unknown    ipratropium - albuterol 0.5 mg/2.5 mg/3 mL (DUONEB) 0.5-2.5 (3) MG/3ML neb solution Take 1 vial (3 mLs) by nebulization every 6 hours as needed for shortness of breath, wheezing or cough Past Month at prn    loperamide (IMODIUM) 2 MG capsule Take 2 mg by mouth daily as needed 6/10/2024 at am    metoprolol succinate (TOPROL-XL) 50 MG 24 hr tablet [METOPROLOL SUCCINATE (TOPROL-XL) 50 MG 24 HR TABLET] Take 50 mg by mouth daily. 6/10/2024 at am    multivitamin therapeutic tablet Take 1 tablet by mouth daily 6/10/2024 at am    nintedanib (OFEV) 100 MG capsule Take 1 capsule (100 mg) by mouth 2 times daily 6/11/2024 at am    omeprazole (PRILOSEC) 40 MG DR capsule Take 40 mg by mouth 2 times daily 6/10/2024 at pm    triamterene-HCTZ (DYAZIDE) 37.5-25 MG capsule Take 1 capsule by mouth every morning 6/10/2024 at am

## 2024-06-11 NOTE — PROGRESS NOTES
Procedure: TAVR    Anesthesia type: moderate sedation    Valve type: Dan GABINO 3 Ultra valve    Valve size: 26 mm     Access used:   1) Valve Delivery Site: Right femoral artery   2) Pigtail site: Left femoral artery  3) Temporary pacing wire: Left femoral vein    Implanting physician: Dr. Gustafson    Surgeon who assisted in case: Dr. Stephen Niño PA-C  Structural Heart Program  [facility]

## 2024-06-11 NOTE — CONSULTS
"[facility]  Consult Note - Hospitalist Service  Date of Admission:  6/11/2024  Consult Requested by: Dr. Gustafson  Reason for Consult: Medical management post-TAVR    Assessment & Plan   Patient is a 79-year-old male with history of idiopathic pulmonary fibrosis, chronic respiratory failure and aortic stenosis admitted for TAVR.    #Severe aortic stenosis  #Chronic HFpEF  #Nonobstructive CAD  #Hypertension  S/p TAVR 6/11  Patient appears euvolemic  Resume home ASA, statin, beta-blocker  Hold home triamterene-HCTZ for now, monitor hemodynamics and volume status and resume if needed    #Idiopathic pulmonary fibrosis  #Chronic respiratory failure with hypoxia  Resume home medications including home inhalers, ninedanib and prophylactic azithromycin  Home O2 2-3L at rest, did use up to 6L with activity but I imaging this will be improved after TAVR      DVT PPx: PCDs     Clinically Significant Risk Factors Present on Admission            # Hypomagnesemia: Lowest Mg = 1.6 mg/dL in last 2 days, will replace as needed     # Drug Induced Platelet Defect: home medication list includes an antiplatelet medication   # Hypertension: Noted on problem list    # Anemia: based on hgb <11           # Overweight: Estimated body mass index is 28.98 kg/m  as calculated from the following:    Height as of 6/10/24: 1.778 m (5' 10\").    Weight as of 6/10/24: 91.6 kg (202 lb).              Hardik Yarbrough DO  Hospitalist Service  Securely message with [employer] (more info)  Text page via [facility] Paging/Directory   ______________________________________________________________________    Chief Complaint   Aortic stenosis    History is obtained from the patient and electronic health record    History of Present Illness   Patient is a very pleasant 79-year-old male with history of idiopathic pulmonary fibrosis, HTN, paroxysmal atrial fibrillation, nonobstructive CAD, diastolic CHF and aortic stenosis admitted for TAVR.  " Patient is feeling good after procedure.  No chest pain, shortness of breath, palpitations, nausea, vomiting or edema.  He normally wears 2 to 3 L of oxygen at rest.      Past Medical History    Past Medical History:   Diagnosis Date    Arthritis     High cholesterol     Hypertension     Sleep apnea        Past Surgical History   Past Surgical History:   Procedure Laterality Date    ARTHROSCOPY SHOULDER      COLONOSCOPY      CV CORONARY ANGIOGRAM N/A 5/13/2024    Procedure: Coronary Angiogram;  Surgeon: Shiv Robles MD;  Location: [facility] CV    ESOPHAGOSCOPY, GASTROSCOPY, DUODENOSCOPY (EGD), COMBINED N/A 10/2/2023    Procedure: ESOPHAGOGASTRODUODENOSCOPY with biopsies;  Surgeon: Reji Baptiste MD;  Location: [facility] Main OR    OTHER SURGICAL HISTORY Right 1957    heel fracture    OTHER SURGICAL HISTORY      knee arthroscopies    ZZC TOTAL KNEE ARTHROPLASTY Left 10/31/2019    Procedure: LEFT MINIMALLY INVASIVE TOTAL KNEE ARTHROPLASTY;  Surgeon: Avelino Canada MD;  Location: [facility];  Service: Orthopedics       Medications   I have reviewed this patient's current medications        Physical Exam   Vital Signs: Temp: 97.5  F (36.4  C) Temp src: Oral BP: (!) 141/66 Pulse: 79   Resp: 18 SpO2: 91 % O2 Device: None (Room air) Oxygen Delivery: 2 LPM  Weight: 0 lbs 0 oz    General Appearance:  No acute distress  Respiratory: Clear to auscultation bilaterally  Cardiovascular: Regular rate and rhythm, no murmur appreciated on auscultation  Extremities: No peripheral edema or cyanosis  Neuro: Alert and oriented x 3, normal speech      Medical Decision Making             Data

## 2024-06-11 NOTE — DISCHARGE INSTRUCTIONS
Patient Instructions - Going Home after TAVR:    Going Home: It s normal to feel a little anxious after leaving the hospital and when fewer people are nearby. People do much better when they feel as though they have support.  If you live alone we suggest you arrange to have someone stay with you for a day or two to help you recover.     Medications:  Take all of your medications as directed in your discharge summary. Do not stop taking these medications without speaking with your cardiologist. If you have any questions about any of your medications, speak to your pharmacist or provider.     Follow up Appointments  You will follow up with Albania Niño PA-C on June 19 at 12:50.  You will have a repeat echocardiogram on July 10 at 10 am.  You will have a follow up with Albania Niño PA-C on July 12 at 1:15 pm, for labs and visit (you DON'T have to fast for these labs).      If you need to reschedule any of these appointments, please call:  [phone]    See your regular cardiologist in 6 months.  Your regular heart doctor will continue to be your heart specialist.   See your family doctor in 2 weeks.  Make an appointment once you get home.  You will receive a temporary  heart valve  wallet card. We recommend that you keep it in your wallet or purse at all times. You will be receiving a permanent wallet card from the  within 6 months.   Before an MRI (magnetic resonance imaging) procedure, always notify the doctor (or medical technician) that you have an implanted heart valve.     Site Care: Shower every day.  Let the soap and water run over your incision or access site, but do not rub the area. No tub baths, pools or hot-tubs for the 1st week you are at home. Do not put ointments, powders, or lotions on your access site and/or incision.  It is normal to feel a small lump the size of a marble in the groin access site.  That is the closure device used to close the vein/artery.  If you  are experiencing discomfort, bleeding, drainage, redness or increasing swelling, please call us.    Dental Work: Avoid major dental work for the next 6 months if possible. You will need antibiotics before any dental cleanings, work or surgery. This will decrease the risk of infection.     Driving:  You should not drive for the first 2 weeks after your procedure. The first time you drive, you must have someone with you. You may ride in a car immediately after your procedure.      Activity: People recover at different rates depending on their general health and the type of heart valve procedure. Most people take about 4-10 weeks to feel fully recovered. Daily activity and exercise are an important part of your recovery.  Do not lift, push or pull anything > 10 lb. for the first 2 weeks.        CALL THE VALVE CLINIC IF YOU HAVE ANY QUESTIONS OR CONCERNS:  [phone]  For the first 2 weeks after TAVR     Do Not:   Lift, push, or pull more than ten pounds (including pets, laundry, groceries, etc)  Garden, including lawn mowing and raking  Excessively bear down or strain when having a bowel movement   Ride a bike   Do:  Weigh yourself every day in the morning after using the bathroom.  If you notice weight gain of more than 3 pounds in 1 day or more than 5 pounds in 1 week, call the cardiology clinic.   Get up and get dressed every day. Do not stay in bed.  Set a daily routine.   Walk as much as you can. You may walk up and down stairs. When you are tired, rest.   Cough and deep breathe. Use your incentive spirometer 5-10 times each hour when you are awake.   We strongly recommend you participate in a cardiac rehabilitation program. This type of program will help you learn about your heart health, prevent more heart problems, participate in safe and heart-healthy activities, and learn how to return to your activities of daily living and hobbies.   Let the cardiology clinic know if you need to leave town before your  first follow-up visit.     When to call the Cardiology Clinic to speak with a nurse?   Swelling of the legs, ankles, or feet  Increasing shortness of breath  Change in the color or temperature of your lower legs and feet  Abdominal pain or unrelieved nausea and/or vomiting  Redness and warmth around your access site and/or incision that does not go away  Yellow or green drainage from your access site and/or incision   Weight gain of 3 pounds in one day or 5 pounds in one week  Develop any numbness, tingling, or limited movement of your arms or legs.   Chest pain that does not go away  New confusion or you cannot think clearly  Fever and chills         [facility] Clinic:  [phone]  If you are calling after hours, please listen to the entire voicemail, a live  will answer at the end of the message       Patient Instructions - Going Home after TAVR:    Going Home: It s normal to feel a little anxious after leaving the hospital and when fewer people are nearby. People do much better when they feel as though they have support.  If you live alone we suggest you arrange to have someone stay with you for a day or two to help you recover.      Medications:  Take all of your medications as directed in your discharge summary. Do not stop taking these medications without speaking with your cardiologist. If you have any questions about any of your medications, speak to your pharmacist or provider.      Follow up Appointments:  You will follow up at 1 week and one month after surgery at [facility]. You will receive these appointments at the time of discharge.  If you need to reschedule your appointment, please call:  [phone]  See your regular cardiologist in 6 months. Make an appointment once you get home. Your regular heart doctor will continue to be your heart specialist.  See your family doctor in 2 weeks.  Make an appointment once you get home.  You will receive a temporary   heart valve  wallet card. We recommend that you keep it in your wallet or purse at all times. You will be receiving a permanent wallet card from the  within 6 months.  Before an MRI (magnetic resonance imaging) procedure, always notify the doctor (or medical technician) that you have an implanted heart valve.    Site Care: Shower every day. Let the soap and water run over your incision or access site, but do not rub the area. No tub baths, pools or hot-tubs for the 1st week you are at home. Do not put ointments, powders, or lotions on your access site and/or incision.  It is normal to feel a small lump the size of a marble in the groin access site. That is the closure device used to close the vein/artery. If you are experiencing discomfort, bleeding, drainage, redness or increasing swelling, please call us.    Dental Work: Avoid major dental work for the next 6 months if possible. You will need antibiotics before any dental cleanings, work or surgery. This will decrease the risk of infection.      Driving:  You should not drive for the first week after your procedure. The first time you drive, you should have someone with you. You may ride in a car immediately after your procedure.       Activity: People recover at different rates depending on their general health and the type of heart valve procedure. Most people take about 4-10 weeks to feel fully recovered. Daily activity and exercise are an important part of your recovery.  Do not lift, push or pull anything > 10 lb. for the first week.         CALL THE VALVE CLINIC IF YOU HAVE ANY QUESTIONS OR CONCERNS:  [phone]  For the first week after TAVR      Do Not:   Lift, push, or pull more than ten pounds (including pets, laundry, groceries, etc)  Garden, including lawn mowing and raking  Excessively bear down or strain when having a bowel movement   Ride a bike   Do:  Weigh yourself every day in the morning after using the bathroom.  If you notice  weight gain of more than 3 pounds in 1 day or more than 5 pounds in 1 week, call the cardiology clinic.   Get up and get dressed every day. Do not stay in bed.  Set a daily routine.   Walk as much as you can. You may walk up and down stairs. When you are tired, rest.   Cough and deep breathe. Use your incentive spirometer 5-10 times each hour when you are awake.   We strongly recommend you participate in a cardiac rehabilitation program. This type of program will help you learn about your heart health, prevent more heart problems, participate in safe and heart-healthy activities, and learn how to return to your activities of daily living and hobbies.   Let the cardiology clinic know if you need to leave town before your first follow-up visit.     When to call the Cardiology Clinic to speak with a nurse:     Swelling of the legs, ankles, or feet  Increasing shortness of breath  Change in the color or temperature of your lower legs and feet  Abdominal pain or unrelieved nausea and/or vomiting  Redness and warmth around your access site and/or incision that does not go away Yellow or green drainage from your access site and/or incision  Weight gain of 3 pounds in one day or 5 pounds in one week  Develop any numbness, tingling, or limited movement of your arms or legs.   Chest pain that does not go away  New confusion or you cannot think clearly  Fever and chills           [facility] Clinic:  [phone]  If you are calling after hours, please listen to the entire voicemail, a live  will answer at the end of the message     Interventional Cardiology  Coronary Angiogram/Angioplasty/Stent/Atherectomy Discharge  Instructions -   Radial (wrist) Approach     The instructions below are to help you understand how to take care of yourself. There is also information about when to call the doctor or emergency services.    **Do not stop your aspirin or platelet inhibitor unless directed by your  Cardiologist.  These medications help to prevent platelets in your blood from sticking together and forming a clot.   Examples of these medications are: Ticagrelor (Brilinta), Clopidogrel (Plavix), Prasugrel (Effient)    For 24 hours after procedure:  Do not subject hand/arm to any forceful movements for 24 hours, such as supporting weight when rising from a chair or bed.  Do not drive a car for 24 hours.  The dressing on the puncture site may be removed after 24 hours and left open to air. If minor oozing, you may apply a Band-Aid and remove after 12 hours.   You may shower on the day after your procedure. Do not take a tub bath or wash dishes (no soaking wrist) with the puncture site in water for 3 days after the procedure.    For 48 hours following the procedure:  Do not operate a lawnmower, motorcycle, chainsaw or all-terrain vehicle.  Do not lift anything heavier than 5-10 pounds with affected arm for 5 days.  Avoid excessive bending (flexion/extension) wrist movement.  Do not engage in vigorous exercise (i.e. tennis, golf) using the affected arm for 5 days after discharge.  You may return to work in 72 hours if no complications and no heavy lifting.    If bleeding should occur following discharge:  Sit down and apply firm pressure with your thumb against the puncture site and fingers against back of wrist for 10 minutes.  If the bleeding stops, continue to rest, keeping your wrist still for 2 hours. Notify your doctor as soon as possible.  If bleeding does not stop after 10 minutes or if there is a large amount of bleeding or spurting, call 911 immediately. Do not drive yourself to the hospital.           Contact the Heart Clinic at [phone] if you develop:  Fever over 100.4, that lasts more than one day  Redness, heat, or pus at the puncture site  Change in color or temperature of the hand or arm    Expect mild tingling of hand and tenderness at the puncture site for up to 3 days. You may take Tylenol or  a pain medicine recommended by your doctor.                       Our Cardiac Rehab staff may visit briefly with you while your in the hospital.   If they miss you, someone will contact you after you are home.  You are encouraged to enroll in an Outpatient Cardiac Rehab Program     No elective dental work for 6 weeks after having a stent    Your Procedural Physician was: Dr. Gustafson  the phone number is: [phone]    [facility]:  [phone]  If you are calling after hours, please listen to the entire voicemail, a live  will answer at the end of the message

## 2024-06-11 NOTE — PLAN OF CARE
Pt alert and oriented. Vss on room air. Right femoral procedural site cdi. Transparent dressing and gauze in place. CMS +, no signs of bleeding/hematoma. Pt ready to be admitted to p3. All belongings remain with patient. Report given to Jeremy WINCHESTER and bedside handoff completed.

## 2024-06-11 NOTE — Clinical Note
Potential access sites were evaluated for patency using ultrasound.   The right femoral artery, left femoral artery, and left femoral vein was selected. Access was obtained under with Sonosite and Fluoroscopic guidance using a micropuncture 21 gauge needle with direct visualization of needle entry.

## 2024-06-12 ENCOUNTER — APPOINTMENT (OUTPATIENT)
Dept: CARDIOLOGY | Facility: HOSPITAL | Age: 79
DRG: 267 | End: 2024-06-12
Attending: PHYSICIAN ASSISTANT
Payer: MEDICARE

## 2024-06-12 ENCOUNTER — APPOINTMENT (OUTPATIENT)
Dept: RADIOLOGY | Facility: HOSPITAL | Age: 79
DRG: 267 | End: 2024-06-12
Attending: PHYSICIAN ASSISTANT
Payer: MEDICARE

## 2024-06-12 ENCOUNTER — APPOINTMENT (OUTPATIENT)
Dept: OCCUPATIONAL THERAPY | Facility: HOSPITAL | Age: 79
DRG: 267 | End: 2024-06-12
Attending: PHYSICIAN ASSISTANT
Payer: MEDICARE

## 2024-06-12 VITALS
SYSTOLIC BLOOD PRESSURE: 119 MMHG | HEART RATE: 75 BPM | DIASTOLIC BLOOD PRESSURE: 56 MMHG | TEMPERATURE: 97.5 F | OXYGEN SATURATION: 100 % | BODY MASS INDEX: 28.48 KG/M2 | WEIGHT: 198.5 LBS | RESPIRATION RATE: 16 BRPM

## 2024-06-12 LAB
ALBUMIN UR-MCNC: 10 MG/DL
ANION GAP SERPL CALCULATED.3IONS-SCNC: 5 MMOL/L (ref 7–15)
APPEARANCE UR: CLEAR
ATRIAL RATE - MUSE: 68 BPM
BACTERIA #/AREA URNS HPF: ABNORMAL /HPF
BILIRUB UR QL STRIP: NEGATIVE
BUN SERPL-MCNC: 10.6 MG/DL (ref 8–23)
CALCIUM SERPL-MCNC: 9 MG/DL (ref 8.8–10.2)
CHLORIDE SERPL-SCNC: 96 MMOL/L (ref 98–107)
COLOR UR AUTO: ABNORMAL
CREAT SERPL-MCNC: 0.87 MG/DL (ref 0.67–1.17)
CRP SERPL-MCNC: 6.3 MG/L
DEPRECATED HCO3 PLAS-SCNC: 33 MMOL/L (ref 22–29)
DIASTOLIC BLOOD PRESSURE - MUSE: NORMAL MMHG
EGFRCR SERPLBLD CKD-EPI 2021: 89 ML/MIN/1.73M2
ERYTHROCYTE [DISTWIDTH] IN BLOOD BY AUTOMATED COUNT: 15.9 % (ref 10–15)
GLUCOSE SERPL-MCNC: 101 MG/DL (ref 70–99)
GLUCOSE UR STRIP-MCNC: NEGATIVE MG/DL
HCT VFR BLD AUTO: 30.5 % (ref 40–53)
HGB BLD-MCNC: 9.9 G/DL (ref 13.3–17.7)
HGB UR QL STRIP: NEGATIVE
HYALINE CASTS: 1 /LPF
INTERPRETATION ECG - MUSE: NORMAL
KETONES UR STRIP-MCNC: NEGATIVE MG/DL
LEUKOCYTE ESTERASE UR QL STRIP: ABNORMAL
MAGNESIUM SERPL-MCNC: 1.7 MG/DL (ref 1.7–2.3)
MCH RBC QN AUTO: 24.6 PG (ref 26.5–33)
MCHC RBC AUTO-ENTMCNC: 32.5 G/DL (ref 31.5–36.5)
MCV RBC AUTO: 76 FL (ref 78–100)
NITRATE UR QL: NEGATIVE
P AXIS - MUSE: 44 DEGREES
PH UR STRIP: 7 [PH] (ref 5–7)
PLATELET # BLD AUTO: 188 10E3/UL (ref 150–450)
POTASSIUM SERPL-SCNC: 3.9 MMOL/L (ref 3.4–5.3)
PR INTERVAL - MUSE: 238 MS
PSA SERPL DL<=0.01 NG/ML-MCNC: 1.72 NG/ML (ref 0–6.5)
QRS DURATION - MUSE: 104 MS
QT - MUSE: 416 MS
QTC - MUSE: 442 MS
R AXIS - MUSE: -58 DEGREES
RBC # BLD AUTO: 4.03 10E6/UL (ref 4.4–5.9)
RBC URINE: 5 /HPF
SODIUM SERPL-SCNC: 134 MMOL/L (ref 135–145)
SP GR UR STRIP: 1.01 (ref 1–1.03)
SYSTOLIC BLOOD PRESSURE - MUSE: NORMAL MMHG
T AXIS - MUSE: 7 DEGREES
TRANSITIONAL EPI: 1 /HPF
UROBILINOGEN UR STRIP-MCNC: <2 MG/DL
VENTRICULAR RATE- MUSE: 68 BPM
WBC # BLD AUTO: 9.3 10E3/UL (ref 4–11)
WBC URINE: 6 /HPF

## 2024-06-12 PROCEDURE — 85027 COMPLETE CBC AUTOMATED: CPT | Performed by: PHYSICIAN ASSISTANT

## 2024-06-12 PROCEDURE — 999N000208 ECHOCARDIOGRAM COMPLETE

## 2024-06-12 PROCEDURE — 999N000157 HC STATISTIC RCP TIME EA 10 MIN

## 2024-06-12 PROCEDURE — 97535 SELF CARE MNGMENT TRAINING: CPT | Mod: GO

## 2024-06-12 PROCEDURE — 250N000009 HC RX 250: Performed by: INTERNAL MEDICINE

## 2024-06-12 PROCEDURE — 99232 SBSQ HOSP IP/OBS MODERATE 35: CPT | Performed by: INTERNAL MEDICINE

## 2024-06-12 PROCEDURE — 97166 OT EVAL MOD COMPLEX 45 MIN: CPT | Mod: GO

## 2024-06-12 PROCEDURE — 81001 URINALYSIS AUTO W/SCOPE: CPT | Performed by: INTERNAL MEDICINE

## 2024-06-12 PROCEDURE — 83735 ASSAY OF MAGNESIUM: CPT | Performed by: PHYSICIAN ASSISTANT

## 2024-06-12 PROCEDURE — 94640 AIRWAY INHALATION TREATMENT: CPT

## 2024-06-12 PROCEDURE — 93005 ELECTROCARDIOGRAM TRACING: CPT

## 2024-06-12 PROCEDURE — 250N000013 HC RX MED GY IP 250 OP 250 PS 637: Performed by: INTERNAL MEDICINE

## 2024-06-12 PROCEDURE — 99232 SBSQ HOSP IP/OBS MODERATE 35: CPT | Performed by: PHYSICIAN ASSISTANT

## 2024-06-12 PROCEDURE — 93010 ELECTROCARDIOGRAM REPORT: CPT | Mod: HIP | Performed by: GENERAL ACUTE CARE HOSPITAL

## 2024-06-12 PROCEDURE — 86140 C-REACTIVE PROTEIN: CPT | Performed by: INTERNAL MEDICINE

## 2024-06-12 PROCEDURE — 97110 THERAPEUTIC EXERCISES: CPT | Mod: GO

## 2024-06-12 PROCEDURE — 999N000096 CARDIAC MOBILE TELEMETRY MONITOR

## 2024-06-12 PROCEDURE — 80048 BASIC METABOLIC PNL TOTAL CA: CPT | Performed by: PHYSICIAN ASSISTANT

## 2024-06-12 PROCEDURE — 84153 ASSAY OF PSA TOTAL: CPT | Performed by: INTERNAL MEDICINE

## 2024-06-12 PROCEDURE — 250N000013 HC RX MED GY IP 250 OP 250 PS 637: Performed by: PHYSICIAN ASSISTANT

## 2024-06-12 PROCEDURE — 93306 TTE W/DOPPLER COMPLETE: CPT | Mod: 26 | Performed by: INTERNAL MEDICINE

## 2024-06-12 PROCEDURE — 255N000002 HC RX 255 OP 636: Performed by: INTERNAL MEDICINE

## 2024-06-12 PROCEDURE — 71045 X-RAY EXAM CHEST 1 VIEW: CPT

## 2024-06-12 PROCEDURE — 36415 COLL VENOUS BLD VENIPUNCTURE: CPT | Performed by: PHYSICIAN ASSISTANT

## 2024-06-12 RX ORDER — TRIAMTERENE/HYDROCHLOROTHIAZID 37.5-25 MG
1 TABLET ORAL DAILY
Status: DISCONTINUED | OUTPATIENT
Start: 2024-06-12 | End: 2024-06-12 | Stop reason: HOSPADM

## 2024-06-12 RX ADMIN — PANTOPRAZOLE SODIUM 40 MG: 40 TABLET, DELAYED RELEASE ORAL at 06:51

## 2024-06-12 RX ADMIN — TRIAMTERENE AND HYDROCHLOROTHIAZIDE 1 TABLET: 37.5; 25 TABLET ORAL at 09:34

## 2024-06-12 RX ADMIN — ASPIRIN 81 MG: 81 TABLET, COATED ORAL at 08:06

## 2024-06-12 RX ADMIN — AZITHROMYCIN DIHYDRATE 250 MG: 250 TABLET, FILM COATED ORAL at 08:07

## 2024-06-12 RX ADMIN — PERFLUTREN 3 ML: 6.52 INJECTION, SUSPENSION INTRAVENOUS at 09:14

## 2024-06-12 RX ADMIN — FORMOTEROL FUMARATE DIHYDRATE 20 MCG: 20 SOLUTION RESPIRATORY (INHALATION) at 08:05

## 2024-06-12 RX ADMIN — METOPROLOL SUCCINATE 50 MG: 50 TABLET, EXTENDED RELEASE ORAL at 08:06

## 2024-06-12 RX ADMIN — ACETAMINOPHEN 650 MG: 325 TABLET ORAL at 08:05

## 2024-06-12 RX ADMIN — ACETAMINOPHEN 650 MG: 325 TABLET ORAL at 03:04

## 2024-06-12 ASSESSMENT — ACTIVITIES OF DAILY LIVING (ADL)
ADLS_ACUITY_SCORE: 28

## 2024-06-12 NOTE — DISCHARGE SUMMARY
[facility]  CARDIOLOGY DISCHARGE SUMMARY     Primary Care Physician: Eliot De La Rosa  Admission Date: 6/11/2024   Discharge Provider: [place]ia Niño PA-C Discharge Date: 6/12/2024   Diet: resume previous home diet   Code Status: Full Code   Activity: No lifting > 10 lb, no driving for 7 days        Condition at Discharge: Stable      BRIEF HPI:   He has a past medical history significant for aortic stenosis, paroxysmal atrial fibrillation, coronary artery disease, dyslipidemia, hypertension, idiopathic pulmonary fibrosis, Seaman's esophagus.   He has been followed by serial echocardiograms for his aortic stenosis.  He has had progression of his aortic stenosis to at least moderate to severe and his echocardiogram in December 2023.  He also has a history of pulmonary fibrosis but has had increased exercise intolerance and shortness of breath.  He was referred to valve clinic for further management.  He underwent successful transcatheter aortic valve replacement yesterday.      PRINCIPAL & ACTIVE DISCHARGE DIAGNOSES     Active Problems:    S/P TAVR (transcatheter aortic valve replacement)    Nonrheumatic aortic valve stenosis    Aortic stenosis, severe    IPF (idiopathic pulmonary fibrosis) (H)      SIGNIFICANT FINDINGS (Imaging, labs):   ECHO   Interpretation Summary     1. Normal left ventricular size and systolic performance with a visually  estimated ejection fraction of 65-70%.  2. There is a bio-prosthetic aortic valve (documented 26 mm Dan Gabe 3  Ultra tissue valve).  Â  Normal aortic valve prosthesis metrics with a mean systolic gradient of 9  mmHg and a peak anterograde velocity of 2.1 m/sec.  Â  There is trace aortic insufficiency.  3. Normal right ventricular size and systolic performance.  4. There is mild left atrial enlargement.    CXR (personally reviewed and interpreted):  Narrative & Impression   EXAM: XR CHEST PORT 1 VIEW  LOCATION: [facility]  HOSPITAL  DATE: 6/12/2024     INDICATION: S P Transcatheter Aortic Valve Replacement  COMPARISON: 10/16/2020                                                                      IMPRESSION:   Mild low lung volumes. Mild elevation of the right hemidiaphragm. Extensive interstitial infiltrates throughout the mid and lower lungs likely representing a combination of interstitial pulmonary edema and chronic lung markings. Mild pulmonary vascular   congestion. Heart size upper limits of normal. Interval placement of endovascular aortic valv         EKG (personally reviewed and interpreted):  Sinus rhythm with 1st degree AVB, LAFB  Ventriculare rate 68, , , QT/QTc 416/442    LABS or IMAGING REQUIRING FOLLOW-UP AFTER DISCHARGE:     Echocardiogram on July 10 at 10 AM    PROCEDURES ( this hospitalization only)      Procedure(s):  Transcatheter Aortic Valve Replacement-Femoral Approach  OR TRANSCATHETER AORTIC VALVE REPLACEMENT, FEMORAL PERCUTANEOUS APPROACH (STANDBY)    RECOMMENDATIONS TO OUTPATIENT PROVIDER FOR F/U VISIT     With Albania Niño PA-C on June 19 at 12:50 PM    With [place]ia Niño PA-C for 1 month visit on July 12 at 1:25 pm    DISPOSITION     Home    HOSPITAL COURSE BY DIAGNOSIS:      #Severe aortic stenosis, now s/p TAVR using a R TF approach with a 26 mm Dan GABINO 3 Ultra valve  # Chronic heart failure with preserved ejection fraction, NYHA class III-IV - compensated   He tolerated the procedure well yesterday.  His EKG has remained stable.  There were no significant pauses or evidence of high degree AV block on telemetry.  He did have 1 run of 5 beat NSVT. Patient also reports elevated heart rate at times. MCOT ordered x2 weeks    Sheath sites soft without hematoma.    Neuro's intact. Pt reports that his dyspnea recovers more quickly after exertion than prior to the procedure. He otherwise feels that his breathing has remained stable.  He denies chest discomfort, dizziness,  "lightheadedness.  He does report some scrotal pain for which the hospitalist has ordered a UA.      - ASA for anti-platelet therapy  - OP Cardiac rehab  - Daily weights  - Lifelong antibiotic prophylaxis prior to all dental procedures.  - follow ups have been arranged as listed above  -Patient continue triamterene-hydrochlorothiazide 37.5-25 mg daily, metoprolol succinate 50 mg daily    #CAD  #Dyslipidemia  - Pre-TAVR coronary angiogram showed nonobstructive coronary artery disease  - LDL 72  - continue aspirin 81 mg daily   - continue PTA atorvastatin    #HTN  BP has been somewhat elevated since procedure  - Continue PTA triamterene-hydrochlorothiazide, metoprolol    # Idiopathic pulmonary fibrosis  - Follows with pulmonology, continue current regimen  - Chronic oxygen supplementation     # Seaman's esophagus  -Continue PTA PPI    # Scrotal pain  -UA ordered per hospitalist; management per hospital team    Clinically Significant Risk Factors                  # Hypertension: Noted on problem list              # Overweight: Estimated body mass index is 28.48 kg/m  as calculated from the following:    Height as of 6/10/24: 1.778 m (5' 10\").    Weight as of this encounter: 90 kg (198 lb 8 oz)., PRESENT ON ADMISSION                   Discharge Medications with Med changes:        Review of your medicines        CONTINUE these medicines which have NOT CHANGED        Dose / Directions   acyclovir 400 MG tablet  Commonly known as: ZOVIRAX      Dose: 400 mg  [ACYCLOVIR (ZOVIRAX) 400 MG TABLET] Take 400 mg by mouth 3 (three) times a day as needed.  Refills: 0     * albuterol 108 (90 Base) MCG/ACT inhaler  Commonly known as: PROAIR HFA/PROVENTIL HFA/VENTOLIN HFA  Used for: IPF (idiopathic pulmonary fibrosis) (H)      Dose: 2 puff  Inhale 2 puffs into the lungs every 6 hours as needed  Quantity: 18 g  Refills: 3     * albuterol (2.5 MG/3ML) 0.083% neb solution  Commonly known as: PROVENTIL      Dose: 2.5 mg  Take 2.5 mg by " nebulization 4 times daily as needed for shortness of breath, wheezing or cough  Refills: 0     arformoterol 15 MCG/2ML Nebu neb solution  Commonly known as: BROVANA  Used for: Bronchiectasis without complication (H)      Dose: 15 mcg  Take 2 mLs (15 mcg) by nebulization 2 times daily  Quantity: 360 mL  Refills: 1     aspirin 81 MG EC tablet  Commonly known as: ASA      Take by mouth daily  Refills: 0     atorvastatin 80 MG tablet  Commonly known as: LIPITOR  Used for: Coronary artery disease involving native coronary artery without angina pectoris, unspecified whether native or transplanted heart, Dyslipidemia      Dose: 80 mg  Take 1 tablet (80 mg) by mouth at bedtime  Quantity: 90 tablet  Refills: 3     azelastine 0.1 % nasal spray  Commonly known as: ASTELIN      Dose: 2 spray  Spray 2 sprays into both nostrils 2 times daily as needed for rhinitis  Refills: 0     azithromycin 250 MG tablet  Commonly known as: ZITHROMAX  Used for: ILD (interstitial lung disease) (H), Chronic cough      Dose: 250 mg  Take 1 tablet (250 mg) by mouth daily for 180 days  Quantity: 90 tablet  Refills: 1     CLOVGRAN Tabs      Dose: 1 tablet  Take 1 tablet by mouth daily  Refills: 0     ipratropium - albuterol 0.5 mg/2.5 mg/3 mL 0.5-2.5 (3) MG/3ML neb solution  Commonly known as: DUONEB  Used for: IPF (idiopathic pulmonary fibrosis) (H), Chronic cough, Dyspnea, unspecified type, Hypoxemic respiratory failure, chronic (H)      Dose: 1 vial  Take 1 vial (3 mLs) by nebulization every 6 hours as needed for shortness of breath, wheezing or cough  Quantity: 360 mL  Refills: 3     ipratropium 0.02 % neb solution  Commonly known as: ATROVENT  Used for: COPD exacerbation (H)      Dose: 0.5 mg  Take 2.5 mLs (0.5 mg) by nebulization 4 times daily  Quantity: 90 mL  Refills: 3     loperamide 2 MG capsule  Commonly known as: IMODIUM      Dose: 2 mg  Take 2 mg by mouth daily as needed  Refills: 0     metoprolol succinate ER 50 MG 24 hr  tablet  Commonly known as: TOPROL XL      Dose: 50 mg  [METOPROLOL SUCCINATE (TOPROL-XL) 50 MG 24 HR TABLET] Take 50 mg by mouth daily.  Refills: 0     nintedanib 100 MG capsule  Commonly known as: OFEV  Used for: IPF (idiopathic pulmonary fibrosis) (H)      Dose: 100 mg  Take 1 capsule (100 mg) by mouth 2 times daily  Quantity: 180 capsule  Refills: 3     omeprazole 40 MG DR capsule  Commonly known as: PriLOSEC      Dose: 40 mg  Take 40 mg by mouth 2 times daily  Refills: 0     triamterene-HCTZ 37.5-25 MG capsule  Commonly known as: DYAZIDE      Dose: 1 capsule  Take 1 capsule by mouth every morning  Refills: 0           * This list has 2 medication(s) that are the same as other medications prescribed for you. Read the directions carefully, and ask your doctor or other care provider to review them with you.                       Rationale for medication changes:      N/A        Consults   Hospitalist  Cardiac rehab         Pertinent Labs     Lab Results   Component Value Date    NTBNP 151 06/10/2024     Lab Results   Component Value Date    WBC 9.3 06/12/2024    HGB 9.9 (L) 06/12/2024    HCT 30.5 (L) 06/12/2024    MCV 76 (L) 06/12/2024     06/12/2024     Lab Results   Component Value Date    CR 0.87 06/12/2024     Lab Results   Component Value Date     (L) 06/12/2024    POTASSIUM 3.9 06/12/2024    CHLORIDE 96 (L) 06/12/2024    CO2 33 (H) 06/12/2024     (H) 06/12/2024     Lab Results   Component Value Date    GFRESTIMATED 89 06/12/2024    GFRESTBLACK >60 10/16/2020           Discharge Orders     Discharge Procedure Orders   CARDIAC REHAB REFERRAL   Standing Status: Future   Referral Priority: Routine Referral Type: Rehab Therapy Cardiac Therapy   Number of Visits Requested: 1     Reason for your hospital stay   Order Comments: New valve     Follow-up and recommended labs and tests    Order Comments: Follow ups listed on AVS     Activity   Order Comments: Activity restrictions as per AVS     Order  "Specific Question Answer Comments   Is discharge order? Yes      Diet   Order Comments: Resume previous home diet     Order Specific Question Answer Comments   Is discharge order? Yes      Examination     Vital Signs in last 24 hours:   Vital signs:  Temp: 97.5  F (36.4  C) Temp src: Oral BP: 119/56 Pulse: 75   Resp: 16 SpO2: 100 % O2 Device: Nasal cannula Oxygen Delivery: 4 LPM   Weight: 90 kg (198 lb 8 oz)  Estimated body mass index is 28.48 kg/m  as calculated from the following:    Height as of 6/10/24: 1.778 m (5' 10\").    Weight as of this encounter: 90 kg (198 lb 8 oz).           General Appearance:   Alert, cooperative and in no acute distress   ENT/Mouth: membranes moist, no oral lesions or bleeding gums.      EYES:  no scleral icterus, normal conjunctivae   Neck: Supple without lymphadenopathy.  Thyroid not visualized   Chest/Lungs:   lungs are clear to auscultation, no rales or wheezing   Cardiovascular:   Regular. Normal first and second heart sounds with faint systolic murmur, no rubs or gallops; the carotid, radial and posterior tibial pulses are intact, no edema bilaterally    Abdomen:  Soft and nontender. Bowel sounds are present in all quadrants   Extremities: no cyanosis or clubbing.  Puncture site is C/D/I, soft, and without hematoma   Skin: no xanthelasma, warm.    Neurologic: normal gait, normal  bilateral, no tremors   Psychiatric: Normal mood and affect         Please see EMR for more detailed significant labs, imaging, consultant notes etc.    45 MINUTES SPENT BY ME on the date of service doing chart review, history, exam, documentation & further activities per the note.         Albania Niño PA-C  Structural Heart Program  [facility]   "

## 2024-06-12 NOTE — PLAN OF CARE
Cardiac Rehab Discharge Summary    Reason for therapy discharge:    No further inpt CR needed; discharging home today.    Progress towards therapy goal(s). See goals on Care Plan in [facility] electronic health record for goal details.  Goals met    Therapy recommendation(s):    Outpt CR-ordered/scheduled.

## 2024-06-12 NOTE — PLAN OF CARE
Goal Outcome Evaluation:               Problem: Cardiac Catheterization (Diagnostic/Interventional)  Goal: Absence of Bleeding  Outcome: Met  Goal: Absence of Contrast-Induced Injury  Outcome: Met  Goal: Stable Heart Rate and Rhythm  Outcome: Met  Goal: Absence of Embolism Signs and Symptoms  Outcome: Met  Goal: Anesthesia/Sedation Recovery  Outcome: Met  Goal: Optimal Pain Control and Function  Outcome: Met  Intervention: Prevent or Manage Pain  Recent Flowsheet Documentation  Taken 6/12/2024 0805 by Jose D Hager RN  Pain Management Interventions: medication (see MAR)  Goal: Absence of Vascular Access Complication  Outcome: Met     Problem: Gas Exchange Impaired  Goal: Optimal Gas Exchange  Outcome: Met     PT reports some mild pain 3/10 in testicles.  Writer updated MD and UA was ordered.  Pt was given apap and pain resolved.     Pt is on 2L NC while at rest and raises to 6L wit activity which is his baseline at home.    Pt right groin site is CDI WDL   Pt denies pain at site.     Pt had echo, therapy, and heart monitor will be placed shortly.

## 2024-06-12 NOTE — PLAN OF CARE
Goal Outcome Evaluation:       No acute issues overnight. Vitals stable. Pt given Tylenol PRN for R groin pain r/t TAVR procedure with relief. Neuro checks Q 2 hours done; no deficits noted. R groin site with dry dressing and Tegaderm covering c/d/intact.

## 2024-06-12 NOTE — PROGRESS NOTES
"[facility]    Medicine Progress Note - Hospitalist Service    Date of Admission:  6/11/2024    Assessment & Plan   Patient is a 79-year-old male with history of idiopathic pulmonary fibrosis, chronic respiratory failure and aortic stenosis admitted for TAVR.    #Severe aortic stenosis  #Chronic HFpEF  #Nonobstructive CAD  #Hypertension  S/p TAVR 6/11  Patient appears euvolemic  Resumed home ASA, statin, beta-blocker  Held home triamterene-HCTZ for now, will resume at discharge.    #Idiopathic pulmonary fibrosis  #Chronic respiratory failure with hypoxia  Resume home medications including home inhalers, ninedanib and prophylactic azithromycin  Home O2 2-3L at rest, did use up to 6L with activity but I imaging this will be improved after TAVR    #Scrotal pain, rating today 2/10.  States it was worse after TAVR procedure.  Normal scrotal exam.  No history of BPH, prostatitis, urinary retention.  History: Normal PSA.  Checking today.  UA positive for leukoesterase, negative nitrates.  No urinary symptoms.  Random bladder scan 192 mL, patient voided shortly after bladder scan.    DVT PPx: PCDs    Diet: Regular Diet Adult    DVT Prophylaxis: Pneumatic Compression Devices  Foreman Catheter: Not present  Lines: None     Cardiac Monitoring: ACTIVE order. Indication: Transcatheter structural interventions (24 hours)  Code Status: Full Code      Clinically Significant Risk Factors            # Hypomagnesemia: Lowest Mg = 1.6 mg/dL in last 2 days, will replace as needed       # Hypertension: Noted on problem list         # Overweight: Estimated body mass index is 28.48 kg/m  as calculated from the following:    Height as of 6/10/24: 1.778 m (5' 10\").    Weight as of this encounter: 90 kg (198 lb 8 oz)., PRESENT ON ADMISSION            Disposition Plan     Medically Ready for Discharge: Discharge timing per cardiology likely today.    Mary Abreu MD  Hospitalist Service  [facility] " Hospital  Securely message with [employer] (more info)  Text page via [facility] Paging/Directory   ______________________________________________________________________    Interval History     To me.  Chart reviewed.  Patient seen and examined.  He is complaining of testicular discomfort.  He denies dysuria, fever, chills, otorrhea, history of BPH.    Physical Exam   Vital Signs: Temp: 97.5  F (36.4  C) Temp src: Oral BP: (!) 141/71 Pulse: 75   Resp: 16 SpO2: 100 % O2 Device: Nasal cannula Oxygen Delivery: 4 LPM  Weight: 198 lbs 8 oz    General: Alert and oriented x 3. Not in obvious distress.  HEENT: NC, AT. Neck- supple, No JVP elevation, lymphadenopathy or thyromegaly. Trachea-central.  Chest: Clear to auscultation bilaterally.  Heart: S1S2 regular. No M/R/G.  Abdomen: Soft. NT, ND. No organomegaly. Bowel sounds- active.  Back: No spine tenderness. No CVA tenderness.  Extremities: No leg swelling. Peripheral pulses 2+ bilaterally.  Neuro: Cranial nerves 1-12 grossly normal. No focal neurological deficit    Medical Decision Making   40 MINUTES SPENT BY ME on the date of service doing chart review, history, exam, documentation & further activities per the note.      Data     I have personally reviewed the following data over the past 24 hrs:    9.3  \   9.9 (L)   / 188     134 (L) 96 (L) 10.6 /  101 (H)   3.9 33 (H) 0.87 \     Procal: N/A CRP: 6.30 (H) Lactic Acid: N/A       Imaging results reviewed over the past 24 hrs:   Recent Results (from the past 24 hour(s))   XR Chest Port 1 View    Narrative    EXAM: XR CHEST PORT 1 VIEW  LOCATION: [facility]  DATE: 6/12/2024    INDICATION: S P Transcatheter Aortic Valve Replacement  COMPARISON: 10/16/2020      Impression    IMPRESSION:   Mild low lung volumes. Mild elevation of the right hemidiaphragm. Extensive interstitial infiltrates throughout the mid and lower lungs likely representing a combination of interstitial pulmonary edema and chronic lung  markings. Mild pulmonary vascular   congestion. Heart size upper limits of normal. Interval placement of endovascular aortic valve.     Echocardiogram Complete    Narrative    [id number]  [id number]  [id number]  [id number]^[id number]^[place] [address]     Name: ASTER FARRELL  MRN: [id number]  : 1945  Study Date: 2024 08:53 AM  Age: 79 yrs  Gender: Male  Patient Location: [facility]  Reason For Study: Aortic Valve Replacement  Ordering Physician: STEVE VERA  Referring Physician: OLIVER RAE  Performed By: BP     BSA: 2.1 m2  Height: 70 in  Weight: 202 lb  HR: 60  ______________________________________________________________________________  Procedure  Complete Portable Echo Adult. Definity (NDC #76142-266) given intravenously.  TAVR POD-1.  ______________________________________________________________________________  Interpretation Summary     1. Normal left ventricular size and systolic performance with a visually  estimated ejection fraction of 65-70%.  2. There is a bio-prosthetic aortic valve (documented 26 mm Dan Gabe 3  Ultra tissue valve).  Â  Normal aortic valve prosthesis metrics with a mean systolic gradient of 9  mmHg and a peak anterograde velocity of 2.1 m/sec.  Â  There is trace aortic insufficiency.  3. Normal right ventricular size and systolic performance.  4. There is mild left atrial enlargement.  ______________________________________________________________________________  Left ventricle:  Normal left ventricular size and systolic performance with a visually  estimated ejection fraction of 65-70%. There is normal regional wall motion.  There is borderline increase in left ventricular wall thickness.     Assessment of LV Diastolic Function: The cumulative findings are indeterminate  in the evaluation of diastolic function [The septal e' velocity is < 7 cm/s &  lateral e' velocity is < 10 cm/s. The average E/e' is < 14. The TR  velocity  cannot be determined due to insufficient tricuspid insufficiency signal. Left  atrial volume index is greater than 34 mL/mÂ ].     Right ventricle:  Normal right ventricular size and systolic performance.     Left atrium:  There is mild left atrial enlargement.     Right atrium:  The right atrium is of normal size.     IVC:  The IVC is of normal caliber.     Aortic valve:  There is a bio-prosthetic aortic valve (documented 26 mm Dan Gabe 3  Ultra tissue valve). Normal aortic valve prosthesis metrics with a mean  systolic gradient of 9 mmHg and a peak anterograde velocity of 2.1 m/sec. The  Ao Acceleration Time is 0.09 sec. There is trace aortic insufficiency.     Mitral valve:  The mitral valve appears morphologically normal. There is trace mitral  insufficiency.     Tricuspid valve:  The tricuspid valve is grossly morphologically normal. There is trace  tricuspid insufficiency.     Pulmonic valve:  The pulmonic valve is grossly morphologically normal.     Thoracic aorta:  The aortic root and proximal ascending aorta are of normal dimension.     Pericardium:  There is no significant pericardial effusion.  ______________________________________________________________________________  ______________________________________________________________________________  MMode/2D Measurements & Calculations  IVSd: 1.1 cm  LVIDd: 4.7 cm  LVIDs: 2.4 cm  LVPWd: 1.2 cm  FS: 49.6 %  LV mass(C)d: 195.7 grams  LV mass(C)dI: 93.4 grams/m2  Ao root diam: 3.1 cm  LA dimension: 3.9 cm  LA/Ao: 1.3  LVOT diam: 2.2 cm  LVOT area: 3.8 cm2  Ao root diam index Ht(cm/m): 1.7  Ao root diam index BSA (cm/m2): 1.5  EF Biplane: 69.3 %  LA Volume (BP): 69.5 ml     LA Volume Index (BP): 33.1 ml/m2  LA Volume Indexed (AL/bp): 35.5 ml/m2  RWT: 0.52  TAPSE: 2.1 cm     Time Measurements  MM HR: 60.0 BPM     Doppler Measurements & Calculations  MV E max della: 73.9 cm/sec  MV A max della: 112.0 cm/sec  MV E/A: 0.66  MV dec time: 0.25 sec  Ao  V2 max: 212.5 cm/sec  Ao max P.0 mmHg  Ao V2 mean: 139.0 cm/sec  Ao mean P.0 mmHg  Ao V2 VTI: 47.4 cm  JOJO(I,D): 2.4 cm2  JOJO(V,D): 2.2 cm2  Ao acc time: 0.09 sec  LV V1 max P.3 mmHg  LV V1 max: 125.0 cm/sec  LV V1 VTI: 29.4 cm  SV(LVOT): 111.8 ml  SI(LVOT): 53.3 ml/m2  PA acc time: 0.07 sec  AV Wally Ratio (DI): 0.59  JOJO Index (cm2/m2): 1.1  E/E': 7.5  E/E' av.1  Lateral E/e': 7.5  Medial E/e': 14.7  Peak E' Wally: 9.9 cm/sec     RV S Wally: 11.2 cm/sec   _____________________________________________________________________________  Report approved by: Carly Elizabeth 2024 10:13 AM

## 2024-06-19 ENCOUNTER — OFFICE VISIT (OUTPATIENT)
Dept: CARDIOLOGY | Facility: CLINIC | Age: 79
End: 2024-06-19
Payer: MEDICARE

## 2024-06-19 VITALS
SYSTOLIC BLOOD PRESSURE: 116 MMHG | RESPIRATION RATE: 20 BRPM | DIASTOLIC BLOOD PRESSURE: 74 MMHG | BODY MASS INDEX: 28.63 KG/M2 | HEART RATE: 76 BPM | OXYGEN SATURATION: 95 % | WEIGHT: 200 LBS | HEIGHT: 70 IN

## 2024-06-19 DIAGNOSIS — E78.5 DYSLIPIDEMIA: ICD-10-CM

## 2024-06-19 DIAGNOSIS — Z95.2 S/P TAVR (TRANSCATHETER AORTIC VALVE REPLACEMENT): Primary | ICD-10-CM

## 2024-06-19 DIAGNOSIS — I10 BENIGN ESSENTIAL HYPERTENSION: ICD-10-CM

## 2024-06-19 DIAGNOSIS — I35.0 NONRHEUMATIC AORTIC VALVE STENOSIS: ICD-10-CM

## 2024-06-19 PROCEDURE — 99214 OFFICE O/P EST MOD 30 MIN: CPT | Performed by: PHYSICIAN ASSISTANT

## 2024-06-19 NOTE — PROGRESS NOTES
HEART CARE ENCOUNTER NOTE       [facility]  [phone]    Assessment/Recommendations   Assessment:  Severe aortic stenosis, now s/p TAVR using a R TF approach with a 26 mm Dan GABINO 3 Ultra valve. Post-TAVR Day 1 his echocardiogram showed a mean gradient of 9 mmHg  Chronic heart failure with preserved ejection fraction, NYHA class III-IV - appears euvolemic  Non obstructive coronary artery disease - denies chest discomfort  Dyslipidemia - LDL 72  Hypertension - blood pressure is controlled   Idiopathic pulmonary fibrosis -chronically on 3 to 6 L NC of supplemental O2  Scrotal pain postprocedure-UA was positive for leuk esterase, negative for nitrates.  He had no urinary symptoms.  His scrotal pain has since resolved.  Palpitations/racing heart - patient reported elevated heart rates at times pre-procedure. MCOT ordered x2 weeks, patient is currently wearing.    Plan:  Continue aspirin 81 mg daily   Continue current doses of triamterene-hydrochlorothiazide and metoprolol  Continue atorvastatin 80 mg   Continue MCOT  He does not plan to attend cardiac rehab but uses a treadmill and weights in his home on a regular basis.  Okay to resume driving and normal activities   Echocardiogram is scheduled for July 10  1 month labs and follow-up visit is scheduled for July 12  He should follow-up with his primary care provider as scheduled    Thank you for the opportunity to participate in the care of Wyatt Gutierrez. It's been a pleasure working with him.  Please do not hesitate to call with any questions or concerns.       History of Present Illness/Subjective    I had the opportunity to see Wyatt Gutierrez at the [facility] for 1 week follow-up visit after TAVR.  He is accompanied by his spouse for today's visit.    He has a past medical history significant for aortic stenosis, paroxysmal atrial fibrillation, coronary artery disease, dyslipidemia, hypertension, idiopathic pulmonary  fibrosis, Seaman's esophagus.     He underwent transcatheter aortic valve replacement on June 11.  He did have low energy for the first couple of days after he was discharged from the hospital but this has improved.  He continues to have chronic shortness of breath which is similar to prior to the procedure and he continues to require 3 to 6 L nasal cannula of supplemental oxygen, however he feels that his oxygen saturation and breathing recovers faster after activity/exertion than prior to the procedure.  He did have some scrotal pain postprocedure but this has resolved.  He also notes some issues with double vision, however this is not new since the procedure and he does have follow-up scheduled with an eye doctor for this.  His weight has remained stable and his appetite may have improved since the procedure.  He does not plan to attend cardiac rehab as he uses his treadmill and weights in his home on a regular basis.  He is currently taking a baby aspirin daily he has noticed some bruising but he denies any bleeding issues.    Wyatt Gutierrez denies exertional chest discomfort, palpitations, shortness of breath at rest, PND, orthopnea, lightheadedness, dizziness, pre-syncope or syncope.  Wyatt Gutierrez also denies any recent weight loss, changes in appetite, nausea or vomiting.   ____________________________________________________________  Echo: 6/12/2024  Interpretation Summary     1. Normal left ventricular size and systolic performance with a visually  estimated ejection fraction of 65-70%.  2. There is a bio-prosthetic aortic valve (documented 26 mm Dan Gabe 3  Ultra tissue valve).  Â  Normal aortic valve prosthesis metrics with a mean systolic gradient of 9  mmHg and a peak anterograde velocity of 2.1 m/sec.  Â  There is trace aortic insufficiency.  3. Normal right ventricular size and systolic performance.  4. There is mild left atrial enlargement.    EKG (personally reviewed and interpreted):  Sinus  "rhythm with first degree AVB, LAFB  Ventricular rate 68, , , QT/Qtc 416/442       Physical Examination Review of Systems   Vitals: /74 (BP Location: Left arm, Patient Position: Sitting, Cuff Size: Adult Large)   Pulse 76   Resp 20   Ht 1.778 m (5' 10\")   Wt 90.7 kg (200 lb)   SpO2 95%   BMI 28.70 kg/m    BMI= Body mass index is 28.7 kg/m .  Wt Readings from Last 3 Encounters:   06/19/24 90.7 kg (200 lb)   06/12/24 90 kg (198 lb 8 oz)   06/10/24 91.6 kg (202 lb)       General Appearance:   Alert, cooperative and in no acute distress   ENT/Mouth: membranes moist, no oral lesions or bleeding gums.      EYES:  no scleral icterus, normal conjunctivae   Neck: Supple without lymphadenopathy.  Thyroid not visualized   Chest/Lungs:   lungs are clear to auscultation, no rales or wheezing   Cardiovascular:   Regular. Normal first and second heart sounds with no murmurs, rubs, or gallops; the carotid, radial and posterior tibial pulses are intact, no edema bilaterally    Abdomen:  Soft and nontender. Bowel sounds are present in all quadrants   Extremities: no cyanosis or clubbing.  Puncture site is soft and without hematoma   Skin: no xanthelasma, warm.    Neurologic: normal gait, normal  bilateral, no tremors   Psychiatric: Normal mood and affect       Please refer above for cardiac ROS details.      Medical History  Surgical History Family History Social History   Past Medical History:   Diagnosis Date    Arthritis     High cholesterol     Hypertension     Sleep apnea      Past Surgical History:   Procedure Laterality Date    ARTHROSCOPY SHOULDER      COLONOSCOPY      CV CORONARY ANGIOGRAM N/A 5/13/2024    Procedure: Coronary Angiogram;  Surgeon: Shiv Robles MD;  Location: [facility] CATH LAB CV    CV TRANSCATHETER AORTIC VALVE REPLACEMENT-FEMORAL APPROACH N/A 6/11/2024    Procedure: Transcatheter Aortic Valve Replacement-Femoral Approach;  Surgeon: Serjio Gustafson MD;  Location: [facility]" CATH LAB CV    ESOPHAGOSCOPY, GASTROSCOPY, DUODENOSCOPY (EGD), COMBINED N/A 10/2/2023    Procedure: ESOPHAGOGASTRODUODENOSCOPY with biopsies;  Surgeon: Reji Baptiste MD;  Location: [facility] OR    OR TRANSCATHETER AORTIC VALVE REPLACEMENT, FEMORAL PERCUTANEOUS APPROACH (STANDBY) N/A 2024    Procedure: OR TRANSCATHETER AORTIC VALVE REPLACEMENT, FEMORAL PERCUTANEOUS APPROACH (STANDBY);  Surgeon: Susana Mitchell MD;  Location: [facility] CATH LAB CV    OTHER SURGICAL HISTORY Right     heel fracture    OTHER SURGICAL HISTORY      knee arthroscopies    ZZC TOTAL KNEE ARTHROPLASTY Left 10/31/2019    Procedure: LEFT MINIMALLY INVASIVE TOTAL KNEE ARTHROPLASTY;  Surgeon: Avelino Canada MD;  Location: [facility];  Service: Orthopedics     No family history on file. Social History     Socioeconomic History    Marital status:      Spouse name: Not on file    Number of children: Not on file    Years of education: Not on file    Highest education level: Not on file   Occupational History    Not on file   Tobacco Use    Smoking status: Former     Current packs/day: 0.00     Average packs/day: 0.5 packs/day for 55.0 years (27.5 ttl pk-yrs)     Types: Cigarettes     Start date: 1966     Quit date: 2021     Years since quittin.4    Smokeless tobacco: Never   Substance and Sexual Activity    Alcohol use: Yes     Comment: 1-2 glasses of wine/ 2 x-week    Drug use: Not Currently     Comment: edible marijuana in the past    Sexual activity: Not on file   Other Topics Concern    Not on file   Social History Narrative    Not on file     Social Determinants of Health     Financial Resource Strain: Not on file   Food Insecurity: Not on file   Transportation Needs: Not on file   Physical Activity: Not on file   Stress: Not on file   Social Connections: Not on file   Interpersonal Safety: Not on file   Housing Stability: Not on file          Medications  Allergies   Current Outpatient Medications    Medication Sig Dispense Refill    acyclovir (ZOVIRAX) 400 MG tablet [ACYCLOVIR (ZOVIRAX) 400 MG TABLET] Take 400 mg by mouth 3 (three) times a day as needed.             albuterol (PROAIR HFA/PROVENTIL HFA/VENTOLIN HFA) 108 (90 Base) MCG/ACT inhaler Inhale 2 puffs into the lungs every 6 hours as needed 18 g 3    albuterol (PROVENTIL) (2.5 MG/3ML) 0.083% neb solution Take 2.5 mg by nebulization 4 times daily as needed for shortness of breath, wheezing or cough      arformoterol (BROVANA) 15 MCG/2ML NEBU neb solution Take 2 mLs (15 mcg) by nebulization 2 times daily 360 mL 1    aspirin (ASA) 81 MG EC tablet Take by mouth daily      atorvastatin (LIPITOR) 80 MG tablet Take 1 tablet (80 mg) by mouth at bedtime 90 tablet 3    azelastine (ASTELIN) 0.1 % nasal spray Spray 2 sprays into both nostrils 2 times daily as needed for rhinitis      azithromycin (ZITHROMAX) 250 MG tablet Take 1 tablet (250 mg) by mouth daily for 180 days 90 tablet 1    ipratropium (ATROVENT) 0.02 % neb solution Take 2.5 mLs (0.5 mg) by nebulization 4 times daily 90 mL 3    ipratropium - albuterol 0.5 mg/2.5 mg/3 mL (DUONEB) 0.5-2.5 (3) MG/3ML neb solution Take 1 vial (3 mLs) by nebulization every 6 hours as needed for shortness of breath, wheezing or cough 360 mL 3    loperamide (IMODIUM) 2 MG capsule Take 2 mg by mouth daily as needed      metoprolol succinate (TOPROL-XL) 50 MG 24 hr tablet [METOPROLOL SUCCINATE (TOPROL-XL) 50 MG 24 HR TABLET] Take 50 mg by mouth daily.      multivitamin therapeutic tablet Take 1 tablet by mouth daily      nintedanib (OFEV) 100 MG capsule Take 1 capsule (100 mg) by mouth 2 times daily 180 capsule 3    omeprazole (PRILOSEC) 40 MG DR capsule Take 40 mg by mouth 2 times daily      triamterene-HCTZ (DYAZIDE) 37.5-25 MG capsule Take 1 capsule by mouth every morning      Allergies   Allergen Reactions    Animal Dander Unknown    Chalk     Dogs Other (See Comments)     Pet Dander    Maple Tree     Mold [Molds & Smuts]  "Unknown         Lab Results    Chemistry/lipid CBC Cardiac Enzymes/BNP/TSH/INR   Recent Labs   Lab Test 09/18/23  1412   CHOL 148   HDL 42   LDL 66   TRIG 201*     Recent Labs   Lab Test 09/18/23  1412 09/12/23  1313 08/21/23  1411   LDL 66 72 46     Recent Labs   Lab Test 06/12/24  0451   *   POTASSIUM 3.9   CHLORIDE 96*   CO2 33*   *   BUN 10.6   CR 0.87   GFRESTIMATED 89   SHALOM 9.0     Recent Labs   Lab Test 06/12/24  0451 06/11/24  1210 06/10/24  1003   CR 0.87 0.84 0.95     No results for input(s): \"A1C\" in the last 63236 hours. Recent Labs   Lab Test 06/12/24  0451   WBC 9.3   HGB 9.9*   HCT 30.5*   MCV 76*        Recent Labs   Lab Test 06/12/24  0451 06/11/24  1210 06/10/24  1003   HGB 9.9* 10.5* 11.2*    No results for input(s): \"TROPONINI\" in the last 43004 hours.  Recent Labs   Lab Test 06/10/24  1003   NTBNP 151     No results for input(s): \"TSH\" in the last 98182 hours.  Recent Labs   Lab Test 06/10/24  1003 01/15/22  0627 05/30/21  1950   INR 1.01 1.0 1.00        35 minutes spent on the date of encounter doing chart review, review of outside records, review of test results, interpretation with above tests, patient visit, documentation, and discussion with family.      This note has been dictated using voice recognition software. Any grammatical or context distortions are unintentional and inherent to the software.    Albania Niño PA-C  Structural Heart Program  [facility]           "

## 2024-06-19 NOTE — LETTER
6/19/2024    Eliot De La Rosa MD  [address]  [facility] [address]    RE: Wyatt Gutierrez       Dear Colleague,     I had the pleasure of seeing Wyatt Gutierrez in the [facility].  HEART CARE ENCOUNTER NOTE       [facility]  [phone]    Assessment/Recommendations   Assessment:  Severe aortic stenosis, now s/p TAVR using a R TF approach with a 26 mm Dan GABINO 3 Ultra valve. Post-TAVR Day 1 his echocardiogram showed a mean gradient of 9 mmHg  Chronic heart failure with preserved ejection fraction, NYHA class III-IV - appears euvolemic  Non obstructive coronary artery disease - denies chest discomfort  Dyslipidemia - LDL 72  Hypertension - blood pressure is controlled   Idiopathic pulmonary fibrosis -chronically on 3 to 6 L NC of supplemental O2  Scrotal pain postprocedure-UA was positive for leuk esterase, negative for nitrates.  He had no urinary symptoms.  His scrotal pain has since resolved.  Palpitations/racing heart - patient reported elevated heart rates at times pre-procedure. MCOT ordered x2 weeks, patient is currently wearing.    Plan:  Continue aspirin 81 mg daily   Continue current doses of triamterene-hydrochlorothiazide and metoprolol  Continue atorvastatin 80 mg   Continue MCOT  He does not plan to attend cardiac rehab but uses a treadmill and weights in his home on a regular basis.  Okay to resume driving and normal activities   Echocardiogram is scheduled for July 10  1 month labs and follow-up visit is scheduled for July 12  He should follow-up with his primary care provider as scheduled    Thank you for the opportunity to participate in the care of Wyatt Gutierrez. It's been a pleasure working with him.  Please do not hesitate to call with any questions or concerns.       History of Present Illness/Subjective    I had the opportunity to see Wyatt Gutierrez at the [facility] for 1 week follow-up visit after TAVR.  He is accompanied by his spouse  for today's visit.    He has a past medical history significant for aortic stenosis, paroxysmal atrial fibrillation, coronary artery disease, dyslipidemia, hypertension, idiopathic pulmonary fibrosis, Seaman's esophagus.     He underwent transcatheter aortic valve replacement on June 11.  He did have low energy for the first couple of days after he was discharged from the hospital but this has improved.  He continues to have chronic shortness of breath which is similar to prior to the procedure and he continues to require 3 to 6 L nasal cannula of supplemental oxygen, however he feels that his oxygen saturation and breathing recovers faster after activity/exertion than prior to the procedure.  He did have some scrotal pain postprocedure but this has resolved.  He also notes some issues with double vision, however this is not new since the procedure and he does have follow-up scheduled with an eye doctor for this.  His weight has remained stable and his appetite may have improved since the procedure.  He does not plan to attend cardiac rehab as he uses his treadmill and weights in his home on a regular basis.  He is currently taking a baby aspirin daily he has noticed some bruising but he denies any bleeding issues.    Wyatt Gutierrez denies exertional chest discomfort, palpitations, shortness of breath at rest, PND, orthopnea, lightheadedness, dizziness, pre-syncope or syncope.  Wyatt Gutierrez also denies any recent weight loss, changes in appetite, nausea or vomiting.   ____________________________________________________________  Echo: 6/12/2024  Interpretation Summary     1. Normal left ventricular size and systolic performance with a visually  estimated ejection fraction of 65-70%.  2. There is a bio-prosthetic aortic valve (documented 26 mm Dan Gabe 3  Ultra tissue valve).  Â  Normal aortic valve prosthesis metrics with a mean systolic gradient of 9  mmHg and a peak anterograde velocity of 2.1 m/sec.  Â   "There is trace aortic insufficiency.  3. Normal right ventricular size and systolic performance.  4. There is mild left atrial enlargement.    EKG (personally reviewed and interpreted):  Sinus rhythm with first degree AVB, LAFB  Ventricular rate 68, , , QT/Qtc 416/442       Physical Examination Review of Systems   Vitals: /74 (BP Location: Left arm, Patient Position: Sitting, Cuff Size: Adult Large)   Pulse 76   Resp 20   Ht 1.778 m (5' 10\")   Wt 90.7 kg (200 lb)   SpO2 95%   BMI 28.70 kg/m    BMI= Body mass index is 28.7 kg/m .  Wt Readings from Last 3 Encounters:   06/19/24 90.7 kg (200 lb)   06/12/24 90 kg (198 lb 8 oz)   06/10/24 91.6 kg (202 lb)       General Appearance:   Alert, cooperative and in no acute distress   ENT/Mouth: membranes moist, no oral lesions or bleeding gums.      EYES:  no scleral icterus, normal conjunctivae   Neck: Supple without lymphadenopathy.  Thyroid not visualized   Chest/Lungs:   lungs are clear to auscultation, no rales or wheezing   Cardiovascular:   Regular. Normal first and second heart sounds with no murmurs, rubs, or gallops; the carotid, radial and posterior tibial pulses are intact, no edema bilaterally    Abdomen:  Soft and nontender. Bowel sounds are present in all quadrants   Extremities: no cyanosis or clubbing.  Puncture site is soft and without hematoma   Skin: no xanthelasma, warm.    Neurologic: normal gait, normal  bilateral, no tremors   Psychiatric: Normal mood and affect       Please refer above for cardiac ROS details.      Medical History  Surgical History Family History Social History   Past Medical History:   Diagnosis Date    Arthritis     High cholesterol     Hypertension     Sleep apnea      Past Surgical History:   Procedure Laterality Date    ARTHROSCOPY SHOULDER      COLONOSCOPY      CV CORONARY ANGIOGRAM N/A 5/13/2024    Procedure: Coronary Angiogram;  Surgeon: Shiv Robles MD;  Location: [facility] CATH LAB CV    CV " TRANSCATHETER AORTIC VALVE REPLACEMENT-FEMORAL APPROACH N/A 2024    Procedure: Transcatheter Aortic Valve Replacement-Femoral Approach;  Surgeon: Serjio Gustafson MD;  Location: [facility] CV    ESOPHAGOSCOPY, GASTROSCOPY, DUODENOSCOPY (EGD), COMBINED N/A 10/2/2023    Procedure: ESOPHAGOGASTRODUODENOSCOPY with biopsies;  Surgeon: Reji Baptiste MD;  Location: [facility] OR    OR TRANSCATHETER AORTIC VALVE REPLACEMENT, FEMORAL PERCUTANEOUS APPROACH (STANDBY) N/A 2024    Procedure: OR TRANSCATHETER AORTIC VALVE REPLACEMENT, FEMORAL PERCUTANEOUS APPROACH (STANDBY);  Surgeon: Susana Mitchell MD;  Location: [facility] CV    OTHER SURGICAL HISTORY Right     heel fracture    OTHER SURGICAL HISTORY      knee arthroscopies    ZZC TOTAL KNEE ARTHROPLASTY Left 10/31/2019    Procedure: LEFT MINIMALLY INVASIVE TOTAL KNEE ARTHROPLASTY;  Surgeon: Avelino Canada MD;  Location: [facility];  Service: Orthopedics     No family history on file. Social History     Socioeconomic History    Marital status:      Spouse name: Not on file    Number of children: Not on file    Years of education: Not on file    Highest education level: Not on file   Occupational History    Not on file   Tobacco Use    Smoking status: Former     Current packs/day: 0.00     Average packs/day: 0.5 packs/day for 55.0 years (27.5 ttl pk-yrs)     Types: Cigarettes     Start date: 1966     Quit date: 2021     Years since quittin.4    Smokeless tobacco: Never   Substance and Sexual Activity    Alcohol use: Yes     Comment: 1-2 glasses of wine/ 2 x-week    Drug use: Not Currently     Comment: edible marijuana in the past    Sexual activity: Not on file   Other Topics Concern    Not on file   Social History Narrative    Not on file     Social Determinants of Health     Financial Resource Strain: Not on file   Food Insecurity: Not on file   Transportation Needs: Not on file   Physical Activity: Not on file    Stress: Not on file   Social Connections: Not on file   Interpersonal Safety: Not on file   Housing Stability: Not on file          Medications  Allergies   Current Outpatient Medications   Medication Sig Dispense Refill    acyclovir (ZOVIRAX) 400 MG tablet [ACYCLOVIR (ZOVIRAX) 400 MG TABLET] Take 400 mg by mouth 3 (three) times a day as needed.             albuterol (PROAIR HFA/PROVENTIL HFA/VENTOLIN HFA) 108 (90 Base) MCG/ACT inhaler Inhale 2 puffs into the lungs every 6 hours as needed 18 g 3    albuterol (PROVENTIL) (2.5 MG/3ML) 0.083% neb solution Take 2.5 mg by nebulization 4 times daily as needed for shortness of breath, wheezing or cough      arformoterol (BROVANA) 15 MCG/2ML NEBU neb solution Take 2 mLs (15 mcg) by nebulization 2 times daily 360 mL 1    aspirin (ASA) 81 MG EC tablet Take by mouth daily      atorvastatin (LIPITOR) 80 MG tablet Take 1 tablet (80 mg) by mouth at bedtime 90 tablet 3    azelastine (ASTELIN) 0.1 % nasal spray Spray 2 sprays into both nostrils 2 times daily as needed for rhinitis      azithromycin (ZITHROMAX) 250 MG tablet Take 1 tablet (250 mg) by mouth daily for 180 days 90 tablet 1    ipratropium (ATROVENT) 0.02 % neb solution Take 2.5 mLs (0.5 mg) by nebulization 4 times daily 90 mL 3    ipratropium - albuterol 0.5 mg/2.5 mg/3 mL (DUONEB) 0.5-2.5 (3) MG/3ML neb solution Take 1 vial (3 mLs) by nebulization every 6 hours as needed for shortness of breath, wheezing or cough 360 mL 3    loperamide (IMODIUM) 2 MG capsule Take 2 mg by mouth daily as needed      metoprolol succinate (TOPROL-XL) 50 MG 24 hr tablet [METOPROLOL SUCCINATE (TOPROL-XL) 50 MG 24 HR TABLET] Take 50 mg by mouth daily.      multivitamin therapeutic tablet Take 1 tablet by mouth daily      nintedanib (OFEV) 100 MG capsule Take 1 capsule (100 mg) by mouth 2 times daily 180 capsule 3    omeprazole (PRILOSEC) 40 MG DR capsule Take 40 mg by mouth 2 times daily      triamterene-HCTZ (DYAZIDE) 37.5-25 MG capsule  "Take 1 capsule by mouth every morning      Allergies   Allergen Reactions    Animal Dander Unknown    Chalk     Dogs Other (See Comments)     Pet Dander    Maple Tree     Mold [Molds & Smuts] Unknown         Lab Results    Chemistry/lipid CBC Cardiac Enzymes/BNP/TSH/INR   Recent Labs   Lab Test 09/18/23  1412   CHOL 148   HDL 42   LDL 66   TRIG 201*     Recent Labs   Lab Test 09/18/23  1412 09/12/23  1313 08/21/23  1411   LDL 66 72 46     Recent Labs   Lab Test 06/12/24  0451   *   POTASSIUM 3.9   CHLORIDE 96*   CO2 33*   *   BUN 10.6   CR 0.87   GFRESTIMATED 89   SHALOM 9.0     Recent Labs   Lab Test 06/12/24  0451 06/11/24  1210 06/10/24  1003   CR 0.87 0.84 0.95     No results for input(s): \"A1C\" in the last 88926 hours. Recent Labs   Lab Test 06/12/24  0451   WBC 9.3   HGB 9.9*   HCT 30.5*   MCV 76*        Recent Labs   Lab Test 06/12/24  0451 06/11/24  1210 06/10/24  1003   HGB 9.9* 10.5* 11.2*    No results for input(s): \"TROPONINI\" in the last 56576 hours.  Recent Labs   Lab Test 06/10/24  1003   NTBNP 151     No results for input(s): \"TSH\" in the last 64547 hours.  Recent Labs   Lab Test 06/10/24  1003 01/15/22  0627 05/30/21  1950   INR 1.01 1.0 1.00        35 minutes spent on the date of encounter doing chart review, review of outside records, review of test results, interpretation with above tests, patient visit, documentation, and discussion with family.      This note has been dictated using voice recognition software. Any grammatical or context distortions are unintentional and inherent to the software.    Albania Niño PA-C  Structural Heart Program  [facility]        Thank you for allowing me to participate in the care of your patient.      Sincerely,     Albania Niño PA-C     [facility]  cc:   No referring provider defined for this encounter.      "

## 2024-06-19 NOTE — PATIENT INSTRUCTIONS
Wyatt Gutierrez,    It was a pleasure to see you today in the clinic regarding your 1 week follow-up visit.     My recommendations after this visit include:     - no medication changes today   - ok to resume normal activities, please monitor your groin site as you increase your activity again   - continue your heart monitor for a total duration of 2 weeks   - 1 month echocardiogram is scheduled for July 10   - 1 month labs and follow-up visit is scheduled for July 12    - follow-up with your PCP as scheduled        **Remember you will need prophylactic antibiotics for all dental visits in the future.  You can get the Rx from your dentist, your PCP or from us.  If possible, still refrain from going to the dentist until 6 months or more after your valve replacement      If you have questions or concerns, please call using the numbers below:    Valve Clinic Phone   [phone]    After Hours/Scheduling  [phone]    Otherwise you can dial the nurse directly at:    Christina Pierre RN  [phone]    Juan Carlos Lugo RN  [phone]                  Albania Niño PA-C  Structural Heart Program  [facility]

## 2024-06-25 ENCOUNTER — TRANSFERRED RECORDS (OUTPATIENT)
Dept: HEALTH INFORMATION MANAGEMENT | Facility: CLINIC | Age: 79
End: 2024-06-25

## 2024-06-26 DIAGNOSIS — J47.9 BRONCHIECTASIS WITHOUT COMPLICATION (H): ICD-10-CM

## 2024-06-26 RX ORDER — ARFORMOTEROL TARTRATE 15 UG/2ML
15 SOLUTION RESPIRATORY (INHALATION) 2 TIMES DAILY
Qty: 360 ML | Refills: 1 | Status: SHIPPED | OUTPATIENT
Start: 2024-06-26

## 2024-06-28 PROCEDURE — 93228 REMOTE 30 DAY ECG REV/REPORT: CPT | Performed by: INTERNAL MEDICINE

## 2024-07-03 DIAGNOSIS — Z95.2 S/P TAVR (TRANSCATHETER AORTIC VALVE REPLACEMENT): Primary | ICD-10-CM

## 2024-07-10 ENCOUNTER — HOSPITAL ENCOUNTER (OUTPATIENT)
Dept: CARDIOLOGY | Facility: HOSPITAL | Age: 79
Discharge: HOME | End: 2024-07-10
Attending: INTERNAL MEDICINE | Admitting: INTERNAL MEDICINE
Payer: MEDICARE

## 2024-07-10 ENCOUNTER — TELEPHONE (OUTPATIENT)
Dept: PULMONOLOGY | Facility: CLINIC | Age: 79
End: 2024-07-10
Payer: MEDICARE

## 2024-07-10 DIAGNOSIS — I35.0 NONRHEUMATIC AORTIC VALVE STENOSIS: ICD-10-CM

## 2024-07-10 LAB — LVEF ECHO: NORMAL

## 2024-07-10 PROCEDURE — 93306 TTE W/DOPPLER COMPLETE: CPT

## 2024-07-10 PROCEDURE — 93306 TTE W/DOPPLER COMPLETE: CPT | Mod: 26 | Performed by: INTERNAL MEDICINE

## 2024-07-10 NOTE — TELEPHONE ENCOUNTER
PA Initiation    Medication: OFEV 100 MG PO CAPS  Insurance Company: Medicare Blue - Phone [phone] Fax [phone]  Pharmacy Filling the Rx: [facility] - [place], [place] - [facility] [address]  Filling Pharmacy Phone:    Filling Pharmacy Fax:    Start Date: 7/10/2024    Key: [id number]

## 2024-07-10 NOTE — TELEPHONE ENCOUNTER
Prior Authorization Approval    Medication: OFEV 100 MG PO CAPS  Authorization Effective Date: 4/11/2024  Authorization Expiration Date: 7/10/2025  Approved Dose/Quantity: #180 per 90 days  Reference #: Key: [id number]   Insurance Company: Medicare Blue - Phone [phone] Fax [phone]  Expected CoPay: $ 100  CoPay Card Available: No    Financial Assistance Needed: No  [facility] - [place], [place] - [address]  Pharmacy Notified: No, renewal  Patient Notified: No, renewal

## 2024-07-12 ENCOUNTER — LAB (OUTPATIENT)
Dept: CARDIOLOGY | Facility: CLINIC | Age: 79
End: 2024-07-12
Payer: MEDICARE

## 2024-07-12 ENCOUNTER — OFFICE VISIT (OUTPATIENT)
Dept: CARDIOLOGY | Facility: CLINIC | Age: 79
End: 2024-07-12
Payer: MEDICARE

## 2024-07-12 VITALS
HEART RATE: 81 BPM | RESPIRATION RATE: 16 BRPM | SYSTOLIC BLOOD PRESSURE: 120 MMHG | WEIGHT: 197 LBS | BODY MASS INDEX: 28.27 KG/M2 | DIASTOLIC BLOOD PRESSURE: 50 MMHG

## 2024-07-12 DIAGNOSIS — E78.5 DYSLIPIDEMIA: ICD-10-CM

## 2024-07-12 DIAGNOSIS — I10 BENIGN ESSENTIAL HYPERTENSION: ICD-10-CM

## 2024-07-12 DIAGNOSIS — Z95.2 S/P TAVR (TRANSCATHETER AORTIC VALVE REPLACEMENT): Primary | ICD-10-CM

## 2024-07-12 DIAGNOSIS — Z95.2 S/P TAVR (TRANSCATHETER AORTIC VALVE REPLACEMENT): ICD-10-CM

## 2024-07-12 LAB
ANION GAP SERPL CALCULATED.3IONS-SCNC: 9 MMOL/L (ref 7–15)
ATRIAL RATE - MUSE: 81 BPM
BUN SERPL-MCNC: 12.4 MG/DL (ref 8–23)
CALCIUM SERPL-MCNC: 9.5 MG/DL (ref 8.8–10.2)
CHLORIDE SERPL-SCNC: 88 MMOL/L (ref 98–107)
CREAT SERPL-MCNC: 0.92 MG/DL (ref 0.67–1.17)
DEPRECATED HCO3 PLAS-SCNC: 29 MMOL/L (ref 22–29)
DIASTOLIC BLOOD PRESSURE - MUSE: NORMAL MMHG
EGFRCR SERPLBLD CKD-EPI 2021: 86 ML/MIN/1.73M2
ERYTHROCYTE [DISTWIDTH] IN BLOOD BY AUTOMATED COUNT: 16.7 % (ref 10–15)
GLUCOSE SERPL-MCNC: 118 MG/DL (ref 70–99)
HCT VFR BLD AUTO: 30 % (ref 40–53)
HGB BLD-MCNC: 9.8 G/DL (ref 13.3–17.7)
INTERPRETATION ECG - MUSE: NORMAL
MCH RBC QN AUTO: 24.9 PG (ref 26.5–33)
MCHC RBC AUTO-ENTMCNC: 32.7 G/DL (ref 31.5–36.5)
MCV RBC AUTO: 76 FL (ref 78–100)
P AXIS - MUSE: 32 DEGREES
PLATELET # BLD AUTO: 207 10E3/UL (ref 150–450)
POTASSIUM SERPL-SCNC: 4.1 MMOL/L (ref 3.4–5.3)
PR INTERVAL - MUSE: 210 MS
QRS DURATION - MUSE: 102 MS
QT - MUSE: 368 MS
QTC - MUSE: 427 MS
R AXIS - MUSE: -58 DEGREES
RBC # BLD AUTO: 3.93 10E6/UL (ref 4.4–5.9)
SODIUM SERPL-SCNC: 126 MMOL/L (ref 135–145)
SYSTOLIC BLOOD PRESSURE - MUSE: NORMAL MMHG
T AXIS - MUSE: -3 DEGREES
VENTRICULAR RATE- MUSE: 81 BPM
WBC # BLD AUTO: 9 10E3/UL (ref 4–11)

## 2024-07-12 PROCEDURE — 80048 BASIC METABOLIC PNL TOTAL CA: CPT | Performed by: PHYSICIAN ASSISTANT

## 2024-07-12 PROCEDURE — 36415 COLL VENOUS BLD VENIPUNCTURE: CPT | Performed by: PHYSICIAN ASSISTANT

## 2024-07-12 PROCEDURE — 93000 ELECTROCARDIOGRAM COMPLETE: CPT | Performed by: GENERAL ACUTE CARE HOSPITAL

## 2024-07-12 PROCEDURE — 85027 COMPLETE CBC AUTOMATED: CPT | Performed by: PHYSICIAN ASSISTANT

## 2024-07-12 PROCEDURE — G2211 COMPLEX E/M VISIT ADD ON: HCPCS | Performed by: PHYSICIAN ASSISTANT

## 2024-07-12 PROCEDURE — 99215 OFFICE O/P EST HI 40 MIN: CPT | Performed by: PHYSICIAN ASSISTANT

## 2024-07-12 RX ORDER — TRIAMTERENE CAPSULES 50 MG/1
50 CAPSULE ORAL DAILY
Qty: 30 CAPSULE | Refills: 3 | Status: SHIPPED | OUTPATIENT
Start: 2024-07-12 | End: 2024-07-12

## 2024-07-12 RX ORDER — TRIAMTERENE CAPSULES 50 MG/1
CAPSULE ORAL
COMMUNITY
End: 2024-07-12

## 2024-07-12 NOTE — PROGRESS NOTES
HEART CARE ENCOUNTER NOTE       [facility]  [phone]    Assessment/Recommendations   Assessment:  Severe aortic stenosis, now s/p TAVR using a R TF approach with a 26 mm Dan GABINO 3 Ultra valve. 1 month echocardiogram showed a mean gradient of 13 mmHg. Mild-moderate paravalvular insufficiency - all though per TTE report this is likely unchanged from previous echocardiogram.  Chronic heart failure with preserved ejection fraction, NYHA class III-IV   Non obstructive coronary artery disease - denies angina  Dyslipidemia - LDL 72   Hypertension - blood pressure is controlled  Idiopathic pulmonary fibrosis - chronically on 3-6 L of supplemental oxygen  Palpitations/racing heart - MCOT was without tachyarrhythmias or atrial fibrillation. Rare PVCs and supraventricular ectopic beats. No significant pauses noted  Hyponatremia - labs returned later this afternoon with sodium 126, patient seems to run chronically low-normal. I discussed with patients wife over the phone. He does have chronic weakness which has been unchanged recently but overall seems to be asymptomatic. There is some discrepancy in his chart regarding taking triamterene vs triamterene-hydrochlorothiazide. I called his pharmacy and verified that patient has been taking his current triamterene-hydrochlorothiazide dose.    Plan:  Continue aspirin 81 mg daily   Continue metoprolol   Hold triamterene-hydrochlorothiazide and patient should restrict fluid intake 50-60 ounces daily over the weekend and recheck labs on Monday. Warning signs and follow-up precautions discussed over the phone with patient's wife, who verbalized understanding.  Follow-up with Dr. Cao in December (6 months post-TAVR), or sooner if needed    Thank you for the opportunity to participate in the care of Wyatt Gutierrez. It's been a pleasure working with him.  Please do not hesitate to call with any questions or concerns.    The longitudinal plan of care for  the severe aortic stenosis s/p TAVR, HFpEF, CAD, HTN as documented were addressed during this visit. Due to the added complexity in care, I will continue to support Tai in the subsequent management and with ongoing continuity of care.        History of Present Illness/Subjective    I had the opportunity to see Wyatt Gutierrez at the [facility] for follow-up after TAVR. He is accompanied by his Wife for today's visit.    He has a past medical history significant for aortic stenosis, paroxysmal atrial fibrillation, coronary artery disease, dyslipidemia, hypertension, idiopathic pulmonary fibrosis, Seaman's esophagus.     Overall has been doing ok since his TAVR procedure. He initially thought he had noticed an improvement in recovery of his shortness of breath, but he now thinks this has not changed. His chronic shortness of breath is stable. He was recently in the emergency department for epistaxis and cauterization. He has had nosebleeds in the past due to his oxygen. He does have a nose clamp if he has another nose bleed. He and his [place] next [place] and will be leaving tomorrow. He reports his weight on his home scale has been stable around 190-195 lbs.     Wyatt Gutierrez denies exertional chest discomfort, palpitations, shortness of breath at rest, PND, orthopnea, lightheadedness, dizziness, pre-syncope or syncope.  Wyatt Gutierrez also denies any recent weight loss, changes in appetite, nausea or vomiting.   ____________________________________________________________  Echo: 7/10/2024  Interpretation Summary     There is mild concentric left ventricular hypertrophy.  Left ventricular size, wall motion and function are normal. The ejection  fraction is > 65%.  Mildly decreased right ventricular systolic function  The gradient is normal for this prosthetic aortic valve.  IVC diameter >2.1 cm collapsing <50% with sniff suggests a high RA pressure  estimated at 15 mmHg  or greater.  There is a bio-prosthetic aortic valve (documented 26 mm Dan Gabe 3  Ultra tissue valve). MIld to moderate paravalvular insufficiency is noted,  visualized better on current exam vs images of 6/12/2024. Likely unchanged in  severity.    MCOT: 6/12/2024  Mobile cardiac telemetry monitoring from 6/12/2024 to 6/25/2024 (monitored duration 13d 6h 8m).  Predominant rhythm was sinus rhythm, 54 to 120bpm, average 72bpm.  No tachyarrhythmias.  No atrial fibrillation.  There were no pauses of over 3.0s.  Rare supraventricular ectopic beats (<1%).  Rare premature ventricular contractions (<1%).  Symptom triggers (72) correlated to correlated to sinus rhythm, rarely with PAC and PVCs.    EKG (personally reviewed and interpreted):  Sinus rhythm with 1st degree AVB  Ventricular rate 91, , , QT/Qtc 368/427         Physical Examination Review of Systems   Vitals: /50 (BP Location: Left arm, Patient Position: Sitting, Cuff Size: Adult Large)   Pulse 81   Resp 16   Wt 89.4 kg (197 lb)   BMI 28.27 kg/m    BMI= Body mass index is 28.27 kg/m .  Wt Readings from Last 3 Encounters:   07/12/24 89.4 kg (197 lb)   06/19/24 90.7 kg (200 lb)   06/12/24 90 kg (198 lb 8 oz)       General Appearance:   Alert, cooperative and in no acute distress   ENT/Mouth: membranes moist, no oral lesions or bleeding gums.      EYES:  no scleral icterus, normal conjunctivae   Neck: Supple without lymphadenopathy.  Thyroid not visualized   Chest/Lungs:   lungs are diminished to auscultation   Cardiovascular:   Regular. Normal first and second heart sounds with no murmurs, rubs, or gallops; the carotid, radial and posterior tibial pulses are intact, no edema bilaterally    Abdomen:  Soft and nontender. Bowel sounds are present in all quadrants   Extremities: no cyanosis or clubbing.   Skin: no xanthelasma, warm.    Neurologic: normal gait, normal  bilateral, no tremors   Psychiatric: Normal mood and affect        Please refer above for cardiac ROS details.      Medical History  Surgical History Family History Social History   Past Medical History:   Diagnosis Date    Arthritis     High cholesterol     Hypertension     Sleep apnea      Past Surgical History:   Procedure Laterality Date    ARTHROSCOPY SHOULDER      COLONOSCOPY      CV CORONARY ANGIOGRAM N/A 2024    Procedure: Coronary Angiogram;  Surgeon: Shiv Robles MD;  Location: [facility] CV    CV TRANSCATHETER AORTIC VALVE REPLACEMENT-FEMORAL APPROACH N/A 2024    Procedure: Transcatheter Aortic Valve Replacement-Femoral Approach;  Surgeon: Serjio Gustafson MD;  Location: [facility] CV    ESOPHAGOSCOPY, GASTROSCOPY, DUODENOSCOPY (EGD), COMBINED N/A 10/2/2023    Procedure: ESOPHAGOGASTRODUODENOSCOPY with biopsies;  Surgeon: Reji Baptiste MD;  Location: [facility] OR    OR TRANSCATHETER AORTIC VALVE REPLACEMENT, FEMORAL PERCUTANEOUS APPROACH (STANDBY) N/A 2024    Procedure: OR TRANSCATHETER AORTIC VALVE REPLACEMENT, FEMORAL PERCUTANEOUS APPROACH (STANDBY);  Surgeon: Susana Mitchell MD;  Location: [facility] CV    OTHER SURGICAL HISTORY Right     heel fracture    OTHER SURGICAL HISTORY      knee arthroscopies    ZZC TOTAL KNEE ARTHROPLASTY Left 10/31/2019    Procedure: LEFT MINIMALLY INVASIVE TOTAL KNEE ARTHROPLASTY;  Surgeon: Avelino Canada MD;  Location: [facility] OR;  Service: Orthopedics     No family history on file. Social History     Socioeconomic History    Marital status:      Spouse name: Not on file    Number of children: Not on file    Years of education: Not on file    Highest education level: Not on file   Occupational History    Not on file   Tobacco Use    Smoking status: Former     Current packs/day: 0.00     Average packs/day: 0.5 packs/day for 55.0 years (27.5 ttl pk-yrs)     Types: Cigarettes     Start date: 1966     Quit date: 2021     Years since quittin.5    Smokeless  tobacco: Never   Substance and Sexual Activity    Alcohol use: Yes     Comment: 1-2 glasses of wine/ 2 x-week    Drug use: Not Currently     Comment: edible marijuana in the past    Sexual activity: Not on file   Other Topics Concern    Not on file   Social History Narrative    Not on file     Social Determinants of Health     Financial Resource Strain: Not on file   Food Insecurity: Not on file   Transportation Needs: Not on file   Physical Activity: Not on file   Stress: Not on file   Social Connections: Not on file   Interpersonal Safety: Not on file   Housing Stability: Not on file          Medications  Allergies   Current Outpatient Medications   Medication Sig Dispense Refill    acyclovir (ZOVIRAX) 400 MG tablet [ACYCLOVIR (ZOVIRAX) 400 MG TABLET] Take 400 mg by mouth 3 (three) times a day as needed.             albuterol (PROAIR HFA/PROVENTIL HFA/VENTOLIN HFA) 108 (90 Base) MCG/ACT inhaler Inhale 2 puffs into the lungs every 6 hours as needed 18 g 3    albuterol (PROVENTIL) (2.5 MG/3ML) 0.083% neb solution Take 2.5 mg by nebulization 4 times daily as needed for shortness of breath, wheezing or cough      arformoterol (BROVANA) 15 MCG/2ML NEBU neb solution Take 2 mLs (15 mcg) by nebulization 2 times daily 360 mL 1    aspirin (ASA) 81 MG EC tablet Take by mouth daily      atorvastatin (LIPITOR) 80 MG tablet Take 1 tablet (80 mg) by mouth at bedtime 90 tablet 3    azelastine (ASTELIN) 0.1 % nasal spray Spray 2 sprays into both nostrils 2 times daily as needed for rhinitis      ipratropium (ATROVENT) 0.02 % neb solution Take 2.5 mLs (0.5 mg) by nebulization 4 times daily 90 mL 3    ipratropium - albuterol 0.5 mg/2.5 mg/3 mL (DUONEB) 0.5-2.5 (3) MG/3ML neb solution Take 1 vial (3 mLs) by nebulization every 6 hours as needed for shortness of breath, wheezing or cough 360 mL 3    loperamide (IMODIUM) 2 MG capsule Take 2 mg by mouth daily as needed      metoprolol succinate (TOPROL-XL) 50 MG 24 hr tablet [METOPROLOL  "SUCCINATE (TOPROL-XL) 50 MG 24 HR TABLET] Take 50 mg by mouth daily.      multivitamin therapeutic tablet Take 1 tablet by mouth daily      nintedanib (OFEV) 100 MG capsule Take 1 capsule (100 mg) by mouth 2 times daily 180 capsule 3    omeprazole (PRILOSEC) 40 MG DR capsule Take 40 mg by mouth 2 times daily      triamterene-HCTZ (DYAZIDE) 37.5-25 MG capsule Take 1 capsule by mouth every morning      Allergies   Allergen Reactions    Animal Dander Unknown    Chalk     Dogs Other (See Comments)     Pet Dander    Maple Tree     Mold [Molds & Smuts] Unknown         Lab Results    Chemistry/lipid CBC Cardiac Enzymes/BNP/TSH/INR   Recent Labs   Lab Test 09/18/23  1412   CHOL 148   HDL 42   LDL 66   TRIG 201*     Recent Labs   Lab Test 09/18/23  1412 09/12/23  1313 08/21/23  1411   LDL 66 72 46     Recent Labs   Lab Test 06/12/24  0451   *   POTASSIUM 3.9   CHLORIDE 96*   CO2 33*   *   BUN 10.6   CR 0.87   GFRESTIMATED 89   SHALOM 9.0     Recent Labs   Lab Test 06/12/24  0451 06/11/24  1210 06/10/24  1003   CR 0.87 0.84 0.95     No results for input(s): \"A1C\" in the last 94738 hours. Recent Labs   Lab Test 06/12/24  0451   WBC 9.3   HGB 9.9*   HCT 30.5*   MCV 76*        Recent Labs   Lab Test 06/12/24  0451 06/11/24  1210 06/10/24  1003   HGB 9.9* 10.5* 11.2*    No results for input(s): \"TROPONINI\" in the last 02386 hours.  Recent Labs   Lab Test 06/10/24  1003   NTBNP 151     No results for input(s): \"TSH\" in the last 20668 hours.  Recent Labs   Lab Test 06/10/24  1003 01/15/22  0627 05/30/21  1950   INR 1.01 1.0 1.00        >60 minutes spent on the date of encounter doing chart review, review of outside records, review of test results, interpretation with above tests, patient visit, documentation, and discussion with family.      This note has been dictated using voice recognition software. Any grammatical or context distortions are unintentional and inherent to the software.    [place]ia Niño, " PA-C  Structural Heart Program  [facility]

## 2024-07-12 NOTE — PATIENT INSTRUCTIONS
Wyatt Gutierrez,    It was a pleasure to see you today in the clinic regarding your follow-up after TAVR.     My recommendations after this visit include:     - no medication changes today   - I will discuss your echo results with Dr. Gustafson/Margaret    You should followup with Dr. Cao in December 2024, or sooner if needed    **Remember you will need prophylactic antibiotics for all dental visits in the future.  You can get the Rx from your dentist, your PCP or from us.  If possible, still refrain from going to the dentist until 6 months or more after your valve replacement      If you have questions or concerns, please call using the numbers below:    Valve Clinic Phone   [phone]    After Hours/Scheduling  [phone]    Otherwise you can dial the nurse directly at:    Joyce Franklin RN  [phone]    Juan Carlos Lugo RN  [phone]                  [place]ia Niño PA-C  Structural Heart Program  [facility]

## 2024-07-12 NOTE — LETTER
7/12/2024    Eliot De La Rosa MD  [address]  [facility] [address]    RE: Wyatt Gutierrez       Dear Colleague,     I had the pleasure of seeing Wyatt Gutierrez in the [facility].  HEART CARE ENCOUNTER NOTE       [facility]  [phone]    Assessment/Recommendations   Assessment:  Severe aortic stenosis, now s/p TAVR using a R TF approach with a 26 mm Dan GABINO 3 Ultra valve. 1 month echocardiogram showed a mean gradient of 13 mmHg. Mild-moderate paravalvular insufficiency - all though per TTE report this is likely unchanged from previous echocardiogram.  Chronic heart failure with preserved ejection fraction, NYHA class III-IV   Non obstructive coronary artery disease - denies angina  Dyslipidemia - LDL 72   Hypertension - blood pressure is controlled  Idiopathic pulmonary fibrosis - chronically on 3-6 L of supplemental oxygen  Palpitations/racing heart - MCOT was without tachyarrhythmias or atrial fibrillation. Rare PVCs and supraventricular ectopic beats. No significant pauses noted  Hyponatremia - labs returned later this afternoon with sodium 126, patient seems to run chronically low-normal. I discussed with patients wife over the phone. He does have chronic weakness which has been unchanged recently but overall seems to be asymptomatic. There is some discrepancy in his chart regarding taking triamterene vs triamterene-hydrochlorothiazide. I called his pharmacy and verified that patient has been taking his current triamterene-hydrochlorothiazide dose.    Plan:  Continue aspirin 81 mg daily   Continue metoprolol   Hold triamterene-hydrochlorothiazide and patient should restrict fluid intake 50-60 ounces daily over the weekend and recheck labs on Monday. Warning signs and follow-up precautions discussed over the phone with patient's wife, who verbalized understanding.  Follow-up with Dr. Cao in December (6 months post-TAVR), or sooner if needed    Thank you for the  opportunity to participate in the care of Wyatt Gutierrez. It's been a pleasure working with him.  Please do not hesitate to call with any questions or concerns.    The longitudinal plan of care for the severe aortic stenosis s/p TAVR, HFpEF, CAD, HTN as documented were addressed during this visit. Due to the added complexity in care, I will continue to support Tai in the subsequent management and with ongoing continuity of care.        History of Present Illness/Subjective    I had the opportunity to see Wyatt Gutierrez at the [facility] for follow-up after TAVR. He is accompanied by his Wife for today's visit.    He has a past medical history significant for aortic stenosis, paroxysmal atrial fibrillation, coronary artery disease, dyslipidemia, hypertension, idiopathic pulmonary fibrosis, Seaman's esophagus.     Overall has been doing ok since his TAVR procedure. He initially thought he had noticed an improvement in recovery of his shortness of breath, but he now thinks this has not changed. His chronic shortness of breath is stable. He was recently in the emergency department for epistaxis and cauterization. He has had nosebleeds in the past due to his oxygen. He does have a nose clamp if he has another nose bleed. He and his [place] the next [place] and will be leaving tomorrow. He reports his weight on his home scale has been stable around 190-195 lbs.     Wyatt Gutierrez denies exertional chest discomfort, palpitations, shortness of breath at rest, PND, orthopnea, lightheadedness, dizziness, pre-syncope or syncope.  Wyatt Gutierrez also denies any recent weight loss, changes in appetite, nausea or vomiting.   ____________________________________________________________  Echo: 7/10/2024  Interpretation Summary     There is mild concentric left ventricular hypertrophy.  Left ventricular size, wall motion and function are normal. The ejection  fraction is > 65%.  Mildly  decreased right ventricular systolic function  The gradient is normal for this prosthetic aortic valve.  IVC diameter >2.1 cm collapsing <50% with sniff suggests a high RA pressure  estimated at 15 mmHg or greater.  There is a bio-prosthetic aortic valve (documented 26 mm Dan Gabe 3  Ultra tissue valve). MIld to moderate paravalvular insufficiency is noted,  visualized better on current exam vs images of 6/12/2024. Likely unchanged in  severity.    MCOT: 6/12/2024  Mobile cardiac telemetry monitoring from 6/12/2024 to 6/25/2024 (monitored duration 13d 6h 8m).  Predominant rhythm was sinus rhythm, 54 to 120bpm, average 72bpm.  No tachyarrhythmias.  No atrial fibrillation.  There were no pauses of over 3.0s.  Rare supraventricular ectopic beats (<1%).  Rare premature ventricular contractions (<1%).  Symptom triggers (72) correlated to correlated to sinus rhythm, rarely with PAC and PVCs.    EKG (personally reviewed and interpreted):  Sinus rhythm with 1st degree AVB  Ventricular rate 91, , , QT/Qtc 368/427         Physical Examination Review of Systems   Vitals: /50 (BP Location: Left arm, Patient Position: Sitting, Cuff Size: Adult Large)   Pulse 81   Resp 16   Wt 89.4 kg (197 lb)   BMI 28.27 kg/m    BMI= Body mass index is 28.27 kg/m .  Wt Readings from Last 3 Encounters:   07/12/24 89.4 kg (197 lb)   06/19/24 90.7 kg (200 lb)   06/12/24 90 kg (198 lb 8 oz)       General Appearance:   Alert, cooperative and in no acute distress   ENT/Mouth: membranes moist, no oral lesions or bleeding gums.      EYES:  no scleral icterus, normal conjunctivae   Neck: Supple without lymphadenopathy.  Thyroid not visualized   Chest/Lungs:   lungs are diminished to auscultation   Cardiovascular:   Regular. Normal first and second heart sounds with no murmurs, rubs, or gallops; the carotid, radial and posterior tibial pulses are intact, no edema bilaterally    Abdomen:  Soft and nontender. Bowel sounds are  present in all quadrants   Extremities: no cyanosis or clubbing.   Skin: no xanthelasma, warm.    Neurologic: normal gait, normal  bilateral, no tremors   Psychiatric: Normal mood and affect       Please refer above for cardiac ROS details.      Medical History  Surgical History Family History Social History   Past Medical History:   Diagnosis Date    Arthritis     High cholesterol     Hypertension     Sleep apnea      Past Surgical History:   Procedure Laterality Date    ARTHROSCOPY SHOULDER      COLONOSCOPY      CV CORONARY ANGIOGRAM N/A 5/13/2024    Procedure: Coronary Angiogram;  Surgeon: Shiv Robles MD;  Location: [facility] CV    CV TRANSCATHETER AORTIC VALVE REPLACEMENT-FEMORAL APPROACH N/A 6/11/2024    Procedure: Transcatheter Aortic Valve Replacement-Femoral Approach;  Surgeon: Serjio Gustafson MD;  Location: [facility] CV    ESOPHAGOSCOPY, GASTROSCOPY, DUODENOSCOPY (EGD), COMBINED N/A 10/2/2023    Procedure: ESOPHAGOGASTRODUODENOSCOPY with biopsies;  Surgeon: Reji Baptiste MD;  Location: [facility] OR    OR TRANSCATHETER AORTIC VALVE REPLACEMENT, FEMORAL PERCUTANEOUS APPROACH (STANDBY) N/A 6/11/2024    Procedure: OR TRANSCATHETER AORTIC VALVE REPLACEMENT, FEMORAL [facility]CUT[facility] APPROACH [facility]  Surgeon: Susana Mitchell MD;  Location: [facility] CV    OTHER SURGICAL HISTORY Right 1957    heel fracture    OTHER SURGICAL HISTORY      knee arthroscopies    ZC TOTAL KNEE ARTHROPLASTY Left 10/31/2019    Procedure: LEFT MINIMALLY INVASIVE TOTAL KNEE ARTHROPLASTY;  Surgeon: Avelino Canada MD;  Location: [facility];  Service: Orthopedics     No family history on file. Social History     Socioeconomic History    Marital status:      Spouse name: Not on file    Number of children: Not on file    Years of education: Not on file    Highest education level: Not on file   Occupational History    Not on file   Tobacco Use    Smoking status: Former     Current packs/day:  0.00     Average packs/day: 0.5 packs/day for 55.0 years (27.5 ttl pk-yrs)     Types: Cigarettes     Start date: 1966     Quit date: 2021     Years since quittin.5    Smokeless tobacco: Never   Substance and Sexual Activity    Alcohol use: Yes     Comment: 1-2 glasses of wine/ 2 x-week    Drug use: Not Currently     Comment: edible marijuana in the past    Sexual activity: Not on file   Other Topics Concern    Not on file   Social History Narrative    Not on file     Social Determinants of Health     Financial Resource Strain: Not on file   Food Insecurity: Not on file   Transportation Needs: Not on file   Physical Activity: Not on file   Stress: Not on file   Social Connections: Not on file   Interpersonal Safety: Not on file   Housing Stability: Not on file          Medications  Allergies   Current Outpatient Medications   Medication Sig Dispense Refill    acyclovir (ZOVIRAX) 400 MG tablet [ACYCLOVIR (ZOVIRAX) 400 MG TABLET] Take 400 mg by mouth 3 (three) times a day as needed.             albuterol (PROAIR HFA/PROVENTIL HFA/VENTOLIN HFA) 108 (90 Base) MCG/ACT inhaler Inhale 2 puffs into the lungs every 6 hours as needed 18 g 3    albuterol (PROVENTIL) (2.5 MG/3ML) 0.083% neb solution Take 2.5 mg by nebulization 4 times daily as needed for shortness of breath, wheezing or cough      arformoterol (BROVANA) 15 MCG/2ML NEBU neb solution Take 2 mLs (15 mcg) by nebulization 2 times daily 360 mL 1    aspirin (ASA) 81 MG EC tablet Take by mouth daily      atorvastatin (LIPITOR) 80 MG tablet Take 1 tablet (80 mg) by mouth at bedtime 90 tablet 3    azelastine (ASTELIN) 0.1 % nasal spray Spray 2 sprays into both nostrils 2 times daily as needed for rhinitis      ipratropium (ATROVENT) 0.02 % neb solution Take 2.5 mLs (0.5 mg) by nebulization 4 times daily 90 mL 3    ipratropium - albuterol 0.5 mg/2.5 mg/3 mL (DUONEB) 0.5-2.5 (3) MG/3ML neb solution Take 1 vial (3 mLs) by nebulization every 6 hours as needed  "for shortness of breath, wheezing or cough 360 mL 3    loperamide (IMODIUM) 2 MG capsule Take 2 mg by mouth daily as needed      metoprolol succinate (TOPROL-XL) 50 MG 24 hr tablet [METOPROLOL SUCCINATE (TOPROL-XL) 50 MG 24 HR TABLET] Take 50 mg by mouth daily.      multivitamin therapeutic tablet Take 1 tablet by mouth daily      nintedanib (OFEV) 100 MG capsule Take 1 capsule (100 mg) by mouth 2 times daily 180 capsule 3    omeprazole (PRILOSEC) 40 MG DR capsule Take 40 mg by mouth 2 times daily      triamterene-HCTZ (DYAZIDE) 37.5-25 MG capsule Take 1 capsule by mouth every morning      Allergies   Allergen Reactions    Animal Dander Unknown    Chalk     Dogs Other (See Comments)     Pet Dander    Maple Tree     Mold [Molds & Smuts] Unknown         Lab Results    Chemistry/lipid CBC Cardiac Enzymes/BNP/TSH/INR   Recent Labs   Lab Test 09/18/23  1412   CHOL 148   HDL 42   LDL 66   TRIG 201*     Recent Labs   Lab Test 09/18/23  1412 09/12/23  1313 08/21/23  1411   LDL 66 72 46     Recent Labs   Lab Test 06/12/24  0451   *   POTASSIUM 3.9   CHLORIDE 96*   CO2 33*   *   BUN 10.6   CR 0.87   GFRESTIMATED 89   SHALOM 9.0     Recent Labs   Lab Test 06/12/24  0451 06/11/24  1210 06/10/24  1003   CR 0.87 0.84 0.95     No results for input(s): \"A1C\" in the last 15167 hours. Recent Labs   Lab Test 06/12/24  0451   WBC 9.3   HGB 9.9*   HCT 30.5*   MCV 76*        Recent Labs   Lab Test 06/12/24  0451 06/11/24  1210 06/10/24  1003   HGB 9.9* 10.5* 11.2*    No results for input(s): \"TROPONINI\" in the last 59689 hours.  Recent Labs   Lab Test 06/10/24  1003   NTBNP 151     No results for input(s): \"TSH\" in the last 26719 hours.  Recent Labs   Lab Test 06/10/24  1003 01/15/22  0627 05/30/21  1950   INR 1.01 1.0 1.00        >60 minutes spent on the date of encounter doing chart review, review of outside records, review of test results, interpretation with above tests, patient visit, documentation, and discussion " with family.      This note has been dictated using voice recognition software. Any grammatical or context distortions are unintentional and inherent to the software.    Albania Niño PA-C  Structural Heart Program  [facility]               Thank you for allowing me to participate in the care of your patient.      Sincerely,     Albania Niño PA-C     [facility]  cc:   Albania Niño PA-C  [facility]  [address], [address]  [place] [address]

## 2024-07-25 ENCOUNTER — TELEPHONE (OUTPATIENT)
Dept: PULMONOLOGY | Facility: CLINIC | Age: 79
End: 2024-07-25
Payer: COMMERCIAL

## 2024-07-25 DIAGNOSIS — J84.112 IPF (IDIOPATHIC PULMONARY FIBROSIS) (H): Primary | ICD-10-CM

## 2024-07-25 RX ORDER — PIRFENIDONE 267 MG/1
CAPSULE ORAL
Qty: 270 CAPSULE | Refills: 11 | Status: SHIPPED | OUTPATIENT
Start: 2024-07-25

## 2024-07-25 NOTE — TELEPHONE ENCOUNTER
Pt having continuous diarrhea, despite taking Imodium and eating with the medication. He is at the point where he cannot leave his house. He'd like to switchback to PIrfenidone. He will avoid the sun. Updated Dr. Estrada. Placed order for Pirfenidone.     Lydia Amor RN

## 2024-07-26 ENCOUNTER — TELEPHONE (OUTPATIENT)
Dept: PULMONOLOGY | Facility: CLINIC | Age: 79
End: 2024-07-26
Payer: COMMERCIAL

## 2024-07-26 NOTE — TELEPHONE ENCOUNTER
PA Initiation    Medication: PIRFENIDONE 267 MG PO CAPS  Insurance Company: [employer] RX - Phone [phone] Fax [phone]  Pharmacy Filling the Rx:    Filling Pharmacy Phone:    Filling Pharmacy Fax:    Start Date: 7/26/2024    Key: [id number]

## 2024-07-29 NOTE — TELEPHONE ENCOUNTER
Prior Authorization Approval    Medication: PIRFENIDONE 267 MG PO CAPS  Authorization Effective Date: 4/27/2024  Authorization Expiration Date: 7/26/2025  Approved Dose/Quantity: #270 per 30 days  Reference #: Key: [id number]   Insurance Company: [employer] RX - Phone [phone] Fax [phone]  Expected CoPay: $ 0  CoPay Card Available: No    Financial Assistance Needed: No  [facility] [place], [place] - [facility] [address]  Pharmacy Notified: SpecNew email sent  Patient Notified: MyChart sent          Patient switching from Ofev to prifenidone:

## 2024-08-01 ENCOUNTER — OFFICE VISIT (OUTPATIENT)
Dept: PULMONOLOGY | Facility: CLINIC | Age: 79
End: 2024-08-01
Attending: INTERNAL MEDICINE
Payer: MEDICARE

## 2024-08-01 VITALS — SYSTOLIC BLOOD PRESSURE: 122 MMHG | DIASTOLIC BLOOD PRESSURE: 73 MMHG | OXYGEN SATURATION: 96 % | HEART RATE: 79 BPM

## 2024-08-01 DIAGNOSIS — J84.9 ILD (INTERSTITIAL LUNG DISEASE) (H): Primary | ICD-10-CM

## 2024-08-01 DIAGNOSIS — J84.9 ILD (INTERSTITIAL LUNG DISEASE) (H): ICD-10-CM

## 2024-08-01 PROCEDURE — 94375 RESPIRATORY FLOW VOLUME LOOP: CPT | Performed by: INTERNAL MEDICINE

## 2024-08-01 PROCEDURE — G0463 HOSPITAL OUTPT CLINIC VISIT: HCPCS | Performed by: INTERNAL MEDICINE

## 2024-08-01 PROCEDURE — 99497 ADVNCD CARE PLAN 30 MIN: CPT | Performed by: INTERNAL MEDICINE

## 2024-08-01 PROCEDURE — 94729 DIFFUSING CAPACITY: CPT | Performed by: INTERNAL MEDICINE

## 2024-08-01 PROCEDURE — 99213 OFFICE O/P EST LOW 20 MIN: CPT | Mod: 25 | Performed by: INTERNAL MEDICINE

## 2024-08-01 PROCEDURE — 94726 PLETHYSMOGRAPHY LUNG VOLUMES: CPT | Performed by: INTERNAL MEDICINE

## 2024-08-01 RX ORDER — PREDNISONE 20 MG/1
TABLET ORAL
Qty: 20 TABLET | Refills: 0 | Status: ON HOLD | OUTPATIENT
Start: 2024-08-01 | End: 2024-08-14

## 2024-08-01 RX ORDER — LEVOFLOXACIN 750 MG/1
750 TABLET, FILM COATED ORAL DAILY
Qty: 7 TABLET | Refills: 0 | Status: ON HOLD | OUTPATIENT
Start: 2024-08-01 | End: 2024-08-14

## 2024-08-01 NOTE — LETTER
"8/1/2024      Wyatt Gutierrez  [address] [facility] [address]      Dear Colleague,    Thank you for referring your patient, Wyatt Gutierrez, to the [facility] FOR LUNG SCIENCE AND [facility]. Please see a copy of my visit note below.    [facility]  Pulmonary Medicine  Visit Clinic Note  Dear patient. Thank you for visiting with me. I want you to feel respected, understood, and empowered. \"Respect\" is valuing you as much as I would a close family member. \"Empowerment\" happens when you are fully informed, and can make the best possible decision for you.  Please ask me questions!  Challenge anything that is not clear.       ASSESSMENT & PLAN     Patient is a 77 year old male who has presented for further work up of IPF.     #IPF:   -Diagnosed based on Ct chest in 2021. . Had been taking Ofev sinec May 2022.  Switched to Esbeiret on 2023 as unable to tolerate angelo dose of ofev and after lowering the dose his pfts worsneedd.   -  He has been doing pulmonary rehab.  He has severe lung disease which has worsened after possible exacerbation in January.  His age is a limitation for lung transplant.   - Discussed clinical trials but he is not interested in it for now.   -  On PPI.   -Prescribed BReo inhaler as there was some air reactivity noted.   He was not able to use it. Switched him to nebulized treatment.   -I am worried that he has rapid worsening of disease.    [facility].   Palliative care referral was placed.     Advance Care Planning Discussion 8/1/2024. IGilson MD met with Patient and their spouse today at the clinic to discuss Advance Care Planning. Wyatt Gutierrez does have decisional capacity and was present for this discussion.  Those present were informed of the voluntary nature of this discussion and wished to proceed.  The discussion included:  Severe ILD and progressive fibrosis and futility of intubation.  " . This discussion began at 11:55 and ended at 12:16 for a total of 16 minutes.    He has created living will. . Short term intubation for a clearly identifiable infectious pneumonia may be a possiblly reasonable.   -Face to face encounter for nebulizer is held.  -Duonebs is prescribed.     #Cardiac:  -Severe Aortic stenosis noted on echo Follows with cardiology.     #Chronic Cough  -Tessalon perles has not worked.  -Most lijkley due to his IPF.    -He has responded well to Azithromycin three times a week with significant improvement.  Switched I t to 250 mg daily.   -ENT referral is placed.     -EKg is ordered.     #Hypoxemic Respiratory failure  -? 10/15 lt with activity. Oximizer is prescribed      #vaccines:  -Uptodate       RTC in 3 months with 6mwt.     The longitudinal plan of care for the diagnosis(es)/condition(s) as documented were addressed during this visit. Due to the added complexity in care, I will continue to support Tai in the subsequent management and with ongoing continuity of care.      25  minutes excluding the time spent on cigarette cessation was  spent on the date of the encounter doing chart review, history and exam, documentation and further activities as noted above.    These conclusions are made at the best of one's knowledge and belief based on the provided evidence such as patient's history and allergy test results and they can change over time or can be incomplete because of missing information's.    I explained the lab values, imagings and findings to the patient.  Patient expressed understanding I did not recognize any barriers to the understanding of the patient.    The above note was dictated using voice recognition software and may include typographical errors. Please contact the author for any clarifications.    Gilson Estrada MD   RN Coordinators: Maureen/Meme/Radha: [phone]  ILD RN Coordinators: [phone]  Clinic Number: [phone]  Pager: [phone]       Today's  visit note:     Chief Complaint: Wyatt Gutierrez is a 77 year old year old male who is being seen for Interstitial Lung Disease (ILD) (F/u)      HPI:    Wyatt Gutierrez is a 75 y.o. male, previous smoker with PMH sig for KAITY, HTN, HPL who was referred to us back on 12/20/2019 for abnormal chest x-ray that was concerning for ILD.  Patient has had chronic shortness of breath since at least 2010.  He quit smoking tobacco in Jan 2022.     -He had his Ild diagnosed in June 2021. He was not on oxygen.  At that time he was followed eventually he was started on Ofev in May 2022.  His major decompensation happened in 2022 January when he was in [place] and admitted to hospital and had to be started on oxygen after that.     Interval History: 9/29/22:  -Since than he has had worsening oxygen requirement.  He continues to use treadmill with oxygen.  He will ntio be a transplant candidate.      -His mainly dry cough is bopthering him.     #Interval History: 10/4  -Discussed goals of care in detail.    #  Interval History: 4/13  -Patient is doing well.  His trip to [place] was well.  No significant IPF exacerbation was noted.  He was able to play golf.  - He is establish care with Dr. Maciel at [facility].  He just finished his Ofev 1 week ago.  He will be switched to Esbriet starting today.     #Interval History: 7/2023  -  Patient is doing well. No significant worsening is noted.   - I don't think he is using his Breo well.  He has increased sputum production.    #Interval History: 1/2024  -Increasing oxygen needs     #8/2024:  -Worsening pulmonary infiltrates.     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   Hot tub:  No   Portable humidifier:  Dehumidifier.   Brass or woodwind instruments:  Smoking tobacco or marijuana: Stopped smoking in Jan 2022. Smoked for almost 50 years.  Less than PPD.    Feather pillow/blanket:  Gardening:    Hobbies: suzieg .  Chemotherapy or radiation therapy:  Fam hx of ILD:           Medications:     Current Outpatient Medications   Medication Sig Dispense Refill     acyclovir (ZOVIRAX) 400 MG tablet [ACYCLOVIR (ZOVIRAX) 400 MG TABLET] Take 400 mg by mouth 3 (three) times a day as needed.              arformoterol (BROVANA) 15 MCG/2ML NEBU neb solution Take 2 mLs (15 mcg) by nebulization 2 times daily 360 mL 1     aspirin (ASA) 81 MG EC tablet Take by mouth daily       atorvastatin (LIPITOR) 80 MG tablet Take 1 tablet (80 mg) by mouth at bedtime 90 tablet 3     azelastine (ASTELIN) 0.1 % nasal spray Spray 2 sprays into both nostrils 2 times daily as needed for rhinitis       ipratropium - albuterol 0.5 mg/2.5 mg/3 mL (DUONEB) 0.5-2.5 (3) MG/3ML neb solution Take 1 vial (3 mLs) by nebulization every 6 hours as needed for shortness of breath, wheezing or cough 360 mL 3     loperamide (IMODIUM) 2 MG capsule Take 2 mg by mouth daily as needed       metoprolol succinate (TOPROL-XL) 50 MG 24 hr tablet [METOPROLOL SUCCINATE (TOPROL-XL) 50 MG 24 HR TABLET] Take 50 mg by mouth daily.       multivitamin therapeutic tablet Take 1 tablet by mouth daily       omeprazole (PRILOSEC) 40 MG DR capsule Take 40 mg by mouth 2 times daily       pirfenidone (ESBRIET) 267 MG capsule Take 1 capsule three times daily with food days 1-7, then 2 capsules three times daily with food days 8-14, then 3 capsules three times daily with food days 15 and onward. 270 capsule 11     albuterol (PROVENTIL) (2.5 MG/3ML) 0.083% neb solution Take 2.5 mg by nebulization 4 times daily as needed for shortness of breath, wheezing or cough (Patient not taking: Reported on 8/1/2024)       triamterene-HCTZ (DYAZIDE) 37.5-25 MG capsule Take 1 capsule by mouth every morning       No current facility-administered medications for this visit.            Review of Systems:       A complete 10 point review of systems was otherwise negative except as noted in the  HPI.        PHYSICAL EXAM:  /73   Pulse 79   SpO2 96%      General: Well developed, well nourished, No apparent distress  Eyes: Anicteric  Nose: Nasal mucosa with no edema or hyperemia.  No polyps  Ears: Hearing grossly normal  Mouth: Oral mucosa is moist, without any lesions. No oropharyngeal exudate.  Respiratory: Coarse breathing is noted.   Cardiac: RRR, normal S1, S2. No murmurs. No JVD  Abdomen: Soft, NT/ND  Musculoskeletal: Extremities normal. No clubbing. No cyanosis. No edema.  Skin: No rash on limited exam  Neuro: Normal mentation. Normal speech.  Psych:Normal affect           Data:   All laboratory and imaging data reviewed.      PFT:           9/2022 6.2021    PFT Interpretation:  I personally reviewed and interpreted the PFTs.    ECHO:  2022  Interpretation Summary     Left ventricular size, wall motion and function are normal. The ejection  fraction is > 65%.  There is mild concentric left ventricular hypertrophy.  Normal right ventricle size and systolic function.  Moderate valvular aortic stenosis.  The ascending aorta is Mildly dilated.      Chest CT: I personally reviewed and interpreted the CT scan.  4/2022    Recent Results (from the past 168 hour(s))   General PFT Lab (Please always keep checked)    Collection Time: 08/01/24  9:55 AM   Result Value Ref Range    FVC-Pred 3.67 L    FVC-Pre 1.93 L    FVC-%Pred-Pre 52 %    FEV1-Pre 1.63 L    FEV1-%Pred-Pre 59 %    FEV1FVC-Pred 76 %    FEV1FVC-Pre 84 %    FEFMax-Pred 7.57 L/sec    FEFMax-Pre 6.42 L/sec    FEFMax-%Pred-Pre 84 %    FEF2575-Pred 2.03 L/sec    FEF2575-Pre 2.66 L/sec    HMJ3663-%Pred-Pre 131 %    ExpTime-Pre 5.16 sec    FIFMax-Pre 4.39 L/sec    VC-Pred 4.00 L    VC-Pre 1.64 L    VC-%Pred-Pre 41 %    IC-Pred 2.74 L    IC-Pre 1.33 L    IC-%Pred-Pre 48 %    ERV-Pred 1.37 L    ERV-Pre 0.31 L    ERV-%Pred-Pre 22 %    FEV1FEV6-Pred 77 %    FEV1FEV6-Pre 84 %    FRCPleth-Pred 3.77 L    FRCPleth-Pre 1.87 L    FRCPleth-%Pred-Pre  49 %    RVPleth-Pred 2.80 L    RVPleth-Pre 1.56 L    RVPleth-%Pred-Pre 55 %    TLCPleth-Pred 7.13 L    TLCPleth-Pre 3.20 L    TLCPleth-%Pred-Pre 44 %    FEV1SVC-Pred 69 %    FEV1SVC-Pre 99 %                                       Again, thank you for allowing me to participate in the care of your patient.        Sincerely,        Gilson Estrada MD

## 2024-08-01 NOTE — NURSING NOTE
Chief Complaint   Patient presents with    Interstitial Lung Disease (ILD)     F/u     Vitals were taken and medications were reconciled.     DEMETRI Avendano

## 2024-08-01 NOTE — PROGRESS NOTES
"[facility]  Pulmonary Medicine  Visit Clinic Note  Dear patient. Thank you for visiting with me. I want you to feel respected, understood, and empowered. \"Respect\" is valuing you as much as I would a close family member. \"Empowerment\" happens when you are fully informed, and can make the best possible decision for you.  Please ask me questions!  Challenge anything that is not clear.       ASSESSMENT & PLAN     Patient is a 77 year old male who has presented for further work up of IPF.     #IPF:   -Diagnosed based on Ct chest in 2021. . Had been taking Ofev sinec May 2022.  Switched to Esbeiret on 2023 as unable to tolerate angelo dose of ofev and after lowering the dose his pfts worsneedd.   -  He has been doing pulmonary rehab.  He has severe lung disease which has worsened after possible exacerbation in January.  His age is a limitation for lung transplant.   - Discussed clinical trials but he is not interested in it for now.   -  On PPI.   -Prescribed BReo inhaler as there was some air reactivity noted.   He was not able to use it. Switched him to nebulized treatment.   -I am worried that he has rapid worsening of disease.    -[facility] goals of care was held with this patient.   Palliative care referral was placed.     Advance Care Planning Discussion 8/1/2024. IGilson MD met with Patient and their spouse today at the clinic to discuss Advance Care Planning. Wyatt Gtuierrez does have decisional capacity and was present for this discussion.  Those present were informed of the voluntary nature of this discussion and wished to proceed.  The discussion included:  Severe ILD and progressive fibrosis and futility of intubation.  . This discussion began at 11:55 and ended at 12:16 for a total of 16 minutes.    He has created living will. . Short term intubation for a clearly identifiable infectious pneumonia may be a possiblly reasonable.   -Face to face encounter for nebulizer is held.  -Jose Luis " is prescribed.     #Cardiac:  -Severe Aortic stenosis noted on echo Follows with cardiology.     #Chronic Cough  -Tessalon perles has not worked.  -Most lijkley due to his IPF.    -He has responded well to Azithromycin three times a week with significant improvement.  Switched I t to 250 mg daily.   -ENT referral is placed.     -EKg is ordered.     #Hypoxemic Respiratory failure  -? 10/15 lt with activity. Oximizer is prescribed      #vaccines:  -Uptodate       RTC in 3 months with 6mwt.     The longitudinal plan of care for the diagnosis(es)/condition(s) as documented were addressed during this visit. Due to the added complexity in care, I will continue to support Tai in the subsequent management and with ongoing continuity of care.      25  minutes excluding the time spent on cigarette cessation was  spent on the date of the encounter doing chart review, history and exam, documentation and further activities as noted above.    These conclusions are made at the best of one's knowledge and belief based on the provided evidence such as patient's history and allergy test results and they can change over time or can be incomplete because of missing information's.    I explained the lab values, imagings and findings to the patient.  Patient expressed understanding I did not recognize any barriers to the understanding of the patient.    The above note was dictated using voice recognition software and may include typographical errors. Please contact the author for any clarifications.    Gilson Estrada MD   RN Coordinators: Leticia/Radha: [phone]  ILD RN Coordinators: [phone]  Clinic Number: [phone]  Pager: [phone]       Today's visit note:     Chief Complaint: Wyatt Gutierrez is a 77 year old year old male who is being seen for Interstitial Lung Disease (ILD) (F/u)      HPI:    Wyatt Gutierrez is a 75 y.o. male, previous smoker with PMH sig for KAITY, HTN, HPL who was referred to us back on 12/20/2019 for  abnormal chest x-ray that was concerning for ILD.  Patient has had chronic shortness of breath since at least 2010.  He quit smoking tobacco in Jan 2022.     -He had his Ild diagnosed in June 2021. He was not on oxygen.  At that time he was followed eventually he was started on Ofev in May 2022.  His major decompensation happened in 2022 January when he was in [place] and admitted to hospital and had to be started on oxygen after that.     Interval History: 9/29/22:  -Since than he has had worsening oxygen requirement.  He continues to use treadmill with oxygen.  He will ntio be a transplant candidate.      -His mainly dry cough is bopthering him.     #Interval History: 10/4  -Discussed goals of care in detail.    #  Interval History: 4/13  -Patient is doing well.  His trip to [place] was well.  No significant IPF exacerbation was noted.  He was able to play golf.  - He is establish care with Dr. Maciel at [facility].  He just finished his Ofev 1 week ago.  He will be switched to Esbriet starting today.     #Interval History: 7/2023  -  Patient is doing well. No significant worsening is noted.   - I don't think he is using his Breo well.  He has increased sputum production.    #Interval History: 1/2024  -Increasing oxygen needs     #8/2024:  -Worsening pulmonary infiltrates.     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   Hot tub:  No   Portable humidifier:  Dehumidifier.   Brass or woodwind instruments:  Smoking tobacco or marijuana: Stopped smoking in Jan 2022. Smoked for almost 50 years.  Less than PPD.    Feather pillow/blanket:  Gardening:   Hobbies: golfing .  Chemotherapy or radiation therapy:  Fam hx of ILD:           Medications:     Current Outpatient Medications   Medication Sig Dispense Refill    acyclovir (ZOVIRAX) 400 MG tablet [ACYCLOVIR (ZOVIRAX) 400 MG TABLET] Take 400 mg by mouth 3 (three) times a day  as needed.             arformoterol (BROVANA) 15 MCG/2ML NEBU neb solution Take 2 mLs (15 mcg) by nebulization 2 times daily 360 mL 1    aspirin (ASA) 81 MG EC tablet Take by mouth daily      atorvastatin (LIPITOR) 80 MG tablet Take 1 tablet (80 mg) by mouth at bedtime 90 tablet 3    azelastine (ASTELIN) 0.1 % nasal spray Spray 2 sprays into both nostrils 2 times daily as needed for rhinitis      ipratropium - albuterol 0.5 mg/2.5 mg/3 mL (DUONEB) 0.5-2.5 (3) MG/3ML neb solution Take 1 vial (3 mLs) by nebulization every 6 hours as needed for shortness of breath, wheezing or cough 360 mL 3    loperamide (IMODIUM) 2 MG capsule Take 2 mg by mouth daily as needed      metoprolol succinate (TOPROL-XL) 50 MG 24 hr tablet [METOPROLOL SUCCINATE (TOPROL-XL) 50 MG 24 HR TABLET] Take 50 mg by mouth daily.      multivitamin therapeutic tablet Take 1 tablet by mouth daily      omeprazole (PRILOSEC) 40 MG DR capsule Take 40 mg by mouth 2 times daily      pirfenidone (ESBRIET) 267 MG capsule Take 1 capsule three times daily with food days 1-7, then 2 capsules three times daily with food days 8-14, then 3 capsules three times daily with food days 15 and onward. 270 capsule 11    albuterol (PROVENTIL) (2.5 MG/3ML) 0.083% neb solution Take 2.5 mg by nebulization 4 times daily as needed for shortness of breath, wheezing or cough (Patient not taking: Reported on 8/1/2024)      triamterene-HCTZ (DYAZIDE) 37.5-25 MG capsule Take 1 capsule by mouth every morning       No current facility-administered medications for this visit.            Review of Systems:       A complete 10 point review of systems was otherwise negative except as noted in the HPI.        PHYSICAL EXAM:  /73   Pulse 79   SpO2 96%      General: Well developed, well nourished, No apparent distress  Eyes: Anicteric  Nose: Nasal mucosa with no edema or hyperemia.  No polyps  Ears: Hearing grossly normal  Mouth: Oral mucosa is moist, without any lesions. No  oropharyngeal exudate.  Respiratory: Coarse breathing is noted.   Cardiac: RRR, normal S1, S2. No murmurs. No JVD  Abdomen: Soft, NT/ND  Musculoskeletal: Extremities normal. No clubbing. No cyanosis. No edema.  Skin: No rash on limited exam  Neuro: Normal mentation. Normal speech.  Psych:Normal affect           Data:   All laboratory and imaging data reviewed.      PFT:           9/2022 6.2021    PFT Interpretation:  I personally reviewed and interpreted the PFTs.    ECHO:  2022  Interpretation Summary     Left ventricular size, wall motion and function are normal. The ejection  fraction is > 65%.  There is mild concentric left ventricular hypertrophy.  Normal right ventricle size and systolic function.  Moderate valvular aortic stenosis.  The ascending aorta is Mildly dilated.      Chest CT: I personally reviewed and interpreted the CT scan.  4/2022    Recent Results (from the past 168 hour(s))   General PFT Lab (Please always keep checked)    Collection Time: 08/01/24  9:55 AM   Result Value Ref Range    FVC-Pred 3.67 L    FVC-Pre 1.93 L    FVC-%Pred-Pre 52 %    FEV1-Pre 1.63 L    FEV1-%Pred-Pre 59 %    FEV1FVC-Pred 76 %    FEV1FVC-Pre 84 %    FEFMax-Pred 7.57 L/sec    FEFMax-Pre 6.42 L/sec    FEFMax-%Pred-Pre 84 %    FEF2575-Pred 2.03 L/sec    FEF2575-Pre 2.66 L/sec    MFI7925-%Pred-Pre 131 %    ExpTime-Pre 5.16 sec    FIFMax-Pre 4.39 L/sec    VC-Pred 4.00 L    VC-Pre 1.64 L    VC-%Pred-Pre 41 %    IC-Pred 2.74 L    IC-Pre 1.33 L    IC-%Pred-Pre 48 %    ERV-Pred 1.37 L    ERV-Pre 0.31 L    ERV-%Pred-Pre 22 %    FEV1FEV6-Pred 77 %    FEV1FEV6-Pre 84 %    FRCPleth-Pred 3.77 L    FRCPleth-Pre 1.87 L    FRCPleth-%Pred-Pre 49 %    RVPleth-Pred 2.80 L    RVPleth-Pre 1.56 L    RVPleth-%Pred-Pre 55 %    TLCPleth-Pred 7.13 L    TLCPleth-Pre 3.20 L    TLCPleth-%Pred-Pre 44 %    FEV1SVC-Pred 69 %    FEV1SVC-Pre 99 %

## 2024-08-01 NOTE — PROGRESS NOTES
Wyatt Gutierrez comes into clinic today at the request of Dr Estrada , for PFT    Tolerated testing well. No adverse reactions. Left lab in no distress.        SMITH ECKERT

## 2024-08-02 ENCOUNTER — TELEPHONE (OUTPATIENT)
Dept: PULMONOLOGY | Facility: CLINIC | Age: 79
End: 2024-08-02
Payer: MEDICARE

## 2024-08-02 DIAGNOSIS — J84.112 IPF (IDIOPATHIC PULMONARY FIBROSIS) (H): Primary | ICD-10-CM

## 2024-08-02 RX ORDER — AZITHROMYCIN 250 MG/1
TABLET, FILM COATED ORAL
Qty: 90 TABLET | Refills: 3 | Status: SHIPPED | OUTPATIENT
Start: 2024-08-02

## 2024-08-02 NOTE — TELEPHONE ENCOUNTER
Patient Contacted for the patient to call back and schedule the following:    Appointment type: Return ILD  Provider: Natalie  Return date: 11/1/2024  Specialty phone number: [phone]  Additional appointment(s) needed: na  Additonal Notes: video

## 2024-08-02 NOTE — TELEPHONE ENCOUNTER
Pharmacy requesting to refill Azithromycin 250mg tabs.  This medication is not on the patient's list.

## 2024-08-05 ENCOUNTER — MYC MEDICAL ADVICE (OUTPATIENT)
Dept: PULMONOLOGY | Facility: CLINIC | Age: 79
End: 2024-08-05
Payer: MEDICARE

## 2024-08-06 LAB
DLCOUNC-%PRED-PRE: 25 %
DLCOUNC-PRE: 6.43 ML/MIN/MMHG
DLCOUNC-PRED: 24.77 ML/MIN/MMHG
ERV-%PRED-PRE: 22 %
ERV-PRE: 0.31 L
ERV-PRED: 1.37 L
EXPTIME-PRE: 5.16 SEC
FEF2575-%PRED-PRE: 131 %
FEF2575-PRE: 2.66 L/SEC
FEF2575-PRED: 2.03 L/SEC
FEFMAX-%PRED-PRE: 84 %
FEFMAX-PRE: 6.42 L/SEC
FEFMAX-PRED: 7.57 L/SEC
FEV1-%PRED-PRE: 59 %
FEV1-PRE: 1.63 L
FEV1FEV6-PRE: 84 %
FEV1FEV6-PRED: 77 %
FEV1FVC-PRE: 84 %
FEV1FVC-PRED: 76 %
FEV1SVC-PRE: 99 %
FEV1SVC-PRED: 69 %
FIFMAX-PRE: 4.39 L/SEC
FRCPLETH-%PRED-PRE: 49 %
FRCPLETH-PRE: 1.87 L
FRCPLETH-PRED: 3.77 L
FVC-%PRED-PRE: 52 %
FVC-PRE: 1.93 L
FVC-PRED: 3.67 L
IC-%PRED-PRE: 48 %
IC-PRE: 1.33 L
IC-PRED: 2.74 L
RVPLETH-%PRED-PRE: 55 %
RVPLETH-PRE: 1.56 L
RVPLETH-PRED: 2.8 L
TLCPLETH-%PRED-PRE: 44 %
TLCPLETH-PRE: 3.2 L
TLCPLETH-PRED: 7.13 L
VA-%PRED-PRE: 49 %
VA-PRE: 3.17 L
VC-%PRED-PRE: 41 %
VC-PRE: 1.64 L
VC-PRED: 4 L

## 2024-08-08 ENCOUNTER — APPOINTMENT (OUTPATIENT)
Dept: RADIOLOGY | Facility: HOSPITAL | Age: 79
DRG: 196 | End: 2024-08-08
Attending: EMERGENCY MEDICINE
Payer: MEDICARE

## 2024-08-08 ENCOUNTER — HOSPITAL ENCOUNTER (INPATIENT)
Facility: HOSPITAL | Age: 79
LOS: 6 days | Discharge: HOME | DRG: 196 | End: 2024-08-14
Attending: EMERGENCY MEDICINE | Admitting: FAMILY MEDICINE
Payer: MEDICARE

## 2024-08-08 ENCOUNTER — APPOINTMENT (OUTPATIENT)
Dept: CT IMAGING | Facility: HOSPITAL | Age: 79
DRG: 196 | End: 2024-08-08
Attending: FAMILY MEDICINE
Payer: MEDICARE

## 2024-08-08 DIAGNOSIS — J96.21 ACUTE AND CHRONIC RESPIRATORY FAILURE WITH HYPOXIA (H): ICD-10-CM

## 2024-08-08 DIAGNOSIS — Z29.89 NEED FOR PNEUMOCYSTIS PROPHYLAXIS: Primary | ICD-10-CM

## 2024-08-08 DIAGNOSIS — J84.112 IPF (IDIOPATHIC PULMONARY FIBROSIS) (H): ICD-10-CM

## 2024-08-08 LAB
ANION GAP SERPL CALCULATED.3IONS-SCNC: 11 MMOL/L (ref 7–15)
ATRIAL RATE - MUSE: 117 BPM
BASOPHILS # BLD MANUAL: 0 10E3/UL (ref 0–0.2)
BASOPHILS NFR BLD MANUAL: 0 %
BUN SERPL-MCNC: 13.1 MG/DL (ref 8–23)
C PNEUM DNA SPEC QL NAA+PROBE: NOT DETECTED
CALCIUM SERPL-MCNC: 9.3 MG/DL (ref 8.8–10.4)
CHLORIDE SERPL-SCNC: 95 MMOL/L (ref 98–107)
CREAT SERPL-MCNC: 0.86 MG/DL (ref 0.67–1.17)
CREAT SERPL-MCNC: 0.88 MG/DL (ref 0.67–1.17)
DIASTOLIC BLOOD PRESSURE - MUSE: 60 MMHG
EGFRCR SERPLBLD CKD-EPI 2021: 89 ML/MIN/1.73M2
EGFRCR SERPLBLD CKD-EPI 2021: 89 ML/MIN/1.73M2
EOSINOPHIL # BLD MANUAL: 0 10E3/UL (ref 0–0.7)
EOSINOPHIL NFR BLD MANUAL: 0 %
ERYTHROCYTE [DISTWIDTH] IN BLOOD BY AUTOMATED COUNT: 17.2 % (ref 10–15)
FLUAV H1 2009 PAND RNA SPEC QL NAA+PROBE: NOT DETECTED
FLUAV H1 RNA SPEC QL NAA+PROBE: NOT DETECTED
FLUAV H3 RNA SPEC QL NAA+PROBE: NOT DETECTED
FLUAV RNA SPEC QL NAA+PROBE: NEGATIVE
FLUAV RNA SPEC QL NAA+PROBE: NOT DETECTED
FLUBV RNA RESP QL NAA+PROBE: NEGATIVE
FLUBV RNA SPEC QL NAA+PROBE: NOT DETECTED
GLUCOSE SERPL-MCNC: 119 MG/DL (ref 70–99)
HADV DNA SPEC QL NAA+PROBE: NOT DETECTED
HCO3 SERPL-SCNC: 28 MMOL/L (ref 22–29)
HCOV PNL SPEC NAA+PROBE: NOT DETECTED
HCT VFR BLD AUTO: 32.4 % (ref 40–53)
HGB BLD-MCNC: 10.2 G/DL (ref 13.3–17.7)
HMPV RNA SPEC QL NAA+PROBE: NOT DETECTED
HOLD SPECIMEN: NORMAL
HOLD SPECIMEN: NORMAL
HPIV1 RNA SPEC QL NAA+PROBE: NOT DETECTED
HPIV2 RNA SPEC QL NAA+PROBE: NOT DETECTED
HPIV3 RNA SPEC QL NAA+PROBE: NOT DETECTED
HPIV4 RNA SPEC QL NAA+PROBE: NOT DETECTED
INR PPP: 1.1 (ref 0.85–1.15)
INTERPRETATION ECG - MUSE: NORMAL
LACTATE SERPL-SCNC: 1.8 MMOL/L (ref 0.7–2)
LYMPHOCYTES # BLD MANUAL: 0.2 10E3/UL (ref 0.8–5.3)
LYMPHOCYTES NFR BLD MANUAL: 1 %
M PNEUMO DNA SPEC QL NAA+PROBE: NOT DETECTED
MAGNESIUM SERPL-MCNC: 1.8 MG/DL (ref 1.7–2.3)
MCH RBC QN AUTO: 23.8 PG (ref 26.5–33)
MCHC RBC AUTO-ENTMCNC: 31.5 G/DL (ref 31.5–36.5)
MCV RBC AUTO: 76 FL (ref 78–100)
MONOCYTES # BLD MANUAL: 1.1 10E3/UL (ref 0–1.3)
MONOCYTES NFR BLD MANUAL: 6 %
NEUTROPHILS # BLD MANUAL: 16.5 10E3/UL (ref 1.6–8.3)
NEUTROPHILS NFR BLD MANUAL: 93 %
NRBC # BLD AUTO: 0 10E3/UL
NRBC BLD AUTO-RTO: 0 /100
NT-PROBNP SERPL-MCNC: 6051 PG/ML (ref 0–1800)
P AXIS - MUSE: 35 DEGREES
PLAT MORPH BLD: ABNORMAL
PLATELET # BLD AUTO: 305 10E3/UL (ref 150–450)
POTASSIUM SERPL-SCNC: 4.3 MMOL/L (ref 3.4–5.3)
PR INTERVAL - MUSE: 202 MS
PROCALCITONIN SERPL IA-MCNC: 0.06 NG/ML
QRS DURATION - MUSE: 88 MS
QT - MUSE: 288 MS
QTC - MUSE: 401 MS
R AXIS - MUSE: 184 DEGREES
RBC # BLD AUTO: 4.28 10E6/UL (ref 4.4–5.9)
RBC MORPH BLD: ABNORMAL
RSV RNA SPEC NAA+PROBE: NEGATIVE
RSV RNA SPEC QL NAA+PROBE: NOT DETECTED
RSV RNA SPEC QL NAA+PROBE: NOT DETECTED
RV+EV RNA SPEC QL NAA+PROBE: NOT DETECTED
SARS-COV-2 RNA RESP QL NAA+PROBE: NEGATIVE
SODIUM SERPL-SCNC: 134 MMOL/L (ref 135–145)
SYSTOLIC BLOOD PRESSURE - MUSE: 136 MMHG
T AXIS - MUSE: -3 DEGREES
TROPONIN T SERPL HS-MCNC: 26 NG/L
TSH SERPL DL<=0.005 MIU/L-ACNC: 2.56 UIU/ML (ref 0.3–4.2)
VENTRICULAR RATE- MUSE: 117 BPM
WBC # BLD AUTO: 17.7 10E3/UL (ref 4–11)

## 2024-08-08 PROCEDURE — 85041 AUTOMATED RBC COUNT: CPT | Performed by: EMERGENCY MEDICINE

## 2024-08-08 PROCEDURE — 80048 BASIC METABOLIC PNL TOTAL CA: CPT | Performed by: EMERGENCY MEDICINE

## 2024-08-08 PROCEDURE — 250N000009 HC RX 250: Performed by: FAMILY MEDICINE

## 2024-08-08 PROCEDURE — 87637 SARSCOV2&INF A&B&RSV AMP PRB: CPT | Performed by: EMERGENCY MEDICINE

## 2024-08-08 PROCEDURE — 83880 ASSAY OF NATRIURETIC PEPTIDE: CPT | Performed by: EMERGENCY MEDICINE

## 2024-08-08 PROCEDURE — 999N000185 HC STATISTIC TRANSPORT TIME EA 15 MIN

## 2024-08-08 PROCEDURE — 87633 RESP VIRUS 12-25 TARGETS: CPT | Performed by: FAMILY MEDICINE

## 2024-08-08 PROCEDURE — 82565 ASSAY OF CREATININE: CPT | Performed by: FAMILY MEDICINE

## 2024-08-08 PROCEDURE — 87040 BLOOD CULTURE FOR BACTERIA: CPT | Performed by: EMERGENCY MEDICINE

## 2024-08-08 PROCEDURE — 272N000054 HC CANNULA HIGH FLOW, ADULT

## 2024-08-08 PROCEDURE — 36415 COLL VENOUS BLD VENIPUNCTURE: CPT | Performed by: EMERGENCY MEDICINE

## 2024-08-08 PROCEDURE — 84443 ASSAY THYROID STIM HORMONE: CPT | Performed by: EMERGENCY MEDICINE

## 2024-08-08 PROCEDURE — 99223 1ST HOSP IP/OBS HIGH 75: CPT | Mod: AI | Performed by: FAMILY MEDICINE

## 2024-08-08 PROCEDURE — 250N000009 HC RX 250: Performed by: EMERGENCY MEDICINE

## 2024-08-08 PROCEDURE — 99291 CRITICAL CARE FIRST HOUR: CPT | Mod: 25

## 2024-08-08 PROCEDURE — 85610 PROTHROMBIN TIME: CPT | Performed by: EMERGENCY MEDICINE

## 2024-08-08 PROCEDURE — 84484 ASSAY OF TROPONIN QUANT: CPT | Performed by: FAMILY MEDICINE

## 2024-08-08 PROCEDURE — 83605 ASSAY OF LACTIC ACID: CPT | Performed by: EMERGENCY MEDICINE

## 2024-08-08 PROCEDURE — 71045 X-RAY EXAM CHEST 1 VIEW: CPT

## 2024-08-08 PROCEDURE — 250N000011 HC RX IP 250 OP 636: Performed by: INTERNAL MEDICINE

## 2024-08-08 PROCEDURE — 250N000013 HC RX MED GY IP 250 OP 250 PS 637: Performed by: FAMILY MEDICINE

## 2024-08-08 PROCEDURE — 250N000011 HC RX IP 250 OP 636: Performed by: FAMILY MEDICINE

## 2024-08-08 PROCEDURE — 83735 ASSAY OF MAGNESIUM: CPT | Performed by: EMERGENCY MEDICINE

## 2024-08-08 PROCEDURE — 96365 THER/PROPH/DIAG IV INF INIT: CPT

## 2024-08-08 PROCEDURE — 36415 COLL VENOUS BLD VENIPUNCTURE: CPT | Performed by: FAMILY MEDICINE

## 2024-08-08 PROCEDURE — 5A09457 ASSISTANCE WITH RESPIRATORY VENTILATION, 24-96 CONSECUTIVE HOURS, CONTINUOUS POSITIVE AIRWAY PRESSURE: ICD-10-PCS | Performed by: EMERGENCY MEDICINE

## 2024-08-08 PROCEDURE — 94640 AIRWAY INHALATION TREATMENT: CPT | Mod: 76

## 2024-08-08 PROCEDURE — 94640 AIRWAY INHALATION TREATMENT: CPT

## 2024-08-08 PROCEDURE — 96375 TX/PRO/DX INJ NEW DRUG ADDON: CPT

## 2024-08-08 PROCEDURE — 999N000157 HC STATISTIC RCP TIME EA 10 MIN

## 2024-08-08 PROCEDURE — 250N000011 HC RX IP 250 OP 636: Performed by: EMERGENCY MEDICINE

## 2024-08-08 PROCEDURE — 99223 1ST HOSP IP/OBS HIGH 75: CPT | Performed by: INTERNAL MEDICINE

## 2024-08-08 PROCEDURE — 210N000001 HC R&B IMCU HEART CARE

## 2024-08-08 PROCEDURE — 94660 CPAP INITIATION&MGMT: CPT

## 2024-08-08 PROCEDURE — G1010 CDSM STANSON: HCPCS

## 2024-08-08 PROCEDURE — 85007 BL SMEAR W/DIFF WBC COUNT: CPT | Performed by: EMERGENCY MEDICINE

## 2024-08-08 PROCEDURE — 84145 PROCALCITONIN (PCT): CPT | Performed by: EMERGENCY MEDICINE

## 2024-08-08 PROCEDURE — 93005 ELECTROCARDIOGRAM TRACING: CPT | Performed by: EMERGENCY MEDICINE

## 2024-08-08 RX ORDER — IOPAMIDOL 755 MG/ML
90 INJECTION, SOLUTION INTRAVASCULAR ONCE
Status: COMPLETED | OUTPATIENT
Start: 2024-08-08 | End: 2024-08-08

## 2024-08-08 RX ORDER — AZELASTINE 1 MG/ML
2 SPRAY, METERED NASAL 2 TIMES DAILY PRN
Status: DISCONTINUED | OUTPATIENT
Start: 2024-08-08 | End: 2024-08-14 | Stop reason: HOSPADM

## 2024-08-08 RX ORDER — AMOXICILLIN 250 MG
2 CAPSULE ORAL 2 TIMES DAILY
Status: DISCONTINUED | OUTPATIENT
Start: 2024-08-08 | End: 2024-08-14 | Stop reason: HOSPADM

## 2024-08-08 RX ORDER — ATORVASTATIN CALCIUM 40 MG/1
80 TABLET, FILM COATED ORAL AT BEDTIME
Status: DISCONTINUED | OUTPATIENT
Start: 2024-08-08 | End: 2024-08-14 | Stop reason: HOSPADM

## 2024-08-08 RX ORDER — ONDANSETRON 2 MG/ML
4 INJECTION INTRAMUSCULAR; INTRAVENOUS EVERY 6 HOURS PRN
Status: DISCONTINUED | OUTPATIENT
Start: 2024-08-08 | End: 2024-08-14 | Stop reason: HOSPADM

## 2024-08-08 RX ORDER — ACYCLOVIR 400 MG/1
400 TABLET ORAL 3 TIMES DAILY PRN
Status: DISCONTINUED | OUTPATIENT
Start: 2024-08-08 | End: 2024-08-14 | Stop reason: HOSPADM

## 2024-08-08 RX ORDER — LIDOCAINE 40 MG/G
CREAM TOPICAL
Status: DISCONTINUED | OUTPATIENT
Start: 2024-08-08 | End: 2024-08-14 | Stop reason: HOSPADM

## 2024-08-08 RX ORDER — AMOXICILLIN 250 MG
1 CAPSULE ORAL 2 TIMES DAILY
Status: DISCONTINUED | OUTPATIENT
Start: 2024-08-08 | End: 2024-08-14 | Stop reason: HOSPADM

## 2024-08-08 RX ORDER — GUAIFENESIN/DEXTROMETHORPHAN 100-10MG/5
10 SYRUP ORAL EVERY 4 HOURS PRN
Status: DISCONTINUED | OUTPATIENT
Start: 2024-08-08 | End: 2024-08-14 | Stop reason: HOSPADM

## 2024-08-08 RX ORDER — IPRATROPIUM BROMIDE AND ALBUTEROL SULFATE 2.5; .5 MG/3ML; MG/3ML
3 SOLUTION RESPIRATORY (INHALATION) ONCE
Status: COMPLETED | OUTPATIENT
Start: 2024-08-08 | End: 2024-08-08

## 2024-08-08 RX ORDER — PIPERACILLIN SODIUM, TAZOBACTAM SODIUM 3; .375 G/15ML; G/15ML
3.38 INJECTION, POWDER, LYOPHILIZED, FOR SOLUTION INTRAVENOUS ONCE
Status: COMPLETED | OUTPATIENT
Start: 2024-08-08 | End: 2024-08-08

## 2024-08-08 RX ORDER — PROCHLORPERAZINE 25 MG
12.5 SUPPOSITORY, RECTAL RECTAL EVERY 12 HOURS PRN
Status: DISCONTINUED | OUTPATIENT
Start: 2024-08-08 | End: 2024-08-14 | Stop reason: HOSPADM

## 2024-08-08 RX ORDER — HYDRALAZINE HYDROCHLORIDE 20 MG/ML
10 INJECTION INTRAMUSCULAR; INTRAVENOUS EVERY 4 HOURS PRN
Status: DISCONTINUED | OUTPATIENT
Start: 2024-08-08 | End: 2024-08-14 | Stop reason: HOSPADM

## 2024-08-08 RX ORDER — CALCIUM CARBONATE 500 MG/1
1000 TABLET, CHEWABLE ORAL 4 TIMES DAILY PRN
Status: DISCONTINUED | OUTPATIENT
Start: 2024-08-08 | End: 2024-08-14 | Stop reason: HOSPADM

## 2024-08-08 RX ORDER — PIRFENIDONE 267 MG/1
534 CAPSULE ORAL 3 TIMES DAILY
Status: DISCONTINUED | OUTPATIENT
Start: 2024-08-08 | End: 2024-08-10

## 2024-08-08 RX ORDER — LIDOCAINE 4 G/G
1 PATCH TOPICAL
Status: DISCONTINUED | OUTPATIENT
Start: 2024-08-08 | End: 2024-08-14 | Stop reason: HOSPADM

## 2024-08-08 RX ORDER — AZITHROMYCIN 250 MG/1
250 TABLET, FILM COATED ORAL DAILY
Status: DISCONTINUED | OUTPATIENT
Start: 2024-08-08 | End: 2024-08-14 | Stop reason: HOSPADM

## 2024-08-08 RX ORDER — LOPERAMIDE HCL 2 MG
2 CAPSULE ORAL DAILY PRN
Status: DISCONTINUED | OUTPATIENT
Start: 2024-08-08 | End: 2024-08-14 | Stop reason: HOSPADM

## 2024-08-08 RX ORDER — PANTOPRAZOLE SODIUM 40 MG/1
40 TABLET, DELAYED RELEASE ORAL
Status: DISCONTINUED | OUTPATIENT
Start: 2024-08-08 | End: 2024-08-14 | Stop reason: HOSPADM

## 2024-08-08 RX ORDER — METHYLPREDNISOLONE SODIUM SUCCINATE 125 MG/2ML
60 INJECTION, POWDER, LYOPHILIZED, FOR SOLUTION INTRAMUSCULAR; INTRAVENOUS EVERY 12 HOURS
Status: DISCONTINUED | OUTPATIENT
Start: 2024-08-09 | End: 2024-08-08

## 2024-08-08 RX ORDER — LEVOFLOXACIN 750 MG/1
750 TABLET, FILM COATED ORAL DAILY
Status: COMPLETED | OUTPATIENT
Start: 2024-08-08 | End: 2024-08-09

## 2024-08-08 RX ORDER — AMOXICILLIN 250 MG
2 CAPSULE ORAL 2 TIMES DAILY PRN
Status: DISCONTINUED | OUTPATIENT
Start: 2024-08-08 | End: 2024-08-14 | Stop reason: HOSPADM

## 2024-08-08 RX ORDER — FORMOTEROL FUMARATE DIHYDRATE 20 UG/2ML
20 SOLUTION RESPIRATORY (INHALATION)
Status: DISCONTINUED | OUTPATIENT
Start: 2024-08-08 | End: 2024-08-14 | Stop reason: HOSPADM

## 2024-08-08 RX ORDER — AMOXICILLIN 250 MG
1 CAPSULE ORAL 2 TIMES DAILY PRN
Status: DISCONTINUED | OUTPATIENT
Start: 2024-08-08 | End: 2024-08-14 | Stop reason: HOSPADM

## 2024-08-08 RX ORDER — ONDANSETRON 4 MG/1
4 TABLET, ORALLY DISINTEGRATING ORAL EVERY 6 HOURS PRN
Status: DISCONTINUED | OUTPATIENT
Start: 2024-08-08 | End: 2024-08-14 | Stop reason: HOSPADM

## 2024-08-08 RX ORDER — ACETAMINOPHEN 325 MG/1
650 TABLET ORAL EVERY 4 HOURS PRN
Status: DISCONTINUED | OUTPATIENT
Start: 2024-08-08 | End: 2024-08-14 | Stop reason: HOSPADM

## 2024-08-08 RX ORDER — PROCHLORPERAZINE MALEATE 5 MG
5 TABLET ORAL EVERY 6 HOURS PRN
Status: DISCONTINUED | OUTPATIENT
Start: 2024-08-08 | End: 2024-08-14 | Stop reason: HOSPADM

## 2024-08-08 RX ORDER — ACETAMINOPHEN 650 MG/1
650 SUPPOSITORY RECTAL EVERY 4 HOURS PRN
Status: DISCONTINUED | OUTPATIENT
Start: 2024-08-08 | End: 2024-08-14 | Stop reason: HOSPADM

## 2024-08-08 RX ORDER — IPRATROPIUM BROMIDE AND ALBUTEROL SULFATE 2.5; .5 MG/3ML; MG/3ML
1 SOLUTION RESPIRATORY (INHALATION)
Status: DISCONTINUED | OUTPATIENT
Start: 2024-08-08 | End: 2024-08-12

## 2024-08-08 RX ORDER — FUROSEMIDE 10 MG/ML
40 INJECTION INTRAMUSCULAR; INTRAVENOUS EVERY 12 HOURS
Status: DISCONTINUED | OUTPATIENT
Start: 2024-08-08 | End: 2024-08-09

## 2024-08-08 RX ORDER — ENOXAPARIN SODIUM 100 MG/ML
40 INJECTION SUBCUTANEOUS EVERY 24 HOURS
Status: DISCONTINUED | OUTPATIENT
Start: 2024-08-08 | End: 2024-08-14 | Stop reason: HOSPADM

## 2024-08-08 RX ORDER — METOPROLOL SUCCINATE 50 MG/1
50 TABLET, EXTENDED RELEASE ORAL DAILY
Status: DISCONTINUED | OUTPATIENT
Start: 2024-08-08 | End: 2024-08-14 | Stop reason: HOSPADM

## 2024-08-08 RX ORDER — HYDRALAZINE HYDROCHLORIDE 10 MG/1
10 TABLET, FILM COATED ORAL EVERY 4 HOURS PRN
Status: DISCONTINUED | OUTPATIENT
Start: 2024-08-08 | End: 2024-08-14 | Stop reason: HOSPADM

## 2024-08-08 RX ORDER — METHYLPREDNISOLONE SODIUM SUCCINATE 125 MG/2ML
60 INJECTION, POWDER, LYOPHILIZED, FOR SOLUTION INTRAMUSCULAR; INTRAVENOUS EVERY 8 HOURS
Status: DISCONTINUED | OUTPATIENT
Start: 2024-08-09 | End: 2024-08-09

## 2024-08-08 RX ORDER — METHYLPREDNISOLONE SODIUM SUCCINATE 125 MG/2ML
125 INJECTION, POWDER, LYOPHILIZED, FOR SOLUTION INTRAMUSCULAR; INTRAVENOUS ONCE
Status: COMPLETED | OUTPATIENT
Start: 2024-08-08 | End: 2024-08-08

## 2024-08-08 RX ORDER — ASPIRIN 81 MG/1
81 TABLET ORAL DAILY
Status: DISCONTINUED | OUTPATIENT
Start: 2024-08-08 | End: 2024-08-14 | Stop reason: HOSPADM

## 2024-08-08 RX ADMIN — PANTOPRAZOLE SODIUM 40 MG: 40 TABLET, DELAYED RELEASE ORAL at 17:41

## 2024-08-08 RX ADMIN — LIDOCAINE 1 PATCH: 246 PATCH TOPICAL at 20:14

## 2024-08-08 RX ADMIN — FUROSEMIDE 40 MG: 10 INJECTION, SOLUTION INTRAMUSCULAR; INTRAVENOUS at 17:41

## 2024-08-08 RX ADMIN — IOPAMIDOL 90 ML: 755 INJECTION, SOLUTION INTRAVENOUS at 19:43

## 2024-08-08 RX ADMIN — SENNOSIDES AND DOCUSATE SODIUM 1 TABLET: 8.6; 5 TABLET ORAL at 21:51

## 2024-08-08 RX ADMIN — ENOXAPARIN SODIUM 40 MG: 40 INJECTION SUBCUTANEOUS at 17:41

## 2024-08-08 RX ADMIN — IPRATROPIUM BROMIDE AND ALBUTEROL SULFATE 3 ML: .5; 3 SOLUTION RESPIRATORY (INHALATION) at 20:01

## 2024-08-08 RX ADMIN — IPRATROPIUM BROMIDE AND ALBUTEROL SULFATE 3 ML: .5; 3 SOLUTION RESPIRATORY (INHALATION) at 13:56

## 2024-08-08 RX ADMIN — METHYLPREDNISOLONE SODIUM SUCCINATE 125 MG: 125 INJECTION, POWDER, FOR SOLUTION INTRAMUSCULAR; INTRAVENOUS at 13:53

## 2024-08-08 RX ADMIN — METHYLPREDNISOLONE SODIUM SUCCINATE 62.5 MG: 125 INJECTION, POWDER, FOR SOLUTION INTRAMUSCULAR; INTRAVENOUS at 23:26

## 2024-08-08 RX ADMIN — PIPERACILLIN AND TAZOBACTAM 3.38 G: 3; .375 INJECTION, POWDER, FOR SOLUTION INTRAVENOUS at 14:15

## 2024-08-08 RX ADMIN — PIRFENIDONE 534 MG: 267 CAPSULE ORAL at 21:51

## 2024-08-08 RX ADMIN — ATORVASTATIN CALCIUM 80 MG: 40 TABLET, FILM COATED ORAL at 21:51

## 2024-08-08 ASSESSMENT — ACTIVITIES OF DAILY LIVING (ADL)
ADLS_ACUITY_SCORE: 28
ADLS_ACUITY_SCORE: 28
ADLS_ACUITY_SCORE: 35
ADLS_ACUITY_SCORE: 28
ADLS_ACUITY_SCORE: 35
ADLS_ACUITY_SCORE: 28
ADLS_ACUITY_SCORE: 35
ADLS_ACUITY_SCORE: 28
ADLS_ACUITY_SCORE: 35
ADLS_ACUITY_SCORE: 35
DEPENDENT_IADLS:: CLEANING;COOKING;LAUNDRY;SHOPPING;MEAL PREPARATION;TRANSPORTATION

## 2024-08-08 ASSESSMENT — COLUMBIA-SUICIDE SEVERITY RATING SCALE - C-SSRS
1. IN THE PAST MONTH, HAVE YOU WISHED YOU WERE DEAD OR WISHED YOU COULD GO TO SLEEP AND NOT WAKE UP?: NO
2. HAVE YOU ACTUALLY HAD ANY THOUGHTS OF KILLING YOURSELF IN THE PAST MONTH?: NO
6. HAVE YOU EVER DONE ANYTHING, STARTED TO DO ANYTHING, OR PREPARED TO DO ANYTHING TO END YOUR LIFE?: NO

## 2024-08-08 NOTE — CONSULTS
Pulmonary Service Consult Note  Date of Service: 08/08/2024    Reason for Consultation: ILD/IPF    Assessment:     Wyatt Gutierrez is a 79 year old male, former smoker (quit 2022) with h/o ILD/IPF (diagnosed 2021) followed at the  on home at 6 lPM, h/o TAVR (6 weeks ago), KAITY, HTN, HPL [place]. Estrada who came in with complaints of dyspnea on exertion that has worsened over the past 1 week.  CXR was done and shows stable diffuse interstitial coarsening.  BNP is elevated at 6 K. WBC is elevated at 17.7K. Procalcitonin is 0.06. Etiology is broad and could be heart failure (elevated bnp), viral syndrome, ILD exacerbation, valvular heart disease (recent TAVR).     Plan:     Agree with steroids. Will increase to 60 mg q8h.  On abx (levofloxacin 750 mg) daily to complete course  Agree with holding azithromycin (chronically on it)  Agree with lasix. 40 mg iv bid seems adequate for now. Follow urine ouput  Follow up sputum g-stain and culture  Viral panel  Titrate FiO2  HFNC/BiPAP, Currently on bipap with FiO2 of 50%. Will check to see if can use HFNC  Scheduled and prn nebs  Follow up echocardiogram  Monitor Na  Monitor I/O, daily weights  Follow up CT chest  Follow up echocardiogram    TTS greater than 65 min, more than 50% on counseling and coordination of care    I appreciate the opportunity to participate in the care of Wyatt Gutierrez.  Please feel free to contact me at any time.    Whitney José MD  Pulmonary and Critical Care Medicine    History:     HPI: Patient is a  former smoker (quit 2022) with h/o ILD/IPF (diagnosed 2021) followed at the , h/o TAVR, KAITY, HTN, HPL [place]. Estrada who came in with complaints of dyspnea on exertion that has worsened over the past 1 week.  For his IPF, pt follows up with at the  with Dr Estrada.  For his ILD/IPF, he was started on Ofev in May 2022.  He was started on oxygen on January 2022.  With respect to antifibrotic agents, patient was  switched to Esbriet last week.      Pt notes that over the past week, his sob has progressively worsened.  His functional status has decreased. He notes that nebs did not help much.  Pt was found to have O2 sat of about 68% on 6 L.  In the ED, he was started on BiPAP and pulmonary was consulted.  Patient denies any change in his chronic cough.  No fever or chills.  No pedal edema.  Of note, he stopped using his diuretic approximately a month ago due to hyponatremia.      PMHx/PSHx:  Past Medical History:   Diagnosis Date    Aortic stenosis     Arthritis     High cholesterol     Hypertension     IPF (idiopathic pulmonary fibrosis) (H)     Sleep apnea      Past Surgical History:   Procedure Laterality Date    ARTHROSCOPY SHOULDER      COLONOSCOPY      CV CORONARY ANGIOGRAM N/A 5/13/2024    Procedure: Coronary Angiogram;  Surgeon: Shiv Robles MD;  [place] [facility] TRANSCATHETER AORTIC VALVE REPLACEMENT-FEMORAL APPROACH N/A 6/11/2024    Procedure: Transcatheter Aortic Valve Replacement-Femoral Approach;  Surgeon: Serjio Gustafson MD;  Location: [facility] CV    ESOPHAGOSCOPY, GASTROSCOPY, DUODENOSCOPY (EGD), COMBINED N/A 10/2/2023    Procedure: ESOPHAGOGASTRODUODENOSCOPY with biopsies;  Surgeon: Reji Baptiste MD;  Location: [facility] OR    OR TRANSCATHETER AORTIC VALVE REPLACEMENT, FEMORAL PERCUTANEOUS APPROACH (STANDBY) N/A 6/11/2024    Procedure: OR TRANSCATHETER AORTIC VALVE REPLACEMENT, FEMORAL PERCUTANEOUS APPROACH (STANDBY);  Surgeon: Susana Mitchell MD;  [place] [facility] [place]    OTHER SURGICAL HISTORY Right 1957    heel fracture    OTHER SURGICAL HISTORY      knee arthroscopies    [facility] TOTAL KNEE ARTHROPLASTY Left 10/31/2019    Procedure: LEFT MINIMALLY INVASIVE TOTAL KNEE ARTHROPLASTY;  Surgeon: Avelino Canada MD;  Location: [facility] OR;  Service: Orthopedics     Social History     Socioeconomic History    Marital status:      Spouse name: Not on file  "   Number of children: Not on file    Years of education: Not on file    Highest education level: Not on file   Occupational History    Not on file   Tobacco Use    Smoking status: Former     Current packs/day: 0.00     Average packs/day: 0.5 packs/day for 55.0 years (27.5 ttl pk-yrs)     Types: Cigarettes     Start date: 1966     Quit date: 2021     Years since quittin.6    Smokeless tobacco: Never   Substance and Sexual Activity    Alcohol use: Yes     Comment: 1-2 glasses of wine/ 2 x-week    Drug use: Not Currently     Comment: edible marijuana in the past    Sexual activity: Not on file   Other Topics Concern    Not on file   Social History Narrative    Lives with wife - single level house     Social Determinants of Health     Financial Resource Strain: Not on file   Food Insecurity: Not on file   Transportation Needs: Not on file   Physical Activity: Not on file   Stress: Not on file   Social Connections: Not on file   Interpersonal Safety: Not on file   Housing Stability: Not on file       Review of Systems - 10 point review of system negative except for what is mentioned in the HPI.    Exam/Data:   /70   Pulse 109   Temp 97.7  F (36.5  C) (Axillary)   Resp 30   Ht 1.778 m (5' 10\")   Wt 86.2 kg (190 lb)   SpO2 96%   BMI 27.26 kg/m      GEN: comfortable, NAD, on bipap  HEENT: NCAT, EMOI, mmm  CVS: S1S2, RRR  Lung: bibasilar crackles  Abd: Soft, NT, ND,  + BS.   Ext: no c/c/e  Neuro: nonfocal  Musculoskeletal: FROM all extremities  Psych: appropriate    DATA    Labs: Reviewed in EMR and outside records where pertinent.     IMAGING: Personally reviewed images. Formal radiology interpretation noted below.    XR Chest Port 1 View    Result Date: 2024  EXAM: XR CHEST PORT 1 VIEW LOCATION: [facility] DATE: 2024 INDICATION: acute hypoxic respiratory failure on bipap with IPF COMPARISON: Chest x-ray 2024, CT chest 2022     IMPRESSION: Low lung " volumes. Stable diffuse interstitial coarsening correlating with known UIP pattern interstitial lung disease. No definite superimposed acute airspace disease. However, given the patient's symptoms recommend short interval follow-up with x-ray or CT. No definite pleural effusion. Stable heart size. TAVR.    Echocardiogram Complete    Result Date: 7/10/2024  [id number]^[place] [address]  Name: ASTER FARRELL MRN: [id number] : 1945 Study Date: 07/10/2024 09:50 AM Age: 79 yrs Gender: Male Patient Location: [facility] Reason For Study: Nonrheumatic aortic valve stenosis Ordering Physician: DIYA DURAN Referring Physician: DIYA DURAN Performed By: GERMANIA  BSA: 2.1 m2 Height: 70 in Weight: 200 lb HR: 91 BP: 116/74 mmHg ______________________________________________________________________________ Procedure Complete Echo Adult. Adequate quality two-dimensional was performed and interpreted. Adequate quality color and spectral Doppler were performed and interpreted. ______________________________________________________________________________ Interpretation Summary  There is mild concentric left ventricular hypertrophy. Left ventricular size, wall motion and function are normal. The ejection fraction is > 65%. Mildly decreased right ventricular systolic function The gradient is normal for this prosthetic aortic valve. IVC diameter >2.1 cm collapsing <50% with sniff suggests a high RA pressure estimated at 15 mmHg or greater. There is a bio-prosthetic aortic valve (documented 26 mm Dan Gabe 3 Ultra tissue valve). MIld to moderate paravalvular insufficiency is noted, visualized better on current exam vs images of 2024. Likely unchanged in severity. ______________________________________________________________________________ Left Ventricle Left ventricular size, wall motion and function are normal. The ejection fraction is  > 65%. There is mild concentric left ventricular hypertrophy. Left ventricular diastolic function is normal. No regional wall motion abnormalities noted.  Right Ventricle The right ventricle is normal size. Mildly decreased right ventricular systolic function. TAPSE is abnormal, which is consistent with abnormal right ventricular systolic function.  Atria The left atrium is moderate to severely dilated. Right atrial size is normal. There is no color Doppler evidence of an atrial shunt.  Mitral Valve There is mild mitral annular calcification.  Tricuspid Valve The tricuspid valve is not well visualized, but is grossly normal. There is trace tricuspid regurgitation. Right ventricular systolic pressure could not be approximated due to inadequate tricuspid regurgitation.  Aortic Valve The mean AoV pressure gradient is 12.8 mmHg. There is a bio-prosthetic aortic valve (documented 26 mm Dan Gabe 3 Ultra tissue valve). MIld to moderate paravalvular insufficiency is noted, visualized better on current exam vs images of 6/12/2024. Likely unchanged in severity. The gradient is normal for this prosthetic aortic valve. The prosthetic aortic valve is not well visualized.  Pulmonic Valve The pulmonic valve is not well seen, but is grossly normal.  Vessels The aorta root is normal. Normal size ascending aorta. IVC diameter >2.1 cm collapsing <50% with sniff suggests a high RA pressure estimated at 15 mmHg or greater.  Pericardium There is no pericardial effusion.  ______________________________________________________________________________ MMode/2D Measurements & Calculations IVSd: 1.4 cm LVIDd: 4.4 cm LVIDs: 2.7 cm LVPWd: 1.2 cm FS: 38.4 % LV mass(C)d: 212.1 grams LV mass(C)dI: 101.6 grams/m2  Ao root diam: 3.3 cm asc Aorta Diam: 3.7 cm LVOT diam: 1.8 cm LVOT area: 2.5 cm2 Ao root diam index Ht(cm/m): 1.9 Ao root diam index BSA (cm/m2): 1.6 Asc Ao diam index BSA (cm/m2): 1.8 Asc Ao diam index Ht(cm/m): 2.1 EF Biplane:  64.8 % LA Volume (BP): 63.0 ml LA Volume Index (BP): 30.1 ml/m2  LA Volume Indexed (AL/bp): 32.9 ml/m2 RWT: 0.56 TAPSE: 1.7 cm  Doppler Measurements & Calculations MV E max wally: 61.8 cm/sec MV A max wally: 115.0 cm/sec MV E/A: 0.54 MV dec time: 0.26 sec Ao V2 max: 232.3 cm/sec Ao max P.0 mmHg Ao V2 mean: 169.1 cm/sec Ao mean P.8 mmHg Ao V2 VTI: 53.1 cm JOJO(I,D): 1.9 cm2 JOJO(V,D): 2.1 cm2 AI P1/2t: 348.1 msec Ao acc time: 0.07 sec  LV V1 max P.0 mmHg LV V1 max: 199.4 cm/sec LV V1 VTI: 41.9 cm SV(LVOT): 102.9 ml SI(LVOT): 49.3 ml/m2 PA V2 max: 98.1 cm/sec PA max PG: 3.9 mmHg PA acc time: 0.11 sec TR max wally: 89.3 cm/sec TR max PG: 3.2 mmHg AV Wally Ratio (DI): 0.86 JOJO Index (cm2/m2): 0.93 E/E' av.8 Lateral E/e': 7.8 Medial E/e': 11.8 RV S Wally: 8.7 cm/sec  ______________________________________________________________________________ Report approved by: Carly Gonzalez 07/10/2024 03:56 PM         PFT DATA on 2024:  Although the FEV1 and FVC are reduced, the FEV1/FVC ratio is normal. The inspiratory flow rates are within normal limits.  The lung volumes are reduced.  Diffusing capacity is reduced but was not corrected for hemoglobin.   IMPRESSION:   Severe Restriction.   Severe diffusion defect.       History reviewed. No pertinent family history.  Allergies   Allergen Reactions    Animal Dander Unknown    Chalk     Dogs Other (See Comments)     Pet Dander    Maple Tree     Mold [Molds & Smuts] Unknown       Medications:     Current Outpatient Medications   Medication Sig Dispense Refill    acyclovir (ZOVIRAX) 400 MG tablet [ACYCLOVIR (ZOVIRAX) 400 MG TABLET] Take 400 mg by mouth 3 (three) times a day as needed.             arformoterol (BROVANA) 15 MCG/2ML NEBU neb solution Take 2 mLs (15 mcg) by nebulization 2 times daily 360 mL 1    aspirin (ASA) 81 MG EC tablet Take by mouth daily      atorvastatin (LIPITOR) 80 MG tablet Take 1 tablet (80 mg) by mouth at bedtime 90 tablet 3    azelastine  (ASTELIN) 0.1 % nasal spray Spray 2 sprays into both nostrils 2 times daily as needed for rhinitis      azithromycin (ZITHROMAX) 250 MG tablet Take 1 tablet (250 mg) daily. 90 tablet 3    ipratropium - albuterol 0.5 mg/2.5 mg/3 mL (DUONEB) 0.5-2.5 (3) MG/3ML neb solution Take 1 vial (3 mLs) by nebulization every 6 hours as needed for shortness of breath, wheezing or cough 360 mL 3    levofloxacin (LEVAQUIN) 750 MG tablet Take 1 tablet (750 mg) by mouth daily 7 tablet 0    loperamide (IMODIUM) 2 MG capsule Take 2 mg by mouth daily as needed      metoprolol succinate (TOPROL-XL) 50 MG 24 hr tablet [METOPROLOL SUCCINATE (TOPROL-XL) 50 MG 24 HR TABLET] Take 50 mg by mouth daily.      multivitamin therapeutic tablet Take 1 tablet by mouth daily      omeprazole (PRILOSEC) 40 MG DR capsule Take 40 mg by mouth 2 times daily      pirfenidone (ESBRIET) 267 MG capsule Take 1 capsule three times daily with food days 1-7, then 2 capsules three times daily with food days 8-14, then 3 capsules three times daily with food days 15 and onward. 270 capsule 11    triamterene-HCTZ (DYAZIDE) 37.5-25 MG capsule Take 1 capsule by mouth every morning      predniSONE (DELTASONE) 20 MG tablet Take 3 tabs by mouth daily x 3 days, then 2 tabs daily x 3 days, then 1 tab daily x 3 days, then 1/2 tab daily x 3 days. 20 tablet 0       Much or all of the text in this note was generated through the use of the Dragon Dictate voice-to-text software. Errors in spelling or words which seem out of context are unintentional. Sound alike errors, in particular, may have escaped editing.

## 2024-08-08 NOTE — ED NOTES
"[facility] ED Handoff Report    ED Chief Complaint: sob      ED Diagnosis:  (J96.21) Acute and chronic respiratory failure with hypoxia (H)  Comment:   Plan:     (J84.112) IPF (idiopathic pulmonary fibrosis) (H)  Comment:   Plan:        PMH:    Past Medical History:   Diagnosis Date    Aortic stenosis     Arthritis     High cholesterol     Hypertension     IPF (idiopathic pulmonary fibrosis) (H)     Sleep apnea         Code Status:  No CPR- Do NOT Intubate     Falls Risk: Yes Band: Applied    Current Living Situation/Residence: lives with a significant other     Elimination Status: Continent: Yes     Activity Level: Independent    Patients Preferred Language:  English     Needed: No    Vital Signs:  /70   Pulse 109   Temp 97.7  F (36.5  C) (Axillary)   Resp 30   Ht 1.778 m (5' 10\")   Wt 86.2 kg (190 lb)   SpO2 96%   BMI 27.26 kg/m       Cardiac Rhythm: ST    Pain Score: 0/10    Is the Patient Confused:  No    Last Food or Drink: 08/08/24 at am    Focused Assessment:  pt here with chronic lung infection.   Hypoxic on arrival and not tolerating n/c--currently on bipap with 40%.  Denies pain.  Tolerating bipap well.     Tests Performed: Done: Labs and Imaging    Treatments Provided:  see mar    Family Dynamics/Concerns: No    Family Updated On Visitor Policy: Yes    Plan of Care Communicated to Family: Yes    Who Was Updated about Plan of Care: pt to notify wife    Belongings Checklist Done and Signed by Patient: Yes    Belongings Sent with Patient: yes    Medications sent with patient: na    Covid: symptomatic, negative    Additional Information: micha    RN: Smita Gallegos RN   8/8/2024 4:35 PM        "

## 2024-08-08 NOTE — PROGRESS NOTES
Patient was transported from ED to room 316, on Bipap.  Patient needed the FiO2 increased to 50% for transfer to bed but otherwise tolerated the transport well.  Transport was done with no complications.   Report given to P3 RT at bedside.    Tirso Meyers, RT

## 2024-08-08 NOTE — TREATMENT PLAN
RESPIRATORY CARE NOTE    Pt placed on HFNC 50 lpm 50 % with initial spo2 90% but desaturate to low 80's with minimal exertion.    Cont monitoring    Krysta COTTO

## 2024-08-08 NOTE — H&P
[facility]    History and Physical - Hospitalist Service       Date of Admission:  8/8/2024    Assessment & Plan      Wyatt Gutierrez is a 79 year old male history of IPF on chronic oxygen as well as severe aortic stenosis, status post TAVR 6/2024 as well as history of palpitations with recent workup admitted to the hospital with acute on chronic respiratory failure    Acute on chronic respiratory failure  --- Differential still wide and includes worsening of known IPF, versus infectious etiology, versus acute CHF due to hold of diuretic over the past month.  --- So far workup includes elevated BNP, low Pro-Dariel, unremarkable chest x-ray although recommending chest CT.  --- Continue BiPAP.  Therapy, see goals of care below  --- Start IV steroids (was not on before)  --- On chronic azithromycin.  Held this week as was on levoquin.  Pro-Dariel low.  Hold off on broadening antibiotics pending CT,  further eval and consults  --- CT chest  -Check echo--  ---start IV diuretic; note patient's Dyazide has been on hold since patient visit with cardiology 7/12/2024 due to hyponatremia.  --- Check respiratory panel  --- Blood culture obtained    Leukocytosis  --- Had not started prednisone as an outpatient.  Unclear if this could be infectious etiology.  Pro-Dariel low, checking chest CT as x-ray unremarkable.  Blood culture taken.  Finished Levaquin per above as no clear infectious etiology.  No urine symptoms so no clear indication for UA.  Monitor with starting IV steroids.  Possible stress reaction.    Sinus tachycardia  --- History of palpitations with some cardiac workup  --- Telemetry  --- Check troponin  --- TSH normal  --- Other workup per above  ---continue home toprol    IPF  ---See info above.  Follows with Dr. Estrada with recent visit---  --- Continue home  Brovana, DuoNeb, pirfenidone (recent med change)  --- Been off prophylactic azithromycin as was on Levaquin. Finish Levaquin    Aortic  "stenosis  Recent TAVR  --- Will check limited echo due to acute worsening of his respiratory status  --- Continue beta-blocker  --- Cards notes reviewed.  Taken off his Dyazide 1 month ago.  --- Checking troponin    Hypertension--continue Toprol.  Starting IV diuretic    Hyperlipidemia--continue Lipitor    GERD - continue PPI    Tailbone pain; positional, try Lidoderm patch    Plans of care; no recent extensive discussion with pulmonology regarding CODE STATUS.  Pulmonologist was worried about rapidly progressive IPF but I am also concerned that part of this may be due to diuretics labs 1 month ago.  He confirms DNR/DNI status.  If not improving consider palliative care consult while here.  He does have outpatient palliative care consult being scheduled at this time        Diet:  low salt and fluid restrict  DVT Prophylaxis: Enoxaparin (Lovenox) SQ  Foreman Catheter: Not present  Lines: None     Cardiac Monitoring: None  Code Status:  DNR/DNI    Clinically Significant Risk Factors Present on Admission                # Drug Induced Platelet Defect: home medication list includes an antiplatelet medication   # Hypertension: Noted on problem list     # Acute Hypoxic Respiratory Failure: Documented O2 saturation < 90%. Continue supplemental oxygen as needed   # Anemia: based on hgb <11       # Overweight: Estimated body mass index is 27.26 kg/m  as calculated from the following:    Height as of this encounter: 1.778 m (5' 10\").    Weight as of this encounter: 86.2 kg (190 lb).                    Disposition Plan     Medically Ready for Discharge:            Mayra Miller MD  Hospitalist Service  [facility]  Securely message with [employer] (more info)  Text page via [employer] Paging/Directory     ______________________________________________________________________    Chief Complaint   : Hypoxia, shortness of breath    History is obtained from the patient    History of Present Illness   Wyatt Gutierrez is a 79 " year old male with a history of known IPF, recent TAVR, recent change from nintedanib to pirfenidone and visit with usual pulmonologist 1 week ago where he had extensive discussions of goals of care who came into the emergency room department by ambulance for further workup and evaluation of worsening dyspnea on exertion.  Patient states he has chronic dyspnea on exertion.  Usually recovers in a few minutes.  However over the past week he has had worsening despite limited activity.  Today tried a neb which was initially helpful.  Second time was unable to catch his breath and EMS reported oxygen saturation 68% despite 6 L.  Brought into the emergency room department he was placed on BiPAP and admission was requested.    COVID testing negative.  Patient to me denies changing chronic cough, fever, nausea vomiting or chest pain.  Had TAVR.  Denies recent change in swelling or edema.  Note, approximately 1 month ago had diuretic held due to hyponatremia.  Unclear if he ever resumed it.  Quit smoking two years ago    Past Medical History    Past Medical History:   Diagnosis Date    Aortic stenosis     Arthritis     High cholesterol     Hypertension     IPF (idiopathic pulmonary fibrosis) (H)     Sleep apnea        Past Surgical History   Past Surgical History:   Procedure Laterality Date    ARTHROSCOPY SHOULDER      COLONOSCOPY      CV CORONARY ANGIOGRAM N/A 5/13/2024    Procedure: Coronary Angiogram;  Surgeon: Shiv Robles MD;  Location: [facility] CV    CV TRANSCATHETER AORTIC VALVE REPLACEMENT-FEMORAL APPROACH N/A 6/11/2024    Procedure: Transcatheter Aortic Valve Replacement-Femoral Approach;  Surgeon: Serjio Gustafson MD;  Location: [facility] CV    ESOPHAGOSCOPY, GASTROSCOPY, DUODENOSCOPY (EGD), COMBINED N/A 10/2/2023    Procedure: ESOPHAGOGASTRODUODENOSCOPY with biopsies;  Surgeon: Reji Baptiste MD;  Location: [facility] Main OR    OR TRANSCATHETER AORTIC VALVE REPLACEMENT, FEMORAL PERCUTANEOUS  APPROACH (STANDBY) N/A 6/11/2024    Procedure: OR TRANSCATHETER AORTIC VALVE REPLACEMENT, FEMORAL PERCUTANEOUS APPROACH (STANDBY);  Surgeon: Susana Mitchell MD;  Location: [facility] LAB CV    OTHER SURGICAL HISTORY Right 1955    heel fracture    OTHER SURGICAL HISTORY      knee arthroscopies    ZZC TOTAL KNEE ARTHROPLASTY Left 10/31/2019    Procedure: LEFT MINIMALLY INVASIVE TOTAL KNEE ARTHROPLASTY;  Surgeon: Avelino Canada MD;  Location: [facility];  Service: Orthopedics       Prior to Admission Medications   Prior to Admission Medications   Prescriptions Last Dose Informant Patient Reported? Taking?   acyclovir (ZOVIRAX) 400 MG tablet Past Week at prn  Yes Yes   Sig: [ACYCLOVIR (ZOVIRAX) 400 MG TABLET] Take 400 mg by mouth 3 (three) times a day as needed.          arformoterol (BROVANA) 15 MCG/2ML NEBU neb solution 8/7/2024 at pm  No Yes   Sig: Take 2 mLs (15 mcg) by nebulization 2 times daily   aspirin (ASA) 81 MG EC tablet 8/7/2024 at am  Yes Yes   Sig: Take by mouth daily   atorvastatin (LIPITOR) 80 MG tablet 8/7/2024 at pm  No Yes   Sig: Take 1 tablet (80 mg) by mouth at bedtime   azelastine (ASTELIN) 0.1 % nasal spray Unknown at prn  Yes Yes   Sig: Spray 2 sprays into both nostrils 2 times daily as needed for rhinitis   azithromycin (ZITHROMAX) 250 MG tablet Past Week at 8/1 am  No Yes   Sig: Take 1 tablet (250 mg) daily.   ipratropium - albuterol 0.5 mg/2.5 mg/3 mL (DUONEB) 0.5-2.5 (3) MG/3ML neb solution 8/8/2024 at am 2x  No Yes   Sig: Take 1 vial (3 mLs) by nebulization every 6 hours as needed for shortness of breath, wheezing or cough   levofloxacin (LEVAQUIN) 750 MG tablet 8/7/2024 at am  No Yes   Sig: Take 1 tablet (750 mg) by mouth daily   loperamide (IMODIUM) 2 MG capsule Unknown at prn  Yes Yes   Sig: Take 2 mg by mouth daily as needed   metoprolol succinate (TOPROL-XL) 50 MG 24 hr tablet 8/7/2024 at am  Yes Yes   Sig: [METOPROLOL SUCCINATE (TOPROL-XL) 50 MG 24 HR TABLET] Take 50 mg by  mouth daily.   multivitamin therapeutic tablet 8/7/2024 at am  Yes Yes   Sig: Take 1 tablet by mouth daily   omeprazole (PRILOSEC) 40 MG DR capsule 8/7/2024 at pm  Yes Yes   Sig: Take 40 mg by mouth 2 times daily   pirfenidone (ESBRIET) 267 MG capsule 8/7/2024 at am  No Yes   Sig: Take 1 capsule three times daily with food days 1-7, then 2 capsules three times daily with food days 8-14, then 3 capsules three times daily with food days 15 and onward.   predniSONE (DELTASONE) 20 MG tablet  at not yet started  No No   Sig: Take 3 tabs by mouth daily x 3 days, then 2 tabs daily x 3 days, then 1 tab daily x 3 days, then 1/2 tab daily x 3 days.   triamterene-HCTZ (DYAZIDE) 37.5-25 MG capsule Past Month at on HOLD  Yes Yes   Sig: Take 1 capsule by mouth every morning      Facility-Administered Medications: None        Review of Systems    The 5 point Review of Systems is negative other than noted in the HPI or here.  He is complaining of tailbone pain      Physical Exam   Vital Signs: Temp: 97.7  F (36.5  C) Temp src: Axillary BP: 131/70 Pulse: 109   Resp: 30 SpO2: 96 % O2 Device: BiPAP/CPAP    Weight: 190 lbs 0 oz    General Appearance: .  BiPAP Machine on. Somewhat Difficult to Understand History but Awake and Conversational.  Respiratory: Coarse crackles bilaterally  Cardiovascular: Regular rate and rhythm without murmurs  GI: Soft and nontender without hepatosplenomegaly  Skin: Trace lower extremity edema.  Other: Grossly intact.  No focal deficits appreciated.    Medical Decision Making             Data     I have personally reviewed the following data over the past 24 hrs:    17.7 (H)  \   10.2 (L)   / 305     134 (L) 95 (L) 13.1 /  119 (H)   4.3 28 0.86 \     Trop: N/A BNP: 6,051 (H)     TSH: 2.56 T4: N/A A1C: N/A     Procal: 0.06 CRP: N/A Lactic Acid: 1.8       INR:  1.10 PTT:  N/A   D-dimer:  N/A Fibrinogen:  N/A       Imaging results reviewed over the past 24 hrs:   Recent Results (from the past 24 hour(s))   XR  Chest Port 1 View    Narrative    EXAM: XR CHEST PORT 1 VIEW  LOCATION: [facility]  DATE: 8/8/2024    INDICATION: acute hypoxic respiratory failure on bipap with IPF  COMPARISON: Chest x-ray 6/12/2024, CT chest 4/26/2022      Impression    IMPRESSION: Low lung volumes. Stable diffuse interstitial coarsening correlating with known UIP pattern interstitial lung disease. No definite superimposed acute airspace disease. However, given the patient's symptoms recommend short interval follow-up   with x-ray or CT. No definite pleural effusion. Stable heart size. TAVR.

## 2024-08-08 NOTE — PROGRESS NOTES
"ED RESPIRATORY CARE NOTE     Pt arrived via EMS on Bipap. Pt was placed on 15 LPM oxymask for assessment and transfer over from EMS Bipap to our V-60. Pt's SpO2 dropped rapidly on oxymask into the 70's. Pt recovered quickly once placed on V-60 Bipap.  Pt remains on Bipap: ST mode, rate of 16,16/10, 40%. Patient has been tolerating this well. Attempted to titrate the peep down to 8. The patient tolerated the peep decrease for about 5 minutes but then started desaturating and having increased work of breathing. Setting were returned to peep of 10.  /70   Pulse 109   Temp 97.7  F (36.5  C) (Axillary)   Resp 30   Ht 1.778 m (5' 10\")   Wt 86.2 kg (190 lb)   SpO2 96%   BMI 27.26 kg/m    BS diminished on right.  Duoneb given and tolerated well. BS unchanged post neb treatment.     Tirso Meyers, RT   "

## 2024-08-08 NOTE — CONSULTS
"Care Management Initial Consult    General Information  Assessment completed with: PatientTai  Type of CM/SW Visit: Initial Assessment    Primary Care Provider verified and updated as needed: Yes   Readmission within the last 30 days: no previous admission in last 30 days      Reason for Consult: discharge planning  Advance Care Planning: Advance Care Planning Reviewed: no concerns identified, questions answered (\"I have one written at home. I can have family bring it in sometime\")          Communication Assessment  Patient's communication style: spoken language (English or Bilingual)             Cognitive  Cognitive/Neuro/Behavioral: WDL                      Living Environment:   People in home: spouse  Tai and wife Alka  Current living Arrangements: house      Able to return to prior arrangements: other (see comments) (unknown at this time)       Family/Social Support:  Care provided by: self, spouse/significant other  Provides care for: no one  Marital Status:   Wife  Alka       Description of Support System: Supportive, Involved    Support Assessment: Adequate family and caregiver support, Adequate social supports, Patient communicates needs well met    Current Resources:   Patient receiving home care services: No     Community Resources: DME (Home Oxygen from [facility]. I use it at 6LPM at all times.)  Equipment currently used at home: shower chair  Supplies currently used at home: Oxygen Tubing/Supplies, Nebulizer tubing, Other (\"Home Oxygen from [facility]. I use it at 6LPM at all times. I use a CPAP at night (It is not a bipap). Nebulizer. Dentures.\")    Employment/Financial:  Employment Status: retired, , previous service     Employment/ Comments: \"I don't use any of my  benefits at this time.\"  Financial Concerns: none   Referral to Financial Worker: No       Does the patient's insurance plan have a 3 day qualifying hospital stay waiver?  " "No    Lifestyle & Psychosocial Needs:  Social Determinants of Health     Food Insecurity: Not on file   Depression: Not at risk (1/4/2024)    PHQ-2     PHQ-2 Score: 0   Housing Stability: Not on file   Tobacco Use: Medium Risk (8/8/2024)    Patient History     Smoking Tobacco Use: Former     Smokeless Tobacco Use: Never     Passive Exposure: Not on file   Financial Resource Strain: Not on file   Alcohol Use: Not on file   Transportation Needs: Not on file   Physical Activity: Not on file   Interpersonal Safety: Not on file   Stress: Not on file   Social Connections: Not on file   Health Literacy: Not on file       Functional Status:  Prior to admission patient needed assistance:   Dependent ADLs:: Independent, Ambulation-no assistive device  Dependent IADLs:: Cleaning, Cooking, Laundry, Shopping, Meal Preparation, Transportation (\"Our daughter helps with housekeeping. I don't drive much. My wife helps with transportation\".)  Assesssment of Functional Status:  (unknown at this time)    Mental Health Status:  Mental Health Status: No Current Concerns       Chemical Dependency Status:  Chemical Dependency Status: No Current Concerns             Values/Beliefs:  Spiritual, Cultural Beliefs, Restoration Practices, Values that affect care: no               Additional Information:  Tai lives in a house with his wife Alka. He is independent with ADLs and family helps with IADLs. \"Our daughter helps with housekeeping. I don't drive much. My wife helps with transportation and around the house\".    He uses Home Oxygen from [facility] at 6LPM at all times.     Unknown discharge needs at this time.     Family to transport at discharge.    CM to follow for medical progression of care, discharge recommendations, and final discharge plan.    Karen Castro RN    "

## 2024-08-08 NOTE — PROGRESS NOTES
"ED RESPIRATORY CARE NOTE     Was able to titrate Bipap settings down to 14/8, 35%. Pt is tolerating well.  /70   Pulse 109   Temp 97.7  F (36.5  C) (Axillary)   Resp 30   Ht 1.778 m (5' 10\")   Wt 86.2 kg (190 lb)   SpO2 96%   BMI 27.26 kg/m      Tirso Meyers, RT    "

## 2024-08-08 NOTE — ED NOTES
Bed: JNED-09  Expected date: 8/8/24  Expected time: 1:08 PM  Means of arrival:   Comments:  [facility]/hypoxia

## 2024-08-08 NOTE — ED TRIAGE NOTES
Arrived via [facility] from home for evaluation of SOB, hypoxia and tachypnea.EMS reports patient on home O2/6L and with O2 sats: 68%. Initally, placed on 20L and increased to 86%. Placed on bipap and O2 sats improved to 98%. Uses bipap at night. Currently on antiboitics with recent med changes. HX: Aortic valve replacement on 6/19/24 and history: fibrosis.      Triage Assessment (Adult)       Row Name 08/08/24 132          Triage Assessment    Airway WDL WDL        Respiratory WDL    Respiratory WDL X;cough  SOB, hypoxia, tachpneic        Skin Circulation/Temperature WDL    Skin Circulation/Temperature WDL WDL        Cardiac WDL    Cardiac WDL X  tachycardia        Cognitive/Neuro/Behavioral WDL    Cognitive/Neuro/Behavioral WDL WDL

## 2024-08-08 NOTE — PLAN OF CARE
Problem: Gas Exchange Impaired  Goal: Optimal Gas Exchange  Outcome: Progressing   Goal Outcome Evaluation:       Pt arrived to the unit this evening. Pt on Bipap on arrival to unit, after pulm saw Pt, they decided to try to switch Pt to HFNC. RT notified and RT switched Pt over to HFNC, Pt sating low 90's. Pt on bedrest w/ bedside commode privileges. Tele sinus tach, 100-110's. Pt AOX4 and denies pain, numbness/tingling, and nausea. Pt is able to make needs known. Urinal at bedside.

## 2024-08-08 NOTE — ED PROVIDER NOTES
EMERGENCY DEPARTMENT ENCOUNTER      NAME: Wyatt Gutierrez  AGE: 79 year old male  YOB: 1945  MRN: [id number]  EVALUATION DATE & TIME: 8/8/2024  1:21 PM    PCP: Eliot De La Rosa    ED PROVIDER: Erum Velazquez M.D.      Chief Complaint   Patient presents with    Shortness of Breath    hypoxia         FINAL IMPRESSION:  1. Acute and chronic respiratory failure with hypoxia (H)    2. IPF (idiopathic pulmonary fibrosis) (H)          ED COURSE & MEDICAL DECISION MAKING:    ED Course as of 08/08/24 1515   Thu Aug 08, 2024   1338 Pt with IPF on CPAP at night and 6L home O2 with acute hypoxic respiratoyr failure with dyspnea and sat in 60s by EMS despite home O2 and nebs, now markedly improved with bipap which is novel therapy for him and pending cultures, lactic acid, XR and continued abx with possible HCAP, patient and wife ok with plan   1457 BNP very elevated, viral PCR negative, chemistry WNL and TSH normal and WBC 17 but procalcitonin undetectable and lactic acid 1.8 and on steroids thus SIRS noted but likely relating to steroids rather than acute bacterial or viral infection. XR without clear infiltrate, thus this may represent isolated IUP flare up, hosptialist paged for admission   1509 Pt endorsed to hospitalist Dr Miller, who will consult pulmonology while admitted       Pertinent Labs & Imaging studies reviewed. (See chart for details)      At the conclusion of the encounter I discussed the results of all of the tests and the disposition. The questions were answered. The patient or family acknowledged understanding and was agreeable with the care plan.     Critical Care time was 45 minutes for this patient excluding procedures.      MEDICATIONS GIVEN IN THE EMERGENCY:  Medications   piperacillin-tazobactam (ZOSYN) 3.375 g vial to attach to  mL bag (0 g Intravenous Stopped 8/8/24 1450)   methylPREDNISolone sodium succinate (solu-MEDROL) injection 125 mg (125 mg Intravenous $Given 8/8/24 1353)    ipratropium - albuterol 0.5 mg/2.5 mg/3 mL (DUONEB) neb solution 3 mL (3 mLs Nebulization $Given 8/8/24 3803)       NEW PRESCRIPTIONS STARTED AT TODAY'S ER VISIT  New Prescriptions    No medications on file          =================================================================    [facility]      Wyatt Gutierrez is a 79 year old male with PMHx of hypertension, hyperlipidemia, aortic stenosis with remote TAVR, tobacco use, pulmonary fibrosis, atrial fibrillation who presents to the ED today via ambulance with hypoxia and shortness of breath    Per Chart Review, on 08/01/2024 at [facility] and [facility]. Patient was seen by a pulmonologist who discussed past rehabilitation for his pulmonary idiopathic fibrosis. Patient declined clinical trial. Patient was referred to palliative care for severe ILD and progressive fibrosis.    Patient was brought in by ambulance and on CPAP. Per patient's wife, about 3 days ago the shortness of breath has worsened. This morning patient had an oxygen level of 55 and a heart rate of 125. His wife administered his albuterol nebulizer twice which did not lead to improvement. Patient then had hematemesis, but patient's wife indicated could be due to coughing. Patient also was very clammy. Normally, at home, patient requires 3-4 L of oxygen when sitting and 6 L of oxygen when walking to have oxygen levels in the 90s.    Patient was given 20 L of oxygen which improved his oxygen from 86% to 90% and is now on BiPap. Patient denies fever or leg swelling. Patient uses CPAP with oxygen at night, and he has never been on BiPap or been intubated.    REVIEW OF SYSTEMS   All other systems reviewed and are negative except as noted above in HPI.    PAST MEDICAL HISTORY:  Past Medical History:   Diagnosis Date    Arthritis     High cholesterol     Hypertension     Sleep apnea        PAST SURGICAL HISTORY:  Past Surgical History:   Procedure Laterality Date     ARTHROSCOPY SHOULDER      COLONOSCOPY      CV CORONARY ANGIOGRAM N/A 5/13/2024    Procedure: Coronary Angiogram;  Surgeon: Shiv Robles MD;  Location: [facility] CV    CV TRANSCATHETER AORTIC VALVE REPLACEMENT-FEMORAL APPROACH N/A 6/11/2024    Procedure: Transcatheter Aortic Valve Replacement-Femoral Approach;  Surgeon: Serjio Gustafson MD;  Location: [facility] CV    ESOPHAGOSCOPY, GASTROSCOPY, DUODENOSCOPY (EGD), COMBINED N/A 10/2/2023    Procedure: ESOPHAGOGASTRODUODENOSCOPY with biopsies;  Surgeon: Reji Baptiste MD;  Location: [facility] OR    OR TRANSCATHETER AORTIC VALVE REPLACEMENT, FEMORAL PERCUTANEOUS APPROACH (STANDBY) N/A 6/11/2024    Procedure: OR TRANSCATHETER AORTIC VALVE REPLACEMENT, FEMORAL PERCUTANEOUS APPROACH (STANDBY);  Surgeon: Susana Mithcell MD;  Location: [facility] CV    OTHER SURGICAL HISTORY Right 1957    heel fracture    OTHER SURGICAL HISTORY      knee arthroscopies    ZZC TOTAL KNEE ARTHROPLASTY Left 10/31/2019    Procedure: LEFT MINIMALLY INVASIVE TOTAL KNEE ARTHROPLASTY;  Surgeon: Avelino Canada MD;  Location: [facility] OR;  Service: Orthopedics       CURRENT MEDICATIONS:    acyclovir (ZOVIRAX) 400 MG tablet  arformoterol (BROVANA) 15 MCG/2ML NEBU neb solution  aspirin (ASA) 81 MG EC tablet  atorvastatin (LIPITOR) 80 MG tablet  azelastine (ASTELIN) 0.1 % nasal spray  azithromycin (ZITHROMAX) 250 MG tablet  ipratropium - albuterol 0.5 mg/2.5 mg/3 mL (DUONEB) 0.5-2.5 (3) MG/3ML neb solution  levofloxacin (LEVAQUIN) 750 MG tablet  loperamide (IMODIUM) 2 MG capsule  metoprolol succinate (TOPROL-XL) 50 MG 24 hr tablet  multivitamin therapeutic tablet  omeprazole (PRILOSEC) 40 MG DR capsule  pirfenidone (ESBRIET) 267 MG capsule  triamterene-HCTZ (DYAZIDE) 37.5-25 MG capsule  predniSONE (DELTASONE) 20 MG tablet        ALLERGIES:  Allergies   Allergen Reactions    Animal Dander Unknown    Chalk     Dogs Other (See Comments)     Pet Dander    Maple Tree      "Mold [Molds & Smuts] Unknown       FAMILY HISTORY:  History reviewed. No pertinent family history.    SOCIAL HISTORY:   Social History     Socioeconomic History    Marital status:      Spouse name: None    Number of children: None    Years of education: None    Highest education level: None   Tobacco Use    Smoking status: Former     Current packs/day: 0.00     Average packs/day: 0.5 packs/day for 55.0 years (27.5 ttl pk-yrs)     Types: Cigarettes     Start date: 1966     Quit date: 2021     Years since quittin.6    Smokeless tobacco: Never   Substance and Sexual Activity    Alcohol use: Yes     Comment: 1-2 glasses of wine/ 2 x-week    Drug use: Not Currently     Comment: edible marijuana in the past       VITALS:  Patient Vitals for the past 24 hrs:   BP Temp Temp src Pulse Resp SpO2 Height Weight   24 1445 131/70 -- -- 109 30 96 % -- --   24 1430 (!) 141/76 -- -- (!) 122 -- 92 % -- --   24 1415 133/84 -- -- 113 30 94 % -- --   24 1340 -- -- -- -- -- 91 % -- --   24 1325 136/60 -- -- (!) 124 -- -- -- --   24 1324 136/60 97.7  F (36.5  C) Axillary (!) 126 28 95 % 1.778 m (5' 10\") 86.2 kg (190 lb)       PHYSICAL EXAM    GENERAL: Awake, alert.  In no acute distress.   HEENT: Normocephalic, atraumatic.  Pupils equal, round and reactive.  Conjunctiva normal.  EOMI.  NECK: No stridor or apparent deformity.  PULMONARY: Patient on BiPap with minimal scalene retraction. Minimal abdominal retraction. Normal expiratory phase. Globally coarse blood sounds. No leg swelling.  CARDIO: Regular rate and rhythm.  No significant murmur, rub or gallop.  Radial pulses strong and symmetrical.  ABDOMINAL: Abdomen soft, non-distended and non-tender to palpation.  No CVAT, no palpable hepatosplenomegaly.  EXTREMITIES: No lower extremity swelling or edema.    NEURO: Alert and oriented to person, place and time.  Cranial nerves grossly intact.  No focal motor deficit.  PSYCH: Normal " mood and affect  SKIN: No rashes      LAB:  All pertinent labs reviewed and interpreted.  Results for orders placed or performed during the hospital encounter of 08/08/24   XR Chest Port 1 View    Impression    IMPRESSION: Low lung volumes. Stable diffuse interstitial coarsening correlating with known UIP pattern interstitial lung disease. No definite superimposed acute airspace disease. However, given the patient's symptoms recommend short interval follow-up   with x-ray or CT. No definite pleural effusion. Stable heart size. TAVR.   Extra Red Top Tube   Result Value Ref Range    Hold Specimen JIC    Extra Heparinized Syringe   Result Value Ref Range    Hold Specimen JIC    Basic metabolic panel   Result Value Ref Range    Sodium 134 (L) 135 - 145 mmol/L    Potassium 4.3 3.4 - 5.3 mmol/L    Chloride 95 (L) 98 - 107 mmol/L    Carbon Dioxide (CO2) 28 22 - 29 mmol/L    Anion Gap 11 7 - 15 mmol/L    Urea Nitrogen 13.1 8.0 - 23.0 mg/dL    Creatinine 0.86 0.67 - 1.17 mg/dL    GFR Estimate 89 >60 mL/min/1.73m2    Calcium 9.3 8.8 - 10.4 mg/dL    Glucose 119 (H) 70 - 99 mg/dL   Lactic acid whole blood with 1x repeat in 2 hr when >2   Result Value Ref Range    Lactic Acid, Initial 1.8 0.7 - 2.0 mmol/L   Result Value Ref Range    Procalcitonin 0.06 <0.50 ng/mL   Result Value Ref Range    Magnesium 1.8 1.7 - 2.3 mg/dL   TSH with free T4 reflex   Result Value Ref Range    TSH 2.56 0.30 - 4.20 uIU/mL   Nt probnp inpatient (BNP)   Result Value Ref Range    N terminal Pro BNP Inpatient 6,051 (H) 0 - 1,800 pg/mL   Symptomatic Influenza A/B, RSV, & SARS-CoV2 PCR (COVID-19) Nasopharyngeal    Specimen: Nasopharyngeal; Swab   Result Value Ref Range    Influenza A PCR Negative Negative    Influenza B PCR Negative Negative    RSV PCR Negative Negative    SARS CoV2 PCR Negative Negative   Result Value Ref Range    INR 1.10 0.85 - 1.15   CBC with platelets and differential   Result Value Ref Range    WBC Count 17.7 (H) 4.0 - 11.0 10e3/uL     RBC Count 4.28 (L) 4.40 - 5.90 10e6/uL    Hemoglobin 10.2 (L) 13.3 - 17.7 g/dL    Hematocrit 32.4 (L) 40.0 - 53.0 %    MCV 76 (L) 78 - 100 fL    MCH 23.8 (L) 26.5 - 33.0 pg    MCHC 31.5 31.5 - 36.5 g/dL    RDW 17.2 (H) 10.0 - 15.0 %    Platelet Count 305 150 - 450 10e3/uL    NRBCs per 100 WBC 0 <1 /100    Absolute NRBCs 0.0 10e3/uL   Manual Differential   Result Value Ref Range    % Neutrophils 93 %    % Lymphocytes 1 %    % Monocytes 6 %    % Eosinophils 0 %    % Basophils 0 %    Absolute Neutrophils 16.5 (H) 1.6 - 8.3 10e3/uL    Absolute Lymphocytes 0.2 (L) 0.8 - 5.3 10e3/uL    Absolute Monocytes 1.1 0.0 - 1.3 10e3/uL    Absolute Eosinophils 0.0 0.0 - 0.7 10e3/uL    Absolute Basophils 0.0 0.0 - 0.2 10e3/uL    RBC Morphology Confirmed RBC Indices     Platelet Assessment  Automated Count Confirmed. Platelet morphology is normal.     Automated Count Confirmed. Platelet morphology is normal.       RADIOLOGY:  Reviewed all pertinent imaging. Please see official radiology report.  XR Chest Port 1 View   Final Result   IMPRESSION: Low lung volumes. Stable diffuse interstitial coarsening correlating with known UIP pattern interstitial lung disease. No definite superimposed acute airspace disease. However, given the patient's symptoms recommend short interval follow-up    with x-ray or CT. No definite pleural effusion. Stable heart size. TAVR.            EK2024, 13:31:46. Reviewed and interpreted as: 117 bpm. Sinus tachycardia. When compared with ECG of 2024 13:25, similar appearing.      I have independently reviewed and interpreted the EKG(s) documented above.        I, Marti Mckoy, am serving as a scribe to document services personally performed by Dr. Erum Velazquez based on my observation and the provider's statements to me. I, Erum Velazquez MD attest that Marti Mckoy is acting in a scribe capacity, has observed my performance of the services and has documented them in accordance with my  direction.       Erum Velazquez MD  08/08/24 2629

## 2024-08-08 NOTE — MEDICATION SCRIBE - ADMISSION MEDICATION HISTORY
Medication Scribe Admission Medication History    Admission medication history is complete. The information provided in this note is only as accurate as the sources available at the time of the update.    Information Source(s): Patient, Family member, and CareEverywhere/SureScripts via in-person    Pertinent Information: pt spouse manages med's   Per spouse pt hasn't had any oral med's today  Per spouse pt dyazide is on hold per doctors instructions, due to sodium levels dropping has supply  Spouse will bring in esbriet today      Changes made to PTA medication list:  Added: None  Deleted: None  Changed: None    Allergies reviewed with patient and updates made in EHR: yes    Medication History Completed By: TORREY LUZ 8/8/2024 2:24 PM    PTA Med List   Medication Sig Note Last Dose    acyclovir (ZOVIRAX) 400 MG tablet [ACYCLOVIR (ZOVIRAX) 400 MG TABLET] Take 400 mg by mouth 3 (three) times a day as needed.         Past Week at prn    arformoterol (BROVANA) 15 MCG/2ML NEBU neb solution Take 2 mLs (15 mcg) by nebulization 2 times daily  8/7/2024 at pm    aspirin (ASA) 81 MG EC tablet Take by mouth daily  8/7/2024 at am    atorvastatin (LIPITOR) 80 MG tablet Take 1 tablet (80 mg) by mouth at bedtime  8/7/2024 at pm    azelastine (ASTELIN) 0.1 % nasal spray Spray 2 sprays into both nostrils 2 times daily as needed for rhinitis  Unknown at prn    azithromycin (ZITHROMAX) 250 MG tablet Take 1 tablet (250 mg) daily. 8/8/2024: ON HOLD until levofloxacin is completed  Past Week at 8/1 am    ipratropium - albuterol 0.5 mg/2.5 mg/3 mL (DUONEB) 0.5-2.5 (3) MG/3ML neb solution Take 1 vial (3 mLs) by nebulization every 6 hours as needed for shortness of breath, wheezing or cough  8/8/2024 at am 2x    levofloxacin (LEVAQUIN) 750 MG tablet Take 1 tablet (750 mg) by mouth daily  8/7/2024 at am    loperamide (IMODIUM) 2 MG capsule Take 2 mg by mouth daily as needed  Unknown at prn    metoprolol succinate (TOPROL-XL) 50 MG 24  hr tablet [METOPROLOL SUCCINATE (TOPROL-XL) 50 MG 24 HR TABLET] Take 50 mg by mouth daily.  8/7/2024 at am    multivitamin therapeutic tablet Take 1 tablet by mouth daily  8/7/2024 at am    omeprazole (PRILOSEC) 40 MG DR capsule Take 40 mg by mouth 2 times daily  8/7/2024 at pm    pirfenidone (ESBRIET) 267 MG capsule Take 1 capsule three times daily with food days 1-7, then 2 capsules three times daily with food days 8-14, then 3 capsules three times daily with food days 15 and onward. 8/8/2024: Pt taking 2 caps three times daily this week- spouse will bring in med for today's dose  8/7/2024 at am    triamterene-HCTZ (DYAZIDE) 37.5-25 MG capsule Take 1 capsule by mouth every morning 8/8/2024: ON HOLD due to sodium levels dropping  Past Month at on HOLD

## 2024-08-09 ENCOUNTER — APPOINTMENT (OUTPATIENT)
Dept: CARDIOLOGY | Facility: HOSPITAL | Age: 79
DRG: 196 | End: 2024-08-09
Attending: FAMILY MEDICINE
Payer: MEDICARE

## 2024-08-09 LAB
ALBUMIN SERPL BCG-MCNC: 3.8 G/DL (ref 3.5–5.2)
ALP SERPL-CCNC: 129 U/L (ref 40–150)
ALT SERPL W P-5'-P-CCNC: 18 U/L (ref 0–70)
ANION GAP SERPL CALCULATED.3IONS-SCNC: 10 MMOL/L (ref 7–15)
ANION GAP SERPL CALCULATED.3IONS-SCNC: 13 MMOL/L (ref 7–15)
AST SERPL W P-5'-P-CCNC: 21 U/L (ref 0–45)
BACTERIA SPT CULT: NORMAL
BILIRUB SERPL-MCNC: 0.5 MG/DL
BUN SERPL-MCNC: 14.8 MG/DL (ref 8–23)
BUN SERPL-MCNC: 16 MG/DL (ref 8–23)
CALCIUM SERPL-MCNC: 9.2 MG/DL (ref 8.8–10.4)
CALCIUM SERPL-MCNC: 9.3 MG/DL (ref 8.8–10.4)
CHLORIDE SERPL-SCNC: 94 MMOL/L (ref 98–107)
CHLORIDE SERPL-SCNC: 94 MMOL/L (ref 98–107)
CREAT SERPL-MCNC: 0.87 MG/DL (ref 0.67–1.17)
CREAT SERPL-MCNC: 0.87 MG/DL (ref 0.67–1.17)
EGFRCR SERPLBLD CKD-EPI 2021: 89 ML/MIN/1.73M2
EGFRCR SERPLBLD CKD-EPI 2021: 89 ML/MIN/1.73M2
ERYTHROCYTE [DISTWIDTH] IN BLOOD BY AUTOMATED COUNT: 17.2 % (ref 10–15)
GLUCOSE SERPL-MCNC: 148 MG/DL (ref 70–99)
GLUCOSE SERPL-MCNC: 161 MG/DL (ref 70–99)
GRAM STAIN RESULT: NORMAL
HCO3 SERPL-SCNC: 27 MMOL/L (ref 22–29)
HCO3 SERPL-SCNC: 28 MMOL/L (ref 22–29)
HCT VFR BLD AUTO: 29.6 % (ref 40–53)
HGB BLD-MCNC: 9.6 G/DL (ref 13.3–17.7)
HOLD SPECIMEN: NORMAL
LACTATE SERPL-SCNC: 2 MMOL/L (ref 0.7–2)
LACTATE SERPL-SCNC: 2.4 MMOL/L (ref 0.7–2)
MCH RBC QN AUTO: 24.3 PG (ref 26.5–33)
MCHC RBC AUTO-ENTMCNC: 32.4 G/DL (ref 31.5–36.5)
MCV RBC AUTO: 75 FL (ref 78–100)
PLATELET # BLD AUTO: 274 10E3/UL (ref 150–450)
POTASSIUM SERPL-SCNC: 4 MMOL/L (ref 3.4–5.3)
POTASSIUM SERPL-SCNC: 4.2 MMOL/L (ref 3.4–5.3)
PROT SERPL-MCNC: 7 G/DL (ref 6.4–8.3)
RBC # BLD AUTO: 3.95 10E6/UL (ref 4.4–5.9)
SODIUM SERPL-SCNC: 131 MMOL/L (ref 135–145)
SODIUM SERPL-SCNC: 135 MMOL/L (ref 135–145)
WBC # BLD AUTO: 7.7 10E3/UL (ref 4–11)

## 2024-08-09 PROCEDURE — 85027 COMPLETE CBC AUTOMATED: CPT | Performed by: FAMILY MEDICINE

## 2024-08-09 PROCEDURE — 999N000157 HC STATISTIC RCP TIME EA 10 MIN

## 2024-08-09 PROCEDURE — 250N000009 HC RX 250: Performed by: FAMILY MEDICINE

## 2024-08-09 PROCEDURE — 99233 SBSQ HOSP IP/OBS HIGH 50: CPT | Performed by: HOSPITALIST

## 2024-08-09 PROCEDURE — 36415 COLL VENOUS BLD VENIPUNCTURE: CPT | Performed by: HOSPITALIST

## 2024-08-09 PROCEDURE — 255N000002 HC RX 255 OP 636: Performed by: HOSPITALIST

## 2024-08-09 PROCEDURE — 250N000011 HC RX IP 250 OP 636: Performed by: INTERNAL MEDICINE

## 2024-08-09 PROCEDURE — 250N000011 HC RX IP 250 OP 636: Performed by: FAMILY MEDICINE

## 2024-08-09 PROCEDURE — 93321 DOPPLER ECHO F-UP/LMTD STD: CPT | Mod: 26 | Performed by: INTERNAL MEDICINE

## 2024-08-09 PROCEDURE — 999N000208 ECHOCARDIOGRAM LIMITED

## 2024-08-09 PROCEDURE — 36415 COLL VENOUS BLD VENIPUNCTURE: CPT | Performed by: FAMILY MEDICINE

## 2024-08-09 PROCEDURE — 80053 COMPREHEN METABOLIC PANEL: CPT | Performed by: FAMILY MEDICINE

## 2024-08-09 PROCEDURE — 210N000001 HC R&B IMCU HEART CARE

## 2024-08-09 PROCEDURE — 94640 AIRWAY INHALATION TREATMENT: CPT | Mod: 76

## 2024-08-09 PROCEDURE — 94640 AIRWAY INHALATION TREATMENT: CPT

## 2024-08-09 PROCEDURE — 250N000013 HC RX MED GY IP 250 OP 250 PS 637: Performed by: FAMILY MEDICINE

## 2024-08-09 PROCEDURE — 93308 TTE F-UP OR LMTD: CPT | Mod: 26 | Performed by: INTERNAL MEDICINE

## 2024-08-09 PROCEDURE — 83605 ASSAY OF LACTIC ACID: CPT | Performed by: HOSPITALIST

## 2024-08-09 PROCEDURE — 87205 SMEAR GRAM STAIN: CPT | Performed by: INTERNAL MEDICINE

## 2024-08-09 PROCEDURE — 99233 SBSQ HOSP IP/OBS HIGH 50: CPT | Performed by: INTERNAL MEDICINE

## 2024-08-09 PROCEDURE — 93325 DOPPLER ECHO COLOR FLOW MAPG: CPT | Mod: 26 | Performed by: INTERNAL MEDICINE

## 2024-08-09 RX ORDER — FUROSEMIDE 10 MG/ML
20 INJECTION INTRAMUSCULAR; INTRAVENOUS EVERY 12 HOURS
Status: DISCONTINUED | OUTPATIENT
Start: 2024-08-09 | End: 2024-08-12

## 2024-08-09 RX ORDER — METHYLPREDNISOLONE SODIUM SUCCINATE 125 MG/2ML
60 INJECTION, POWDER, LYOPHILIZED, FOR SOLUTION INTRAMUSCULAR; INTRAVENOUS EVERY 6 HOURS
Status: DISCONTINUED | OUTPATIENT
Start: 2024-08-09 | End: 2024-08-12

## 2024-08-09 RX ADMIN — FORMOTEROL FUMARATE DIHYDRATE 20 MCG: 20 SOLUTION RESPIRATORY (INHALATION) at 19:49

## 2024-08-09 RX ADMIN — PERFLUTREN 2 ML: 6.52 INJECTION, SUSPENSION INTRAVENOUS at 10:25

## 2024-08-09 RX ADMIN — ACETAMINOPHEN 650 MG: 325 TABLET ORAL at 18:17

## 2024-08-09 RX ADMIN — IPRATROPIUM BROMIDE AND ALBUTEROL SULFATE 3 ML: .5; 3 SOLUTION RESPIRATORY (INHALATION) at 15:52

## 2024-08-09 RX ADMIN — GUAIFENESIN AND DEXTROMETHORPHAN 10 ML: 100; 10 SYRUP ORAL at 12:50

## 2024-08-09 RX ADMIN — LEVOFLOXACIN 750 MG: 750 TABLET, FILM COATED ORAL at 10:32

## 2024-08-09 RX ADMIN — PIRFENIDONE 534 MG: 267 CAPSULE ORAL at 10:33

## 2024-08-09 RX ADMIN — LIDOCAINE 1 PATCH: 246 PATCH TOPICAL at 19:14

## 2024-08-09 RX ADMIN — IPRATROPIUM BROMIDE AND ALBUTEROL SULFATE 3 ML: .5; 3 SOLUTION RESPIRATORY (INHALATION) at 02:09

## 2024-08-09 RX ADMIN — ASPIRIN 81 MG: 81 TABLET, COATED ORAL at 10:33

## 2024-08-09 RX ADMIN — PANTOPRAZOLE SODIUM 40 MG: 40 TABLET, DELAYED RELEASE ORAL at 15:50

## 2024-08-09 RX ADMIN — FUROSEMIDE 20 MG: 10 INJECTION, SOLUTION INTRAMUSCULAR; INTRAVENOUS at 17:52

## 2024-08-09 RX ADMIN — ENOXAPARIN SODIUM 40 MG: 40 INJECTION SUBCUTANEOUS at 18:09

## 2024-08-09 RX ADMIN — METHYLPREDNISOLONE SODIUM SUCCINATE 62.5 MG: 125 INJECTION, POWDER, FOR SOLUTION INTRAMUSCULAR; INTRAVENOUS at 14:22

## 2024-08-09 RX ADMIN — ATORVASTATIN CALCIUM 80 MG: 40 TABLET, FILM COATED ORAL at 21:15

## 2024-08-09 RX ADMIN — IPRATROPIUM BROMIDE AND ALBUTEROL SULFATE 3 ML: .5; 3 SOLUTION RESPIRATORY (INHALATION) at 19:49

## 2024-08-09 RX ADMIN — PIRFENIDONE 534 MG: 267 CAPSULE ORAL at 15:07

## 2024-08-09 RX ADMIN — METHYLPREDNISOLONE SODIUM SUCCINATE 62.5 MG: 125 INJECTION, POWDER, FOR SOLUTION INTRAMUSCULAR; INTRAVENOUS at 09:24

## 2024-08-09 RX ADMIN — PANTOPRAZOLE SODIUM 40 MG: 40 TABLET, DELAYED RELEASE ORAL at 06:05

## 2024-08-09 RX ADMIN — FORMOTEROL FUMARATE DIHYDRATE 20 MCG: 20 SOLUTION RESPIRATORY (INHALATION) at 07:42

## 2024-08-09 RX ADMIN — METOPROLOL SUCCINATE 50 MG: 50 TABLET, EXTENDED RELEASE ORAL at 10:32

## 2024-08-09 RX ADMIN — PIRFENIDONE 534 MG: 267 CAPSULE ORAL at 19:20

## 2024-08-09 RX ADMIN — METHYLPREDNISOLONE SODIUM SUCCINATE 62.5 MG: 125 INJECTION, POWDER, FOR SOLUTION INTRAMUSCULAR; INTRAVENOUS at 21:16

## 2024-08-09 RX ADMIN — IPRATROPIUM BROMIDE AND ALBUTEROL SULFATE 3 ML: .5; 3 SOLUTION RESPIRATORY (INHALATION) at 07:42

## 2024-08-09 RX ADMIN — FUROSEMIDE 40 MG: 10 INJECTION, SOLUTION INTRAMUSCULAR; INTRAVENOUS at 06:04

## 2024-08-09 ASSESSMENT — ACTIVITIES OF DAILY LIVING (ADL)
ADLS_ACUITY_SCORE: 28
ADLS_ACUITY_SCORE: 33
ADLS_ACUITY_SCORE: 33
ADLS_ACUITY_SCORE: 28
ADLS_ACUITY_SCORE: 33
ADLS_ACUITY_SCORE: 28
ADLS_ACUITY_SCORE: 33
ADLS_ACUITY_SCORE: 32
ADLS_ACUITY_SCORE: 28
ADLS_ACUITY_SCORE: 33
ADLS_ACUITY_SCORE: 28
ADLS_ACUITY_SCORE: 32
ADLS_ACUITY_SCORE: 33
ADLS_ACUITY_SCORE: 28
ADLS_ACUITY_SCORE: 33
ADLS_ACUITY_SCORE: 32

## 2024-08-09 NOTE — PROGRESS NOTES
Patient has been on BIPAP during the night and tolerating well. Requested BIPAP due to SOB and increase WOB on high flow NC. Since being on BIPAP patient is more comfortable and RR, WOB and SOB have all decreased. Oxygen needs have went from 50% FiO2 to 35% FiO2 still saturating in high 90's. Mask was changed once tonight from under nose mask to full face mask for patient's comfort. Adjusted I time from .9 to .8 to have longer expiratory phase for comfort with patients pulmonary fibrosis diagnosis.     BIPAP settings:  RR 16  Ipap 14 Epap 8 PS 6  FiO2 35%    RT will continue to monitor.

## 2024-08-09 NOTE — PLAN OF CARE
Problem: Adult Inpatient Plan of Care  Goal: Optimal Comfort and Wellbeing  Outcome: Progressing     Problem: Comorbidity Management  Goal: Maintenance of Heart Failure Symptom Control  Outcome: Progressing   Goal Outcome Evaluation:      Plan of Care Reviewed With: patient    Overall Patient Progress: improvingOverall Patient Progress: improving     Pt is A&OX4, sinus tachy w/ BBB, HFNC with 6L NC at baseline, and SBA. Pt using urinal at the bedside. 2000 ml fluid restriction in place. Pt c/o pain on coccyx, PRN Tylenol given. SOB and cough noted, pt refused PRN.     Sepsis protocol triggered, Dr. Mcfarland notified, lactic acid ordered, resulted at 2.4, MD notified.     Received a call from lab at 1940 from the [facility], informed staff that sputum collected was too contaminated and needs to be recollected. Left note for provider.     Izzy Michaels RN

## 2024-08-09 NOTE — PLAN OF CARE
Problem: Adult Inpatient Plan of Care  Goal: Plan of Care Review  Description: The Plan of Care Review/Shift note should be completed every shift.  The Outcome Evaluation is a brief statement about your assessment that the patient is improving, declining, or no change.  This information will be displayed automatically on your shift  note.  Outcome: Progressing     Problem: Gas Exchange Impaired  Goal: Optimal Gas Exchange  Outcome: Progressing  Intervention: Optimize Oxygenation and Ventilation  Recent Flowsheet Documentation  Taken 8/9/2024 0414 by Eugenia Wallis RN  Head of Bed (HOB) Positioning: HOB at 20-30 degrees  Taken 8/9/2024 0030 by Eugenia Wallis RN  Head of Bed (\[facility]\"") Positioning: HOB at 20-30 degrees  Taken 8/8/2024 2330 by Eugenia Wallis RN  Head of Bed (\[facility]\"") Positioning: HOB at 20-30 degrees   Goal Outcome Evaluation:       Pt had BIPAP on all night tolerating it well. Sating in the  mid to upper 90's. ,No increase work of breathing. denies any difficulty breathing. A/O x 4.VSS. Uses a urinal at bed side. Capable of making needs known.

## 2024-08-09 NOTE — PROGRESS NOTES
Pt transported to Radiology on V60 (settings: S/T, 14/8, 16 RR, 50%, 0.9 i-time). Post-transport, pt was placed on HFNC (50 LPM, 50%). No respiratory complications noted, Ambu bag w/ mask and PEEP valve were present for transport.       Derek Eagle, RT

## 2024-08-09 NOTE — PROGRESS NOTES
"Pulmonary Service Consult Note  Date of Service: 08/09/2024    Assessment:     Wyatt Gutierrez is a 79 year old male, former smoker (quit 2022) with h/o ILD/IPF (diagnosed 2021) followed at the  on home at 6 lPM, h/o TAVR (6 weeks ago), KAITY, HTN, HPL [facility] by [facility]. Estrada who came in with complaints of dyspnea on exertion that has worsened over the past 1 week.  CXR was done and shows stable diffuse interstitial coarsening.  BNP is elevated at 6 K. Na was low. WBC is elevated at 17.7K on admission. Procalcitonin is only 0.06. Etiology is broad and could be heart failure (elevated bnp), viral syndrome, ILD exacerbation, valvular heart disease (recent TAVR).     CTA was done to rule out PE that showed moderate worsening of extensive basilar predominant UIP pulmonary fibrosis.  Superimposed bilateral groundglass opacities were present.  In addition there was evidence of elevated pulmonary arterial and right ventricular enlargement compatible with pulmonary hypertension.     Plan:     Titrate FiO2  HFNC/BiPAP, Currently on HFNC with FiO2 of 45%  IV solumedrol 60 mg q8h.  Will increase steroids to q6h given marked worsening CT chest findings and GGO.  On abx (levofloxacin 750 mg) daily to complete course  Agree with holding azithromycin (chronically on it)  IV lasix 40 mg iv bid. Change to 20 mg IV bid  Follow up sputum g-stain and culture  Viral panel --> neg  Scheduled and prn nebs  Follow up echocardiogram --> pending  Monitor Na -> improving  Monitor I/O, daily weights    I appreciate the opportunity to participate in the care of Wyatt Gutierrez.  Please feel free to contact me at any time.    Whitney José MD  Pulmonary and Critical Care Medicine    History:     HPI: \"Patient is a  former smoker (quit 2022) with h/o ILD/IPF (diagnosed 2021) followed at the , h/o TAVR, KAITY, HTN, HPL followed [facility] by Dr. Estrada who came in with complaints of dyspnea on exertion that has worsened over the past 1 week. " " For his IPF, pt follows up with at the  with Dr Estrada.  For his ILD/IPF, he was started on Ofev in May 2022.  He was started on oxygen on January 2022.  With respect to antifibrotic agents, patient was switched to Esbriet last week.       Pt notes that over the past week, his sob has progressively worsened.  His functional status has decreased. He notes that nebs did not help much.  Pt was found to have O2 sat of about 68% on 6 L.  In the ED, he was started on BiPAP and pulmonary was consulted.  Patient denies any change in his chronic cough.  No fever or chills.  No pedal edema.  Of note, he stopped using his diuretic approximately a month ago due to hyponatremia.\"    PMHx/PSHx:  Past Medical History:   Diagnosis Date    Aortic stenosis     Arthritis     High cholesterol     Hypertension     IPF (idiopathic pulmonary fibrosis) (H)     Sleep apnea      Past Surgical History:   Procedure Laterality Date    ARTHROSCOPY SHOULDER      COLONOSCOPY      CV CORONARY ANGIOGRAM N/A 5/13/2024    Procedure: Coronary Angiogram;  Surgeon: Shiv Robles MD;  Location: [facility]    CV TRANSCATHETER AORTIC VALVE REPLACEMENT-FEMORAL APPROACH N/A 6/11/2024    Procedure: Transcatheter Aortic Valve Replacement-Femoral Approach;  Surgeon: Serjio Gustafson MD;  Location: [facility] CV    ESOPHAGOSCOPY, GASTROSCOPY, DUODENOSCOPY (EGD), COMBINED N/A 10/2/2023    Procedure: ESOPHAGOGASTRODUODENOSCOPY with biopsies;  Surgeon: Reji Baptiste MD;  Location: [facility] OR    OR TRANSCATHETER AORTIC VALVE REPLACEMENT, FEMORAL PERCUTANEOUS APPROACH (STANDBY) N/A 6/11/2024    Procedure: OR TRANSCATHETER AORTIC VALVE REPLACEMENT, FEMORAL PERCUTANEOUS APPROACH (STANDBY);  Surgeon: Susana Mitchell MD;  Location: [facility] CV    OTHER SURGICAL HISTORY Right 1957    heel fracture    OTHER SURGICAL HISTORY      knee arthroscopies    ZZC TOTAL KNEE ARTHROPLASTY Left 10/31/2019    Procedure: LEFT MINIMALLY INVASIVE TOTAL " "KNEE ARTHROPLASTY;  Surgeon: Avelino Canada MD;  Location: [facility] OR;  Service: Orthopedics     Social History     Socioeconomic History    Marital status:      Spouse name: Not on file    Number of children: Not on file    Years of education: Not on file    Highest education level: Not on file   Occupational History    Not on file   Tobacco Use    Smoking status: Former     Current packs/day: 0.00     Average packs/day: 0.5 packs/day for 55.0 years (27.5 ttl pk-yrs)     Types: Cigarettes     Start date: 1966     Quit date: 2021     Years since quittin.6    Smokeless tobacco: Never   Substance and Sexual Activity    Alcohol use: Yes     Comment: 1-2 glasses of wine/ 2 x-week    Drug use: Not Currently     Comment: edible marijuana in the past    Sexual activity: Not on file   Other Topics Concern    Not on file   Social History Narrative    Lives with wife - single level house     Social Determinants of Health     Financial Resource Strain: Not on file   Food Insecurity: Not on file   Transportation Needs: Not on file   Physical Activity: Not on file   Stress: Not on file   Social Connections: Not on file   Interpersonal Safety: Not on file   Housing Stability: Not on file       Review of Systems - 10 point review of system negative except for what is mentioned in the HPI.    Exam/Data:   BP (!) 146/79 (BP Location: Right arm)   Pulse 110   Temp 97  F (36.1  C) (Axillary)   Resp 22   Ht 1.778 m (5' 10\")   Wt 87.1 kg (192 lb)   SpO2 92%   BMI 27.55 kg/m      GEN: comfortable, NAD, on bipap  HEENT: NCAT, EMOI, mmm  CVS: S1S2, RRR  Lung: bibasilar crackles  Abd: Soft, NT, ND,  + BS.   Ext: no c/c/e  Neuro: nonfocal  Musculoskeletal: FROM all extremities  Psych: appropriate    DATA    Labs: Reviewed in EMR and outside records where pertinent.       IMAGING: Personally reviewed images. Formal radiology interpretation noted below.    CT Chest Pulmonary Embolism w Contrast    Result " Date: 8/8/2024  EXAM: CT CHEST PULMONARY EMBOLISM W CONTRAST LOCATION: [facility] DATE: 8/8/2024 INDICATION: Acute and chronic respiratory failure. Recent TAVR and interstitial lung disease. COMPARISON: Chest CT 4/26/2022. TECHNIQUE: CT chest pulmonary angiogram during arterial phase injection of IV contrast. Multiplanar reformats and MIP reconstructions were performed. Dose reduction techniques were used. CONTRAST: isovue 370 90ml FINDINGS: ANGIOGRAM CHEST: No pulmonary embolism. Pulmonary trunk enlargement measuring 3.5 cm. Nonaneurysmal aorta without dissection. Post TAVR. LUNGS AND PLEURA: Worsening of extensive basilar predominant UIP pattern pulmonary fibrosis with basilar predominant honeycombing. Worsened moderate basilar predominant bronchiectasis. Superimposed bilateral groundglass opacities also present, worse in the left lung. Mild septal thickening. Trace pleural fluid. No pneumothorax. MEDIASTINUM/AXILLAE: Mild to moderate mediastinal and perihilar adenopathy, likely reactive. Moderate right ventricular enlargement. No pericardial effusion. CORONARY ARTERY CALCIFICATION: Present. UPPER ABDOMEN: No actionable findings.     MUSCULOSKELETAL: Degenerative changes spine IMPRESSION: 1.  No pulmonary embolism. 2.  Since 2022, moderate worsening of extensive basilar predominant UIP pattern pulmonary fibrosis. 3.  Superimposed bilateral groundglass opacities are present, with primary differentials of interstitial lung disease exacerbation versus pulmonary edema versus hemorrhage. Trace pleural fluid. 4.  Worsening moderate basilar predominant bronchiectasis. 5.  Pulmonary arterial and right ventricular enlargement, compatible with pulmonary hypertension and elevated right heart pressure.     XR Chest Port 1 View    Result Date: 8/8/2024  EXAM: XR CHEST PORT 1 VIEW LOCATION: [facility] DATE: 8/8/2024 INDICATION: acute hypoxic respiratory failure on bipap with IPF  COMPARISON: Chest x-ray 2024, CT chest 2022     IMPRESSION: Low lung volumes. Stable diffuse interstitial coarsening correlating with known UIP pattern interstitial lung disease. No definite superimposed acute airspace disease. However, given the patient's symptoms recommend short interval follow-up with x-ray or CT. No definite pleural effusion. Stable heart size. TAVR.    Echocardiogram Complete    Result Date: 7/10/2024  [id number]^ROGER^[place] [address]  Name: ASTER FARRELL MRN: [id number] : 1945 Study Date: 07/10/2024 09:50 AM Age: 79 yrs Gender: Male Patient Location: [facility] Reason For Study: Nonrheumatic aortic valve stenosis Ordering Physician: DIYA DURAN Referring Physician: DIYA DURAN Performed By: [facility]  BSA: 2.1 m2 Height: 70 in Weight: 200 lb HR: 91 BP: 116/74 mmHg ______________________________________________________________________________ Procedure Complete Echo Adult. Adequate quality two-dimensional was performed and interpreted. Adequate quality color and spectral Doppler were performed and interpreted. ______________________________________________________________________________ Interpretation Summary  There is mild concentric left ventricular hypertrophy. Left ventricular size, wall motion and function are normal. The ejection fraction is > 65%. Mildly decreased right ventricular systolic function The gradient is normal for this prosthetic aortic valve. IVC diameter >2.1 cm collapsing <50% with sniff suggests a high RA pressure estimated at 15 mmHg or greater. There is a bio-prosthetic aortic valve (documented 26 mm Dan Gabe 3 Ultra tissue valve). MIld to moderate paravalvular insufficiency is noted, visualized better on current exam vs images of 2024. Likely unchanged in severity. ______________________________________________________________________________ Left Ventricle Left  ventricular size, wall motion and function are normal. The ejection fraction is > 65%. There is mild concentric left ventricular hypertrophy. Left ventricular diastolic function is normal. No regional wall motion abnormalities noted.  Right Ventricle The right ventricle is normal size. Mildly decreased right ventricular systolic function. TAPSE is abnormal, which is consistent with abnormal right ventricular systolic function.  Atria The left atrium is moderate to severely dilated. Right atrial size is normal. There is no color Doppler evidence of an atrial shunt.  Mitral Valve There is mild mitral annular calcification.  Tricuspid Valve The tricuspid valve is not well visualized, but is grossly normal. There is trace tricuspid regurgitation. Right ventricular systolic pressure could not be approximated due to inadequate tricuspid regurgitation.  Aortic Valve The mean AoV pressure gradient is 12.8 mmHg. There is a bio-prosthetic aortic valve (documented 26 mm Dan Gabe 3 Ultra tissue valve). MIld to moderate paravalvular insufficiency is noted, visualized better on current exam vs images of 6/12/2024. Likely unchanged in severity. The gradient is normal for this prosthetic aortic valve. The prosthetic aortic valve is not well visualized.  Pulmonic Valve The pulmonic valve is not well seen, but is grossly normal.  Vessels The aorta root is normal. Normal size ascending aorta. IVC diameter >2.1 cm collapsing <50% with sniff suggests a high RA pressure estimated at 15 mmHg or greater.  Pericardium There is no pericardial effusion.  ______________________________________________________________________________ MMode/2D Measurements & Calculations IVSd: 1.4 cm LVIDd: 4.4 cm LVIDs: 2.7 cm LVPWd: 1.2 cm FS: 38.4 % LV mass(C)d: 212.1 grams LV mass(C)dI: 101.6 grams/m2  Ao root diam: 3.3 cm asc Aorta Diam: 3.7 cm LVOT diam: 1.8 cm LVOT area: 2.5 cm2 Ao root diam index Ht(cm/m): 1.9 Ao root diam index BSA (cm/m2): 1.6  Asc Ao diam index BSA (cm/m2): 1.8 Asc Ao diam index Ht(cm/m): 2.1 EF Biplane: 64.8 % LA Volume (BP): 63.0 ml LA Volume Index (BP): 30.1 ml/m2  LA Volume Indexed (AL/bp): 32.9 ml/m2 RWT: 0.56 TAPSE: 1.7 cm  Doppler Measurements & Calculations MV E max wally: 61.8 cm/sec MV A max wally: 115.0 cm/sec MV E/A: 0.54 MV dec time: 0.26 sec Ao V2 max: 232.3 cm/sec Ao max P.0 mmHg Ao V2 mean: 169.1 cm/sec Ao mean P.8 mmHg Ao V2 VTI: 53.1 cm JOJO(I,D): 1.9 cm2 JOJO(V,D): 2.1 cm2 AI P1/2t: 348.1 msec Ao acc time: 0.07 sec  LV V1 max P.0 mmHg LV V1 max: 199.4 cm/sec LV V1 VTI: 41.9 cm SV(LVOT): 102.9 ml SI(LVOT): 49.3 ml/m2 PA V2 max: 98.1 cm/sec PA max PG: 3.9 mmHg PA acc time: 0.11 sec TR max wally: 89.3 cm/sec TR max PG: 3.2 mmHg AV Wally Ratio (DI): 0.86 JOJO Index (cm2/m2): 0.93 E/E' av.8 Lateral E/e': 7.8 Medial E/e': 11.8 RV S Wally: 8.7 cm/sec  ______________________________________________________________________________ Report approved by: Carly Gonzalez 07/10/2024 03:56 PM         PFT DATA on 2024:  Although the FEV1 and FVC are reduced, the FEV1/FVC ratio is normal. The inspiratory flow rates are within normal limits.  The lung volumes are reduced.  Diffusing capacity is reduced but was not corrected for hemoglobin.   IMPRESSION:   Severe Restriction.   Severe diffusion defect.       History reviewed. No pertinent family history.  Allergies   Allergen Reactions    Animal Dander Unknown    Chalk     Dogs Other (See Comments)     Pet Dander    Maple Tree     Mold [Molds & Smuts] Unknown       Medications:     No current outpatient medications on file.       Much or all of the text in this note was generated through the use of the Dragon Dictate voice-to-text software. Errors in spelling or words which seem out of context are unintentional. Sound alike errors, in particular, may have escaped editing.

## 2024-08-09 NOTE — PROGRESS NOTES
[facility]    Medicine Progress Note - Hospitalist Service    Date of Admission:  8/8/2024    Assessment & Plan      Wyatt Gutierrez is a 79 year old male history of IPF on chronic oxygen as well as severe aortic stenosis, status post TAVR 6/2024 as well as history of palpitations with recent workup admitted to the hospital with acute on chronic respiratory failure    Acute on chronic respiratory failure  --- Differential still wide and includes worsening of known IPF, versus infectious etiology, versus acute CHF due to hold of diuretic over the past month.  --- So far workup includes elevated BNP, low Pro-Dariel, unremarkable chest x-ray.  --- Ct neg for PE,moderate worsening of pulm fibrosis.   --- Resp cx in process, viral panel negative  --- Currently on Hiflo, was on BiPAP on admission. At baseline 6L with activity, 3L at rest.  --- Continue IV steroids per pulm reccs and on abx   --- On chronic azithromycin, on hold for now.   --- On diuretic, started lasix 20mg Q12H, monitor I/O  --- ECHO reviewed    Leukocytosis - resolved    Sinus tachycardia  --- History of palpitations with some cardiac workup  --- Telemetry  --- TSH normal  --- Other workup per above  --- Continue home toprol    IPF  ---See info above.  Follows with Dr. Estrada with recent visit---  --- Continue home  Brovana, DuoNeb, pirfenidone (recent med change)  --- Been off prophylactic azithromycin as was on Levaquin. Finish Levaquin  --- Pulm following, On IV steroids    Aortic stenosis  Recent TAVR  --- ECHO reviewed  --- Continue beta-blocker  --- Cards notes reviewed.  Taken off his Dyazide 1 month ago.    Hypertension--continue Toprol, IV diuretic  PTA Dyazide has been n hold as out-pt by his cardiologist given he was hyponatremic    Hyperlipidemia--continue Lipitor    GERD - continue PPI    Tailbone pain; positional, On Lidoderm patch              Diet: Combination Diet 2 gm NA Diet; No Caffeine Diet (and additional linked  "orders)  Fluid restriction 2000 ML FLUID (and additional linked orders)    DVT Prophylaxis: Lovenox  Foreman Catheter: Not present  Lines: None     Cardiac Monitoring: ACTIVE order. Indication: Acute decompensated heart failure (48 hours)  Code Status: No CPR- Do NOT Intubate      Clinically Significant Risk Factors Present on Admission                # Drug Induced Platelet Defect: home medication list includes an antiplatelet medication   # Hypertension: Noted on problem list    # Anemia: based on hgb <11       # Overweight: Estimated body mass index is 27.55 kg/m  as calculated from the following:    Height as of this encounter: 1.778 m (5' 10\").    Weight as of this encounter: 87.1 kg (192 lb).       # Financial/Environmental Concerns: none               Disposition Plan     Medically Ready for Discharge: Anticipated in 2-4 Days             Trish Mcfarland MD  Hospitalist Service  [facility]  Securely message with [employer] (more info)  Text page via "[employer]" Paging/Directory   ______________________________________________________________________    Interval History   Patient is new to me. Chart reviewed and events noted.  Patient seen and examined.   SOB+, slightly better today. Some chest pain, cough. No dizziness, N/V, fever.     Physical Exam   Vital Signs: Temp: 98.5  F (36.9  C) Temp src: Oral BP: 129/76 Pulse: 105   Resp: 22 SpO2: 92 % O2 Device: High Flow Nasal Cannula (HFNC) Oxygen Delivery: 40 LPM  Weight: 192 lbs 0 oz    General: Pleasant, NAD  HEENT:EOMI, AT,NC  CVS:RRR, no edema  RS:Diminished BS, crackles b/l   Abd: Soft, NT,ND  Neurology:Grossly normal  Psy:Approrpiate affect      Medical Decision Making       >50 MINUTES SPENT BY ME on the date of service doing chart review, history, exam, documentation & further activities per the note.  MANAGEMENT DISCUSSED with the following over the past 24 hours: Patient, wife, SW/CM       Data     I have personally reviewed the following " data over the past 24 hrs:    7.7  \   9.6 (L)   / 274     135 94 (L) 16.0 /  161 (H)   4.0 28 0.87 \     ALT: 18 AST: 21 AP: 129 TBILI: 0.5   ALB: 3.8 TOT PROTEIN: 7.0 LIPASE: N/A     Trop: N/A BNP: N/A

## 2024-08-09 NOTE — PLAN OF CARE
Problem: Gas Exchange Impaired  Goal: Optimal Gas Exchange  Outcome: Progressing     Goal Outcome Evaluation:  Denies pain.  Sinus tachy on tele.  On HFNC @ 40L 50%FI02.  Complaining of a cough and intermittent shortness of breath.  Cough syrup given x1.  Sputum sample sent.  Continues on IV lasix and solumedrol.  Echo completed.

## 2024-08-10 ENCOUNTER — APPOINTMENT (OUTPATIENT)
Dept: OCCUPATIONAL THERAPY | Facility: HOSPITAL | Age: 79
DRG: 196 | End: 2024-08-10
Attending: FAMILY MEDICINE
Payer: MEDICARE

## 2024-08-10 PROCEDURE — 999N000157 HC STATISTIC RCP TIME EA 10 MIN

## 2024-08-10 PROCEDURE — 97165 OT EVAL LOW COMPLEX 30 MIN: CPT | Mod: GO

## 2024-08-10 PROCEDURE — 99233 SBSQ HOSP IP/OBS HIGH 50: CPT | Performed by: INTERNAL MEDICINE

## 2024-08-10 PROCEDURE — 250N000009 HC RX 250: Performed by: FAMILY MEDICINE

## 2024-08-10 PROCEDURE — 250N000013 HC RX MED GY IP 250 OP 250 PS 637: Performed by: FAMILY MEDICINE

## 2024-08-10 PROCEDURE — 94640 AIRWAY INHALATION TREATMENT: CPT

## 2024-08-10 PROCEDURE — 250N000011 HC RX IP 250 OP 636: Performed by: FAMILY MEDICINE

## 2024-08-10 PROCEDURE — 210N000001 HC R&B IMCU HEART CARE

## 2024-08-10 PROCEDURE — 250N000011 HC RX IP 250 OP 636: Performed by: INTERNAL MEDICINE

## 2024-08-10 PROCEDURE — 97535 SELF CARE MNGMENT TRAINING: CPT | Mod: GO

## 2024-08-10 PROCEDURE — 94640 AIRWAY INHALATION TREATMENT: CPT | Mod: 76

## 2024-08-10 RX ORDER — PIRFENIDONE 267 MG/1
534 CAPSULE ORAL 3 TIMES DAILY
Status: COMPLETED | OUTPATIENT
Start: 2024-08-10 | End: 2024-08-12

## 2024-08-10 RX ORDER — PIRFENIDONE 267 MG/1
801 CAPSULE ORAL
Status: DISCONTINUED | OUTPATIENT
Start: 2024-08-14 | End: 2024-08-14 | Stop reason: HOSPADM

## 2024-08-10 RX ADMIN — PIRFENIDONE 534 MG: 267 CAPSULE ORAL at 08:39

## 2024-08-10 RX ADMIN — METHYLPREDNISOLONE SODIUM SUCCINATE 62.5 MG: 125 INJECTION, POWDER, FOR SOLUTION INTRAMUSCULAR; INTRAVENOUS at 14:43

## 2024-08-10 RX ADMIN — ATORVASTATIN CALCIUM 80 MG: 40 TABLET, FILM COATED ORAL at 21:06

## 2024-08-10 RX ADMIN — IPRATROPIUM BROMIDE AND ALBUTEROL SULFATE 3 ML: .5; 3 SOLUTION RESPIRATORY (INHALATION) at 20:05

## 2024-08-10 RX ADMIN — PANTOPRAZOLE SODIUM 40 MG: 40 TABLET, DELAYED RELEASE ORAL at 08:36

## 2024-08-10 RX ADMIN — PIRFENIDONE 534 MG: 267 CAPSULE ORAL at 16:02

## 2024-08-10 RX ADMIN — SENNOSIDES AND DOCUSATE SODIUM 1 TABLET: 8.6; 5 TABLET ORAL at 08:37

## 2024-08-10 RX ADMIN — FUROSEMIDE 20 MG: 10 INJECTION, SOLUTION INTRAMUSCULAR; INTRAVENOUS at 05:00

## 2024-08-10 RX ADMIN — METOPROLOL SUCCINATE 50 MG: 50 TABLET, EXTENDED RELEASE ORAL at 08:37

## 2024-08-10 RX ADMIN — ASPIRIN 81 MG: 81 TABLET, COATED ORAL at 08:36

## 2024-08-10 RX ADMIN — SENNOSIDES AND DOCUSATE SODIUM 1 TABLET: 8.6; 5 TABLET ORAL at 21:05

## 2024-08-10 RX ADMIN — GUAIFENESIN AND DEXTROMETHORPHAN 10 ML: 100; 10 SYRUP ORAL at 00:21

## 2024-08-10 RX ADMIN — METHYLPREDNISOLONE SODIUM SUCCINATE 62.5 MG: 125 INJECTION, POWDER, FOR SOLUTION INTRAMUSCULAR; INTRAVENOUS at 21:06

## 2024-08-10 RX ADMIN — PANTOPRAZOLE SODIUM 40 MG: 40 TABLET, DELAYED RELEASE ORAL at 15:59

## 2024-08-10 RX ADMIN — IPRATROPIUM BROMIDE AND ALBUTEROL SULFATE 3 ML: .5; 3 SOLUTION RESPIRATORY (INHALATION) at 01:58

## 2024-08-10 RX ADMIN — ENOXAPARIN SODIUM 40 MG: 40 INJECTION SUBCUTANEOUS at 17:10

## 2024-08-10 RX ADMIN — METHYLPREDNISOLONE SODIUM SUCCINATE 62.5 MG: 125 INJECTION, POWDER, FOR SOLUTION INTRAMUSCULAR; INTRAVENOUS at 03:10

## 2024-08-10 RX ADMIN — ACETAMINOPHEN 650 MG: 325 TABLET ORAL at 05:33

## 2024-08-10 RX ADMIN — LIDOCAINE 1 PATCH: 246 PATCH TOPICAL at 21:06

## 2024-08-10 RX ADMIN — ACETAMINOPHEN 650 MG: 325 TABLET ORAL at 13:40

## 2024-08-10 RX ADMIN — PIRFENIDONE 534 MG: 267 CAPSULE ORAL at 21:07

## 2024-08-10 RX ADMIN — FORMOTEROL FUMARATE DIHYDRATE 20 MCG: 20 SOLUTION RESPIRATORY (INHALATION) at 08:55

## 2024-08-10 RX ADMIN — IPRATROPIUM BROMIDE AND ALBUTEROL SULFATE 3 ML: .5; 3 SOLUTION RESPIRATORY (INHALATION) at 08:56

## 2024-08-10 RX ADMIN — FORMOTEROL FUMARATE DIHYDRATE 20 MCG: 20 SOLUTION RESPIRATORY (INHALATION) at 23:26

## 2024-08-10 RX ADMIN — FUROSEMIDE 20 MG: 10 INJECTION, SOLUTION INTRAMUSCULAR; INTRAVENOUS at 17:05

## 2024-08-10 RX ADMIN — ACETAMINOPHEN 650 MG: 325 TABLET ORAL at 21:10

## 2024-08-10 RX ADMIN — IPRATROPIUM BROMIDE AND ALBUTEROL SULFATE 3 ML: .5; 3 SOLUTION RESPIRATORY (INHALATION) at 15:52

## 2024-08-10 RX ADMIN — METHYLPREDNISOLONE SODIUM SUCCINATE 62.5 MG: 125 INJECTION, POWDER, FOR SOLUTION INTRAMUSCULAR; INTRAVENOUS at 08:37

## 2024-08-10 ASSESSMENT — ACTIVITIES OF DAILY LIVING (ADL)
ADLS_ACUITY_SCORE: 33
PREVIOUS_RESPONSIBILITIES: MEDICATION MANAGEMENT
ADLS_ACUITY_SCORE: 35
ADLS_ACUITY_SCORE: 33
ADLS_ACUITY_SCORE: 35
ADLS_ACUITY_SCORE: 35
ADLS_ACUITY_SCORE: 33
ADLS_ACUITY_SCORE: 35
ADLS_ACUITY_SCORE: 35
ADLS_ACUITY_SCORE: 33
ADLS_ACUITY_SCORE: 33
ADLS_ACUITY_SCORE: 35
ADLS_ACUITY_SCORE: 33
ADLS_ACUITY_SCORE: 35
ADLS_ACUITY_SCORE: 33
ADLS_ACUITY_SCORE: 35
ADLS_ACUITY_SCORE: 33

## 2024-08-10 NOTE — PROVIDER NOTIFICATION
RC note:     FIO2 titrated 40% to 35%, sat 95% tolerating well.      08/10/24 4733   Tech Time   $Tech Time (10 minute increments) 1   Vital Signs   Pulse 100   Oximeter Heart Rate 99 bpm   Oxygen Therapy   SpO2 90 %   O2 Device High Flow Nasal Cannula (HFNC)   FiO2 (%) 35 %   Oxygen Delivery 40 LPM

## 2024-08-10 NOTE — PROGRESS NOTES
[facility]    Medicine Progress Note - Hospitalist Service    Date of Admission:  8/8/2024    Assessment & Plan   Wyatt Gutierrez is a 79 year old male history of IPF on chronic oxygen as well as severe aortic stenosis, status post TAVR 6/2024 as well as history of palpitations with recent workup admitted to the hospital with acute on chronic respiratory failure    Acute on chronic respiratory failure  - Differential still wide and includes worsening of known IPF, versus infectious etiology, versus acute CHF due to hold of diuretic over the past month.  - Workup included elevated BNP, low Pro-Dariel, unremarkable chest x-ray.  - CT chest neg for PE,moderate worsening of pulm fibrosis.   - Resp cx in process, viral panel negative  - Currently on Hiflo, was on BiPAP on admission. At baseline 6L with activity, 3L at rest.  - Continue IV steroids per pulm reccs and on abx. Appreciate intensivist to follow.  - Currently on levofloxacin 750 mg.  - On diuretic, started lasix 20mg Q12H, monitor I/O    IPF  - Continue home  Brovana, DuoNeb, pirfenidone (recent med change).  Follow-up with [facility] pulmonary department upon discharge.  - Been off prophylactic azithromycin. Continue Levaquin    Sinus tachycardia  - History of palpitations with some cardiac workup  -Telemetry  -TSH normal  -Other workup per above  - Continue home toprol    Aortic stenosis  Recent TAVR  - Continue beta-blocker  - Cards notes reviewed.  Taken off his Dyazide 1 month ago.    Hypertension  -continue Toprol, IV diuretic  -PTA Dyazide has been on hold as out-pt by his cardiologist given he was hyponatremic    Hyperlipidemia  -continue Lipitor    GERD   - continue PPI    Leukocytosis   - resolved    Tailbone pain  -positional, On Lidoderm patch        Diet: Combination Diet 2 gm NA Diet; No Caffeine Diet (and additional linked orders)  Fluid restriction 2000 ML FLUID (and additional linked orders)    DVT Prophylaxis:  "Enoxaparin (Lovenox) SQ  Foreman Catheter: Not present  Lines: None     Cardiac Monitoring: ACTIVE order. Indication: Acute decompensated heart failure (48 hours)  Code Status: No CPR- Do NOT Intubate      Clinically Significant Risk Factors                  # Hypertension: Noted on problem list           # Overweight: Estimated body mass index is 27.84 kg/m  as calculated from the following:    Height as of this encounter: 1.778 m (5' 10\").    Weight as of this encounter: 88 kg (194 lb 0.1 oz)., PRESENT ON ADMISSION     # Financial/Environmental Concerns: none      Disposition Plan     Medically Ready for Discharge: Anticipated in 2-4 Days    Montana Lakhani DO  Hospitalist Service  [facility]  Securely message with [employer] (more info)  Text page via [employer] Paging/Directory   ______________________________________________________________________    Interval History   The patient was resting comfortably in bed with nonrebreather high flow oxygen.  No complaints.    Physical Exam   Vital Signs: Temp: 98.2  F (36.8  C) Temp src: Oral BP: (!) 148/80 Pulse: 82   Resp: 22 SpO2: 90 % O2 Device: High Flow Nasal Cannula (HFNC) Oxygen Delivery: 40 LPM  Weight: 194 lbs .08 oz  GEN: NAD,   HEENT: NCAT, PERRLA, EOMI, Hi-flow nasal canula in place.  CV: Regular rate and rhythm, S1 & S2 positive.  LUNGS: Bilateral breathsounds heard.   ABDOMEN: Positive bowel sounds in all quadrants, soft, no rebound or guarding  MUSCULOSKELETAL: No leg swelling  Neurological: Grossly nonfocal    Medical Decision Making     35 MINUTES SPENT BY ME on the date of service doing chart review, history, exam, documentation & further activities per the note.  MANAGEMENT DISCUSSED with the following over the past 24 hours:         Data     I have personally reviewed the following data over the past 24 hrs:    Procal: N/A CRP: N/A Lactic Acid: 2.0         Imaging results reviewed over the past 24 hrs:   No results found for this or " any previous visit (from the past 24 hour(s)).

## 2024-08-10 NOTE — PLAN OF CARE
Problem: Gas Exchange Impaired  Goal: Optimal Gas Exchange  Outcome: Progressing  Intervention: Optimize Oxygenation and Ventilation  Recent Flowsheet Documentation  Taken 8/10/2024 0020 by Bekah Hu RN  Head of Bed (HOB) Positioning: HOB at 20-30 degrees     Problem: Risk for Delirium  Goal: Optimal Coping  Outcome: Progressing  Intervention: Optimize Psychosocial Adjustment to Delirium  Recent Flowsheet Documentation  Taken 8/10/2024 0020 by Bekah Hu RN  Supportive Measures: active listening utilized  Goal: Improved Behavioral Control  Outcome: Progressing  Intervention: Prevent and Manage Agitation  Recent Flowsheet Documentation  Taken 8/10/2024 0020 by Bekah Hu RN  Environment Familiarity/Consistency: daily routine followed  Intervention: Minimize Safety Risk  Recent Flowsheet Documentation  Taken 8/10/2024 0020 by Bekah Hu RN  Enhanced Safety Measures: pain management  Goal: Improved Attention and Thought Clarity  Outcome: Progressing  Goal: Improved Sleep  Outcome: Progressing       Goal Outcome Evaluation:  Patient is aox4. Denied chest pain and dizziness. Patient SOB with activity in bed. Patient desat to 78% with repositioning and needing approximately 5 minutes to recover. Using HFNC 40L, 40%. Getting IV steroid and Dueneb. Prn cough syrup also helpful. Has SR/sinus tachy with BBB. Hr 80s-100s. No fever overnight. Continue to monitor. Patient is bedrest. Call-light within reach. Bed alarm on.

## 2024-08-10 NOTE — PLAN OF CARE
"  Problem: Adult Inpatient Plan of Care  Goal: Plan of Care Review  Description: The Plan of Care Review/Shift note should be completed every shift.  The Outcome Evaluation is a brief statement about your assessment that the patient is improving, declining, or no change.  This information will be displayed automatically on your shift  note.  Outcome: Progressing  Goal: Patient-Specific Goal (Individualized)  Description: You can add care plan individualizations to a care plan. Examples of Individualization might be:  \"Parent requests to be called daily at 9am for status\", \"I have a hard time hearing out of my right ear\", or \"Do not touch me to wake me up as it startles  me\".  Outcome: Progressing  Goal: Absence of Hospital-Acquired Illness or Injury  Outcome: Progressing  Intervention: Identify and Manage Fall Risk  Recent Flowsheet Documentation  Taken 8/10/2024 1614 by Mary Lou Castro RN  Safety Promotion/Fall Prevention: safety round/check completed  Taken 8/10/2024 1210 by Mary Lou Castro RN  Safety Promotion/Fall Prevention: safety round/check completed  Taken 8/10/2024 0832 by Mary Lou Castro RN  Safety Promotion/Fall Prevention: safety round/check completed  Intervention: Prevent Skin Injury  Recent Flowsheet Documentation  Taken 8/10/2024 1614 by Mary Lou Castro RN  Body Position:   turned   right  Taken 8/10/2024 1600 by Mary Lou Castro RN  Body Position:   sitting up in bed   supine  Taken 8/10/2024 1300 by Mary Lou Castro RN  Body Position: (up in the chair) --  Taken 8/10/2024 1210 by Mary Lou Castro RN  Body Position:   turned   right  Taken 8/10/2024 1000 by Mary Lou Castro RN  Body Position: turned  Taken 8/10/2024 0832 by Mary Lou Castro RN  Body Position:   turned   right  Intervention: Prevent Infection  Recent Flowsheet Documentation  Taken 8/10/2024 1614 by Mary Lou Castro RN  Infection Prevention: rest/sleep promoted  Taken 8/10/2024 1210 by Mary Lou Castro" "RN  Infection Prevention: rest/sleep promoted  Taken 8/10/2024 0832 by Mary Lou Castro RN  Infection Prevention: rest/sleep promoted  Goal: Optimal Comfort and Wellbeing  Outcome: Progressing  Intervention: Monitor Pain and Promote Comfort  Recent Flowsheet Documentation  Taken 8/10/2024 1340 by Mary Lou Castro, RN  Pain Management Interventions: medication (see MAR)   Goal Outcome Evaluation:       Tolerated being up in the chair O2 Saturation intermittently goes down to the low 80's and now being trialed to Nasal Cannula @ 4L and on continous pulse oximetry ,Much improvement during the day as verbalized by patient He said \"I feel much better\"and will continue monitoring .                 "

## 2024-08-10 NOTE — PROGRESS NOTES
"Pulmonary Service Consult Note  Date of Service: 08/10/2024    Assessment:     Wyatt Gutierrez is a 79 year old male, former smoker (quit 2022) with h/o ILD/IPF (diagnosed 2021) followed at the  on home at 6 lPM, h/o TAVR (6 weeks ago), KAITY, HTN, HPL followed in [place] by Dr. Estrada who came in with complaints of dyspnea on exertion that has worsened over the past 1 week.     CTA was done to rule out PE that showed moderate worsening of extensive basilar predominant UIP pulmonary fibrosis.  Superimposed bilateral groundglass opacities were present.  In addition there was evidence of elevated pulmonary arterial and right ventricular enlargement compatible with pulmonary hypertension.     Plan:     Acute on chronic resp failure with hypoxia due to Acute exacerbation of UIP pulm fibrosis vs new PNA  - is on 6L NC at baseline  Titrate FiO2  HFNC/BiPAP, Currently on HFNC with FiO2 of 40%  IV solumedrol 60 mg q8h.  increased steroids to q6h given marked worsening CT chest findings and GGO on 8/9  On abx (levofloxacin 750 mg) daily to complete course  Agree with holding azithromycin (chronically on it)  Follow up sputum g-stain and culture - no growth  Viral panel --> neg  Scheduled and prn nebs  echocardiogram --> worsening RV dilation and increased in RV pressures     ILD/IPF (diagnosed 2021) followed at the   - was up titrating on pirfenidone, will continue      Exam/Data:   BP (!) 148/80 (BP Location: Right arm)   Pulse 82   Temp 98.2  F (36.8  C) (Oral)   Resp 22   Ht 1.778 m (5' 10\")   Wt 88 kg (194 lb 0.1 oz)   SpO2 90%   BMI 27.84 kg/m      GEN: comfortable, NAD   HEENT: HFNC in place  CVS: RRR  Lung: bibasilar crackles, decreased on right base  Abd: Soft, NT, ND   Ext: no rashes on visible skin  Neuro: nonfocal  Musculoskeletal: moving all extremities  Psych: appropriate    DATA    Cultures  7-Day Micro Results    Collected Updated Procedure Result Status    08/09/2024 1307 08/09/2024 1847 Respiratory " Aerobic Bacterial Culture with Gram Stain [[id number]]   Sputum from Expectorate    Final result Component Value   Culture >10 Squamous epithelial cells/low power field indicates oral contamination. Please recollect.   Gram Stain Result >10 Squamous epithelial cells/low power field    <25 PMNs/low power field    2+ Mixed maureen             08/08/2024 1739 08/08/2024 2318 Respiratory Panel PCR [[id number]]    Swab from Nasopharyngeal    Final result Component Value   Adenovirus Not Detected   Coronavirus Not Detected   This test detects Coronavirus 229E, HKU1, NL63 and OC43 but does not distinguish between them. It does not detect MERS ( Respiratory Syndrome), SARS (Severe Acute Respiratory Syndrome) or 2019-nCoV (Novel 2019) Coronavirus.   Human Metapneumovirus Not Detected   Human Rhin/Enterovirus Not Detected   Influenza A Not Detected   Influenza A, H1 Not Detected   Influenza A 2009 H1N1 Not Detected   Influenza A, H3 Not Detected   Influenza B Not Detected   Parainfluenza Virus 1 Not Detected   Parainfluenza Virus 2 Not Detected   Parainfluenza Virus 3 Not Detected   Parainfluenza Virus 4 Not Detected   Respiratory Syncytial Virus A Not Detected   Respiratory Syncytial Virus B Not Detected   Chlamydia Pneumoniae Not Detected   Mycoplasma Pneumoniae Not Detected          08/08/2024 1354 08/08/2024 1447 Symptomatic Influenza A/B, RSV, & SARS-CoV2 PCR (COVID-19) Nasopharyngeal [[id number]]    Swab from Nasopharyngeal    Final result Component Value   Influenza A PCR Negative   Influenza B PCR Negative   RSV PCR Negative   SARS CoV2 PCR Negative   NEGATIVE: SARS-CoV-2 (COVID-19) RNA not detected, presumed negative.          08/08/2024 1347 08/09/2024 1746 Blood Culture Peripheral Blood [[id number]]   Peripheral Blood    Preliminary result Component Value   Culture No growth after 1 day P                   IMAGING: Personally reviewed images. Formal radiology interpretation noted below.    CT  Chest Pulmonary Embolism w Contrast    Result Date: 8/8/2024  EXAM: CT CHEST PULMONARY EMBOLISM W CONTRAST LOCATION: [facility] DATE: 8/8/2024 INDICATION: Acute and chronic respiratory failure. Recent TAVR and interstitial lung disease. COMPARISON: Chest CT 4/26/2022. TECHNIQUE: CT chest pulmonary angiogram during arterial phase injection of IV contrast. Multiplanar reformats and MIP reconstructions were performed. Dose reduction techniques were used. CONTRAST: isovue 370 90ml FINDINGS: ANGIOGRAM CHEST: No pulmonary embolism. Pulmonary trunk enlargement measuring 3.5 cm. Nonaneurysmal aorta without dissection. Post TAVR. LUNGS AND PLEURA: Worsening of extensive basilar predominant UIP pattern pulmonary fibrosis with basilar predominant honeycombing. Worsened moderate basilar predominant bronchiectasis. Superimposed bilateral groundglass opacities also present, worse in the left lung. Mild septal thickening. Trace pleural fluid. No pneumothorax. MEDIASTINUM/AXILLAE: Mild to moderate mediastinal and perihilar adenopathy, likely reactive. Moderate right ventricular enlargement. No pericardial effusion. CORONARY ARTERY CALCIFICATION: Present. UPPER ABDOMEN: No actionable findings.     MUSCULOSKELETAL: Degenerative changes spine IMPRESSION: 1.  No pulmonary embolism. 2.  Since 2022, moderate worsening of extensive basilar predominant UIP pattern pulmonary fibrosis. 3.  Superimposed bilateral groundglass opacities are present, with primary differentials of interstitial lung disease exacerbation versus pulmonary edema versus hemorrhage. Trace pleural fluid. 4.  Worsening moderate basilar predominant bronchiectasis. 5.  Pulmonary arterial and right ventricular enlargement, compatible with pulmonary hypertension and elevated right heart pressure.     XR Chest Port 1 View    Result Date: 8/8/2024  EXAM: XR CHEST PORT 1 VIEW LOCATION: [facility] DATE: 8/8/2024 INDICATION: acute  hypoxic respiratory failure on bipap with IPF COMPARISON: Chest x-ray 6/12/2024, CT chest 4/26/2022     IMPRESSION: Low lung volumes. Stable diffuse interstitial coarsening correlating with known UIP pattern interstitial lung disease. No definite superimposed acute airspace disease. However, given the patient's symptoms recommend short interval follow-up with x-ray or CT. No definite pleural effusion. Stable heart size. TAVR.    PFT DATA on 8/2024:  Although the FEV1 and FVC are reduced, the FEV1/FVC ratio is normal. The inspiratory flow rates are within normal limits.  The lung volumes are reduced.  Diffusing capacity is reduced but was not corrected for hemoglobin.   IMPRESSION:   Severe Restriction.   Severe diffusion defect.     Allergies   Allergen Reactions    Animal Dander Unknown    Chalk     Dogs Other (See Comments)     Pet Dander    Maple Tree     Mold [Molds & Smuts] Unknown

## 2024-08-11 LAB
CREAT SERPL-MCNC: 0.78 MG/DL (ref 0.67–1.17)
EGFRCR SERPLBLD CKD-EPI 2021: >90 ML/MIN/1.73M2
LACTATE SERPL-SCNC: 1.9 MMOL/L (ref 0.7–2)
LACTATE SERPL-SCNC: 2.6 MMOL/L (ref 0.7–2)
PLATELET # BLD AUTO: 340 10E3/UL (ref 150–450)

## 2024-08-11 PROCEDURE — 82565 ASSAY OF CREATININE: CPT | Performed by: FAMILY MEDICINE

## 2024-08-11 PROCEDURE — 83605 ASSAY OF LACTIC ACID: CPT | Performed by: INTERNAL MEDICINE

## 2024-08-11 PROCEDURE — 94640 AIRWAY INHALATION TREATMENT: CPT | Mod: 76

## 2024-08-11 PROCEDURE — 250N000011 HC RX IP 250 OP 636: Performed by: INTERNAL MEDICINE

## 2024-08-11 PROCEDURE — 250N000011 HC RX IP 250 OP 636: Performed by: FAMILY MEDICINE

## 2024-08-11 PROCEDURE — 999N000157 HC STATISTIC RCP TIME EA 10 MIN

## 2024-08-11 PROCEDURE — 99232 SBSQ HOSP IP/OBS MODERATE 35: CPT | Performed by: INTERNAL MEDICINE

## 2024-08-11 PROCEDURE — 250N000009 HC RX 250: Performed by: FAMILY MEDICINE

## 2024-08-11 PROCEDURE — 99233 SBSQ HOSP IP/OBS HIGH 50: CPT | Performed by: INTERNAL MEDICINE

## 2024-08-11 PROCEDURE — 36415 COLL VENOUS BLD VENIPUNCTURE: CPT | Performed by: INTERNAL MEDICINE

## 2024-08-11 PROCEDURE — 36415 COLL VENOUS BLD VENIPUNCTURE: CPT | Performed by: FAMILY MEDICINE

## 2024-08-11 PROCEDURE — 94640 AIRWAY INHALATION TREATMENT: CPT

## 2024-08-11 PROCEDURE — 210N000001 HC R&B IMCU HEART CARE

## 2024-08-11 PROCEDURE — 85049 AUTOMATED PLATELET COUNT: CPT | Performed by: FAMILY MEDICINE

## 2024-08-11 PROCEDURE — 250N000013 HC RX MED GY IP 250 OP 250 PS 637: Performed by: FAMILY MEDICINE

## 2024-08-11 RX ADMIN — METOPROLOL SUCCINATE 50 MG: 50 TABLET, EXTENDED RELEASE ORAL at 08:37

## 2024-08-11 RX ADMIN — IPRATROPIUM BROMIDE AND ALBUTEROL SULFATE 3 ML: .5; 3 SOLUTION RESPIRATORY (INHALATION) at 08:53

## 2024-08-11 RX ADMIN — PIRFENIDONE 534 MG: 267 CAPSULE ORAL at 15:14

## 2024-08-11 RX ADMIN — METHYLPREDNISOLONE SODIUM SUCCINATE 62.5 MG: 125 INJECTION, POWDER, FOR SOLUTION INTRAMUSCULAR; INTRAVENOUS at 08:40

## 2024-08-11 RX ADMIN — IPRATROPIUM BROMIDE AND ALBUTEROL SULFATE 3 ML: .5; 3 SOLUTION RESPIRATORY (INHALATION) at 20:30

## 2024-08-11 RX ADMIN — ACETAMINOPHEN 650 MG: 325 TABLET ORAL at 12:06

## 2024-08-11 RX ADMIN — IPRATROPIUM BROMIDE AND ALBUTEROL SULFATE 3 ML: .5; 3 SOLUTION RESPIRATORY (INHALATION) at 02:41

## 2024-08-11 RX ADMIN — LIDOCAINE 1 PATCH: 246 PATCH TOPICAL at 21:01

## 2024-08-11 RX ADMIN — FORMOTEROL FUMARATE DIHYDRATE 20 MCG: 20 SOLUTION RESPIRATORY (INHALATION) at 20:29

## 2024-08-11 RX ADMIN — FORMOTEROL FUMARATE DIHYDRATE 20 MCG: 20 SOLUTION RESPIRATORY (INHALATION) at 08:53

## 2024-08-11 RX ADMIN — PIRFENIDONE 534 MG: 267 CAPSULE ORAL at 21:04

## 2024-08-11 RX ADMIN — METHYLPREDNISOLONE SODIUM SUCCINATE 62.5 MG: 125 INJECTION, POWDER, FOR SOLUTION INTRAMUSCULAR; INTRAVENOUS at 03:14

## 2024-08-11 RX ADMIN — ATORVASTATIN CALCIUM 80 MG: 40 TABLET, FILM COATED ORAL at 21:02

## 2024-08-11 RX ADMIN — ENOXAPARIN SODIUM 40 MG: 40 INJECTION SUBCUTANEOUS at 17:21

## 2024-08-11 RX ADMIN — SENNOSIDES AND DOCUSATE SODIUM 1 TABLET: 8.6; 5 TABLET ORAL at 08:37

## 2024-08-11 RX ADMIN — IPRATROPIUM BROMIDE AND ALBUTEROL SULFATE 3 ML: .5; 3 SOLUTION RESPIRATORY (INHALATION) at 13:46

## 2024-08-11 RX ADMIN — PIRFENIDONE 534 MG: 267 CAPSULE ORAL at 18:34

## 2024-08-11 RX ADMIN — FUROSEMIDE 20 MG: 10 INJECTION, SOLUTION INTRAMUSCULAR; INTRAVENOUS at 05:46

## 2024-08-11 RX ADMIN — FUROSEMIDE 20 MG: 10 INJECTION, SOLUTION INTRAMUSCULAR; INTRAVENOUS at 17:21

## 2024-08-11 RX ADMIN — ASPIRIN 81 MG: 81 TABLET, COATED ORAL at 08:37

## 2024-08-11 RX ADMIN — PANTOPRAZOLE SODIUM 40 MG: 40 TABLET, DELAYED RELEASE ORAL at 05:49

## 2024-08-11 RX ADMIN — METHYLPREDNISOLONE SODIUM SUCCINATE 62.5 MG: 125 INJECTION, POWDER, FOR SOLUTION INTRAMUSCULAR; INTRAVENOUS at 15:15

## 2024-08-11 RX ADMIN — PANTOPRAZOLE SODIUM 40 MG: 40 TABLET, DELAYED RELEASE ORAL at 17:21

## 2024-08-11 RX ADMIN — PIRFENIDONE 534 MG: 267 CAPSULE ORAL at 09:17

## 2024-08-11 RX ADMIN — SENNOSIDES AND DOCUSATE SODIUM 1 TABLET: 8.6; 5 TABLET ORAL at 21:02

## 2024-08-11 RX ADMIN — GUAIFENESIN AND DEXTROMETHORPHAN 10 ML: 100; 10 SYRUP ORAL at 12:15

## 2024-08-11 RX ADMIN — GUAIFENESIN AND DEXTROMETHORPHAN 10 ML: 100; 10 SYRUP ORAL at 21:52

## 2024-08-11 RX ADMIN — METHYLPREDNISOLONE SODIUM SUCCINATE 62.5 MG: 125 INJECTION, POWDER, FOR SOLUTION INTRAMUSCULAR; INTRAVENOUS at 21:03

## 2024-08-11 NOTE — PROGRESS NOTES
"Pulmonary Service Consult Note  Date of Service: 08/11/2024    Assessment:     Wyatt Gutierrez is a 79 year old male, former smoker (quit 2022) with h/o ILD/IPF (diagnosed 2021) followed at the  on home at 6 lPM, h/o TAVR (6 weeks ago), KAITY, HTN, H[place]. Estrada who came in with complaints of dyspnea on exertion that has worsened over the past 1 week.     CTA was done to rule out PE that showed moderate worsening of extensive basilar predominant UIP pulmonary fibrosis.  Superimposed bilateral groundglass opacities were present.  In addition there was evidence of elevated pulmonary arterial and right ventricular enlargement compatible with pulmonary hypertension.    He wore his home CPAP last night with 6L bleed in  He is looking much improved this morning  He is down to 3L O2  Eating breakfast and watching the olympics     Plan:     Acute on chronic resp failure with hypoxia due to Acute exacerbation of UIP pulm fibrosis vs new PNA  - is on 6L NC at baseline and home CPAP  Titrate FiO2  HFNC/BiPAP, Currently on HFNC with FiO2 of 40%  IV solumedrol 60 mg q8h.  increased steroids to q6h given marked worsening CT chest findings and GGO on 8/9  On abx (levofloxacin 750 mg) daily to complete course  Agree with holding azithromycin (chronically on it)  Follow up sputum g-stain and culture - no growth  Viral panel --> neg  Scheduled and prn nebs  echocardiogram --> worsening RV dilation and increased in RV pressures     ILD/IPF (diagnosed 2021) followed at the   - was up titrating on pirfenidone, will continue      Exam/Data:   BP (!) 147/71 (BP Location: Right arm)   Pulse 93   Temp (!) 96.2  F (35.7  C) (Axillary)   Resp 20   Ht 1.778 m (5' 10\")   Wt 82.6 kg (182 lb 1.6 oz)   SpO2 93%   BMI 26.13 kg/m      GEN: comfortable, NAD   HEENT: NC in place  CVS: RRR  Lung: bibasilar crackles, decreased on right base  Abd: Soft, NT, ND   Ext: no rashes on visible skin  Neuro: " nonfocal  Musculoskeletal: moving all extremities  Psych: appropriate    DATA    Cultures  7-Day Micro Results    Collected Updated Procedure Result Status    08/09/2024 1307 08/09/2024 1847 Respiratory Aerobic Bacterial Culture with Gram Stain [[id number]]   Sputum from Expectorate    Final result Component Value   Culture >10 Squamous epithelial cells/low power field indicates oral contamination. Please recollect.   Gram Stain Result >10 Squamous epithelial cells/low power field    <25 PMNs/low power field    2+ Mixed maureen             08/08/2024 1739 08/08/2024 2318 Respiratory Panel PCR [[id number]]    Swab from Nasopharyngeal    Final result Component Value   Adenovirus Not Detected   Coronavirus Not Detected   This test detects Coronavirus 229E, HKU1, NL63 and OC43 but does not distinguish between them. It does not detect MERS ( Respiratory Syndrome), SARS (Severe Acute Respiratory Syndrome) or 2019-nCoV (Novel 2019) Coronavirus.   Human Metapneumovirus Not Detected   Human Rhin/Enterovirus Not Detected   Influenza A Not Detected   Influenza A, H1 Not Detected   Influenza A 2009 H1N1 Not Detected   Influenza A, H3 Not Detected   Influenza B Not Detected   Parainfluenza Virus 1 Not Detected   Parainfluenza Virus 2 Not Detected   Parainfluenza Virus 3 Not Detected   Parainfluenza Virus 4 Not Detected   Respiratory Syncytial Virus A Not Detected   Respiratory Syncytial Virus B Not Detected   Chlamydia Pneumoniae Not Detected   Mycoplasma Pneumoniae Not Detected          08/08/2024 1354 08/08/2024 1447 Symptomatic Influenza A/B, RSV, & SARS-CoV2 PCR (COVID-19) Nasopharyngeal [[id number]]    Swab from Nasopharyngeal    Final result Component Value   Influenza A PCR Negative   Influenza B PCR Negative   RSV PCR Negative   SARS CoV2 PCR Negative   NEGATIVE: SARS-CoV-2 (COVID-19) RNA not detected, presumed negative.          08/08/2024 1347 08/09/2024 1746 Blood Culture Peripheral Blood  [[id number]]   Peripheral Blood    Preliminary result Component Value   Culture No growth after 1 day P                   IMAGING: Personally reviewed images. Formal radiology interpretation noted below.    CT Chest Pulmonary Embolism w Contrast    Result Date: 8/8/2024  EXAM: CT CHEST PULMONARY EMBOLISM W CONTRAST LOCATION: [facility] DATE: 8/8/2024 INDICATION: Acute and chronic respiratory failure. Recent TAVR and interstitial lung disease. COMPARISON: Chest CT 4/26/2022. TECHNIQUE: CT chest pulmonary angiogram during arterial phase injection of IV contrast. Multiplanar reformats and MIP reconstructions were performed. Dose reduction techniques were used. CONTRAST: isovue 370 90ml FINDINGS: ANGIOGRAM CHEST: No pulmonary embolism. Pulmonary trunk enlargement measuring 3.5 cm. Nonaneurysmal aorta without dissection. Post TAVR. LUNGS AND PLEURA: Worsening of extensive basilar predominant UIP pattern pulmonary fibrosis with basilar predominant honeycombing. Worsened moderate basilar predominant bronchiectasis. Superimposed bilateral groundglass opacities also present, worse in the left lung. Mild septal thickening. Trace pleural fluid. No pneumothorax. MEDIASTINUM/AXILLAE: Mild to moderate mediastinal and perihilar adenopathy, likely reactive. Moderate right ventricular enlargement. No pericardial effusion. CORONARY ARTERY CALCIFICATION: Present. UPPER ABDOMEN: No actionable findings.     MUSCULOSKELETAL: Degenerative changes spine IMPRESSION: 1.  No pulmonary embolism. 2.  Since 2022, moderate worsening of extensive basilar predominant UIP pattern pulmonary fibrosis. 3.  Superimposed bilateral groundglass opacities are present, with primary differentials of interstitial lung disease exacerbation versus pulmonary edema versus hemorrhage. Trace pleural fluid. 4.  Worsening moderate basilar predominant bronchiectasis. 5.  Pulmonary arterial and right ventricular enlargement, compatible with  pulmonary hypertension and elevated right heart pressure.     XR Chest Port 1 View    Result Date: 8/8/2024  EXAM: XR CHEST PORT 1 VIEW LOCATION: [facility] DATE: 8/8/2024 INDICATION: acute hypoxic respiratory failure on bipap with IPF COMPARISON: Chest x-ray 6/12/2024, CT chest 4/26/2022     IMPRESSION: Low lung volumes. Stable diffuse interstitial coarsening correlating with known UIP pattern interstitial lung disease. No definite superimposed acute airspace disease. However, given the patient's symptoms recommend short interval follow-up with x-ray or CT. No definite pleural effusion. Stable heart size. TAVR.    PFT DATA on 8/2024:  Although the FEV1 and FVC are reduced, the FEV1/FVC ratio is normal. The inspiratory flow rates are within normal limits.  The lung volumes are reduced.  Diffusing capacity is reduced but was not corrected for hemoglobin.   IMPRESSION:   Severe Restriction.   Severe diffusion defect.     Allergies   Allergen Reactions    Animal Dander Unknown    Chalk     Dogs Other (See Comments)     Pet Dander    Maple Tree     Mold [Molds & Smuts] Unknown

## 2024-08-11 NOTE — PROGRESS NOTES
[facility]    Medicine Progress Note - Hospitalist Service    Date of Admission:  8/8/2024    Assessment & Plan   Wyatt Gutierrez is a 79 year old male history of IPF on chronic oxygen as well as severe aortic stenosis, status post TAVR 6/2024 as well as history of palpitations with recent workup admitted to the hospital with acute on chronic respiratory failure    Acute on chronic respiratory failure  - Differential still wide and includes worsening of known IPF, versus infectious etiology, versus acute CHF due to hold of diuretic over the past month.  - Workup included elevated BNP, low Pro-Dariel, unremarkable chest x-ray.  - CT chest neg for PE,moderate worsening of pulm fibrosis.   - Resp cx in process, viral panel negative  - Currently on Hiflo, was on BiPAP on admission. At baseline 6L with activity, 3L at rest.  - Continue IV steroids per pulm reccs and on abx. Appreciate pulmonary to follow.  - Currently on levofloxacin 750 mg.  - On diuretic, started lasix 20mg Q12H, monitor I/O    IPF  - Continue home  Brovana, DuoNeb, pirfenidone (recent med change).  Follow-up with [facility] pulmonary department upon discharge.  - Been off prophylactic azithromycin. Continue Levaquin    Sinus tachycardia  - History of palpitations with some cardiac workup  -Telemetry  -TSH normal  - Continue home toprol    Aortic stenosis  Recent TAVR  - Continue beta-blocker  -Taken off his Dyazide 1 month ago.    Hypertension  -continue Toprol, IV diuretic  -PTA Dyazide has been on hold as out-pt by his cardiologist given he was hyponatremic    Hyperlipidemia  -continue Lipitor    GERD   - continue PPI    Leukocytosis   - resolved    Tailbone pain  -positional, On Lidoderm patch    Diet: Combination Diet 2 gm NA Diet; No Caffeine Diet (and additional linked orders)  Fluid restriction 2000 ML FLUID (and additional linked orders)    DVT Prophylaxis: Enoxaparin (Lovenox) SQ  Foreman Catheter: Not  "present  Lines: None     Cardiac Monitoring: ACTIVE order. Indication: Acute decompensated heart failure (48 hours)  Code Status: No CPR- Do NOT Intubate      Clinically Significant Risk Factors                  # Hypertension: Noted on problem list           # Overweight: Estimated body mass index is 26.13 kg/m  as calculated from the following:    Height as of this encounter: 1.778 m (5' 10\").    Weight as of this encounter: 82.6 kg (182 lb 1.6 oz)., PRESENT ON ADMISSION     # Financial/Environmental Concerns: none        Disposition Plan     Medically Ready for Discharge: Anticipated in 2-4 Days      Montana Lakhani DO  Hospitalist Service  [facility]  Securely message with [employer] (more info)  Text page via [employer] Paging/Directory   ______________________________________________________________________    Interval History   The patient is using CPAP with oxygen bleeding in during sleep.  He is using 3 L/min nonrebreather when he is awake.  Currently resting comfortably in bed.    Physical Exam   Vital Signs: Temp: 97.8  F (36.6  C) Temp src: Axillary BP: (!) 147/71 Pulse: 93   Resp: 20 SpO2: 93 % O2 Device: Nasal cannula Oxygen Delivery: 3 LPM  Weight: 182 lbs 1.6 oz  GEN: NAD,   HEENT: NCAT, PERRLA, EOMI, Hi-flow nasal canula in place.  CV: Regular rate and rhythm, S1 & S2 positive.  LUNGS: Bilateral breathsounds heard.   ABDOMEN: Positive bowel sounds in all quadrants, soft, no rebound or guarding  MUSCULOSKELETAL: No leg swelling  Neurological: Grossly nonfocal    Medical Decision Making       25 MINUTES SPENT BY ME on the date of service doing chart review, history, exam, documentation & further activities per the note.      Data     I have personally reviewed the following data over the past 24 hrs:    N/A  \   N/A   / 340     N/A N/A N/A /  N/A   N/A N/A 0.78 \       Imaging results reviewed over the past 24 hrs:   No results found for this or any previous visit (from the past 24 " hour(s)).

## 2024-08-11 NOTE — PROGRESS NOTES
"Care Management Follow Up    Length of Stay (days): 3    Expected Discharge Date: 08/13/2024     Concerns to be Addressed: discharge planning     Patient plan of care discussed at interdisciplinary rounds: Yes    Anticipated Discharge Disposition: anticipate return home on discharge   Anticipated Discharge Services:  tbd  Anticipated Discharge DME:  tbd    Patient/family educated on Medicare website which has current facility and service quality ratings:  n/a  Education Provided on the Discharge Plan:    Patient/Family in Agreement with the Plan:      Referrals Placed by CM/SW:  none at this time  Private pay costs discussed: Not applicable    Additional Information:  Chart reviewed. Patient on CPAP at night, O2 4L.     Social history:  Tai lives in a house with his wife Alka. He is independent with ADLs and family helps with IADLs. \"Our daughter helps with housekeeping. I don't drive much. My wife helps with transportation and around the house\". He uses Home Oxygen from [facility] at 6LPM at all times.      Unknown discharge needs at this time.      Family to transport at discharge.     CM to follow for medical progression of care, discharge recommendations, and final discharge plan.    ABAD Miles     "

## 2024-08-11 NOTE — PLAN OF CARE
Goal Outcome Evaluation:                        Problem: Adult Inpatient Plan of Care  Goal: Optimal Comfort and Wellbeing  Outcome: Met  Intervention: Monitor Pain and Promote Comfort  Recent Flowsheet Documentation  Taken 8/10/2024 2110 by Roni Donovan RN  Pain Management Interventions: medication (see MAR)     Problem: Risk for Delirium  Goal: Improved Attention and Thought Clarity  Outcome: Met  Intervention: Maximize Cognitive Function  Recent Flowsheet Documentation  Taken 8/11/2024 0041 by Roni Donovan RN  Reorientation Measures: clock in view  Taken 8/10/2024 2030 by Roni Donovan RN  Reorientation Measures: clock in view     Problem: Gas Exchange Impaired  Goal: Optimal Gas Exchange  Outcome: Met  Intervention: Optimize Oxygenation and Ventilation  Recent Flowsheet Documentation  Taken 8/11/2024 0041 by Roni Donovan RN  Head of Bed (HOB) Positioning: HOB at 20-30 degrees  Taken 8/10/2024 2030 by Roni Donovan RN  Head of Bed (HOB) Positioning: HOB at 20-30 degrees     Alert and oriented. On tele SR with 1st degree AVB. On CPAP over the night. O2 4 L. Using urinal, voiding well. Tailbone pain, prn Tylenol given. Vital signs stab;e. Using call light for all needs Continue with current plan of care.    Roni Donovan RN

## 2024-08-11 NOTE — PROGRESS NOTES
08/10/24 0950   Appointment Info   Signing Clinician's Name / Credentials (OT) Coco Vinson OTR/L   Living Environment   Current Living Arrangements house   Home Accessibility other (see comments);stairs to enter home  (stays)   Number of Stairs, Main Entrance 8   Stair Railings, Main Entrance other (see comments)  (one side)   Self-Care   Equipment Currently Used at Home shower chair   Fall history within last six months no   Activity/Exercise/Self-Care Comment independent with self cares   Instrumental Activities of Daily Living (IADL)   Previous Responsibilities medication management  (wife does other IADL)   General Information   Onset of Illness/Injury or Date of Surgery 08/08/24   Referring Physician Mayra Miller MD   Additional Occupational Profile Info/Pertinent History of Current Problem Wyatt Gutierrez is a 79 year old male, former smoker (quit 2022) with h/o ILD/IPF (diagnosed 2021) followed at the  on home at 6 lPM, h/o TAVR (6 weeks ago), KAITY, HTN, HPL followed in [place] by Dr. Estrada who came in with complaints of dyspnea on exertion that has worsened over the past 1 week.   Cognitive Status Examination   Orientation Status orientation to person, place and time   Visual Perception   Visual Impairment/Limitations corrective lenses for reading   Pain Assessment   Patient Currently in Pain No   Range of Motion Comprehensive   General Range of Motion no range of motion deficits identified   Bed Mobility   Bed Mobility supine-sit   Supine-Sit [place] (Bed Mobility) minimum assist (75% patient effort)   Assistive Device (Bed Mobility) bed rails   Comment (Bed Mobility) SOB with oxygen dropping to 83%   Transfers   Transfers sit-stand transfer;bed-chair transfer   Transfer Skill: Bed to Chair/Chair to Bed   Bed-Chair [place] (Transfers) minimum assist (75% patient effort)   Assistive Device (Bed-Chair Transfers) other (see comments)  (HHA)   Sit-Stand Transfer   Sit-Stand [place]  (Transfers) minimum assist (75% patient effort)   Assistive Device (Sit-Stand Transfers) other (see comments)  (HHA)   Balance   Balance Assessment sitting dynamic balance;standing dynamic balance;standing static balance   Sitting Balance: Dynamic supervision   Position, Sitting Balance sitting edge of bed   Standing Balance: Static minimal assist   Standing Balance: Dynamic minimal assist   Position/Device Used, Standing Balance other (see comments)  (HHA)   Activities of Daily Living   BADL Assessment/Intervention bathing;toileting;lower body dressing;upper body dressing   Bathing Assessment/Intervention   Position (Bathing) edge of bed sitting   [place] Level (Bathing) upper body;dependent (less than 25% patient effort)   Comment, (Bathing) SOB, decreased O2   Upper Body Dressing Assessment/Training   Position (Upper Body Dressing) edge of bed sitting;unsupported sitting   Comment, (Upper Body Dressing) SOB, decreased O2   [place] Level (Upper Body Dressing) doff;don;pull-over garment;dependent (less than 25% patient effort)   Lower Body Dressing Assessment/Training   Position (Lower Body Dressing) edge of bed sitting   Comment, (Lower Body Dressing) SOB, decreased O2   [place] Level (Lower Body Dressing) pants/bottoms;shoes/slippers;dependent (less than 25% patient effort)   Toileting   Position (Toileting) unsupported sitting   Assistive Devices (Toileting) urinal   [place] Level (Toileting) adjust/manage clothing;set up   Clinical Impression   Criteria for Skilled Therapeutic Interventions Met (OT) Yes, treatment indicated   OT Diagnosis decreased ADL's due to IPF   Influenced by the following impairments fatique, SOB, HFO2   OT Problem List-Impairments impacting ADL activity tolerance impaired;strength;mobility;balance   Assessment of Occupational Performance 5 or more Performance Deficits   Identified Performance Deficits bed mobility, transfers, bathing, dressing, toileting   Planned  Therapy Interventions (OT) ADL retraining;balance training;bed mobility training;progressive activity/exercise;home program guidelines;transfer training;strengthening;other (see comments)  (energy cons. and breathing tech)   Clinical Decision Making Complexity (OT) problem focused assessment/low complexity   Risk & Benefits of therapy have been explained evaluation/treatment results reviewed;patient;participants voiced agreement with care plan   OT Total Evaluation Time   OT Eval, Low Complexity Minutes (20837) 20   OT Goals   Therapy Frequency (OT) 5 times/week   OT Predicted Duration/Target Date for Goal Attainment 08/17/24   OT Goals Transfers;Toilet Transfer/Toileting;Aerobic Activity;Upper Body Dressing   OT: Upper Body Dressing Supervision/stand-by assist  (keeping O2 greater than 88% using energy conservation tech)   OT: Transfer Supervision/stand-by assist  (keeping O2 greater than 88% using energy conservation tech)   OT: Toilet Transfer/Toileting Supervision/stand-by assist  (keeping O2 greater than 88% using energy conservation tech)   OT: Perform aerobic activity with stable cardiovascular response intermittent activity;5 minutes  (ADL's and U/E exercises incorporating PLB)   Interventions   Interventions Quick Adds Self-Care/Home Management   Self-Care/Home Management   Self-Care/Home Mgmt/ADL, Compensatory, Meal Prep Minutes (37406) 25   Symptoms Noted During/After Treatment (Meal Preparation/Planning Training) shortness of breath;increase work of breath;fatigue   Treatment Detail/Skilled Intervention Therapist worked on energy conservation tech. (pacing self), throughout session requiring increased time for rest periods as pt. tolerating limited act., needing rest periods 5-8,minutes and assist for every step. talking even difficult with increased SOB. Pt. oxygen improved after up in chair and oxygen decreasing less 87% than when first up 83%.   OT Discharge Planning   OT Plan energy cons. , PLB with  ADL's-slow progression, pacing, monitor oxygen levels   OT Discharge Recommendation (DC Rec) (S)  Transitional Care Facility   OT Rationale for DC Rec Pt. in need of assist of max 1 for ADL's due to SOB, increased oxygen needs   OT Brief overview of current status Pt. in need of assist of max 1 for ADL's due to SOB, increased oxygen needs   Total Session Time   Timed Code Treatment Minutes 25   Total Session Time (sum of timed and untimed services) 45

## 2024-08-11 NOTE — PLAN OF CARE
"  Problem: Adult Inpatient Plan of Care  Goal: Plan of Care Review  Description: The Plan of Care Review/Shift note should be completed every shift.  The Outcome Evaluation is a brief statement about your assessment that the patient is improving, declining, or no change.  This information will be displayed automatically on your shift  note.  Outcome: Progressing  Goal: Patient-Specific Goal (Individualized)  Description: You can add care plan individualizations to a care plan. Examples of Individualization might be:  \"Parent requests to be called daily at 9am for status\", \"I have a hard time hearing out of my right ear\", or \"Do not touch me to wake me up as it startles  me\".  Outcome: Progressing  Goal: Absence of Hospital-Acquired Illness or Injury  Outcome: Progressing  Intervention: Identify and Manage Fall Risk  Recent Flowsheet Documentation  Taken 8/11/2024 1643 by Mary Lou Castro RN  Safety Promotion/Fall Prevention:   safety round/check completed   patient and family education   room near nurse's station  Taken 8/11/2024 1229 by Mary Lou Castro RN  Safety Promotion/Fall Prevention:   safety round/check completed   patient and family education   room near nurse's station  Taken 8/11/2024 0723 by Mary Lou Castro RN  Safety Promotion/Fall Prevention:   safety round/check completed   patient and family education   room near nurse's station  Intervention: Prevent Skin Injury  Recent Flowsheet Documentation  Taken 8/11/2024 1643 by Mary Lou Castro RN  Body Position: turned  Taken 8/11/2024 1229 by Mary Lou Castro RN  Body Position: turned  Taken 8/11/2024 0723 by Mary Lou Castro RN  Body Position: turned  Intervention: Prevent Infection  Recent Flowsheet Documentation  Taken 8/11/2024 1643 by Mary Lou Castro RN  Infection Prevention: rest/sleep promoted  Taken 8/11/2024 1229 by Mary Lou Castro RN  Infection Prevention: rest/sleep promoted  Taken 8/11/2024 0723 by Mary Lou Castro" RN  Infection Prevention: rest/sleep promoted  Goal: Optimal Comfort and Wellbeing  Intervention: Monitor Pain and Promote Comfort  Recent Flowsheet Documentation  Taken 8/11/2024 1206 by Mary Lou Castro, RN  Pain Management Interventions: medication (see MAR)   Goal Outcome Evaluation:       Remained on 4 L of NC still with episodes of Low O2 with movement it takes a while for Him to recover  at around 1300 he had episode of choking with toothpaste   and been coughing given 10 ml of Robitussin and this helped  .

## 2024-08-11 NOTE — PROGRESS NOTES
Patient's home CPAP set up and is ready for use. Patient states he bleeds in 6L at bedtime.     RT will follow.     Chel Avila, RT

## 2024-08-12 ENCOUNTER — APPOINTMENT (OUTPATIENT)
Dept: OCCUPATIONAL THERAPY | Facility: HOSPITAL | Age: 79
DRG: 196 | End: 2024-08-12
Payer: MEDICARE

## 2024-08-12 LAB
ANION GAP SERPL CALCULATED.3IONS-SCNC: 7 MMOL/L (ref 7–15)
BUN SERPL-MCNC: 32 MG/DL (ref 8–23)
CALCIUM SERPL-MCNC: 9.3 MG/DL (ref 8.8–10.4)
CHLORIDE SERPL-SCNC: 87 MMOL/L (ref 98–107)
CREAT SERPL-MCNC: 0.85 MG/DL (ref 0.67–1.17)
EGFRCR SERPLBLD CKD-EPI 2021: 89 ML/MIN/1.73M2
GLUCOSE SERPL-MCNC: 153 MG/DL (ref 70–99)
HCO3 SERPL-SCNC: 31 MMOL/L (ref 22–29)
HOLD SPECIMEN: NORMAL
MAGNESIUM SERPL-MCNC: 1.9 MG/DL (ref 1.7–2.3)
POTASSIUM SERPL-SCNC: 3.3 MMOL/L (ref 3.4–5.3)
POTASSIUM SERPL-SCNC: 3.6 MMOL/L (ref 3.4–5.3)
SODIUM SERPL-SCNC: 125 MMOL/L (ref 135–145)

## 2024-08-12 PROCEDURE — 250N000011 HC RX IP 250 OP 636: Performed by: INTERNAL MEDICINE

## 2024-08-12 PROCEDURE — 97110 THERAPEUTIC EXERCISES: CPT | Mod: GO

## 2024-08-12 PROCEDURE — 36415 COLL VENOUS BLD VENIPUNCTURE: CPT | Performed by: FAMILY MEDICINE

## 2024-08-12 PROCEDURE — 250N000009 HC RX 250: Performed by: STUDENT IN AN ORGANIZED HEALTH CARE EDUCATION/TRAINING PROGRAM

## 2024-08-12 PROCEDURE — 210N000001 HC R&B IMCU HEART CARE

## 2024-08-12 PROCEDURE — 94640 AIRWAY INHALATION TREATMENT: CPT

## 2024-08-12 PROCEDURE — 250N000013 HC RX MED GY IP 250 OP 250 PS 637: Performed by: FAMILY MEDICINE

## 2024-08-12 PROCEDURE — 99232 SBSQ HOSP IP/OBS MODERATE 35: CPT | Performed by: FAMILY MEDICINE

## 2024-08-12 PROCEDURE — 250N000009 HC RX 250: Performed by: FAMILY MEDICINE

## 2024-08-12 PROCEDURE — 250N000012 HC RX MED GY IP 250 OP 636 PS 637: Performed by: INTERNAL MEDICINE

## 2024-08-12 PROCEDURE — 94640 AIRWAY INHALATION TREATMENT: CPT | Mod: 76

## 2024-08-12 PROCEDURE — 97535 SELF CARE MNGMENT TRAINING: CPT | Mod: GO

## 2024-08-12 PROCEDURE — 250N000011 HC RX IP 250 OP 636: Performed by: FAMILY MEDICINE

## 2024-08-12 PROCEDURE — 99232 SBSQ HOSP IP/OBS MODERATE 35: CPT | Performed by: INTERNAL MEDICINE

## 2024-08-12 PROCEDURE — 80048 BASIC METABOLIC PNL TOTAL CA: CPT | Performed by: FAMILY MEDICINE

## 2024-08-12 PROCEDURE — 84132 ASSAY OF SERUM POTASSIUM: CPT | Performed by: FAMILY MEDICINE

## 2024-08-12 PROCEDURE — 83735 ASSAY OF MAGNESIUM: CPT | Performed by: FAMILY MEDICINE

## 2024-08-12 PROCEDURE — 999N000157 HC STATISTIC RCP TIME EA 10 MIN

## 2024-08-12 RX ORDER — IPRATROPIUM BROMIDE AND ALBUTEROL SULFATE 2.5; .5 MG/3ML; MG/3ML
3 SOLUTION RESPIRATORY (INHALATION) EVERY 4 HOURS PRN
Status: DISCONTINUED | OUTPATIENT
Start: 2024-08-12 | End: 2024-08-14 | Stop reason: HOSPADM

## 2024-08-12 RX ORDER — PIRFENIDONE 267 MG/1
534 CAPSULE ORAL 3 TIMES DAILY
Status: COMPLETED | OUTPATIENT
Start: 2024-08-12 | End: 2024-08-13

## 2024-08-12 RX ORDER — PREDNISONE 20 MG/1
60 TABLET ORAL 2 TIMES DAILY WITH MEALS
Status: DISCONTINUED | OUTPATIENT
Start: 2024-08-12 | End: 2024-08-13

## 2024-08-12 RX ORDER — IPRATROPIUM BROMIDE AND ALBUTEROL SULFATE 2.5; .5 MG/3ML; MG/3ML
1 SOLUTION RESPIRATORY (INHALATION)
Status: DISCONTINUED | OUTPATIENT
Start: 2024-08-12 | End: 2024-08-14 | Stop reason: HOSPADM

## 2024-08-12 RX ADMIN — FORMOTEROL FUMARATE DIHYDRATE 20 MCG: 20 SOLUTION RESPIRATORY (INHALATION) at 07:45

## 2024-08-12 RX ADMIN — ACETAMINOPHEN 650 MG: 325 TABLET ORAL at 18:29

## 2024-08-12 RX ADMIN — GUAIFENESIN AND DEXTROMETHORPHAN 10 ML: 100; 10 SYRUP ORAL at 13:49

## 2024-08-12 RX ADMIN — ASPIRIN 81 MG: 81 TABLET, COATED ORAL at 08:06

## 2024-08-12 RX ADMIN — ENOXAPARIN SODIUM 40 MG: 40 INJECTION SUBCUTANEOUS at 20:27

## 2024-08-12 RX ADMIN — PIRFENIDONE 534 MG: 267 CAPSULE ORAL at 13:49

## 2024-08-12 RX ADMIN — METHYLPREDNISOLONE SODIUM SUCCINATE 62.5 MG: 125 INJECTION, POWDER, FOR SOLUTION INTRAMUSCULAR; INTRAVENOUS at 08:07

## 2024-08-12 RX ADMIN — IPRATROPIUM BROMIDE AND ALBUTEROL SULFATE 3 ML: .5; 3 SOLUTION RESPIRATORY (INHALATION) at 15:52

## 2024-08-12 RX ADMIN — PIRFENIDONE 534 MG: 267 CAPSULE ORAL at 08:08

## 2024-08-12 RX ADMIN — FUROSEMIDE 20 MG: 10 INJECTION, SOLUTION INTRAMUSCULAR; INTRAVENOUS at 06:13

## 2024-08-12 RX ADMIN — PANTOPRAZOLE SODIUM 40 MG: 40 TABLET, DELAYED RELEASE ORAL at 06:13

## 2024-08-12 RX ADMIN — AZITHROMYCIN DIHYDRATE 250 MG: 250 TABLET, FILM COATED ORAL at 17:00

## 2024-08-12 RX ADMIN — IPRATROPIUM BROMIDE AND ALBUTEROL SULFATE 3 ML: .5; 3 SOLUTION RESPIRATORY (INHALATION) at 12:27

## 2024-08-12 RX ADMIN — PANTOPRAZOLE SODIUM 40 MG: 40 TABLET, DELAYED RELEASE ORAL at 16:58

## 2024-08-12 RX ADMIN — ATORVASTATIN CALCIUM 80 MG: 40 TABLET, FILM COATED ORAL at 20:28

## 2024-08-12 RX ADMIN — PREDNISONE 60 MG: 20 TABLET ORAL at 18:23

## 2024-08-12 RX ADMIN — GUAIFENESIN AND DEXTROMETHORPHAN 10 ML: 100; 10 SYRUP ORAL at 08:16

## 2024-08-12 RX ADMIN — SENNOSIDES AND DOCUSATE SODIUM 1 TABLET: 8.6; 5 TABLET ORAL at 20:27

## 2024-08-12 RX ADMIN — IPRATROPIUM BROMIDE AND ALBUTEROL SULFATE 3 ML: .5; 3 SOLUTION RESPIRATORY (INHALATION) at 07:45

## 2024-08-12 RX ADMIN — FORMOTEROL FUMARATE DIHYDRATE 20 MCG: 20 SOLUTION RESPIRATORY (INHALATION) at 19:51

## 2024-08-12 RX ADMIN — METOPROLOL SUCCINATE 50 MG: 50 TABLET, EXTENDED RELEASE ORAL at 08:06

## 2024-08-12 RX ADMIN — IPRATROPIUM BROMIDE AND ALBUTEROL SULFATE 3 ML: .5; 3 SOLUTION RESPIRATORY (INHALATION) at 19:46

## 2024-08-12 RX ADMIN — LIDOCAINE 1 PATCH: 246 PATCH TOPICAL at 20:26

## 2024-08-12 RX ADMIN — METHYLPREDNISOLONE SODIUM SUCCINATE 62.5 MG: 125 INJECTION, POWDER, FOR SOLUTION INTRAMUSCULAR; INTRAVENOUS at 02:41

## 2024-08-12 RX ADMIN — SENNOSIDES AND DOCUSATE SODIUM 2 TABLET: 8.6; 5 TABLET ORAL at 08:06

## 2024-08-12 NOTE — PLAN OF CARE
Problem: Adult Inpatient Plan of Care  Goal: Absence of Hospital-Acquired Illness or Injury  Outcome: Met  Intervention: Identify and Manage Fall Risk  Recent Flowsheet Documentation  Taken 8/12/2024 0042 by Roni Donovan RN  Safety Promotion/Fall Prevention:   safety round/check completed   patient and family education   room near nurse's station  Taken 8/11/2024 2030 by Roni Donovan RN  Safety Promotion/Fall Prevention:   safety round/check completed   patient and family education   room near nurse's station  Intervention: Prevent Skin Injury  Recent Flowsheet Documentation  Taken 8/12/2024 0210 by Roni Donovan RN  Body Position:   turned   right  Taken 8/12/2024 0042 by Roni Donovan RN  Body Position: turned  Taken 8/12/2024 0010 by Roni Donovan RN  Body Position: (shifted weight in bed) other (see comments)  Taken 8/11/2024 2146 by Roni Donovan RN  Body Position: turned  Taken 8/11/2024 2030 by Roni Donovan RN  Body Position: turned  Taken 8/11/2024 2010 by Roni Donovan RN  Body Position: (weight shifted) other (see comments)  Intervention: Prevent Infection  Recent Flowsheet Documentation  Taken 8/12/2024 0042 by Roni Donovan RN  Infection Prevention: rest/sleep promoted  Taken 8/11/2024 2030 by Roni Donovan RN  Infection Prevention: rest/sleep promoted     Problem: Risk for Delirium  Goal: Optimal Coping  Outcome: Met  Intervention: Optimize Psychosocial Adjustment to Delirium  Recent Flowsheet Documentation  Taken 8/12/2024 0042 by Roni Donovan RN  Supportive Measures: active listening utilized  Taken 8/11/2024 2030 by Roni Donovan RN  Supportive Measures: active listening utilized  Goal: Improved Sleep  Outcome: Met   Goal Outcome Evaluation:       Pt. Alert and oriented. Telemetry NSR. On CPAP over the night. Pt. Gets very SOB and desats with any activity, coughing. Cannot tolerate much activity. Vitals signs stable. Using call light for all needs. Uses urinal to void.  Continue with current plan of care.    Roni Donovan RN

## 2024-08-12 NOTE — PROGRESS NOTES
Care Management Follow Up    Length of Stay (days): 4    Expected Discharge Date: 08/16/2024    Anticipated Discharge Plan:  Other (Comments) (unknown at this time)    Transportation: TBD    PT Recommendations:    OT Recommendations:  (S) Transitional Care Facility     Barriers to Discharge: medical stability, placement, diagnostic workup    Prior Living Situation: house with spouse     Patient/Spokesperson Updated: No    Additional Information:  MD updated: discharge planning pending Pulmonary Consult and final treatment plans and potential Hospice consult.    Catrachita Diaz RN

## 2024-08-12 NOTE — PROGRESS NOTES
[facility]    Medicine Progress Note - Hospitalist Service    Date of Admission:  8/8/2024    Assessment & Plan   Wyatt Gutierrez is a 79 year old male history of IPF on chronic oxygen as well as severe aortic stenosis, status post TAVR 6/2024 as well as history of palpitations with recent workup admitted to the hospital with acute on chronic respiratory failure    Acute on chronic respiratory failure  - suspect exacerbation IPF, fluid retention contributing due to hold of diuretic over the past month.  Appears less likely infectious  - Workup included elevated BNP, low Pro-Dariel, unremarkable chest x-ray.  - CT chest neg for PE,moderate worsening of pulm fibrosis.   - Resp cx mnegative viral panel negative  - Continue NC oxygen, was on Hiflo, was on BiPAP on admission.  --- At baseline 6L with activity, 3L at rest.  - yesterday increaased steroids  ---finished levofloxacin, resume usual azithromycin  ---Appreciate pulmonary following  -- has been on lasix 20mg Q12H since admit, stop now due to hyponatremia    IPF  - Continue home  Brovana, DuoNeb, pirfenidone (recent med change).  Follow-up with [facility] pulmonary department upon discharge.  - has Been off prophylactic azithromycin as off abx with levoquin will resume today    Hyponatremia  ---stop IV lasix  ---trend    Hypokalemia  ---check mag and replace     Sinus tachycardia  - resolved  --History of palpitations with some cardiac workup  -Telemetry  -TSH nanette  - Continue home toprol    Aortic stenosis  Recent TAVR  - Continue beta-blocker  -Taken off his Dyazide 1 month ago. - I suspect he will need a diuretic for discharge.  To come off IV Lasix and [place] the day due to hyponatremia.    Hypertension  -continue Toprol, IV diuretic  -PTA Dyazide has been on hold as out-pt by his cardiologist given he was hyponatremic  -Has been on IV Lasix.    Hyperlipidemia  -continue Lipitor    GERD   - continue PPI    Leukocytosis   -  "resolved    Tailbone pain  -positional, On Lidoderm patch            Diet: Combination Diet 2 gm NA Diet; No Caffeine Diet (and additional linked orders)  Fluid restriction 2000 ML FLUID (and additional linked orders)    DVT Prophylaxis: Enoxaparin (Lovenox) SQ  Foreman Catheter: Not present  Lines: None     Cardiac Monitoring: ACTIVE order. Indication: Acute decompensated heart failure (48 hours)  Code Status: No CPR- Do NOT Intubate      Clinically Significant Risk Factors                  # Hypertension: Noted on problem list           # Overweight: Estimated body mass index is 26.16 kg/m  as calculated from the following:    Height as of this encounter: 1.778 m (5' 10\").    Weight as of this encounter: 82.7 kg (182 lb 5.1 oz)., PRESENT ON ADMISSION     # Financial/Environmental Concerns: none               Disposition Plan     Medically Ready for Discharge: Anticipated in 2-4 Days             Mayra Miller MD  Hospitalist Service  [facility]  Securely message with [employer] (more info)  Text page via [employer] Paging/Directory   ______________________________________________________________________    Interval History   --- Seen earlier in the morning when he tells me he was feeling very dyspneic with activities even though oxygen was near its baseline.  --- He does use 6 L at home  ----He is somewhat tired of being on IV steroids.  --- Aims himself for stopping one of his IPF medications due to diarrhea while transitioning onto a different 1.  --- Asked me several questions about what it will take to get him better.    Physical Exam   Vital Signs: Temp: 98.3  F (36.8  C) Temp src: Oral BP: (!) 142/71 Pulse: 86   Resp: 24 SpO2: 94 % O2 Device: Nasal cannula Oxygen Delivery: 6 LPM  Weight: 182 lbs 5.13 oz    General Appearance: Pleasant male up in chair with nasal cannula on.  Respiratory: Crackles bilaterally  Cardiovascular: Regular rate and rhythm  GI: Soft and nontender without " hepatosplenomegaly  Skin: No significant presacral edema or lower extremity edema or open areas.  Other: Neuro grossly intact without focal deficit appreciated    Medical Decision Making             Data     I have personally reviewed the following data over the past 24 hrs:    N/A  \   N/A   / N/A     125 (L) 87 (L) 32.0 (H) /  153 (H)   3.3 (L) 31 (H) 0.85 \     Procal: N/A CRP: N/A Lactic Acid: 1.9         Imaging results reviewed over the past 24 hrs:   No results found for this or any previous visit (from the past 24 hour(s)).

## 2024-08-12 NOTE — PROGRESS NOTES
"Pulmonary Service Consult Note  Date of Service: 08/12/2024    Assessment:     Wyatt Gutierrez is a 79 year old male, former smoker (quit 2022) with h/o ILD/IPF (diagnosed 2021) followed at the  on home at 6 lPM, h/o TAVR (6 weeks ago), KAITY, HTN, HPL [place]. Estrada who came in with complaints of dyspnea on exertion that has worsened over the past 1 week.     CTA was done to rule out PE that showed moderate worsening of extensive basilar predominant UIP pulmonary fibrosis.  Superimposed bilateral groundglass opacities were present.  In addition there was evidence of elevated pulmonary arterial and right ventricular enlargement compatible with pulmonary hypertension.    He is doing well today  He is up in the chair watching tv  He is feeling like his breathing is almost close to baseline     Plan:     Acute on chronic resp failure with hypoxia due to Acute exacerbation of UIP pulm fibrosis vs new PNA  - is on 6L NC at baseline and home CPAP  Titrate FiO2  HFNC/BiPAP, Currently on HFNC with FiO2 of 40%  IV solumedrol 60 mg q8h.  increased steroids to q6h given marked worsening CT chest findings and GGO on 8/9  - will switch to PO prednisone 60mg q12 in anticipation of discharge in the next few days  On abx (levofloxacin 750 mg) daily to complete course  Agree with holding azithromycin (chronically on it)  Follow up sputum g-stain and culture - no growth  Viral panel --> neg  Scheduled and prn nebs  echocardiogram --> worsening RV dilation and increased in RV pressures     ILD/IPF (diagnosed 2021) followed at the   - was up titrating on pirfenidone, will continue      Exam/Data:   BP (!) 142/71 (BP Location: Right arm)   Pulse 86   Temp 98.3  F (36.8  C) (Oral)   Resp 24   Ht 1.778 m (5' 10\")   Wt 82.7 kg (182 lb 5.1 oz)   SpO2 97%   BMI 26.16 kg/m      GEN: comfortable, NAD   HEENT: NC in place  CVS: RRR  Lung: bibasilar crackles, decreased on right base  Abd: Soft, NT, ND   Ext: no rashes on " visible skin  Neuro: nonfocal  Musculoskeletal: moving all extremities  Psych: appropriate    DATA    Cultures  7-Day Micro Results    Collected Updated Procedure Result Status    08/09/2024 1307 08/09/2024 1847 Respiratory Aerobic Bacterial Culture with Gram Stain [[id number]]   Sputum from Expectorate    Final result Component Value   Culture >10 Squamous epithelial cells/low power field indicates oral contamination. Please recollect.   Gram Stain Result >10 Squamous epithelial cells/low power field    <25 PMNs/low power field    2+ Mixed maureen             08/08/2024 1739 08/08/2024 2318 Respiratory Panel PCR [[id number]]    Swab from Nasopharyngeal    Final result Component Value   Adenovirus Not Detected   Coronavirus Not Detected   This test detects Coronavirus 229E, HKU1, NL63 and OC43 but does not distinguish between them. It does not detect MERS ( Respiratory Syndrome), SARS (Severe Acute Respiratory Syndrome) or 2019-nCoV (Novel 2019) Coronavirus.   Human Metapneumovirus Not Detected   Human Rhin/Enterovirus Not Detected   Influenza A Not Detected   Influenza A, H1 Not Detected   Influenza A 2009 H1N1 Not Detected   Influenza A, H3 Not Detected   Influenza B Not Detected   Parainfluenza Virus 1 Not Detected   Parainfluenza Virus 2 Not Detected   Parainfluenza Virus 3 Not Detected   Parainfluenza Virus 4 Not Detected   Respiratory Syncytial Virus A Not Detected   Respiratory Syncytial Virus B Not Detected   Chlamydia Pneumoniae Not Detected   Mycoplasma Pneumoniae Not Detected          08/08/2024 1354 08/08/2024 1447 Symptomatic Influenza A/B, RSV, & SARS-CoV2 PCR (COVID-19) Nasopharyngeal [[id number]]    Swab from Nasopharyngeal    Final result Component Value   Influenza A PCR Negative   Influenza B PCR Negative   RSV PCR Negative   SARS CoV2 PCR Negative   NEGATIVE: SARS-CoV-2 (COVID-19) RNA not detected, presumed negative.          08/08/2024 1347 08/09/2024 1746 Blood Culture  Peripheral Blood [[id number]]   Peripheral Blood    Preliminary result Component Value   Culture No growth after 1 day P                   IMAGING: Personally reviewed images. Formal radiology interpretation noted below.    CT Chest Pulmonary Embolism w Contrast    Result Date: 8/8/2024  EXAM: CT CHEST PULMONARY EMBOLISM W CONTRAST LOCATION: [facility] DATE: 8/8/2024 INDICATION: Acute and chronic respiratory failure. Recent TAVR and interstitial lung disease. COMPARISON: Chest CT 4/26/2022. TECHNIQUE: CT chest pulmonary angiogram during arterial phase injection of IV contrast. Multiplanar reformats and MIP reconstructions were performed. Dose reduction techniques were used. CONTRAST: isovue 370 90ml FINDINGS: ANGIOGRAM CHEST: No pulmonary embolism. Pulmonary trunk enlargement measuring 3.5 cm. Nonaneurysmal aorta without dissection. Post TAVR. LUNGS AND PLEURA: Worsening of extensive basilar predominant UIP pattern pulmonary fibrosis with basilar predominant honeycombing. Worsened moderate basilar predominant bronchiectasis. Superimposed bilateral groundglass opacities also present, worse in the left lung. Mild septal thickening. Trace pleural fluid. No pneumothorax. MEDIASTINUM/AXILLAE: Mild to moderate mediastinal and perihilar adenopathy, likely reactive. Moderate right ventricular enlargement. No pericardial effusion. CORONARY ARTERY CALCIFICATION: Present. UPPER ABDOMEN: No actionable findings.     MUSCULOSKELETAL: Degenerative changes spine IMPRESSION: 1.  No pulmonary embolism. 2.  Since 2022, moderate worsening of extensive basilar predominant UIP pattern pulmonary fibrosis. 3.  Superimposed bilateral groundglass opacities are present, with primary differentials of interstitial lung disease exacerbation versus pulmonary edema versus hemorrhage. Trace pleural fluid. 4.  Worsening moderate basilar predominant bronchiectasis. 5.  Pulmonary arterial and right ventricular enlargement,  compatible with pulmonary hypertension and elevated right heart pressure.     XR Chest Port 1 View    Result Date: 8/8/2024  EXAM: XR CHEST PORT 1 VIEW LOCATION: [facility] DATE: 8/8/2024 INDICATION: acute hypoxic respiratory failure on bipap with IPF COMPARISON: Chest x-ray 6/12/2024, CT chest 4/26/2022     IMPRESSION: Low lung volumes. Stable diffuse interstitial coarsening correlating with known UIP pattern interstitial lung disease. No definite superimposed acute airspace disease. However, given the patient's symptoms recommend short interval follow-up with x-ray or CT. No definite pleural effusion. Stable heart size. TAVR.    PFT DATA on 8/2024:  Although the FEV1 and FVC are reduced, the FEV1/FVC ratio is normal. The inspiratory flow rates are within normal limits.  The lung volumes are reduced.  Diffusing capacity is reduced but was not corrected for hemoglobin.   IMPRESSION:   Severe Restriction.   Severe diffusion defect.     Allergies   Allergen Reactions    Animal Dander Unknown    Chalk     Dogs Other (See Comments)     Pet Dander    Maple Tree     Mold [Molds & Smuts] Unknown

## 2024-08-12 NOTE — TREATMENT PLAN
[facility]AT Treatment Plan    Patient Score: 11    Patient Acuity: 3    Clinical Indication for Therapy: CHF, IPF.     Therapy Ordered: Duoneb QID, pt wears home CPAP at night, will call for pen neb as needed.     Assessment Summary: RT will follow per [facility]AT protocol.

## 2024-08-13 ENCOUNTER — APPOINTMENT (OUTPATIENT)
Dept: OCCUPATIONAL THERAPY | Facility: HOSPITAL | Age: 79
DRG: 196 | End: 2024-08-13
Payer: MEDICARE

## 2024-08-13 ENCOUNTER — APPOINTMENT (OUTPATIENT)
Dept: PHYSICAL THERAPY | Facility: HOSPITAL | Age: 79
DRG: 196 | End: 2024-08-13
Attending: INTERNAL MEDICINE
Payer: MEDICARE

## 2024-08-13 ENCOUNTER — TELEPHONE (OUTPATIENT)
Dept: CARDIOLOGY | Facility: CLINIC | Age: 79
End: 2024-08-13
Payer: MEDICARE

## 2024-08-13 DIAGNOSIS — I50.30 HEART FAILURE WITH PRESERVED EJECTION FRACTION, NYHA CLASS I (H): Primary | ICD-10-CM

## 2024-08-13 LAB
ANION GAP SERPL CALCULATED.3IONS-SCNC: 9 MMOL/L (ref 7–15)
BACTERIA BLD CULT: NO GROWTH
BUN SERPL-MCNC: 31.2 MG/DL (ref 8–23)
CALCIUM SERPL-MCNC: 9.1 MG/DL (ref 8.8–10.4)
CHLORIDE SERPL-SCNC: 88 MMOL/L (ref 98–107)
CREAT SERPL-MCNC: 0.83 MG/DL (ref 0.67–1.17)
EGFRCR SERPLBLD CKD-EPI 2021: 90 ML/MIN/1.73M2
ERYTHROCYTE [DISTWIDTH] IN BLOOD BY AUTOMATED COUNT: 16.8 % (ref 10–15)
GLUCOSE SERPL-MCNC: 129 MG/DL (ref 70–99)
HCO3 SERPL-SCNC: 32 MMOL/L (ref 22–29)
HCT VFR BLD AUTO: 31 % (ref 40–53)
HGB BLD-MCNC: 10.1 G/DL (ref 13.3–17.7)
LACTATE SERPL-SCNC: 1.5 MMOL/L (ref 0.7–2)
LACTATE SERPL-SCNC: 2.3 MMOL/L (ref 0.7–2)
MAGNESIUM SERPL-MCNC: 2 MG/DL (ref 1.7–2.3)
MCH RBC QN AUTO: 24 PG (ref 26.5–33)
MCHC RBC AUTO-ENTMCNC: 32.6 G/DL (ref 31.5–36.5)
MCV RBC AUTO: 74 FL (ref 78–100)
PLATELET # BLD AUTO: 300 10E3/UL (ref 150–450)
POTASSIUM SERPL-SCNC: 3.8 MMOL/L (ref 3.4–5.3)
RBC # BLD AUTO: 4.2 10E6/UL (ref 4.4–5.9)
SODIUM SERPL-SCNC: 129 MMOL/L (ref 135–145)
WBC # BLD AUTO: 13.7 10E3/UL (ref 4–11)

## 2024-08-13 PROCEDURE — 97162 PT EVAL MOD COMPLEX 30 MIN: CPT | Mod: GP

## 2024-08-13 PROCEDURE — 250N000009 HC RX 250: Performed by: FAMILY MEDICINE

## 2024-08-13 PROCEDURE — 250N000009 HC RX 250: Performed by: STUDENT IN AN ORGANIZED HEALTH CARE EDUCATION/TRAINING PROGRAM

## 2024-08-13 PROCEDURE — 258N000003 HC RX IP 258 OP 636: Performed by: INTERNAL MEDICINE

## 2024-08-13 PROCEDURE — 83735 ASSAY OF MAGNESIUM: CPT | Performed by: FAMILY MEDICINE

## 2024-08-13 PROCEDURE — 94640 AIRWAY INHALATION TREATMENT: CPT | Mod: 76

## 2024-08-13 PROCEDURE — 97530 THERAPEUTIC ACTIVITIES: CPT | Mod: GP

## 2024-08-13 PROCEDURE — 97535 SELF CARE MNGMENT TRAINING: CPT | Mod: GO

## 2024-08-13 PROCEDURE — 250N000012 HC RX MED GY IP 250 OP 636 PS 637: Performed by: INTERNAL MEDICINE

## 2024-08-13 PROCEDURE — 83605 ASSAY OF LACTIC ACID: CPT | Performed by: INTERNAL MEDICINE

## 2024-08-13 PROCEDURE — 210N000001 HC R&B IMCU HEART CARE

## 2024-08-13 PROCEDURE — 80048 BASIC METABOLIC PNL TOTAL CA: CPT | Performed by: FAMILY MEDICINE

## 2024-08-13 PROCEDURE — 36415 COLL VENOUS BLD VENIPUNCTURE: CPT | Performed by: INTERNAL MEDICINE

## 2024-08-13 PROCEDURE — 99232 SBSQ HOSP IP/OBS MODERATE 35: CPT | Performed by: INTERNAL MEDICINE

## 2024-08-13 PROCEDURE — 250N000011 HC RX IP 250 OP 636: Performed by: FAMILY MEDICINE

## 2024-08-13 PROCEDURE — 36415 COLL VENOUS BLD VENIPUNCTURE: CPT | Performed by: FAMILY MEDICINE

## 2024-08-13 PROCEDURE — 250N000013 HC RX MED GY IP 250 OP 250 PS 637: Performed by: INTERNAL MEDICINE

## 2024-08-13 PROCEDURE — 250N000013 HC RX MED GY IP 250 OP 250 PS 637: Performed by: FAMILY MEDICINE

## 2024-08-13 PROCEDURE — 999N000157 HC STATISTIC RCP TIME EA 10 MIN

## 2024-08-13 PROCEDURE — 97110 THERAPEUTIC EXERCISES: CPT | Mod: GO

## 2024-08-13 PROCEDURE — 85027 COMPLETE CBC AUTOMATED: CPT | Performed by: FAMILY MEDICINE

## 2024-08-13 PROCEDURE — 94640 AIRWAY INHALATION TREATMENT: CPT

## 2024-08-13 RX ORDER — PREDNISONE 20 MG/1
40 TABLET ORAL DAILY
Status: DISCONTINUED | OUTPATIENT
Start: 2024-08-14 | End: 2024-08-14 | Stop reason: HOSPADM

## 2024-08-13 RX ORDER — PREDNISONE 20 MG/1
20 TABLET ORAL DAILY
Status: DISCONTINUED | OUTPATIENT
Start: 2024-09-11 | End: 2024-08-14 | Stop reason: HOSPADM

## 2024-08-13 RX ORDER — PREDNISONE 5 MG/1
5 TABLET ORAL DAILY
Status: DISCONTINUED | OUTPATIENT
Start: 2024-10-09 | End: 2024-08-14 | Stop reason: HOSPADM

## 2024-08-13 RX ORDER — PREDNISONE 5 MG/1
10 TABLET ORAL DAILY
Status: DISCONTINUED | OUTPATIENT
Start: 2024-09-25 | End: 2024-08-14 | Stop reason: HOSPADM

## 2024-08-13 RX ORDER — SULFAMETHOXAZOLE/TRIMETHOPRIM 800-160 MG
1 TABLET ORAL
Status: DISCONTINUED | OUTPATIENT
Start: 2024-08-14 | End: 2024-08-14 | Stop reason: HOSPADM

## 2024-08-13 RX ADMIN — PIRFENIDONE 534 MG: 267 CAPSULE ORAL at 08:51

## 2024-08-13 RX ADMIN — ENOXAPARIN SODIUM 40 MG: 40 INJECTION SUBCUTANEOUS at 20:50

## 2024-08-13 RX ADMIN — IPRATROPIUM BROMIDE AND ALBUTEROL SULFATE 3 ML: .5; 3 SOLUTION RESPIRATORY (INHALATION) at 12:18

## 2024-08-13 RX ADMIN — PANTOPRAZOLE SODIUM 40 MG: 40 TABLET, DELAYED RELEASE ORAL at 08:45

## 2024-08-13 RX ADMIN — ASPIRIN 81 MG: 81 TABLET, COATED ORAL at 08:46

## 2024-08-13 RX ADMIN — SALINE NASAL SPRAY 1 SPRAY: 1.5 SOLUTION NASAL at 20:51

## 2024-08-13 RX ADMIN — FORMOTEROL FUMARATE DIHYDRATE 20 MCG: 20 SOLUTION RESPIRATORY (INHALATION) at 08:07

## 2024-08-13 RX ADMIN — PANTOPRAZOLE SODIUM 40 MG: 40 TABLET, DELAYED RELEASE ORAL at 16:33

## 2024-08-13 RX ADMIN — IPRATROPIUM BROMIDE AND ALBUTEROL SULFATE 3 ML: .5; 3 SOLUTION RESPIRATORY (INHALATION) at 20:18

## 2024-08-13 RX ADMIN — AZITHROMYCIN DIHYDRATE 250 MG: 250 TABLET, FILM COATED ORAL at 08:46

## 2024-08-13 RX ADMIN — SALINE NASAL SPRAY 1 SPRAY: 1.5 SOLUTION NASAL at 11:38

## 2024-08-13 RX ADMIN — LIDOCAINE 1 PATCH: 246 PATCH TOPICAL at 20:50

## 2024-08-13 RX ADMIN — IPRATROPIUM BROMIDE AND ALBUTEROL SULFATE 3 ML: .5; 3 SOLUTION RESPIRATORY (INHALATION) at 08:06

## 2024-08-13 RX ADMIN — IPRATROPIUM BROMIDE AND ALBUTEROL SULFATE 3 ML: .5; 3 SOLUTION RESPIRATORY (INHALATION) at 16:39

## 2024-08-13 RX ADMIN — SENNOSIDES AND DOCUSATE SODIUM 1 TABLET: 8.6; 5 TABLET ORAL at 20:50

## 2024-08-13 RX ADMIN — SODIUM CHLORIDE 250 ML: 9 INJECTION, SOLUTION INTRAVENOUS at 12:48

## 2024-08-13 RX ADMIN — METOPROLOL SUCCINATE 50 MG: 50 TABLET, EXTENDED RELEASE ORAL at 08:46

## 2024-08-13 RX ADMIN — ATORVASTATIN CALCIUM 80 MG: 40 TABLET, FILM COATED ORAL at 20:54

## 2024-08-13 RX ADMIN — PREDNISONE 60 MG: 20 TABLET ORAL at 08:45

## 2024-08-13 RX ADMIN — ACETAMINOPHEN 650 MG: 325 TABLET ORAL at 20:51

## 2024-08-13 RX ADMIN — FORMOTEROL FUMARATE DIHYDRATE 20 MCG: 20 SOLUTION RESPIRATORY (INHALATION) at 20:24

## 2024-08-13 RX ADMIN — PIRFENIDONE 534 MG: 267 CAPSULE ORAL at 14:14

## 2024-08-13 RX ADMIN — PIRFENIDONE 534 MG: 267 CAPSULE ORAL at 20:51

## 2024-08-13 RX ADMIN — SENNOSIDES AND DOCUSATE SODIUM 1 TABLET: 8.6; 5 TABLET ORAL at 08:45

## 2024-08-13 ASSESSMENT — ACTIVITIES OF DAILY LIVING (ADL)
ADLS_ACUITY_SCORE: 36
ADLS_ACUITY_SCORE: 35
ADLS_ACUITY_SCORE: 36
ADLS_ACUITY_SCORE: 35
ADLS_ACUITY_SCORE: 36
ADLS_ACUITY_SCORE: 36
ADLS_ACUITY_SCORE: 35
ADLS_ACUITY_SCORE: 36
ADLS_ACUITY_SCORE: 35
ADLS_ACUITY_SCORE: 36
ADLS_ACUITY_SCORE: 35
ADLS_ACUITY_SCORE: 36
ADLS_ACUITY_SCORE: 35

## 2024-08-13 NOTE — PROGRESS NOTES
[facility]    Medicine Progress Note - Hospitalist Service    Date of Admission:  8/8/2024    Assessment & Plan   Wyatt Gutierrez is a 79 year old male history of IPF on chronic oxygen as well as severe aortic stenosis, status post TAVR 6/2024 as well as history of palpitations with recent workup admitted to the hospital with acute on chronic respiratory failure    Acute on chronic respiratory failure  - suspect exacerbation IPF, fluid retention contributing due to hold of diuretic over the past month.  Appears less likely infectious  - Workup included elevated BNP, low Pro-Dariel, unremarkable chest x-ray.  - CT chest neg for PE,moderate worsening of pulm fibrosis.   - Resp cx mnegative viral panel negative  - Continue NC oxygen ( almot at baseline nw), was on Hiflo, was on BiPAP on admission.  --- At baseline 6L with activity, 3L at rest.  -  Patient has been on steroids, which patient will be discharged on a tapering dose   ---finished levofloxacin, resume usual azithromycin  ---Appreciate pulmonary following  -- has been on lasix 20mg Q12H since admit, now on hold due to hyponatremia     IPF  - Continue home  Brovana, DuoNeb, pirfenidone (recent med change).  Follow-up with [facility] pulmonary department upon discharge.  - has Been off prophylactic azithromycin, which has been resumed after completion of levaquin     Hyponatremia  ---stop IV lasix  ---trend, improved to 129    Hypokalemia  ---check mag and replace     Sinus tachycardia  - resolved  --History of palpitations with some cardiac workup  -Telemetry  -TSH nanette  - Continue home toprol    Aortic stenosis  Recent TAVR  - Continue beta-blocker  -Taken off his Dyazide 1 month ago. - I suspect he will need a diuretic for discharge.  To come off IV Lasix and [place] the day due to hyponatremia.    Hypertension  -continue Toprol,   -PTA Dyazide has been on hold as out-pt by his cardiologist given he was  "hyponatremic  .    Hyperlipidemia  -continue Lipitor    GERD   - continue PPI    Leukocytosis   - resolved    Tailbone pain  -positional, On Lidoderm patch            Diet: Combination Diet 2 gm NA Diet; No Caffeine Diet (and additional linked orders)  Fluid restriction 2000 ML FLUID (and additional linked orders)    DVT Prophylaxis: Enoxaparin (Lovenox) SQ  Foreman Catheter: Not present  Lines: None     Cardiac Monitoring: ACTIVE order. Indication: Acute decompensated heart failure (48 hours)  Code Status: No CPR- Do NOT Intubate      Clinically Significant Risk Factors        # Hypokalemia: Lowest K = 3.3 mmol/L in last 2 days, will replace as needed  # Hyponatremia: Lowest Na = 125 mmol/L in last 2 days, will monitor as appropriate          # Hypertension: Noted on problem list           # Overweight: Estimated body mass index is 26.41 kg/m  as calculated from the following:    Height as of this encounter: 1.778 m (5' 10\").    Weight as of this encounter: 83.5 kg (184 lb 1.4 oz).        # Financial/Environmental Concerns: none               Disposition Plan     Medically Ready for Discharge:  anticipate tomorrow              Gisella Schaefer MD  Hospitalist Service  [facility]  Securely message with [employer] (more info)  Text page via [employer] Paging/Directory   ______________________________________________________________________    Interval History    Charts reviewed and patient was examined. Patient feels better, and feels that his breathing is at baseline, but weak overall  He hopes to discharge to his own home   Bull any fever or chills       Physical Exam   Vital Signs: Temp: 98.1  F (36.7  C) Temp src: Oral BP: 107/63 Pulse: 89   Resp: 16 SpO2: 97 % O2 Device: Nasal cannula with humidification Oxygen Delivery: 5 LPM  Weight: 184 lbs 1.35 oz    General Appearance: Pleasant male up in chair with nasal cannula on.  Respiratory: Crackles bilaterally  Cardiovascular: Regular rate " and rhythm  GI: Soft and nontender without hepatosplenomegaly  Skin: No significant presacral edema or lower extremity edema or open areas.  Other: Neuro grossly intact without focal deficit appreciated    Medical Decision Making             Data     I have personally reviewed the following data over the past 24 hrs:    13.7 (H)  \   10.1 (L)   / 300     129 (L) 88 (L) 31.2 (H) /  129 (H)   3.8 32 (H) 0.83 \     Procal: N/A CRP: N/A Lactic Acid: 1.5         Imaging results reviewed over the past 24 hrs:   No results found for this or any previous visit (from the past 24 hour(s)).

## 2024-08-13 NOTE — PLAN OF CARE
Heart Failure Care Map  GOALS TO BE MET BEFORE DISCHARGE:    1. Decrease congestion and/or edema with diuretic therapy to achieve near optimal volume status.     Dyspnea improved: No, further care required to meet this goal. Please explain pt reports SOB when making minimal movement   Edema improved: Yes, satisfactory for discharge.        Last 24 hour I/O:   Intake/Output Summary (Last 24 hours) at 8/13/2024 1047  Last data filed at 8/13/2024 0749  Gross per 24 hour   Intake 1000 ml   Output 950 ml   Net 50 ml           Net I/O and Weights since admission:   07/14 1500 - 08/13 1459  In: 5393 [P.O.:5390; I.V.:3]  Out: 12847 [Urine:88449]  Net: -4652     Vitals:    08/08/24 1324 08/09/24 0500 08/10/24 0305 08/11/24 0545   Weight: 86.2 kg (190 lb) 87.1 kg (192 lb) 88 kg (194 lb 0.1 oz) 82.6 kg (182 lb 1.6 oz)    08/12/24 0346 08/13/24 0356   Weight: 82.7 kg (182 lb 5.1 oz) 83.5 kg (184 lb 1.4 oz)       2.  O2 sats > 90% on room air, or at prior home O2 therapy level.      Able to wean O2 this shift to keep sats above 90%?: No, further care required to meet this goal. Please explain pt stats 90s with 4L NC   Does patient use Home O2? Yes         Current oxygenation status:   SpO2: 91 %     O2 Device: Nasal cannula with humidification, Oxygen Delivery: 4 LPM    3.  Tolerates ambulation and mobility near baseline.     Ambulation: Yes, satisfactory for discharge.   Times patient ambulated with staff this shift: 3    Please review the Heart Failure Care Map for additional HF goal outcomes.    Pt A/Ox4, on 4L NC, denies pain, assistx1.  PRN ocean spray given for nose dryness. Pt complains of SOB w/ minimal movement. Possible discharge, pt has O2 already set up at home.      VIKY Ramos, protocol recheck in RUTH ANN Willis RN  8/13/2024

## 2024-08-13 NOTE — PLAN OF CARE
"  Problem: Adult Inpatient Plan of Care  Goal: Plan of Care Review  Description: The Plan of Care Review/Shift note should be completed every shift.  The Outcome Evaluation is a brief statement about your assessment that the patient is improving, declining, or no change.  This information will be displayed automatically on your shift  note.  Outcome: Progressing  Goal: Patient-Specific Goal (Individualized)  Description: You can add care plan individualizations to a care plan. Examples of Individualization might be:  \"Parent requests to be called daily at 9am for status\", \"I have a hard time hearing out of my right ear\", or \"Do not touch me to wake me up as it startles  me\".  Outcome: Progressing  Goal: Readiness for Transition of Care  Outcome: Progressing     Problem: Risk for Delirium  Goal: Improved Behavioral Control  Outcome: Progressing  Intervention: Minimize Safety Risk  Recent Flowsheet Documentation  Taken 8/12/2024 1600 by Herlinda Dash RN  Enhanced Safety Measures: review medications for side effects with activity     Problem: Comorbidity Management  Goal: Maintenance of Heart Failure Symptom Control  Outcome: Progressing  Intervention: Maintain Heart Failure Management  Recent Flowsheet Documentation  Taken 8/12/2024 1600 by Herlinda Dash RN  Medication Review/Management: medications reviewed  Goal: Blood Pressure in Desired Range  Outcome: Progressing  Intervention: Maintain Blood Pressure Management  Recent Flowsheet Documentation  Taken 8/12/2024 1600 by Herlinda Dash RN  Medication Review/Management: medications reviewed   Goal Outcome Evaluation:  Pt is alert and oriented, uses call light appropriately.  Pt's tele is SR w/ 1st DEG AVB.  Pt only ate applesauce and vanilla pudding for dinner.  Pt takes pills without incident.  Pt is on 4 L O2 while resting, but requires 6 L O2 with activity.  Pt is on 2 L fluid restriction, and he adheres well to this.                        "

## 2024-08-13 NOTE — PLAN OF CARE
Problem: Adult Inpatient Plan of Care  Goal: Plan of Care Review  Description: The Plan of Care Review/Shift note should be completed every shift.  The Outcome Evaluation is a brief statement about your assessment that the patient is improving, declining, or no change.  This information will be displayed automatically on your shift  note.  Outcome: Progressing   Goal Outcome Evaluation:  Pt is A/O  to person place, and time.,denied pain, Pt had a CPaP on all night bleeding oxygen. NSR with first degree AVB, Voiding ok using a urinal. Lidocaine patch to Tailbone pain.had tylenol for headache with a positive result. Pt get SOB of breath with the slightest activity and when talking. Pending pulmonary consult and treatment plan before discharge to TCU as per care management. .On 4 litters of oxygen via NC during the day and cpap at night time. On 2 litters of fluid restriction. Fluid consumption rate is WN.

## 2024-08-13 NOTE — PROGRESS NOTES
Pulmonary Progress Note  8/13/2024      Admit Date: 8/8/2024  CODE: No CPR- Do NOT Intubate    Reason for Consult: acute on chronic respiratory failure with hypxemia. IPF with exacerbation.     Assessment/Plan:   Wyatt Gutierrez is a 79 year old male, former smoker (quit 2022) with h/o ILD/IPF (diagnosed 2021) followed at the  on home at 6 lPM, h/o TAVR (6 weeks ago), KAITY, HTN, HPL followed in [place] by Dr. Estrada who came in with complaints of dyspnea on exertion that has worsened over the past 1 week.  CTA was done to rule out PE that showed moderate worsening of extensive basilar predominant UIP pulmonary fibrosis.  Superimposed bilateral groundglass opacities were present.  Improving slowly with steroids, abx, nebs.     Recommendations:  - continue supplemental O2 for goal SpO2 88-92%, on baseline requirement today  - encourage OOB, PT/OT, push IS  - slow prednisone taper ordered  - add TMP/SMX 1 DS tab three times per week for PJP ppx while on prednisone doses of 20mg daily or greater  - continue PTA nebs, inhalers, etc  - continue PTA azithromycin  - sent a message to his outpatient pulmonologist, Dr. Estrada, to help arrange follow up.    No further recommendations; we'll sign off. please call with additional questions.     Jose (Anastasiya Virk MD  [facility]  Pager [phone]  Clinic [phone]  Fax [phone]     Subjective/Interim Events:   Feeling better.  No cough or hemoptysis.  At baseline O2 requirement.       Medications:     Current Facility-Administered Medications   Medication Dose Route Frequency Provider Last Rate Last Admin    Continuing beta blocker from home medication list OR beta blocker order already placed during this visit   Does not apply DOES NOT GO TO Mayra Prince MD        Reason ACE/ARB/ARNI order not selected   Other DOES NOT GO TO Mayra Prince MD         Current Facility-Administered Medications   Medication Dose  Route Frequency Provider Last Rate Last Admin    aspirin EC tablet 81 mg  81 mg Oral Daily Mayra Miller MD   81 mg at 08/13/24 0846    atorvastatin (LIPITOR) tablet 80 mg  80 mg Oral At Bedtime Mayra Miller MD   80 mg at 08/12/24 2028    azithromycin (ZITHROMAX) tablet 250 mg  250 mg Oral Daily Mayra Miller MD   250 mg at 08/13/24 0846    enoxaparin ANTICOAGULANT (LOVENOX) injection 40 mg  40 mg Subcutaneous Q24H Mayra Miller MD   40 mg at 08/12/24 2027    formoterol (PERFOROMIST) neb solution 20 mcg  20 mcg Nebulization 2 times daily Mayra Miller MD   20 mcg at 08/13/24 0807    ipratropium - albuterol 0.5 mg/2.5 mg/3 mL (DUONEB) neb solution 3 mL  1 vial Nebulization 4x daily Rosendo Rivers MD   3 mL at 08/13/24 0806    Lidocaine (LIDOCARE) 4 % Patch 1 patch  1 patch Transdermal Q24h Mayra Miller MD   1 patch at 08/12/24 2026    metoprolol succinate ER (TOPROL XL) 24 hr tablet 50 mg  50 mg Oral Daily Mayra Miller MD   50 mg at 08/13/24 0846    pantoprazole (PROTONIX) EC tablet 40 mg  40 mg Oral BID AC Mayra Miller MD   40 mg at 08/13/24 0845    pirfenidone (ESBRIET) capsule 534 mg  534 mg Oral TID Mayra Miller MD   534 mg at 08/13/24 0851    [START ON 8/14/2024] pirfenidone (ESBRIET) capsule 801 mg  801 mg Oral TID w/meals Amber Silveira,         predniSONE (DELTASONE) tablet 40 mg  40 mg Oral Daily Jose Virk MD        Followed by    [START ON 8/27/2024] predniSONE (DELTASONE) tablet 30 mg  30 mg Oral Daily Jose Virk MD        Followed by    [START ON 9/10/2024] predniSONE (DELTASONE) tablet 20 mg  20 mg Oral Daily Jose Virk MD        Followed by    [START ON 9/24/2024] predniSONE (DELTASONE) tablet 10 mg  10 mg Oral Daily Jose Virk MD        Followed by    [START ON 10/8/2024] predniSONE (DELTASONE) tablet 5 mg  5 mg Oral Daily Jose Virk MD        senna-docusate (SENOKOT-S/PERICOLACE) 8.6-50 MG per tablet 1 tablet  1 tablet Oral BID Mayar Miller MD   1 tablet at  "24 0845    Or    senna-docusate (SENOKOT-S/PERICOLACE) 8.6-50 MG per tablet 2 tablet  2 tablet Oral BID Kamaljit Qureshi MD   2 tablet at 24 0806    sodium chloride (PF) 0.9% PF flush 3 mL  3 mL Intracatheter Q8H Kamaljit Qureshi MD   3 mL at 24 0851    [START ON 2024] sulfamethoxazole-trimethoprim (BACTRIM DS) 800-160 MG per tablet 1 tablet  1 tablet Oral Once per day on  Jose Virk MD             Exam/Data:   Vitals  /70 (BP Location: Right arm)   Pulse 83   Temp 97.6  F (36.4  C) (Oral)   Resp 22   Ht 1.778 m (5' 10\")   Wt 83.5 kg (184 lb 1.4 oz)   SpO2 91%   BMI 26.41 kg/m       I/O last 3 completed shifts:  In: 600 [P.O.:600]  Out: 1225 [Urine:1225]  Weight change: 0.8 kg (1 lb 12.2 oz)  [unfilled]  Resp: 22    EXAM:  Physical Exam  Gen: awake, alert, oriented, no distress  HEENT: NT, no TY  CV: RRR, no m/g/r  Resp: fine bibasilar crackles. No wheezing or rhonchi.   Abd: soft, nontender, BS+  Skin: no rashes or lesions  Ext: no edema  Neuro: PERRL, nonfocal exam    ROS:  A 10-system review was obtained and is negative with the exception of the symptoms noted above.    DATA:    PFT DATA   2024  Although the FEV1 and FVC are reduced, the FEV1/FVC ratio is normal. The inspiratory flow rates are within normal limits.  The lung volumes are reduced.  Diffusing capacity is reduced but was not corrected for hemoglobin.         IMPRESSION:     Severe Restriction.     Severe diffusion defect.     Micro - negative to date      IMAGING:   Echocardiogram Limited    Result Date: 2024  [id number]^[email or web address]^KAMALJIT^[place] [address]  Name: ASTER FARRELL MRN: [id number] : 1945 Study Date: 2024 08:47 AM Age: 79 yrs Gender: Male Patient [facility] Reason For Study: CHF Ordering Physician: KAMALJIT QURESHI Performed By: MIRANDA  BSA: 2.1 m2 Height: 70 in Weight: 192 lb HR: 102 BP: 146/79 mmHg " ______________________________________________________________________________ Procedure Limited Portable Echo Adult. Definity (NDC #59746-060) given intravenously. ______________________________________________________________________________ Interpretation Summary  1. Limited echocardiographic study. 2. Normal left ventricular size and systolic performance with a visually estimated ejection fraction of 65%. 3. There is abnormal septal motion c/w right ventricular overload; there is flattening of the septal curvature throughout the cardiac cycle suggesting prominent elevation in RV systolic pressure 4. There is mild concentric increase in left ventricular wall thickness. 5. There is a bio-prosthetic aortic valve (documented 26 mm Dan Gabe 3 Ultra tissue valve). Â  Normal aortic valve prosthesis metrics with a mean systolic gradient of 13 mmHg and a peak anterograde velocity of 2.7 m/sec. Â  There is mild aortic insufficiency. 6. There is moderate right ventricular enlargement with moderately reduced right ventricular systolic performance 7. There is moderate biatrial enlargement.  When compared to the prior real-time echocardiogram dated 10 July 2024, the right ventricle appears more enlarged and with somewhat more reduced systolic performance. In addition, there is now evidence of flattening of the septal curvature throughout the cardiac cycle suggesting prominent elevation in RV systolic pressure. ______________________________________________________________________________ Left ventricle: Normal left ventricular size and systolic performance with a visually estimated ejection fraction of 65%. There is abnormal septal motion c/w right ventricular overload; there is flattening of the septal curvature throughout the cardiac cycle suggesting prominent elevation in RV systolic pressure. There is mild concentric increase in left ventricular wall thickness.  Assessment of LV Diastolic Function: Examination was  "performed as a \"limited study\". Assessment of diastolic filling consequently not performed.  Right ventricle: There is moderate right ventricular enlargement with moderately reduced right ventricular systolic performance.  Left atrium: There is moderate left atrial enlargement.  Right atrium: There is moderate right atrial enlargement.  IVC: The IVC is not well-visualized.  Aortic valve: There is a bio-prosthetic aortic valve (documented 26 mm Dan Gabe 3 Ultra tissue valve). Normal aortic valve prosthesis metrics with a mean systolic gradient of 13 mmHg and a peak anterograde velocity of 2.7 m/sec. The Ao Acceleration Time is 0.08 sec. There is mild aortic insufficiency.  Mitral valve: The mitral valve appears morphologically normal. There is trace mitral insufficiency.  Tricuspid valve: The tricuspid valve is grossly morphologically normal. There is probable mild tricuspid insufficiency.  Pulmonic valve: The pulmonic valve is grossly morphologically normal. There is trace pulmonic insufficiency.  Thoracic aorta: The aortic root and proximal ascending aorta are of normal dimension.  Pericardium: There is no significant pericardial effusion. ______________________________________________________________________________ ______________________________________________________________________________ MMode/2D Measurements & Calculations IVSd: 1.1 cm LVIDd: 4.2 cm LVIDs: 2.1 cm LVPWd: 1.1 cm FS: 48.9 % LV mass(C)d: 151.4 grams LV mass(C)dI: 73.8 grams/m2 EF Biplane: 66.1 % RWT: 0.51  Time Measurements MM HR: 93.0 BPM  Doppler Measurements & Calculations Ao acc time: 0.08 sec  ______________________________________________________________________________ Report approved by: Carly Elizabeth 08/09/2024 10:44 AM       CT Chest Pulmonary Embolism w Contrast    Result Date: 8/8/2024  EXAM: CT CHEST PULMONARY EMBOLISM W CONTRAST LOCATION: [facility] DATE: 8/8/2024 INDICATION: Acute and " chronic respiratory failure. Recent TAVR and interstitial lung disease. COMPARISON: Chest CT 4/26/2022. TECHNIQUE: CT chest pulmonary angiogram during arterial phase injection of IV contrast. Multiplanar reformats and MIP reconstructions were performed. Dose reduction techniques were used. CONTRAST: isovue 370 90ml FINDINGS: ANGIOGRAM CHEST: No pulmonary embolism. Pulmonary trunk enlargement measuring 3.5 cm. Nonaneurysmal aorta without dissection. Post TAVR. LUNGS AND PLEURA: Worsening of extensive basilar predominant UIP pattern pulmonary fibrosis with basilar predominant honeycombing. Worsened moderate basilar predominant bronchiectasis. Superimposed bilateral groundglass opacities also present, worse in the left lung. Mild septal thickening. Trace pleural fluid. No pneumothorax. MEDIASTINUM/AXILLAE: Mild to moderate mediastinal and perihilar adenopathy, likely reactive. Moderate right ventricular enlargement. No pericardial effusion. CORONARY ARTERY CALCIFICATION: Present. UPPER ABDOMEN: No actionable findings.     MUSCULOSKELETAL: Degenerative changes spine IMPRESSION: 1.  No pulmonary embolism. 2.  Since 2022, moderate worsening of extensive basilar predominant UIP pattern pulmonary fibrosis. 3.  Superimposed bilateral groundglass opacities are present, with primary differentials of interstitial lung disease exacerbation versus pulmonary edema versus hemorrhage. Trace pleural fluid. 4.  Worsening moderate basilar predominant bronchiectasis. 5.  Pulmonary arterial and right ventricular enlargement, compatible with pulmonary hypertension and elevated right heart pressure.     XR Chest Port 1 View    Result Date: 8/8/2024  EXAM: XR CHEST PORT 1 VIEW LOCATION: [facility] DATE: 8/8/2024 INDICATION: acute hypoxic respiratory failure on bipap with IPF COMPARISON: Chest x-ray 6/12/2024, CT chest 4/26/2022     IMPRESSION: Low lung volumes. Stable diffuse interstitial coarsening correlating with  known UIP pattern interstitial lung disease. No definite superimposed acute airspace disease. However, given the patient's symptoms recommend short interval follow-up with x-ray or CT. No definite pleural effusion. Stable heart size. TAVR.

## 2024-08-13 NOTE — PROGRESS NOTES
"PT Evaluation   08/13/24 1050   Appointment Info   Signing Clinician's Name / Credentials (PT) Nabila Coronado PT, DPT   Living Environment   People in Home spouse   Current Living Arrangements house   Number of Stairs, Main Entrance 8   Stair Railings, Main Entrance other (see comments)  (L side descending)   Living Environment Comments Pt has been staying in basement.   Self-Care   Equipment Currently Used at Home shower chair   Fall history within last six months no   Activity/Exercise/Self-Care Comment IND with self cares   General Information   Onset of Illness/Injury or Date of Surgery 08/08/24   Referring Physician Flower Grant MD   Pertinent History of Current Problem (include personal factors and/or comorbidities that impact the POC) Per chart: \"79 year old male history of IPF on chronic oxygen as well as severe aortic stenosis, status post TAVR 6/2024 as well as history of palpitations with recent workup admitted to the hospital with acute on chronic respiratory failure\"   Existing Precautions/Restrictions fall;oxygen therapy device and L/min  (4LPM NC when PT arrived. Has been 6LPM NC with activity per chart.)   Cognition   Affect/Mental Status (Cognition) WNL   Follows Commands (Cognition) WNL   Pain Assessment   Patient Currently in Pain No   Range of Motion (ROM)   Range of Motion ROM is WNL   Bed Mobility   Bed Mobility supine-sit   Supine-Sit [place] (Bed Mobility) modified independence   Impairments Contributing to Impaired Bed Mobility decreased strength   Assistive Device (Bed Mobility) bed rails   Transfers   Transfers sit-stand transfer   Impairments Contributing to Impaired Transfers decreased strength;other (see comments)  (Impaired O2 delivery)   Comment, (Transfers) drop to 90% SpO2 on 6LPM NC, slowly goes up to 92% SpO2 with extended time sitting up PLB.   Sit-Stand Transfer   Sit-Stand [place] (Transfers) contact guard;1 person assist;verbal cues   Gait/Stairs " (Locomotion)   [place] (Gait) contact guard   Distance in Feet (Gait) 8'   Pattern (Gait) step-to   Deviations/Abnormal Patterns (Gait) stride length decreased;gait speed decreased;raul decreased   Balance   Balance other (describe)   Balance Comments CGA in standing, slightly unsteady on feet first getting OOB   Sensory Examination   Sensory Perception patient reports no sensory changes   Sensory Perception Comments denies N/T   Clinical Impression   Criteria for Skilled Therapeutic Intervention Yes, treatment indicated   PT Diagnosis (PT) Impaired functional mobility   Influenced by the following impairments impaired strength, activity tolerance; IPF   Functional limitations due to impairments transfers, gait, endurance   Clinical Presentation (PT Evaluation Complexity) evolving   Clinical Presentation Rationale clinical judgment   Clinical Decision Making (Complexity) moderate complexity   Planned Therapy Interventions (PT) balance training;bed mobility training;gait training;home exercise program;neuromuscular re-education;patient/family education;ROM (range of motion);stair training;strengthening;stretching;transfer training;progressive activity/exercise;home program guidelines   Risk & Benefits of therapy have been explained evaluation/treatment results reviewed;care plan/treatment goals reviewed;risks/benefits reviewed;participants included;patient   PT Total Evaluation Time   PT Eval, Moderate Complexity Minutes (06887) 20   Physical Therapy Goals   PT Frequency Daily   PT Predicted Duration/Target Date for Goal Attainment 08/20/24   PT Goals Transfers;Gait;Stairs   PT: Transfers Sit to/from stand;Bed to/from chair;Independent   PT: Gait Supervision/stand-by assist;150 feet   PT: Stairs 8 stairs;Minimal assist;Rail on left   Interventions   Interventions Quick Adds Therapeutic Activity   Therapeutic Activity   Therapeutic Activities: dynamic activities to improve functional performance Minutes  (68808) 15   Symptoms Noted During/After Treatment Significant change in vital signs;Shortness of breath   Treatment Detail/Skilled Intervention Eval completed, tx initiated. Pt increased to 6LPM NC with portable O2 tank to amb to/from restroom, extended time spent in sitting EOB and then in standing to ensure pt with safe O2 levels >88%. Toilet transfer CGA with cues for use of grab bar. Unbilled time for use of toilet. CGA within room amb 8', with seated rest break EOB, reconnected pt back to O2 in wall at 5LPM NC. pt returns to supine SBA, rolls to L, then sit sup opposite EOB SBA per O2 line limiting amb. 3' amb CGA to recliner per pt wishing to sit up after session. PT noting pt's Spo2 dropping to 80% with bed mobility and amb to recliner, PT cueing for PLB to improve O2 delivery and staying with pt to ensure return to safe SpO2 levels. Pt returns to 95% SpO2 with time spent sitting up PLB. Pt left sitting up in recliner, chair alarm engaged, call light & all other needs in reach. Handoff to RN.   PT Discharge Planning   PT Plan Monitor O2! Bring step for stairs trial, Consecutive sit<>stands, prog gait as tolerated   PT Discharge Recommendation (DC Rec) home with assist   PT Rationale for DC Rec Patient's activity levels are primarily limited by his O2 needs at this time. Pt demonstrates the strength and able to mobilize safely CGA for safety, but desats quickly and needs extended time for SpO2 levels to return to safe levels. Recommend home with assist at discharge pending stairs trial.   PT Brief overview of current status CGA transfers and gait 8' at a time no AD   Total Session Time   Timed Code Treatment Minutes 15   Total Session Time (sum of timed and untimed services) 35

## 2024-08-14 ENCOUNTER — APPOINTMENT (OUTPATIENT)
Dept: PHYSICAL THERAPY | Facility: HOSPITAL | Age: 79
DRG: 196 | End: 2024-08-14
Payer: MEDICARE

## 2024-08-14 ENCOUNTER — APPOINTMENT (OUTPATIENT)
Dept: OCCUPATIONAL THERAPY | Facility: HOSPITAL | Age: 79
DRG: 196 | End: 2024-08-14
Payer: MEDICARE

## 2024-08-14 VITALS
WEIGHT: 185.19 LBS | HEIGHT: 70 IN | OXYGEN SATURATION: 93 % | RESPIRATION RATE: 16 BRPM | SYSTOLIC BLOOD PRESSURE: 105 MMHG | TEMPERATURE: 98.2 F | BODY MASS INDEX: 26.51 KG/M2 | HEART RATE: 83 BPM | DIASTOLIC BLOOD PRESSURE: 58 MMHG

## 2024-08-14 LAB
ANION GAP SERPL CALCULATED.3IONS-SCNC: 8 MMOL/L (ref 7–15)
BUN SERPL-MCNC: 28.7 MG/DL (ref 8–23)
CALCIUM SERPL-MCNC: 8.8 MG/DL (ref 8.8–10.4)
CHLORIDE SERPL-SCNC: 88 MMOL/L (ref 98–107)
CREAT SERPL-MCNC: 0.89 MG/DL (ref 0.67–1.17)
EGFRCR SERPLBLD CKD-EPI 2021: 88 ML/MIN/1.73M2
GLUCOSE SERPL-MCNC: 100 MG/DL (ref 70–99)
HCO3 SERPL-SCNC: 33 MMOL/L (ref 22–29)
MAGNESIUM SERPL-MCNC: 2 MG/DL (ref 1.7–2.3)
PLATELET # BLD AUTO: 266 10E3/UL (ref 150–450)
POTASSIUM SERPL-SCNC: 4 MMOL/L (ref 3.4–5.3)
SODIUM SERPL-SCNC: 129 MMOL/L (ref 135–145)

## 2024-08-14 PROCEDURE — 250N000013 HC RX MED GY IP 250 OP 250 PS 637: Performed by: INTERNAL MEDICINE

## 2024-08-14 PROCEDURE — 999N000157 HC STATISTIC RCP TIME EA 10 MIN

## 2024-08-14 PROCEDURE — 250N000009 HC RX 250: Performed by: FAMILY MEDICINE

## 2024-08-14 PROCEDURE — 97110 THERAPEUTIC EXERCISES: CPT | Mod: GP

## 2024-08-14 PROCEDURE — 94640 AIRWAY INHALATION TREATMENT: CPT

## 2024-08-14 PROCEDURE — 36415 COLL VENOUS BLD VENIPUNCTURE: CPT | Performed by: INTERNAL MEDICINE

## 2024-08-14 PROCEDURE — 99222 1ST HOSP IP/OBS MODERATE 55: CPT | Performed by: INTERNAL MEDICINE

## 2024-08-14 PROCEDURE — 250N000012 HC RX MED GY IP 250 OP 636 PS 637: Performed by: INTERNAL MEDICINE

## 2024-08-14 PROCEDURE — 80048 BASIC METABOLIC PNL TOTAL CA: CPT | Performed by: INTERNAL MEDICINE

## 2024-08-14 PROCEDURE — 94640 AIRWAY INHALATION TREATMENT: CPT | Mod: 76

## 2024-08-14 PROCEDURE — 250N000013 HC RX MED GY IP 250 OP 250 PS 637: Performed by: FAMILY MEDICINE

## 2024-08-14 PROCEDURE — 97535 SELF CARE MNGMENT TRAINING: CPT | Mod: GO

## 2024-08-14 PROCEDURE — 99239 HOSP IP/OBS DSCHRG MGMT >30: CPT | Performed by: STUDENT IN AN ORGANIZED HEALTH CARE EDUCATION/TRAINING PROGRAM

## 2024-08-14 PROCEDURE — 250N000009 HC RX 250: Performed by: STUDENT IN AN ORGANIZED HEALTH CARE EDUCATION/TRAINING PROGRAM

## 2024-08-14 PROCEDURE — 85049 AUTOMATED PLATELET COUNT: CPT | Performed by: FAMILY MEDICINE

## 2024-08-14 PROCEDURE — 83735 ASSAY OF MAGNESIUM: CPT | Performed by: INTERNAL MEDICINE

## 2024-08-14 RX ORDER — PREDNISONE 10 MG/1
TABLET ORAL
Qty: 143 TABLET | Refills: 0 | Status: SHIPPED | OUTPATIENT
Start: 2024-08-15 | End: 2024-10-23

## 2024-08-14 RX ORDER — SULFAMETHOXAZOLE/TRIMETHOPRIM 800-160 MG
1 TABLET ORAL
Qty: 17 TABLET | Refills: 0 | Status: SHIPPED | OUTPATIENT
Start: 2024-08-16 | End: 2024-09-25

## 2024-08-14 RX ADMIN — SENNOSIDES AND DOCUSATE SODIUM 1 TABLET: 8.6; 5 TABLET ORAL at 09:24

## 2024-08-14 RX ADMIN — IPRATROPIUM BROMIDE AND ALBUTEROL SULFATE 3 ML: .5; 3 SOLUTION RESPIRATORY (INHALATION) at 07:42

## 2024-08-14 RX ADMIN — METOPROLOL SUCCINATE 50 MG: 50 TABLET, EXTENDED RELEASE ORAL at 09:24

## 2024-08-14 RX ADMIN — ACETAMINOPHEN 650 MG: 325 TABLET ORAL at 13:09

## 2024-08-14 RX ADMIN — ASPIRIN 81 MG: 81 TABLET, COATED ORAL at 09:25

## 2024-08-14 RX ADMIN — PREDNISONE 40 MG: 20 TABLET ORAL at 09:24

## 2024-08-14 RX ADMIN — FORMOTEROL FUMARATE DIHYDRATE 20 MCG: 20 SOLUTION RESPIRATORY (INHALATION) at 07:44

## 2024-08-14 RX ADMIN — PIRFENIDONE 801 MG: 267 CAPSULE ORAL at 13:09

## 2024-08-14 RX ADMIN — PIRFENIDONE 801 MG: 267 CAPSULE ORAL at 09:26

## 2024-08-14 RX ADMIN — IPRATROPIUM BROMIDE AND ALBUTEROL SULFATE 3 ML: .5; 3 SOLUTION RESPIRATORY (INHALATION) at 12:49

## 2024-08-14 RX ADMIN — AZITHROMYCIN DIHYDRATE 250 MG: 250 TABLET, FILM COATED ORAL at 09:25

## 2024-08-14 RX ADMIN — PANTOPRAZOLE SODIUM 40 MG: 40 TABLET, DELAYED RELEASE ORAL at 09:23

## 2024-08-14 RX ADMIN — IPRATROPIUM BROMIDE AND ALBUTEROL SULFATE 3 ML: .5; 3 SOLUTION RESPIRATORY (INHALATION) at 16:27

## 2024-08-14 RX ADMIN — SULFAMETHOXAZOLE AND TRIMETHOPRIM 1 TABLET: 800; 160 TABLET ORAL at 09:24

## 2024-08-14 ASSESSMENT — ACTIVITIES OF DAILY LIVING (ADL)
ADLS_ACUITY_SCORE: 36
ADLS_ACUITY_SCORE: 36
ADLS_ACUITY_SCORE: 35
ADLS_ACUITY_SCORE: 34
ADLS_ACUITY_SCORE: 36
ADLS_ACUITY_SCORE: 36
ADLS_ACUITY_SCORE: 34
ADLS_ACUITY_SCORE: 35
ADLS_ACUITY_SCORE: 34
ADLS_ACUITY_SCORE: 36
ADLS_ACUITY_SCORE: 35
ADLS_ACUITY_SCORE: 36
ADLS_ACUITY_SCORE: 34
ADLS_ACUITY_SCORE: 34
ADLS_ACUITY_SCORE: 36
ADLS_ACUITY_SCORE: 34
ADLS_ACUITY_SCORE: 34
ADLS_ACUITY_SCORE: 36

## 2024-08-14 NOTE — CONSULTS
"  Thank you, Dr. Traylor ref. provider found, for asking the [facility] team to see Mr. Wyatt Gutierrez to evaluate       Assessment/Recommendations   Assessment/Plan:  Hypoxia no rales on exam, valves appear functioning well, presume related to pulm fibrosis, if concern can consider RHC and if pulm HTN can consider vasodilator study to see if possible vasodilator therapy an option.  S/pTAVR functioning well   CV prevention - mild CAD - cont with asp/statin    Will sign off     History of Present Illness/Subjective    Mr. Wyatt Gutierrez is a 79 year old male with tob use, IPF, s/p TAVR 6 weeks ago, HTH, [facility], prsents with dyspnea and concern for worsening pulm fibrosis, echo 8/9 normal LV EF, TAVR mean gradient 13 with mild AI but worsening RV fxn and enlargement concerning for pulm HTN.  Notd K 4.0, Cr 0.89, BUN 28, WBC 13.67, hgb 10.1 (stable), plt 300.  8/8 CT PE neg for PE, worsening of basilar pulm fibrosis pattern, and poss pulm edema. 5/24 mild CAD, no PA pressure found on echo or RHC done recently.    ECG8/8/24 ST ST changes noted c/w LVH from valve dz in past.        Physical Examination Review of Systems   /65 (BP Location: Right arm)   Pulse 82   Temp 97.7  F (36.5  C) (Oral)   Resp 16   Ht 1.778 m (5' 10\")   Wt 84 kg (185 lb 3 oz)   SpO2 90%   BMI 26.57 kg/m    Body mass index is 26.57 kg/m .  Wt Readings from Last 3 Encounters:   08/14/24 84 kg (185 lb 3 oz)   07/12/24 89.4 kg (197 lb)   06/19/24 90.7 kg (200 lb)       Intake/Output Summary (Last 24 hours) at 8/14/2024 1242  Last data filed at 8/14/2024 1123  Gross per 24 hour   Intake 890 ml   Output 1655 ml   Net -765 ml     General Appearance:   no distress, normal body habitus   ENT/Mouth: membranes moist, no oral lesions or bleeding gums.      EYES:  no scleral icterus, normal conjunctivae   Neck: no carotid bruits or thyromegaly   Chest/Lungs:   lungs are clear to auscultation, no rales or wheezing,  sternal scar, equal chest " wall expansion    Cardiovascular:   Regular. Normal first and second heart sounds with no murmurs, rubs, or gallops; the carotid, radial and posterior tibial pulses are intact, Jugular venous pressure , edema bilaterally    Abdomen:  no organomegaly, masses, bruits, or tenderness; bowel sounds are present   Extremities: no cyanosis or clubbing   Skin: no xanthelasma, warm.    Neurologic: normal  bilateral, no tremors     Psychiatric: alert and oriented x3, calm     Review of Systems - 12 points nega other than above      Medical History  Surgical History Family History Social History   Past Medical History:   Diagnosis Date    Aortic stenosis     Arthritis     High cholesterol     Hypertension     IPF (idiopathic pulmonary fibrosis) (H)     Sleep apnea     Past Surgical History:   Procedure Laterality Date    ARTHROSCOPY SHOULDER      COLONOSCOPY      CV CORONARY ANGIOGRAM N/A 5/13/2024    Procedure: Coronary Angiogram;  Surgeon: Shiv Robles MD;  Location: [facility]    CV TRANSCATHETER AORTIC VALVE REPLACEMENT-FEMORAL APPROACH N/A 6/11/2024    Procedure: Transcatheter Aortic Valve Replacement-Femoral Approach;  Surgeon: Serjio Gustafson MD;  Location: [facility] CV    ESOPHAGOSCOPY, GASTROSCOPY, DUODENOSCOPY (EGD), COMBINED N/A 10/2/2023    Procedure: ESOPHAGOGASTRODUODENOSCOPY with biopsies;  Surgeon: Reji Baptiste MD;  Location: [facility]    OR TRANSCATHETER AORTIC VALVE REPLACEMENT, FEMORAL PERCUTANEOUS APPROACH (STANDBY) N/A 6/11/2024    Procedure: OR TRANSCATHETER AORTIC VALVE REPLACEMENT, FEMORAL PERCUTANEOUS APPROACH (STANDBY);  Surgeon: Susana Mitchell MD;  Location: [facility]    OTHER SURGICAL HISTORY Right 1955    heel fracture    OTHER SURGICAL HISTORY      knee arthroscopies    [facility] TOTAL KNEE ARTHROPLASTY Left 10/31/2019    Procedure: LEFT MINIMALLY INVASIVE TOTAL KNEE ARTHROPLASTY;  Surgeon: Avelino Canada MD;  Location: [facility] OR;  Service:  Orthopedics    History reviewed. No pertinent family history. Social History     Socioeconomic History    Marital status:      Spouse name: Not on file    Number of children: Not on file    Years of education: Not on file    Highest education level: Not on file   Occupational History    Not on file   Tobacco Use    Smoking status: Former     Current packs/day: 0.00     Average packs/day: 0.5 packs/day for 55.0 years (27.5 ttl pk-yrs)     Types: Cigarettes     Start date: 1966     Quit date: 2021     Years since quittin.6    Smokeless tobacco: Never   Substance and Sexual Activity    Alcohol use: Yes     Comment: 1-2 glasses of wine/ 2 x-week    Drug use: Not Currently     Comment: edible marijuana in the past    Sexual activity: Not on file   Other Topics Concern    Not on file   Social History Narrative    Lives with wife - single level house     Social Determinants of Health     Financial Resource Strain: Not on file   Food Insecurity: Not on file   Transportation Needs: Not on file   Physical Activity: Not on file   Stress: Not on file   Social Connections: Not on file   Interpersonal Safety: Not on file   Housing Stability: Not on file          Medications  Allergies   Scheduled Meds:  Current Facility-Administered Medications   Medication Dose Route Frequency Provider Last Rate Last Admin    aspirin EC tablet 81 mg  81 mg Oral Daily Mayra Miller MD   81 mg at 24    atorvastatin (LIPITOR) tablet 80 mg  80 mg Oral At Bedtime Mayra Miller MD   80 mg at 24    azithromycin (ZITHROMAX) tablet 250 mg  250 mg Oral Daily Mayra Miller MD   250 mg at 24    enoxaparin ANTICOAGULANT (LOVENOX) injection 40 mg  40 mg Subcutaneous Q24H Mayra Miller MD   40 mg at 24    formoterol (PERFOROMIST) neb solution 20 mcg  20 mcg Nebulization 2 times daily Mayra Miller MD   20 mcg at 24 0744    ipratropium - albuterol 0.5 mg/2.5 mg/3 mL (DUONEB) neb solution 3  mL  1 vial Nebulization 4x daily Rosendo Rivers MD   3 mL at 08/14/24 0742    Lidocaine (LIDOCARE) 4 % Patch 1 patch  1 patch Transdermal Q24h Mayra Miller MD   1 patch at 08/13/24 2050    metoprolol succinate ER (TOPROL XL) 24 hr tablet 50 mg  50 mg Oral Daily Mayra Miller MD   50 mg at 08/14/24 0924    pantoprazole (PROTONIX) EC tablet 40 mg  40 mg Oral BID AC Mayra Miller MD   40 mg at 08/14/24 0923    pirfenidone (ESBRIET) capsule 801 mg  801 mg Oral TID w/meals Raoul, Amber, DO   801 mg at 08/14/24 0926    predniSONE (DELTASONE) tablet 40 mg  40 mg Oral Daily Jose Virk MD   40 mg at 08/14/24 0924    Followed by    [START ON 8/28/2024] predniSONE (DELTASONE) tablet 30 mg  30 mg Oral Daily Jose Virk MD        Followed by    [START ON 9/11/2024] predniSONE (DELTASONE) tablet 20 mg  20 mg Oral Daily Jose Virk MD        Followed by    [START ON 9/25/2024] predniSONE (DELTASONE) tablet 10 mg  10 mg Oral Daily Jose Virk MD        Followed by    [START ON 10/9/2024] predniSONE (DELTASONE) tablet 5 mg  5 mg Oral Daily Jose Virk MD        senna-docusate (SENOKOT-S/PERICOLACE) 8.6-50 MG per tablet 1 tablet  1 tablet Oral BID Mayra Miller MD   1 tablet at 08/14/24 0924    Or    senna-docusate (SENOKOT-S/PERICOLACE) 8.6-50 MG per tablet 2 tablet  2 tablet Oral BID Mayra Miller MD   2 tablet at 08/12/24 0806    sodium chloride (PF) 0.9% PF flush 3 mL  3 mL Intracatheter Q8H Mayra Miller MD   3 mL at 08/14/24 0928    sulfamethoxazole-trimethoprim (BACTRIM DS) 800-160 MG per tablet 1 tablet  1 tablet Oral Once per day on Monday Wednesday Friday Jose Virk MD   1 tablet at 08/14/24 0924     Continuous Infusions:  Current Facility-Administered Medications   Medication Dose Route Frequency Provider Last Rate Last Admin    Continuing beta blocker from home medication list OR beta blocker order already placed during this visit   Does not apply DOES NOT GO TO Mayra Prince MD         Reason ACE/ARB/ARNI order not selected   Other DOES NOT GO TO Mayra Prince MD         PRN Meds:.  Current Facility-Administered Medications   Medication Dose Route Frequency Provider Last Rate Last Admin    acetaminophen (TYLENOL) tablet 650 mg  650 mg Oral Q4H PRN Mayra Miller MD   650 mg at 08/13/24 2051    Or    acetaminophen (TYLENOL) Suppository 650 mg  650 mg Rectal Q4H PRN Mayra Miller MD        [Held by provider] acyclovir (ZOVIRAX) tablet 400 mg  400 mg Oral TID PRN Mayra Miller MD        azelastine (ASTELIN) nasal spray 2 spray  2 spray Both Nostrils BID PRN Mayra Miller MD        calcium carbonate (TUMS) chewable tablet 1,000 mg  1,000 mg Oral 4x Daily PRN Mayra Miller MD        Continuing beta blocker from home medication list OR beta blocker order already placed during this visit   Does not apply DOES NOT GO TO Mayra Prince MD        guaiFENesin-dextromethorphan (ROBITUSSIN DM) 100-10 MG/5ML syrup 10 mL  10 mL Oral Q4H PRN Mayra Miller MD   10 mL at 08/12/24 1349    hydrALAZINE (APRESOLINE) tablet 10 mg  10 mg Oral Q4H PRN Mayra Miller MD        Or    hydrALAZINE (APRESOLINE) injection 10 mg  10 mg Intravenous Q4H PRN Mayra Miller MD        ipratropium - albuterol 0.5 mg/2.5 mg/3 mL (DUONEB) neb solution 3 mL  3 mL Nebulization Q4H PRN Rosendo Rivers MD        lidocaine (LMX4) cream   Topical Q1H PRN Mayra Miller MD        lidocaine 1 % 0.1-1 mL  0.1-1 mL Other Q1H PRN Mayra Miller MD        loperamide (IMODIUM) capsule 2 mg  2 mg Oral Daily PRN Mayra Miller MD        melatonin tablet 1 mg  1 mg Oral At Bedtime PRN Mayra Miller MD        ondansetron (ZOFRAN ODT) ODT tab 4 mg  4 mg Oral Q6H PRN Mayra Miller MD        Or    ondansetron (ZOFRAN) injection 4 mg  4 mg Intravenous Q6H PRN Mayra Miller MD        prochlorperazine (COMPAZINE) injection 5 mg  5 mg Intravenous Q6H PRN Mayra Miller MD        Or    prochlorperazine (COMPAZINE) tablet 5 mg  5 mg Oral Q6H PRN Mayra Miller,  MD        Or    prochlorperazine (COMPAZINE) suppository 12.5 mg  12.5 mg Rectal Q12H PRN Mayra Miller MD        Reason ACE/ARB/ARNI order not selected   Other DOES NOT GO TO Mayra Prince MD        senna-docusate (SENOKOT-S/PERICOLACE) 8.6-50 MG per tablet 1 tablet  1 tablet Oral BID PRN Mayra Miller MD        Or    senna-docusate (SENOKOT-S/PERICOLACE) 8.6-50 MG per tablet 2 tablet  2 tablet Oral BID PRN Mayra Miller MD        sodium chloride (OCEAN) 0.65 % nasal spray 1 spray  1 spray Both Nostrils Q1H PRN Flower Grant MD   1 spray at 08/13/24 2051    sodium chloride (PF) 0.9% PF flush 3 mL  3 mL Intracatheter q1 min prn Mayra Miller MD        Allergies   Allergen Reactions    Animal Dander Unknown    Chalk     Dogs Other (See Comments)     Pet Dander    Maple Tree     Mold [Molds & Smuts] Unknown         Lab Results    Chemistry/lipid CBC Cardiac Enzymes/BNP/TSH/INR   Lab Results   Component Value Date    CHOL 148 09/18/2023    HDL 42 09/18/2023    TRIG 201 (H) 09/18/2023    BUN 28.7 (H) 08/14/2024     (L) 08/14/2024    CO2 33 (H) 08/14/2024    Lab Results   Component Value Date    WBC 13.7 (H) 08/13/2024    HGB 10.1 (L) 08/13/2024    HCT 31.0 (L) 08/13/2024    MCV 74 (L) 08/13/2024     08/14/2024    Lab Results   Component Value Date    TSH 2.56 08/08/2024    INR 1.10 08/08/2024              Ryan Lama MD  Interventional Cardiology  [facility]

## 2024-08-14 NOTE — DISCHARGE SUMMARY
"[facility]  Hospitalist Discharge Summary      Date of Admission:  8/8/2024  Date of Discharge:  8/14/2024  Discharging Provider: Jackie Olivo MD  Discharge Service: Hospitalist Service    Discharge Diagnoses   IPF exacerbation    Clinically Significant Risk Factors     # Overweight: Estimated body mass index is 26.57 kg/m  as calculated from the following:    Height as of this encounter: 1.778 m (5' 10\").    Weight as of this encounter: 84 kg (185 lb 3 oz).       Follow-ups Needed After Discharge   Follow-up Appointments     Follow-up and recommended labs and tests       Follow up with primary care provider (PCP), Eliot De La Rosa, within 3-5   days for hospital follow- up.  The following labs/tests are recommended:   BMP. Anemia work up deferred to PCP. Pulmonary hypertension workup   deferred to PCP and/or Cardiology. Follow with your pulmonologist as   scheduled. Follow with your cardiologist as scheduled.            Unresulted Labs Ordered in the Past 30 Days of this Admission       No orders found from 7/9/2024 to 8/9/2024.            Discharge Disposition   Discharged to home  Condition at discharge: Stable    Hospital Course   Wyatt Gutierrez is a 79 year old male history of IPF on chronic oxygen as well as severe aortic stenosis, status post TAVR 6/2024 as well as history of palpitations with recent workup admitted to the hospital with acute on chronic respiratory failure     Acute on chronic respiratory failure  Suspected IPF exacerbation  Suspected severe pulmonary hypertension  -- Workup included elevated BNP, low Pro-Dariel, unremarkable chest x-ray.  -- CT chest neg for PE,moderate worsening of pulm fibrosis.   -- Resp cx mnegative viral panel negative  -- Continue NC oxygen, was on Hiflo, was on BiPAP on admission. Currently at 3 L at rest  -- At baseline 6L with activity, 3L at rest.  --  Patient has been on steroids, which patient will be discharged on a tapering dose   -- " Completed Levofloxacin, resume usual azithromycin  -- Pulmonary consult appreciated: Prednisone taper, PJP ppx while on prednisone 20 mg daily or greater. Follow up with Dr. Estrada   -- Has been on lasix 20mg Q12H since admit, now discontinued due to hyponatremia since 08/11  -- Cardiology consult appreciated: Hypoxia presumed to be related to pulm fibrosis. If concern can consider RHC and if pulm HTN can consider vasodilator study to see if possible vasodilator therapy is an option- deferred to PCP.  Hyponatremia- improving  -- IV lasix discontinued.  Hypokalemia-resolved  -- replaced   Sinus tachycardia  -- resolved  -- History of palpitations with some cardiac workup  --Telemetry  -- TSH normal  -- Continue home toprol  Aortic stenosis  Recent TAVR  -- Continue beta-blocker  -- Taken off his Dyazide 1 month ago.   -- Cardiology consult appreciated  Hypertension  -- Continue Toprol   -- PTA Dyazide has been on hold as out-pt by his cardiologist given he was hyponatremic  CAD, Hyperlipidemia  -- Continue ASA, Lipitor  Anemia, microcytic-workup deferred to PCP.  GERD   -- Continue PPI  Leukocytosis likely 2/2 steroid use.  -- Afebrile.  Patient is clinically and hemodynamically stable for discharge. Medication reconciliation was done. Medications sent to patient's preferred pharmacy. All labs and radiologic findings discussed with patient. Follow up appointments and recommendations as shown below. Patient/family verbalized understanding and agreed to plan of care. All questions answered.       Consultations This Hospital Stay   CORE CLINIC EVALUATION IP CONSULT  OCCUPATIONAL THERAPY ADULT IP CONSULT  PULMONARY IP CONSULT  CARE MANAGEMENT / SOCIAL WORK IP CONSULT  PHYSICAL THERAPY ADULT IP CONSULT  CARDIOLOGY IP CONSULT    Code Status   No CPR- Do NOT Intubate    Time Spent on this Encounter   IJackie MD, personally saw the patient today and spent greater than 30 minutes discharging this patient.        Jackie Olivo MD  [facility]  [address] [place] [id number]  Phone: [phone]  Fax: [phone]  ______________________________________________________________________    Physical Exam   Vital Signs: Temp: 98.2  F (36.8  C) Temp src: Oral BP: 105/58 Pulse: 83   Resp: 16 SpO2: 94 % O2 Device: Nasal cannula Oxygen Delivery: 3 LPM  Weight: 185 lbs 2.98 oz  GEN: Alert and oriented. Not in acute distress.  HEENT: Atraumatic, mucous membrane- moist and pink.  Chest: Bilateral air entry.  CVS: S1S2 regular.   Abdomen: Soft. Non-tender, non-distended. No organomegaly. No guarding or rigidity. Bowel sounds active.   Extremities: No pedal edema.  CNS: No involuntary movements.  Skin: no cyanosis or clubbing.        Primary Care Physician   Eliot De La Rosa    Discharge Orders      Reason for your hospital stay    IPF exacerbation     Follow-up and recommended labs and tests     Follow up with primary care provider (PCP), Eliot De La Rosa, within 3-5 days for hospital follow- up.  The following labs/tests are recommended: BMP. Anemia work up deferred to PCP. Pulmonary hypertension workup deferred to PCP and/or Cardiology. Follow with your pulmonologist as scheduled. Follow with your cardiologist as scheduled.     Activity    Your activity upon discharge: activity as tolerated     Discharge Instructions    Continue home oxygen     Diet    Follow this diet upon discharge: Orders Placed This Encounter      Fluid restriction 2000 ML FLUID      Combination Diet 2 gm NA Diet; No Caffeine Diet       Significant Results and Procedures       Discharge Medications   Current Discharge Medication List        START taking these medications    Details   sulfamethoxazole-trimethoprim (BACTRIM DS) 800-160 MG tablet Take 1 tablet by mouth three times a week for 40 days  Qty: 17 tablet, Refills: 0    Associated Diagnoses: Need for pneumocystis prophylaxis           CONTINUE these  medications which have CHANGED    Details   predniSONE (DELTASONE) 10 MG tablet Take 4 tablets (40 mg) by mouth daily for 13 days, THEN 3 tablets (30 mg) daily for 14 days, THEN 2 tablets (20 mg) daily for 14 days, THEN 1 tablet (10 mg) daily for 14 days, THEN 0.5 tablets (5 mg) daily for 14 days.  Qty: 143 tablet, Refills: 0    Associated Diagnoses: IPF (idiopathic pulmonary fibrosis) (H)           CONTINUE these medications which have NOT CHANGED    Details   acyclovir (ZOVIRAX) 400 MG tablet [ACYCLOVIR (ZOVIRAX) 400 MG TABLET] Take 400 mg by mouth 3 (three) times a day as needed.             arformoterol (BROVANA) 15 MCG/2ML NEBU neb solution Take 2 mLs (15 mcg) by nebulization 2 times daily  Qty: 360 mL, Refills: 1    Associated Diagnoses: Bronchiectasis without complication (H)      aspirin (ASA) 81 MG EC tablet Take by mouth daily      atorvastatin (LIPITOR) 80 MG tablet Take 1 tablet (80 mg) by mouth at bedtime  Qty: 90 tablet, Refills: 3    Associated Diagnoses: Coronary artery disease involving native coronary artery without angina pectoris, unspecified whether native or transplanted heart; Dyslipidemia      azelastine (ASTELIN) 0.1 % nasal spray Spray 2 sprays into both nostrils 2 times daily as needed for rhinitis      azithromycin (ZITHROMAX) 250 MG tablet Take 1 tablet (250 mg) daily.  Qty: 90 tablet, Refills: 3    Associated Diagnoses: IPF (idiopathic pulmonary fibrosis) (H)      ipratropium - albuterol 0.5 mg/2.5 mg/3 mL (DUONEB) 0.5-2.5 (3) MG/3ML neb solution Take 1 vial (3 mLs) by nebulization every 6 hours as needed for shortness of breath, wheezing or cough  Qty: 360 mL, Refills: 3    Associated Diagnoses: IPF (idiopathic pulmonary fibrosis) (H); Chronic cough; Dyspnea, unspecified type; Hypoxemic respiratory failure, chronic (H)      loperamide (IMODIUM) 2 MG capsule Take 2 mg by mouth daily as needed      metoprolol succinate (TOPROL-XL) 50 MG 24 hr tablet [METOPROLOL SUCCINATE (TOPROL-XL) 50  MG 24 HR TABLET] Take 50 mg by mouth daily.      multivitamin therapeutic tablet Take 1 tablet by mouth daily      omeprazole (PRILOSEC) 40 MG DR capsule Take 40 mg by mouth 2 times daily      pirfenidone (ESBRIET) 267 MG capsule Take 1 capsule three times daily with food days 1-7, then 2 capsules three times daily with food days 8-14, then 3 capsules three times daily with food days 15 and onward.  Qty: 270 capsule, Refills: 11    Associated Diagnoses: IPF (idiopathic pulmonary fibrosis) (H)           STOP taking these medications       levofloxacin (LEVAQUIN) 750 MG tablet Comments:   Reason for Stopping:         triamterene-HCTZ (DYAZIDE) 37.5-25 MG capsule Comments:   Reason for Stopping:             Allergies   Allergies   Allergen Reactions    Animal Dander Unknown    Chalk     Dogs Other (See Comments)     Pet Dander    Maple Tree     Mold [Molds & Smuts] Unknown

## 2024-08-14 NOTE — PLAN OF CARE
"Goal Outcome Evaluation:      Plan of Care Reviewed With: patient    Overall Patient Progress: improvingOverall Patient Progress: improving         Problem: Gas Exchange Impaired  Goal: Optimal Gas Exchange  Intervention: Optimize Oxygenation and Ventilation  Recent Flowsheet Documentation  Taken 8/14/2024 0924 by Clara Willis, RN  Head of Bed (HOB) Positioning: HOB at 20-30 degrees     Problem: Adult Inpatient Plan of Care  Goal: Patient-Specific Goal (Individualized)  Description: You can add care plan individualizations to a care plan. Examples of Individualization might be:  \"Parent requests to be called daily at 9am for status\", \"I have a hard time hearing out of my right ear\", or \"Do not touch me to wake me up as it startles  me\".  Outcome: Progressing    Pt A/Ox4, NSR, assistx1 w/ gait belt and walker. On 6L NC, SOB with minimal activity. Pt reported pain on coccyx, PRN tylenol given. Pt reported pain relief.   Plan to possibly discharge today.     Mg, K protocol recheck in AM.     Clara Willis RN         "

## 2024-08-14 NOTE — PLAN OF CARE
Problem: Adult Inpatient Plan of Care  Goal: Plan of Care Review  Description: The Plan of Care Review/Shift note should be completed every shift.  The Outcome Evaluation is a brief statement about your assessment that the patient is improving, declining, or no change.  This information will be displayed automatically on your shift  note.  Outcome: Progressing     Problem: Comorbidity Management  Goal: Maintenance of Heart Failure Symptom Control  Outcome: Progressing  Intervention: Maintain Heart Failure Management  Recent Flowsheet Documentation  Taken 8/14/2024 0018 by Eugenia Wallis RN  Medication Review/Management: medications reviewed   Goal Outcome Evaluation:  Pt is alert and oriented x 4, complained of pain on his tailbone that is being managed with lidocaine patch, and PRN tylenol. Neuros intact. SR with first degree AVB, 5 litters of oxygen. Had tylenol or head and neck pain. Calls appropriately for assistance. Uses a urinal. Shortness of breath with the slightest activity.

## 2024-08-14 NOTE — PROGRESS NOTES
Care Management Follow Up    Length of Stay (days): 6    Expected Discharge Date: 08/14/2024    Anticipated Discharge Plan:  Other (Comments) (unknown at this time)    Transportation: Anticipate Family/friend    PT Recommendations: home with assist  OT Recommendations:  (S) Transitional Care Facility     Barriers to Discharge: medical stability    Prior Living Situation: house with spouse     Patient/Spokesperson Updated: Yes. Who? Patient    Additional Information:  Sw met with patient at bedside to introduce self,CM role and review discharge plan. Pt reports plan to discharge home with wife, states his wife is primary caregiver. He is planning to get a 4 wheeled walker for use at home. Lives in a split level home and mostly stays in the basement.     Simi Nielsen, JONNYSW

## 2024-08-14 NOTE — PLAN OF CARE
Problem: Adult Inpatient Plan of Care  Goal: Plan of Care Review  Description: The Plan of Care Review/Shift note should be completed every shift.  The Outcome Evaluation is a brief statement about your assessment that the patient is improving, declining, or no change.  This information will be displayed automatically on your shift  note.  Outcome: Progressing   Goal Outcome Evaluation:  Patient alert and oriented x4.  Patient is SBA with activity, and is very sob with minimal activity. VSS and o2 sats low 90s on 5 liters.  No complaints of pain.  Patient hoping to discharge tomorrow after PT eval.

## 2024-08-15 NOTE — PLAN OF CARE
Occupational Therapy Discharge Summary    Reason for therapy discharge:    Discharged to home.    Progress towards therapy goal(s). See goals on Care Plan in [facility] electronic health record for goal details.  Goals partially met.  Barriers to achieving goals:   discharge from facility.    Therapy recommendation(s):    No further therapy is recommended.

## 2024-08-15 NOTE — PROGRESS NOTES
Reviewed discharge instructions, follow up informations, and medications with patient and his wife.  Patient was given his home medication bottle pirfenidone prior to discharge.  Patient's wife drove him home on his home o2 tank.

## 2024-08-16 ENCOUNTER — TELEPHONE (OUTPATIENT)
Dept: CARDIOLOGY | Facility: CLINIC | Age: 79
End: 2024-08-16
Payer: MEDICARE

## 2024-08-16 NOTE — TELEPHONE ENCOUNTER
L/V to patient to return call to schedule an appt. With Dr Cao. Patients needs a 6 month follow up per TAVR done 6/11/24

## 2024-08-22 ENCOUNTER — LAB REQUISITION (OUTPATIENT)
Dept: LAB | Facility: CLINIC | Age: 79
End: 2024-08-22
Payer: MEDICARE

## 2024-08-22 ENCOUNTER — TRANSFERRED RECORDS (OUTPATIENT)
Dept: HEALTH INFORMATION MANAGEMENT | Facility: CLINIC | Age: 79
End: 2024-08-22

## 2024-08-22 DIAGNOSIS — D50.9 IRON DEFICIENCY ANEMIA, UNSPECIFIED: ICD-10-CM

## 2024-08-22 DIAGNOSIS — I10 ESSENTIAL (PRIMARY) HYPERTENSION: ICD-10-CM

## 2024-08-22 LAB
ERYTHROCYTE [DISTWIDTH] IN BLOOD BY AUTOMATED COUNT: 18.3 % (ref 10–15)
HCT VFR BLD AUTO: 32.5 % (ref 40–53)
HGB BLD-MCNC: 10.1 G/DL (ref 13.3–17.7)
MCH RBC QN AUTO: 23.9 PG (ref 26.5–33)
MCHC RBC AUTO-ENTMCNC: 31.1 G/DL (ref 31.5–36.5)
MCV RBC AUTO: 77 FL (ref 78–100)
PLATELET # BLD AUTO: 201 10E3/UL (ref 150–450)
RBC # BLD AUTO: 4.23 10E6/UL (ref 4.4–5.9)
WBC # BLD AUTO: 23.6 10E3/UL (ref 4–11)

## 2024-08-22 PROCEDURE — 80048 BASIC METABOLIC PNL TOTAL CA: CPT | Mod: ORL | Performed by: FAMILY MEDICINE

## 2024-08-22 PROCEDURE — 85027 COMPLETE CBC AUTOMATED: CPT | Mod: ORL | Performed by: FAMILY MEDICINE

## 2024-08-22 PROCEDURE — 83550 IRON BINDING TEST: CPT | Mod: ORL | Performed by: FAMILY MEDICINE

## 2024-08-23 LAB
ANION GAP SERPL CALCULATED.3IONS-SCNC: 12 MMOL/L (ref 7–15)
BUN SERPL-MCNC: 18.3 MG/DL (ref 8–23)
CALCIUM SERPL-MCNC: 9.3 MG/DL (ref 8.8–10.4)
CHLORIDE SERPL-SCNC: 94 MMOL/L (ref 98–107)
CREAT SERPL-MCNC: 0.94 MG/DL (ref 0.67–1.17)
EGFRCR SERPLBLD CKD-EPI 2021: 83 ML/MIN/1.73M2
GLUCOSE SERPL-MCNC: 82 MG/DL (ref 70–99)
HCO3 SERPL-SCNC: 27 MMOL/L (ref 22–29)
IRON BINDING CAPACITY (ROCHE): 176 UG/DL (ref 240–430)
IRON SATN MFR SERPL: 12 % (ref 15–46)
IRON SERPL-MCNC: 21 UG/DL (ref 61–157)
POTASSIUM SERPL-SCNC: 3.9 MMOL/L (ref 3.4–5.3)
SODIUM SERPL-SCNC: 133 MMOL/L (ref 135–145)

## 2024-09-10 ENCOUNTER — LAB (OUTPATIENT)
Dept: LAB | Facility: CLINIC | Age: 79
End: 2024-09-10
Payer: MEDICARE

## 2024-09-10 DIAGNOSIS — J84.9 ILD (INTERSTITIAL LUNG DISEASE) (H): ICD-10-CM

## 2024-09-10 LAB
ALBUMIN SERPL BCG-MCNC: 4 G/DL (ref 3.5–5.2)
ALP SERPL-CCNC: 88 U/L (ref 40–150)
ALT SERPL W P-5'-P-CCNC: 27 U/L (ref 0–70)
AST SERPL W P-5'-P-CCNC: 23 U/L (ref 0–45)
BILIRUB DIRECT SERPL-MCNC: <0.2 MG/DL (ref 0–0.3)
BILIRUB SERPL-MCNC: 0.4 MG/DL
PROT SERPL-MCNC: 6.3 G/DL (ref 6.4–8.3)

## 2024-09-10 PROCEDURE — 36415 COLL VENOUS BLD VENIPUNCTURE: CPT

## 2024-09-10 PROCEDURE — 80076 HEPATIC FUNCTION PANEL: CPT

## 2024-09-23 ENCOUNTER — VIRTUAL VISIT (OUTPATIENT)
Dept: RADIATION ONCOLOGY | Facility: HOSPITAL | Age: 79
End: 2024-09-23
Attending: INTERNAL MEDICINE
Payer: MEDICARE

## 2024-09-23 VITALS — WEIGHT: 185 LBS | HEIGHT: 70 IN | BODY MASS INDEX: 26.48 KG/M2

## 2024-09-23 DIAGNOSIS — Z51.5 PALLIATIVE CARE PATIENT: Primary | ICD-10-CM

## 2024-09-23 DIAGNOSIS — Z71.89 GOALS OF CARE, COUNSELING/DISCUSSION: ICD-10-CM

## 2024-09-23 DIAGNOSIS — J84.9 ILD (INTERSTITIAL LUNG DISEASE) (H): ICD-10-CM

## 2024-09-23 DIAGNOSIS — R06.00 DYSPNEA, UNSPECIFIED TYPE: ICD-10-CM

## 2024-09-23 PROCEDURE — G2211 COMPLEX E/M VISIT ADD ON: HCPCS | Performed by: FAMILY MEDICINE

## 2024-09-23 PROCEDURE — 99205 OFFICE O/P NEW HI 60 MIN: CPT | Mod: 95 | Performed by: FAMILY MEDICINE

## 2024-09-23 ASSESSMENT — PAIN SCALES - GENERAL: PAINLEVEL: NO PAIN (0)

## 2024-09-23 NOTE — PATIENT INSTRUCTIONS
"It was good to see you today, Tai.    Here are the things we talked about:  Bring a copy of your health care directive to your upcoming visit with Dr. Estrada.  If you need a fresh copy see below.    No new medications to add.     Ask Dr. Estrada if the RSV vaccine is good for you and get the seasonal flu vaccine.    Someone from the team will reach out to schedule a follow up appointment in 2 months and sooner, if needed.    We talked about starting to look at completing a healthcare directive.  I recommend you check out- [email or web address]     They have several tools and some advice about getting started.  Under the \"How do I document my choices\" section, I am a fan of the full length healthcare directive (for adults with serious illness) because I think it has a good combination of the medical questions that are important as well as the social and personal questions that allow healthcare providers to get to know you as a person if you are unable to speak for yourself.  You do not have to complete every question on the document.  I generally recommend patients start with 3 or 4 questions on the goals page and work from there.     There is also a Short Form, which primarily serves to designate health care agents. These are people who can speak on your behalf about your healthcare wishes/goals if you are not able to speak for yourself.    Tips for Conserving Your Energy   Cancer and cancer therapy can beaccompanied by feelings of extreme fatigue. To help you during the times you feel tired, these easy tips help conserve the energy you do have.   Activities of Daily Living   Plan ahead to avoid rushing.  Sit down to bathe and dry off. Wear a josh robe instead of drying off.   Use a shower/bath organizer to decrease leaning and reaching.   Use extension handles on sponges and brushes.   Install grab rails inthe bathroom or use an elevated toilet seat.   Lay " out clothes and toiletries before dressing.   Minimize leaning over to put on clothes and shoes. Bring your foot to your knee to apply socks and shoes. Fasten bra infront then turn to back.   Modify your home to maximize efficient energy use. For example, place chairs strategically to allow for rest stops -- for instance, along a long hallway.   Wear comfortable shoes andlow-heeled, slip on shoes. Wear button front shirts rather than pullovers.   Housekeeping   Schedule household tasks throughout the week.   Do housework sitting down when possible. Use long-handleddusters, dust mops, etc. Use a wheeled cart or gutierrez's apron to carry supplies.   Delegate heavy housework, shopping, laundry and childcare when possible.   Drag or slide objects rather than lifting. If you do needto lift an object, use your leg muscles rather than your back muscles.   Sit when ironing and take rest periods.   Stop working before becoming overly tired.   Shopping   Organize list by betsy.   Use a grocery cart for support.   Shop at less busy times.   Ask for help in getting to the car.   Buy clothes that don't require ironing.   Meal Preparation   Use convenience and easy-to-preparefoods.   Use small appliances that take less effort to use.   Arrange the preparation environment for easy access to frequently used items.   Prepare meals sitting down.   Soak dishes instead of scrubbing and letdishes air dry.   Prepare double portions and freeze half.   Plan activities that can be done sitting down, such as drawing pictures, playing games, reading, and computer games.   Encourage children to climb up ontoyour lap or into the highchair instead of being lifted.   Make a game of the household chores so that children will want to help.   Delegate childcare when possible.   Workplace   Plan workload totake advantage of peak energy times. Alternate physically demanding tasks with less demanding tasks.   Arrange work environment for easy access  to commonly used equipment and supplies.   Leisure Do activities with acompanion.   Select activities that match your energy level. Balance activity and rest. Don't get over-tired.     How to get a hold of us:  For non-urgent matters, MyChart works best.    For more urgent matters, or if you prefer not to use MyChart, call our clinic nurse coordinator Leta Rivas RN at [phone]    We have an on-call number for evenings and weekends. Please call this only if you are having uncontrolled symptoms or serious side effects from your medicines: [phone].     For refills, please give us a week (5 working days) notice. We don't always have providers available everyday to do refills. If you call the day you run out of your medicine, we may not be able to refill it in time, so call 5 days in advance!    Geoff Santoyo MD MS FAAFP CAQHPM  [facility]  Office [phone]  Fax [phone]

## 2024-09-23 NOTE — PROGRESS NOTES
"Virtual Visit Details    Type of service:  Video Visit   Video Start Time: 12:48 PM  Video End Time:1:58 PM    Originating Location (pt. Location): Home    Distant Location (provider location):  On-site  Platform used for Video Visit: [facility]    Palliative Care Outpatient Clinic Consultation Note    Patient:  Wyatt Gutierrez    Chief Complaint:   Wyatt Gutierrez 79 year old male who is presenting to the palliative medicine clinic today at the request of Dr. Estrada for a palliative care consultation secondary to rapidly progressive IPF.   The patient's primary care provider is:  Eliot De La Rosa.     History of Present Illness:  This is from Dr. Estrada's August 2024 note:  \"HPI:    Wyatt Gutierrez is a 75 y.o. male, previous smoker with PMH sig for KAITY, HTN, HPL who was referred to us back on 12/20/2019 for abnormal chest x-ray that was concerning for ILD.  Patient has had chronic shortness of breath since at least 2010.  He quit smoking tobacco in Jan 2022.      -He had his Ild diagnosed in June 2021. He was not on oxygen.  At that time he was followed eventually he was started on Ofev in May 2022.  His major decompensation happened in 2022 January when he was in [place] and admitted to hospital and had to be started on oxygen after that.      Interval History: 9/29/22:  -Since than he has had worsening oxygen requirement.  He continues to use treadmill with oxygen.  He will ntio be a transplant candidate.       -His mainly dry cough is bopthering him.      #Interval History: 10/4  -Discussed goals of care in detail.     #  Interval History: 4/13  -Patient is doing well.  His trip to [place] was well.  No significant IPF exacerbation was noted.  He was able to play golf.  - He is establish care with Dr. Maciel at [facility].  He just finished his Ofev 1 week ago.  He will be switched to Esbriet starting today.      #Interval History: 7/2023  -  Patient is doing well. No significant worsening is noted.   - I " "don't think he is using his Breo well.  He has increased sputum production.     #Interval History: 1/2024  -Increasing oxygen needs      #8/2024:  -Worsening pulmonary infiltrates. \"    Distressing Symptom/s: dyspnea mayelin with exertion--walking more than 10 feet can be problematic; he uses 4 lpm at rest and may increase it to 6-8 with exertion.    \"Terrible' cough when he was on Ofev.  That resolved with stopping it and going on a steroid. Nebs help when he coughs now.    Patient's Disease Understanding: good; he gets good communication with Dr. Estrada's team;     Coping:  overall 'beautifully'--Tai is very cerebral and has accepted his diagnosis; things are easier since he is off Ofev and the GI side effects have lessened;     Social History  Born: [place], [place], while his mom was there on vacation and then he was raised in [place], [place], until he was 17 and they moved back to [place], [place]. 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  Hobbies: reading, music; internet; he misses golf and fine dining experiences  Patient is 'famous for his intelligence and his facility with words and English'  Substance Use/History of misuse: uses CBD and THC gummies recreationally (for a down mood); does not drink hard liquor anymore and he drinks beer  Financial Concerns: none  Spiritual Background: raised in the [place] and stopped at age 18 and attended Confucianist services when Gloria " was young and occasional [place] and [place]  Spiritual Concerns/Needs: none    Social History     Tobacco Use    Smoking status: Former     Current packs/day: 0.00     Average packs/day: 0.5 packs/day for 55.0 years (27.5 ttl pk-yrs)     Types: Cigarettes     Start date: 1966     Quit date: 2021     Years since quittin.7    Smokeless tobacco: Never   Substance Use Topics    Alcohol use: Yes     Comment: 1-2 glasses of wine/ 2 x-week    Drug use: Not Currently     Comment: edible marijuana in the past       Family History  No family history on file.  Patient's Involvement with Prior History of Serious Illness in Family: his mom had cancer and lived with Tai until near the end of her life when she went to live with her other son.    Advance Care Planning:  Advance Directive:    one has been completed and they will bring to a future appointment  Where is written copy located: at home  Health Care Agent Contact Information: wife Alka OROPEZA:   n/a  CODE STATUS: FULL though with a desire to not be kept alive if it seems like all we are doing is only prolonging dying and there is no window to restore him back to a functional life;     Allergies   Allergen Reactions    Animal Dander Unknown    Chalk     Dogs Other (See Comments)     Pet Dander    Maple Tree     Mold [Molds & Smuts] Unknown     Current Outpatient Medications   Medication Sig Dispense Refill    acyclovir (ZOVIRAX) 400 MG tablet [ACYCLOVIR (ZOVIRAX) 400 MG TABLET] Take 400 mg by mouth 3 (three) times a day as needed.             arformoterol (BROVANA) 15 MCG/2ML NEBU neb solution Take 2 mLs (15 mcg) by nebulization 2 times daily 360 mL 1    aspirin (ASA) 81 MG EC tablet Take by mouth daily      atorvastatin (LIPITOR) 80 MG tablet Take 1 tablet (80 mg) by mouth at bedtime 90 tablet 3    azelastine (ASTELIN) 0.1 % nasal spray Spray 2 sprays into both nostrils 2 times daily as needed for rhinitis      azithromycin (ZITHROMAX) 250 MG tablet  Take 1 tablet (250 mg) daily. 90 tablet 3    ipratropium - albuterol 0.5 mg/2.5 mg/3 mL (DUONEB) 0.5-2.5 (3) MG/3ML neb solution Take 1 vial (3 mLs) by nebulization every 6 hours as needed for shortness of breath, wheezing or cough 360 mL 3    loperamide (IMODIUM) 2 MG capsule Take 2 mg by mouth daily as needed      metoprolol succinate (TOPROL-XL) 50 MG 24 hr tablet [METOPROLOL SUCCINATE (TOPROL-XL) 50 MG 24 HR TABLET] Take 50 mg by mouth daily.      multivitamin therapeutic tablet Take 1 tablet by mouth daily      omeprazole (PRILOSEC) 40 MG DR capsule Take 40 mg by mouth 2 times daily      pirfenidone (ESBRIET) 267 MG capsule Take 1 capsule three times daily with food days 1-7, then 2 capsules three times daily with food days 8-14, then 3 capsules three times daily with food days 15 and onward. 270 capsule 11    predniSONE (DELTASONE) 10 MG tablet Take 4 tablets (40 mg) by mouth daily for 13 days, THEN 3 tablets (30 mg) daily for 14 days, THEN 2 tablets (20 mg) daily for 14 days, THEN 1 tablet (10 mg) daily for 14 days, THEN 0.5 tablets (5 mg) daily for 14 days. 143 tablet 0    sulfamethoxazole-trimethoprim (BACTRIM DS) 800-160 MG tablet Take 1 tablet by mouth three times a week for 40 days 17 tablet 0     Past Medical History:   Diagnosis Date    Aortic stenosis     Arthritis     High cholesterol     Hypertension     IPF (idiopathic pulmonary fibrosis) (H)     Sleep apnea      Past Surgical History:   Procedure Laterality Date    ARTHROSCOPY SHOULDER      COLONOSCOPY      CV CORONARY ANGIOGRAM N/A 5/13/2024    Procedure: Coronary Angiogram;  Surgeon: Shiv Robles MD;  Location: [facility] CV    CV TRANSCATHETER AORTIC VALVE REPLACEMENT-FEMORAL APPROACH N/A 6/11/2024    Procedure: Transcatheter Aortic Valve Replacement-Femoral Approach;  Surgeon: Serjio Gustfason MD;  Location: [facility] CV    ESOPHAGOSCOPY, GASTROSCOPY, DUODENOSCOPY (EGD), COMBINED N/A 10/2/2023    Procedure:  ESOPHAGOGASTRODUODENOSCOPY with biopsies;  Surgeon: Reji Baptiste MD;  Location: [facility] OR    OR TRANSCATHETER AORTIC VALVE REPLACEMENT, FEMORAL PERCUTANEOUS APPROACH (STANDBY) N/A 6/11/2024    Procedure: OR TRANSCATHETER AORTIC VALVE REPLACEMENT, FEMORAL PERCUTANEOUS APPROACH (STANDBY);  Surgeon: Susana Mitchell MD;  Location: [facility] CATH LAB CV    OTHER SURGICAL HISTORY Right 1957    heel fracture    OTHER SURGICAL HISTORY      knee arthroscopies    ZZC TOTAL KNEE ARTHROPLASTY Left 10/31/2019    Procedure: LEFT MINIMALLY INVASIVE TOTAL KNEE ARTHROPLASTY;  Surgeon: Avelino Canada MD;  Location: [facility] OR;  Service: Orthopedics       REVIEW OF SYSTEMS:   ROS: 10 point ROS neg other than the symptoms noted above in the HPI and here:  Palliative Symptom Review (0=no symptom/no concern, 1=mild, 2=moderate, 3=severe):      Pain: 0      Fatigue: 1      Nausea: 0      Constipation: 0      Diarrhea: 0      Depressive Symptoms: 0      Anxiety: 0      Drowsiness: 0      Poor Appetite: 1      Shortness of Breath: 1-2      Insomnia: 0      Other: 0      Overall (0 good/no concerns, 3 very poor):  1-2    GENERAL APPEARANCE: appears his stated age alert and no distress; neatly groomed; wearing supplemental O2  EYES: Eyes grossly normal to inspection, PERRLA, conjunctivae and sclerae without injection or discharge, EOM intact   RESP:  no increased work of breathing; speaks in 2/3rd sentences;   MS: No musculoskeletal defects are noted  SKIN: No suspicious lesions or rashes, hydration status appears adequate with normal skin turgor   PSYCH: Alert and oriented x3; speech- coherent, normal rate and volume; able to articulate logical thoughts, able to abstract reason, no tangential thoughts, no hallucinations or delusions, mentation appears normal, Mood is euthymic. Affect is appropriate for this mood state and bright. Thought content is free of suicidal ideation, hallucinations, and delusions.  Eye contact is good  "during conversation.         Data Reviewed:  LABS: Cr 0.94; Alb 4.0;  Hgb 10.1;   IMAGIN2024 CT CHEST (PE) W/CONTRAST  IMPRESSION:     1.  No pulmonary embolism.     2.  Since , moderate worsening of extensive basilar predominant UIP pattern pulmonary fibrosis.     3.  Superimposed bilateral groundglass opacities are present, with primary differentials of interstitial lung disease exacerbation versus pulmonary edema versus hemorrhage. Trace pleural fluid.     4.  Worsening moderate basilar predominant bronchiectasis.     5.  Pulmonary arterial and right ventricular enlargement, compatible with pulmonary hypertension and elevated right heart pressure    Impressions:  Palliative Performance Score:  (100% normal, 0% death):  50-60%  Decision Making Capacity:  very present  PDMP review:  Yes:   reviewed - no record of controlled substances prescribed.    IPF  PAH    GOALS OF CARE:  2024  Life prolonging with some limits--no more Ofev; FULL CODE; OK with a limited trial of intubation and MV but if the team ever feels it is prolonging his dying with little to no chance of restoring him to a functional life he would be OK with life support being withdrawn.    Recommendations & Counseling:  Continue pulmonary care through Dr. Estrada  Continue PAH care with [place] team  Energy saving tips shared in AVS  Link to fresh H\"CD documents provided in AVS if needed.  Tai will bring a copy of his current HCD to an upcoming visit to be entered into Epic.  Code order changed to FULL code today.    Counseling: All of the above was explained to the patient in lay language. The patient has verbalized a clear understanding of the discussion, asked appropriate questions, which have been answered to patient's apparent satisfaction. The patient is in agreement with the above plan.    75 minutes spent on the date of the encounter doing chart review, history and exam, patient education & counseling, documentation and " other activities as noted above.    The longitudinal plan of care for the diagnosis(es)/condition(s) as documented were addressed during this visit. Due to the added complexity in care, I will continue to support Tai in the subsequent management and with ongoing continuity of care.    Geoff Santoyo MD MS FAAFP CAQHPM  [facility]  Office [phone]  Fax [phone]

## 2024-09-23 NOTE — NURSING NOTE
Is the patient currently in the state of [place]? YES    Visit mode:VIDEO    If the visit is dropped, the patient can be reconnected by: VIDEO VISIT: Send to e-mail at: [email or web address]    Will anyone else be joining the visit? NO  (If patient encounters technical issues they should call [phone]    How would you like to obtain your AVS? MyChart    Are changes needed to the allergy or medication list? No Rooming Documentation:  Questionnaire(s) completed    Reason for visit: Video Visit (New apt )    Dee ARRIETA

## 2024-09-24 PROBLEM — I50.30 NYHA CLASS 3 HEART FAILURE WITH PRESERVED EJECTION FRACTION (H): Status: ACTIVE | Noted: 2024-09-24

## 2024-09-24 PROBLEM — I27.20 PULMONARY HYPERTENSION (H): Status: ACTIVE | Noted: 2024-09-24

## 2024-09-24 NOTE — PROGRESS NOTES
Assessment/Recommendations   Assessment:    1.  Acute on chronic heart failure with preserved ejection fraction, NYHA Class II-III:  Patient was hospitalized from August 8 through 14 with acute on chronic respiratory failure suspected due to interstitial pulmonary fibrosis exacerbation, and suspected severe pulmonary hypertension.    NT proBNP was found elevated in 6000's.  Chest CT showed worsening pulmonary fibrosis and mild pleural effusion.   Patient was treated with antibiotic, steroid therapy and IV Lasix.  And triamterene with hydrochlorothiazide was discontinued due to hyponatremia.    Patient had a consultation with palliative on 9/23/2024.  It appears that his CODE STATUS was changed to full code.    Echocardiogram on 8/9/2024 showed preserved LVEF of 65%, abnormal septal motion correlated with right ventricular overload with flattening of septum suggesting elevated RV systolic pressure.      Patient is well compensated on exam except trace bilateral lower extremity edema  On chronic home O2  With 4 L at rest and 6 L with activity.  Overall shortness of breath has improved since recent hospitalization and has been stable.    Discharge weight was 185 pounds on 8/14/2024  Current weight is 185 pounds-stable  He reports following low-sodium diet.    He reports drinking about 64 ounces of fluid per day.    Heart failure regimen includes:    -Not on aldosterone blocker/MRA therapy- likely due to hyponatremia    -Not on SGLT2 Inhibitor -hyponatremia with recent hospitalization    -Not on diuretic therapy-furosemide was discontinued due to hyponatremia    We discussed and reviewed about heart failure, medication management, and lifestyle management including low sodium diet <2 g/day, daily weight, and staying physically active as tolerated. Patient met with [facility] CORE nurse clinician for heart failure education.    # Interstitial pulmonary fibrosis, pulmonary hypertension, obstructive sleep apnea: On  "home CPAP.  Patient follows up with pulmonary.    2.   Hyponatremia/hypokalemia: BMP on 8/22/2024 showed sodium 133, potassium 3.9, and creatinine 0.94. Na+ improved. K+ WNL.  No chest pain.    3. Non-obstructive Coronary artery disease per Cor Angio in 5/2024/Dyslipidemia:      4. Hypertension: BP today is 124/50-stable.    5. Aortic stenosis with status post TAVR in June 2024: Echocardiogram on 8/9/2024 showed normal aortic valve prosthesis with mean gradient of 13 mmHg with peak antegrade velocity of 2.7 m/sec. Stable.    6.  Paroxysmal atrial fibrillation: Noted sinus tachycardia with recent hospitalization but resolved.  Event monitor in June showed normal sinus rhythm with no evidence of A-fib.  On metoprolol.    Plan/Recommendation:  -No medication changes made today.  -Patient was instructed to call if persistent weight gain with heart failure symptoms. Could consider starting on low dose of Furosemide and monitor lab closely.  -Continue on low-sodium diet <2000 mg per day, daily weight monitoring, and maintain fluid intake at 50 to 60 ounces per day     Follow up with pulmonary next month as planned.  Follow up with Dr. Cao in December as planned. Follow up with me in 5 months      The longitudinal plan of care for acute on chronic heart failure with preserved ejection fraction, hyponatremia,  hypokalemia was addressed during this visit.?Due to the added complexity in care, I will continue to support Wyatt Gutierrez in the subsequent management of this condition(s) and with the ongoing continuity of care of this condition(s)\".     History of Present Illness/Subjective    Mr. Wyatt Gutierrez is a 79 year old male with a past medical history of hypertension, dyslipidemia, coronary artery disease, paroxysmal atrial fibrillation, aortic stenosis with status post TAVR in June 2024, interstitial pulmonary fibrosis, GERD, obstructive sleep apnea, Seaman's esophagus,and recent hospitalization with acute on chronic " respiratory failure suspected interstitial pulmonary fibrosis exacerbation, suspected severe pulmonary hypertension, and acute on chronic heart failure with preserved ejection fraction who is seen at [facility] for post hospitalization heart failure follow up.     Today, Tai is accompanied by his spouse.  He reports feeling much better with his shortness of breath since recent hospitalization.  He denies fatigue, lightheadedness, shortness of breath, orthopnea, PND, palpitations, chest pain, and abdominal fullness/bloating.  He notices mild swelling in his legs at the end of the day usually disappears in the morning.    ECHO from 8/8/24-Reviewed:   Interpretation Summary   1. Limited echocardiographic study.  2. Normal left ventricular size and systolic performance with a visually  estimated ejection fraction of 65%.  3. There is abnormal septal motion c/w right ventricular overload; there is  flattening of the septal curvature throughout the cardiac cycle suggesting  prominent elevation in RV systolic pressure  4. There is mild concentric increase in left ventricular wall thickness.  5. There is a bio-prosthetic aortic valve (documented 26 mm Dan Gabe 3  Ultra tissue valve).  Â  Normal aortic valve prosthesis metrics with a mean systolic gradient of 13  mmHg and a peak anterograde velocity of 2.7 m/sec.  Â  There is mild aortic insufficiency.  6. There is moderate right ventricular enlargement with moderately reduced  right ventricular systolic performance  7. There is moderate biatrial enlargement.     When compared to the prior real-time echocardiogram dated 10 July 2024, the  right ventricle appears more enlarged and with somewhat more reduced systolic  performance. In addition, there is now evidence of flattening of the septal  curvature throughout the cardiac cycle suggesting prominent elevation in RV  systolic pressure.       Physical Examination Review of Systems  "  /50 (BP Location: Right arm, Patient Position: Sitting, Cuff Size: Adult Regular)   Pulse 84   Resp 28   Ht 1.778 m (5' 10\")   Wt 87.5 kg (193 lb)   BMI 27.69 kg/m    Body mass index is 27.69 kg/m .  Wt Readings from Last 3 Encounters:   09/25/24 87.5 kg (193 lb)   09/23/24 83.9 kg (185 lb)   08/14/24 84 kg (185 lb 3 oz)     General Appearance:   no distress, normal body habitus   ENT/Mouth: membranes moist, no oral lesions or bleeding gums.      EYES:  no scleral icterus, normal conjunctivae   Neck: no carotid bruits or thyromegaly   Chest/Lungs:   lungs are clear to auscultation, no rales or wheezing, equal chest wall expansion    Cardiovascular:   Heart rate regular. Normal first and second heart sounds with no murmurs, rubs, or gallops; JVP is difficult to assess due to the patient's obesity and body habitus   , trace edema bilaterally    Abdomen:  no organomegaly, masses, bruits, or tenderness; bowel sounds are present   Extremities   no cyanosis or clubbing    CMS intact.   Skin: no xanthelasma, warm.    Neurologic: Alert and oriented X 3 no tremors   Psychiatric: calm and cooperative                                                   Negative unless noted in HPI     Medical History  Surgical History Family History Social History   Past Medical History:   Diagnosis Date    Aortic stenosis     Arthritis     Coronary artery disease involving native coronary artery of native heart without angina pectoris 9/25/2024    High cholesterol     Hypertension     IPF (idiopathic pulmonary fibrosis) (H)     Sleep apnea     Past Surgical History:   Procedure Laterality Date    ARTHROSCOPY SHOULDER      COLONOSCOPY      CV CORONARY ANGIOGRAM N/A 5/13/2024    Procedure: Coronary Angiogram;  Surgeon: Shiv Robles MD;  Location: [facility] CATH LAB CV    CV TRANSCATHETER AORTIC VALVE REPLACEMENT-FEMORAL APPROACH N/A 6/11/2024    Procedure: Transcatheter Aortic Valve Replacement-Femoral Approach;  Surgeon: " Serjio Gustafson MD;  Location: [facility] CV    ESOPHAGOSCOPY, GASTROSCOPY, DUODENOSCOPY (EGD), COMBINED N/A 10/2/2023    Procedure: ESOPHAGOGASTRODUODENOSCOPY with biopsies;  Surgeon: Reji Baptiste MD;  Location: [facility] OR    OR TRANSCATHETER AORTIC VALVE REPLACEMENT, FEMORAL PERCUTANEOUS APPROACH ([facility]) N/A 2024    Procedure: OR TRANSCATHETER AORTIC VALVE REPLACEMENT, FEMORAL PERCUTANEOUS APPROACH ([facility]);  Surgeon: Susana Mitchell MD;  Location: [facility] CV    OTHER SURGICAL HISTORY Right     heel fracture    OTHER SURGICAL HISTORY      knee arthroscopies    ZZC TOTAL KNEE ARTHROPLASTY Left 10/31/2019    Procedure: LEFT MINIMALLY INVASIVE TOTAL KNEE ARTHROPLASTY;  Surgeon: Avelino Canada MD;  Location: [facility];  Service: Orthopedics    No family history on file. Social History     Socioeconomic History    Marital status:      Spouse name: Not on file    Number of children: Not on file    Years of education: Not on file    Highest education level: Not on file   Occupational History    Not on file   Tobacco Use    Smoking status: Former     Current packs/day: 0.00     Average packs/day: 0.5 packs/day for 55.0 years (27.5 ttl pk-yrs)     Types: Cigarettes     Start date: 1966     Quit date: 2021     Years since quittin.7    Smokeless tobacco: Never   Vaping Use    Vaping status: Former    Substances: Nicotine   Substance and Sexual Activity    Alcohol use: Yes     Comment: 1-2 glasses of wine/ 2 x-week    Drug use: Not Currently     Comment: edible marijuana in the past    Sexual activity: Not on file   Other Topics Concern    Not on file   Social History Narrative    Lives with wife - single level house     Social Determinants of Health     Financial Resource Strain: Not on file   Food Insecurity: Not on file   Transportation Needs: Not on file   Physical Activity: Not on file   Stress: Not on file   Social Connections: Not on file    Interpersonal Safety: Not on file   Housing Stability: Not on file          Medications  Allergies   Current Outpatient Medications   Medication Sig Dispense Refill    acyclovir (ZOVIRAX) 400 MG tablet [ACYCLOVIR (ZOVIRAX) 400 MG TABLET] Take 400 mg by mouth 3 (three) times a day as needed.             arformoterol (BROVANA) 15 MCG/2ML NEBU neb solution Take 2 mLs (15 mcg) by nebulization 2 times daily 360 mL 1    aspirin (ASA) 81 MG EC tablet Take 81 mg by mouth daily.      atorvastatin (LIPITOR) 80 MG tablet Take 1 tablet (80 mg) by mouth at bedtime 90 tablet 3    azelastine (ASTELIN) 0.1 % nasal spray Spray 2 sprays into both nostrils 2 times daily as needed for rhinitis      azithromycin (ZITHROMAX) 250 MG tablet Take 1 tablet (250 mg) daily. 90 tablet 3    ipratropium - albuterol 0.5 mg/2.5 mg/3 mL (DUONEB) 0.5-2.5 (3) MG/3ML neb solution Take 1 vial (3 mLs) by nebulization every 6 hours as needed for shortness of breath, wheezing or cough 360 mL 3    loperamide (IMODIUM) 2 MG capsule Take 2 mg by mouth daily as needed      metoprolol succinate (TOPROL-XL) 50 MG 24 hr tablet [METOPROLOL SUCCINATE (TOPROL-XL) 50 MG 24 HR TABLET] Take 50 mg by mouth daily.      multivitamin therapeutic tablet Take 1 tablet by mouth daily      omeprazole (PRILOSEC) 40 MG DR capsule Take 40 mg by mouth 2 times daily      OXYGEN-HELIUM IN Inhale 4 L into the lungs continuous prn. At home while sitting around the house, more when out and about      pirfenidone (ESBRIET) 267 MG capsule Take 1 capsule three times daily with food days 1-7, then 2 capsules three times daily with food days 8-14, then 3 capsules three times daily with food days 15 and onward. 270 capsule 11    predniSONE (DELTASONE) 10 MG tablet Take 4 tablets (40 mg) by mouth daily for 13 days, THEN 3 tablets (30 mg) daily for 14 days, THEN 2 tablets (20 mg) daily for 14 days, THEN 1 tablet (10 mg) daily for 14 days, THEN 0.5 tablets (5 mg) daily for 14 days. 143 tablet  0    Allergies   Allergen Reactions    Animal Dander Unknown    Chalk     Dogs Other (See Comments)     Pet Dander    Maple Tree     Mold [Molds & Smuts] Unknown         Lab Results    Chemistry/lipid CBC Cardiac Enzymes/BNP/TSH/INR   Lab Results   Component Value Date    CHOL 148 09/18/2023    HDL 42 09/18/2023    TRIG 201 (H) 09/18/2023    BUN 18.3 08/22/2024     (L) 08/22/2024    CO2 27 08/22/2024    Lab Results   Component Value Date    WBC 23.6 (H) 08/22/2024    HGB 10.1 (L) 08/22/2024    HCT 32.5 (L) 08/22/2024    MCV 77 (L) 08/22/2024     08/22/2024    Lab Results   Component Value Date    TSH 2.56 08/08/2024    INR 1.10 08/08/2024        40  minutes spent on the date of encounter doing chart review, review of test results, interpretation with above tests, patient visit, documentation, and discussion with family.        This note has been dictated using voice recognition software. Any grammatical, typographical, or context distortions are unintentional and inherent to the software

## 2024-09-25 ENCOUNTER — ALLIED HEALTH/NURSE VISIT (OUTPATIENT)
Dept: CARDIOLOGY | Facility: CLINIC | Age: 79
End: 2024-09-25

## 2024-09-25 ENCOUNTER — OFFICE VISIT (OUTPATIENT)
Dept: CARDIOLOGY | Facility: CLINIC | Age: 79
End: 2024-09-25
Payer: MEDICARE

## 2024-09-25 VITALS
SYSTOLIC BLOOD PRESSURE: 124 MMHG | HEIGHT: 70 IN | BODY MASS INDEX: 27.63 KG/M2 | DIASTOLIC BLOOD PRESSURE: 50 MMHG | HEART RATE: 84 BPM | WEIGHT: 193 LBS | RESPIRATION RATE: 28 BRPM

## 2024-09-25 DIAGNOSIS — I25.10 CORONARY ARTERY DISEASE INVOLVING NATIVE CORONARY ARTERY OF NATIVE HEART WITHOUT ANGINA PECTORIS: ICD-10-CM

## 2024-09-25 DIAGNOSIS — I35.0 AORTIC STENOSIS, SEVERE: ICD-10-CM

## 2024-09-25 DIAGNOSIS — J96.21 ACUTE AND CHRONIC RESPIRATORY FAILURE WITH HYPOXIA (H): ICD-10-CM

## 2024-09-25 DIAGNOSIS — I50.33 ACUTE ON CHRONIC HEART FAILURE WITH PRESERVED EJECTION FRACTION (HFPEF) (H): Primary | ICD-10-CM

## 2024-09-25 DIAGNOSIS — E87.6 HYPOKALEMIA: ICD-10-CM

## 2024-09-25 DIAGNOSIS — Z95.2 S/P TAVR (TRANSCATHETER AORTIC VALVE REPLACEMENT): ICD-10-CM

## 2024-09-25 DIAGNOSIS — I27.20 PULMONARY HYPERTENSION (H): ICD-10-CM

## 2024-09-25 DIAGNOSIS — E87.1 HYPONATREMIA: ICD-10-CM

## 2024-09-25 DIAGNOSIS — I10 BENIGN ESSENTIAL HYPERTENSION: ICD-10-CM

## 2024-09-25 PROCEDURE — 99207 PR NO CHARGE LOS: CPT

## 2024-09-25 PROCEDURE — 99215 OFFICE O/P EST HI 40 MIN: CPT | Performed by: NURSE PRACTITIONER

## 2024-09-25 PROCEDURE — G2211 COMPLEX E/M VISIT ADD ON: HCPCS | Performed by: NURSE PRACTITIONER

## 2024-09-25 NOTE — PROGRESS NOTES
[facility]: Heart Failure Care Coordination   Heart Failure Education    Situation/Background:      RN CC provided heart failure education to pt and his wife Alka during clinic visit.    Assessment:      Living situation: home with family (wife)    Barriers to Heart Failure follow-up: Physical impairment, pt uses RW and is on chronic O2    Medication management:  wife assists, pt knows what he's on though    Pt has a scale at home and reports weighing himself a couple times a week. Encouraged pt to continue, but to increase the frequency to daily if possible. Also encouraged to use the daily weight log for close monitoring and provider review. Pt verbalized understanding. Pt's wife does most of the grocery shopping and cooking. They generally eat at home and she cooks from scratch. Occasionally adds a little salt while cooking. Encouraged the use of sodium free seasonings. They avoid processed foods, she does read nutrition labels. Pt's wife reports he's doing much better w/ limiting sodium than in the past. Pt hasn't monitored fluid intake very closely. We reviewed utilizing a consistent glass or water bottle to ensure adequate, but not excess intake.     Intervention/Plan:      CM/HF education topics reviewed:  Low sodium: 2000 mg or less daily, meal choices and label reading   Fluid Restriction: 50-64oz daily   Daily weight monitoring and logging   Overview of C.O.R.E. clinic   Overview of heart failure appointments and testing   Symptoms of HF to be reported to Core Team      Education materials provided:  Low sodium food and drink handout  Low sodium food product examples  HF stoplight tool  Cardiac medication handout     HF resources reviewed:  None additional today       Patient to follow up as scheduled.  RN CC reviewed and reinforced fluid/dietary sodium restrictions; patient stated understanding.  Instructed patient to call RN line with new or worsening heart failure symptoms and/or rapid weight  gain.     Patient expressed understanding of above education/instructions and denied further questions at this time. Pt scheduled to see Dr. Cao on 12/10 and Olivia in 5 months.    CB Hearn RN

## 2024-09-25 NOTE — PATIENT INSTRUCTIONS
Wyatt Gutierrez,    It was a pleasure to see you today at the [facility].     My recommendations after this visit include:    - No medications changes made today    - Follow up with pulmonary in October as planned    -  Follow up with Dr. Cao in December as planned    - Follow up with Olivia Brown CNP in 5 months in Heart Failure clinic     - Please call Heart Failure Nurse Line at [phone], if you have any questions or concerns    Olivia Brown CNP       What is the [facility] Failure [facility]?     The [facility] is aheart failure specialty clinic within [facility].  You will work with your cardiologist, nurse practitioner, and nurses to carefully adjust medications and learn how to live with heart failure.  The Heart FailureProgram will help you:    Better understand your chronic heart condition  Feel better and avoid hospital stays    Monitoring for Symptoms      Call the Heart Failure Phone Line ([phone]) if you have any of these symptoms:   Increased shortness of breath/shortness ofbreath at rest  Waking up at night with difficulty breathing  Unable to lie down for sleep due to symptoms or needing to sit uprightfor sleep  Weight gain of 2 pounds a day for 2 days in a row OR 5 pounds in 1 week  Increased swelling in your ankles or legs  Dizziness or lightheadedness    Medications     Take your medications as prescribed  Bring all your medications in their original bottles to every appointment  Avoid non-steroidal anti-inflammatory medications (Advil,Aleve, Ibuprofen, Naprosyn, Naproxen, Celebrex)  Do not stop taking your medications or begin taking over-the-counter or herbal medications without first talking to your doctor ornurse practitioner    Diet and Lifestyle     Limit sodium/salt to 2000 mg daily   Read food labels for sodium content  Do not add salt when cooking or add salt at the table  Weigh yourself every day and record in  your daily weight log   Call if you gain 2 pounds a day for 2 days in a row OR 5 pounds in 1 week  Bring daily weight log to every appointment  Stay active, pace yourself, listen to yourbody, and rest when tired  Elevate your legs if they are swollen. Ask about using compression/support stockings  Stop smoking  Lose weight if you are overweight  Avoid drinking alcohol or limit amount  Stay updated on your immunizations including flu and pneumonia vaccines

## 2024-09-25 NOTE — LETTER
9/25/2024    Eliot De La Rosa MD  [address]  [facility] [address]    RE: Wyatt Gutierrez       Dear Colleague,     I had the pleasure of seeing Wyatt Gutierrez in the [facility].          Assessment/Recommendations   Assessment:    1.  Acute on chronic heart failure with preserved ejection fraction, NYHA Class II-III:  Patient was hospitalized from August 8 through 14 with acute on chronic respiratory failure suspected due to interstitial pulmonary fibrosis exacerbation, and suspected severe pulmonary hypertension.    NT proBNP was found elevated in 6000's.  Chest CT showed worsening pulmonary fibrosis and mild pleural effusion.   Patient was treated with antibiotic, steroid therapy and IV Lasix.  And triamterene with hydrochlorothiazide was discontinued due to hyponatremia.    Patient had a consultation with palliative on 9/23/2024.  It appears that his CODE STATUS was changed to full code.    Echocardiogram on 8/9/2024 showed preserved LVEF of 65%, abnormal septal motion correlated with right ventricular overload with flattening of septum suggesting elevated RV systolic pressure.      Patient is well compensated on exam except trace bilateral lower extremity edema  On chronic home O2  With 4 L at rest and 6 L with activity.  Overall shortness of breath has improved since recent hospitalization and has been stable.    Discharge weight was 185 pounds on 8/14/2024  Current weight is 185 pounds-stable  He reports following low-sodium diet.    He reports drinking about 64 ounces of fluid per day.    Heart failure regimen includes:    -Not on aldosterone blocker/MRA therapy- likely due to hyponatremia    -Not on SGLT2 Inhibitor -hyponatremia with recent hospitalization    -Not on diuretic therapy-furosemide was discontinued due to hyponatremia    We discussed and reviewed about heart failure, medication management, and lifestyle management including low sodium diet <2 g/day, daily weight, and staying  "physically active as tolerated. Patient met with [facility] CORE nurse clinician for heart failure education.    # Interstitial pulmonary fibrosis, pulmonary hypertension, obstructive sleep apnea: On home CPAP.  Patient follows up with pulmonary.    2.   Hyponatremia/hypokalemia: BMP on 8/22/2024 showed sodium 133, potassium 3.9, and creatinine 0.94. Na+ improved. K+ WNL.  No chest pain.    3. Non-obstructive Coronary artery disease per Cor Angio in 5/2024/Dyslipidemia:      4. Hypertension: BP today is 124/50-stable.    5. Aortic stenosis with status post TAVR in June 2024: Echocardiogram on 8/9/2024 showed normal aortic valve prosthesis with mean gradient of 13 mmHg with peak antegrade velocity of 2.7 m/sec. Stable.    6.  Paroxysmal atrial fibrillation: Noted sinus tachycardia with recent hospitalization but resolved.  Event monitor in June showed normal sinus rhythm with no evidence of A-fib.  On metoprolol.    Plan/Recommendation:  -No medication changes made today.  -Patient was instructed to call if persistent weight gain with heart failure symptoms. Could consider starting on low dose of Furosemide and monitor lab closely.  -Continue on low-sodium diet <2000 mg per day, daily weight monitoring, and maintain fluid intake at 50 to 60 ounces per day     Follow up with pulmonary next month as planned.  Follow up with Dr. Cao in December as planned. Follow up with me in 5 months      The longitudinal plan of care for acute on chronic heart failure with preserved ejection fraction, hyponatremia,  hypokalemia was addressed during this visit.?Due to the added complexity in care, I will continue to support Wyatt Gutierrez in the subsequent management of this condition(s) and with the ongoing continuity of care of this condition(s)\".     History of Present Illness/Subjective    Mr. Wyatt Gutierrez is a 79 year old male with a past medical history of hypertension, dyslipidemia, coronary artery disease, paroxysmal atrial " fibrillation, aortic stenosis with status post TAVR in June 2024, interstitial pulmonary fibrosis, GERD, obstructive sleep apnea, Seaman's esophagus,and recent hospitalization with acute on chronic respiratory failure suspected interstitial pulmonary fibrosis exacerbation, suspected severe pulmonary hypertension, and acute on chronic heart failure with preserved ejection fraction who is seen at [facility] for post hospitalization heart failure follow up.     Today, Tai is accompanied by his spouse.  He reports feeling much better with his shortness of breath since recent hospitalization.  He denies fatigue, lightheadedness, shortness of breath, orthopnea, PND, palpitations, chest pain, and abdominal fullness/bloating.  He notices mild swelling in his legs at the end of the day usually disappears in the morning.    ECHO from 8/8/24-Reviewed:   Interpretation Summary   1. Limited echocardiographic study.  2. Normal left ventricular size and systolic performance with a visually  estimated ejection fraction of 65%.  3. There is abnormal septal motion c/w right ventricular overload; there is  flattening of the septal curvature throughout the cardiac cycle suggesting  prominent elevation in RV systolic pressure  4. There is mild concentric increase in left ventricular wall thickness.  5. There is a bio-prosthetic aortic valve (documented 26 mm Dan Gabe 3  Ultra tissue valve).  Â  Normal aortic valve prosthesis metrics with a mean systolic gradient of 13  mmHg and a peak anterograde velocity of 2.7 m/sec.  Â  There is mild aortic insufficiency.  6. There is moderate right ventricular enlargement with moderately reduced  right ventricular systolic performance  7. There is moderate biatrial enlargement.     When compared to the prior real-time echocardiogram dated 10 July 2024, the  right ventricle appears more enlarged and with somewhat more reduced systolic  performance. In  "addition, there is now evidence of flattening of the septal  curvature throughout the cardiac cycle suggesting prominent elevation in RV  systolic pressure.       Physical Examination Review of Systems   /50 (BP Location: Right arm, Patient Position: Sitting, Cuff Size: Adult Regular)   Pulse 84   Resp 28   Ht 1.778 m (5' 10\")   Wt 87.5 kg (193 lb)   BMI 27.69 kg/m    Body mass index is 27.69 kg/m .  Wt Readings from Last 3 Encounters:   09/25/24 87.5 kg (193 lb)   09/23/24 83.9 kg (185 lb)   08/14/24 84 kg (185 lb 3 oz)     General Appearance:   no distress, normal body habitus   ENT/Mouth: membranes moist, no oral lesions or bleeding gums.      EYES:  no scleral icterus, normal conjunctivae   Neck: no carotid bruits or thyromegaly   Chest/Lungs:   lungs are clear to auscultation, no rales or wheezing, equal chest wall expansion    Cardiovascular:   Heart rate regular. Normal first and second heart sounds with no murmurs, rubs, or gallops; JVP is difficult to assess due to the patient's obesity and body habitus   , trace edema bilaterally    Abdomen:  no organomegaly, masses, bruits, or tenderness; bowel sounds are present   Extremities   no cyanosis or clubbing    CMS intact.   Skin: no xanthelasma, warm.    Neurologic: Alert and oriented X 3 no tremors   Psychiatric: calm and cooperative                                                   Negative unless noted in HPI     Medical History  Surgical History Family History Social History   Past Medical History:   Diagnosis Date     Aortic stenosis      Arthritis      Coronary artery disease involving native coronary artery of native heart without angina pectoris 9/25/2024     High cholesterol      Hypertension      IPF (idiopathic pulmonary fibrosis) (H)      Sleep apnea     Past Surgical History:   Procedure Laterality Date     ARTHROSCOPY SHOULDER       COLONOSCOPY       CV CORONARY ANGIOGRAM N/A 5/13/2024    Procedure: Coronary Angiogram;  Surgeon: " Shiv Robles MD;  Location: [facility] CV     CV TRANSCATHETER AORTIC VALVE REPLACEMENT-FEMORAL APPROACH N/A 2024    Procedure: Transcatheter Aortic Valve Replacement-Femoral Approach;  Surgeon: Serjio Gustafson MD;  Location: [facility] CV     ESOPHAGOSCOPY, GASTROSCOPY, DUODENOSCOPY (EGD), COMBINED N/A 10/2/2023    Procedure: ESOPHAGOGASTRODUODENOSCOPY with biopsies;  Surgeon: Reji Baptiste MD;  Location: [facility] OR     OR TRANSCATHETER AORTIC VALVE REPLACEMENT, FEMORAL PERCUTANEOUS APPROACH (STANDBY) N/A 2024    Procedure: OR TRANSCATHETER AORTIC VALVE REPLACEMENT, FEMORAL PERCUTANEOUS APPROACH (STANDBY);  Surgeon: Susana Mitchell MD;  Location: [facility] CV     OTHER SURGICAL HISTORY Right     heel fracture     OTHER SURGICAL HISTORY      knee arthroscopies     ZZC TOTAL KNEE ARTHROPLASTY Left 10/31/2019    Procedure: LEFT MINIMALLY INVASIVE TOTAL KNEE ARTHROPLASTY;  Surgeon: Avelino Canada MD;  Location: [facility];  Service: Orthopedics    No family history on file. Social History     Socioeconomic History     Marital status:      Spouse name: Not on file     Number of children: Not on file     Years of education: Not on file     Highest education level: Not on file   Occupational History     Not on file   Tobacco Use     Smoking status: Former     Current packs/day: 0.00     Average packs/day: 0.5 packs/day for 55.0 years (27.5 ttl pk-yrs)     Types: Cigarettes     Start date: 1966     Quit date: 2021     Years since quittin.7     Smokeless tobacco: Never   Vaping Use     Vaping status: Former     Substances: Nicotine   Substance and Sexual Activity     Alcohol use: Yes     Comment: 1-2 glasses of wine/ 2 x-week     Drug use: Not Currently     Comment: edible marijuana in the past     Sexual activity: Not on file   Other Topics Concern     Not on file   Social History Narrative    Lives with wife - single level house     Social  Determinants of Health     Financial Resource Strain: Not on file   Food Insecurity: Not on file   Transportation Needs: Not on file   Physical Activity: Not on file   Stress: Not on file   Social Connections: Not on file   Interpersonal Safety: Not on file   Housing Stability: Not on file          Medications  Allergies   Current Outpatient Medications   Medication Sig Dispense Refill     acyclovir (ZOVIRAX) 400 MG tablet [ACYCLOVIR (ZOVIRAX) 400 MG TABLET] Take 400 mg by mouth 3 (three) times a day as needed.              arformoterol (BROVANA) 15 MCG/2ML NEBU neb solution Take 2 mLs (15 mcg) by nebulization 2 times daily 360 mL 1     aspirin (ASA) 81 MG EC tablet Take 81 mg by mouth daily.       atorvastatin (LIPITOR) 80 MG tablet Take 1 tablet (80 mg) by mouth at bedtime 90 tablet 3     azelastine (ASTELIN) 0.1 % nasal spray Spray 2 sprays into both nostrils 2 times daily as needed for rhinitis       azithromycin (ZITHROMAX) 250 MG tablet Take 1 tablet (250 mg) daily. 90 tablet 3     ipratropium - albuterol 0.5 mg/2.5 mg/3 mL (DUONEB) 0.5-2.5 (3) MG/3ML neb solution Take 1 vial (3 mLs) by nebulization every 6 hours as needed for shortness of breath, wheezing or cough 360 mL 3     loperamide (IMODIUM) 2 MG capsule Take 2 mg by mouth daily as needed       metoprolol succinate (TOPROL-XL) 50 MG 24 hr tablet [METOPROLOL SUCCINATE (TOPROL-XL) 50 MG 24 HR TABLET] Take 50 mg by mouth daily.       multivitamin therapeutic tablet Take 1 tablet by mouth daily       omeprazole (PRILOSEC) 40 MG DR capsule Take 40 mg by mouth 2 times daily       OXYGEN-HELIUM IN Inhale 4 L into the lungs continuous prn. At home while sitting around the house, more when out and about       pirfenidone (ESBRIET) 267 MG capsule Take 1 capsule three times daily with food days 1-7, then 2 capsules three times daily with food days 8-14, then 3 capsules three times daily with food days 15 and onward. 270 capsule 11     predniSONE (DELTASONE) 10 MG  tablet Take 4 tablets (40 mg) by mouth daily for 13 days, THEN 3 tablets (30 mg) daily for 14 days, THEN 2 tablets (20 mg) daily for 14 days, THEN 1 tablet (10 mg) daily for 14 days, THEN 0.5 tablets (5 mg) daily for 14 days. 143 tablet 0    Allergies   Allergen Reactions     Animal Dander Unknown     Chalk      Dogs Other (See Comments)     Pet Dander     Maple Tree      Mold [Molds & Smuts] Unknown         Lab Results    Chemistry/lipid CBC Cardiac Enzymes/BNP/TSH/INR   Lab Results   Component Value Date    CHOL 148 09/18/2023    HDL 42 09/18/2023    TRIG 201 (H) 09/18/2023    BUN 18.3 08/22/2024     (L) 08/22/2024    CO2 27 08/22/2024    Lab Results   Component Value Date    WBC 23.6 (H) 08/22/2024    HGB 10.1 (L) 08/22/2024    HCT 32.5 (L) 08/22/2024    MCV 77 (L) 08/22/2024     08/22/2024    Lab Results   Component Value Date    TSH 2.56 08/08/2024    INR 1.10 08/08/2024        40  minutes spent on the date of encounter doing chart review, review of test results, interpretation with above tests, patient visit, documentation, and discussion with family.        This note has been dictated using voice recognition software. Any grammatical, typographical, or context distortions are unintentional and inherent to the software          Thank you for allowing me to participate in the care of your patient.      Sincerely,     WALTER Dominguez [facility]  cc:   Toby Griffin MD  [address]  [place] [address]

## 2024-10-14 ENCOUNTER — TELEPHONE (OUTPATIENT)
Dept: CARDIOLOGY | Facility: CLINIC | Age: 79
End: 2024-10-14
Payer: MEDICARE

## 2024-10-14 DIAGNOSIS — I50.33 ACUTE ON CHRONIC HEART FAILURE WITH PRESERVED EJECTION FRACTION (HFPEF) (H): Primary | ICD-10-CM

## 2024-10-14 RX ORDER — FUROSEMIDE 20 MG/1
TABLET ORAL
Qty: 90 TABLET | Refills: 0 | Status: SHIPPED | OUTPATIENT
Start: 2024-10-14

## 2024-10-14 NOTE — TELEPHONE ENCOUNTER
Noted. Spoke w/ pt's wife and updated her w/ recommendations from Olivia. She verbalized understanding and is agreeable. She will plan to  and have pt start furosemide 20mg daily x3 days today. They will plan to use the OP lab at [facility] on Thurs 10/17 for blood work. Reviewed fluid intake goal of 50oz daily and low sodium diet. Advised pt to present to the ED if worsening symptoms, Alka verbalized understanding.    CB Hearn RN

## 2024-10-14 NOTE — TELEPHONE ENCOUNTER
Olivia-    Pt's wife called to report weight gain and increased HF symptoms. Weight when you saw pt on 9/25 was 185lbs at home. Today he is 191lbs, seems to be gaining 1lb each day. Pt's wife was gone for a few days and family was helping out, so pt was eating take out, processed foods as he doesn't cook.    Pt is easily SOB w/ activity, even 8ft per wife. Sometimes SOB at rest. Coughing much more and HR elevates w/ this. Per wife, pt reports sleeping well w/ his CPAP and O2 last night. Ankles are looking quite swollen.     Pt not on a diuretic d/t low sodium per notes. Pt has lab appt at [facility] today at 1100 for his PCP as he is on iron per her report.     What are your recommendations? Thank you.     CB Hearn RN

## 2024-10-14 NOTE — TELEPHONE ENCOUNTER
Olivia Brown, APRN CNP  Beatrice Mancilla RN  Caller: Unspecified (Today,  9:38 AM)  Lasix 20 mg daily X 3 days and repeat BMP and NTproBNP.  Limit fluid intake to 50 ounces per day and low salt <2000 mg per day.  ED visit if worsening symptoms.  Thank you!  CY

## 2024-10-16 DIAGNOSIS — J84.112 IPF (IDIOPATHIC PULMONARY FIBROSIS) (H): Primary | ICD-10-CM

## 2024-10-17 ENCOUNTER — TELEPHONE (OUTPATIENT)
Dept: PALLIATIVE CARE | Facility: CLINIC | Age: 79
End: 2024-10-17

## 2024-10-17 ENCOUNTER — LAB (OUTPATIENT)
Dept: LAB | Facility: HOSPITAL | Age: 79
End: 2024-10-17
Payer: MEDICARE

## 2024-10-17 DIAGNOSIS — J84.112 IPF (IDIOPATHIC PULMONARY FIBROSIS) (H): ICD-10-CM

## 2024-10-17 DIAGNOSIS — I50.33 ACUTE ON CHRONIC HEART FAILURE WITH PRESERVED EJECTION FRACTION (HFPEF) (H): ICD-10-CM

## 2024-10-17 LAB
ALBUMIN SERPL BCG-MCNC: 4.1 G/DL (ref 3.5–5.2)
ALP SERPL-CCNC: 104 U/L (ref 40–150)
ALT SERPL W P-5'-P-CCNC: 25 U/L (ref 0–70)
ANION GAP SERPL CALCULATED.3IONS-SCNC: 11 MMOL/L (ref 7–15)
AST SERPL W P-5'-P-CCNC: 23 U/L (ref 0–45)
BILIRUB DIRECT SERPL-MCNC: <0.2 MG/DL (ref 0–0.3)
BILIRUB SERPL-MCNC: 0.2 MG/DL
BUN SERPL-MCNC: 10.9 MG/DL (ref 8–23)
CALCIUM SERPL-MCNC: 9.5 MG/DL (ref 8.8–10.4)
CHLORIDE SERPL-SCNC: 93 MMOL/L (ref 98–107)
CREAT SERPL-MCNC: 0.98 MG/DL (ref 0.67–1.17)
EGFRCR SERPLBLD CKD-EPI 2021: 79 ML/MIN/1.73M2
GLUCOSE SERPL-MCNC: 113 MG/DL (ref 70–99)
HCO3 SERPL-SCNC: 31 MMOL/L (ref 22–29)
NT-PROBNP SERPL-MCNC: 7220 PG/ML (ref 0–1800)
POTASSIUM SERPL-SCNC: 4.3 MMOL/L (ref 3.4–5.3)
PROT SERPL-MCNC: 6.6 G/DL (ref 6.4–8.3)
SODIUM SERPL-SCNC: 135 MMOL/L (ref 135–145)

## 2024-10-17 PROCEDURE — 36415 COLL VENOUS BLD VENIPUNCTURE: CPT

## 2024-10-17 PROCEDURE — 82248 BILIRUBIN DIRECT: CPT

## 2024-10-17 PROCEDURE — 80053 COMPREHEN METABOLIC PANEL: CPT

## 2024-10-17 PROCEDURE — 83880 ASSAY OF NATRIURETIC PEPTIDE: CPT

## 2024-10-17 NOTE — TELEPHONE ENCOUNTER
"Patient's wife Rocael reports patient is having severe pain in his chest and all over his upper torso and trouble walking.  Reports he is \"struggling to breathe.\"  Reports patient has severe anxiety and he has been going \"downhill, downhill, downhill for a couple weeks.\"  Reports this pain occurs when he tries to move and improves when he is sitting still.  Luciana is asking if she can give patient an  oxycodone.    Writer instructed Rocael to take patient to emergency room or call 911 to get pain and breathing evaluated.  Discouraged rocael from giving patient oxycodone at this time.    Will update Dr. Santoyo and pulmonology team    Rocael verbalized understanding and had no further questions.    Erum Huber RN  Palliative Care Nurse Clinician    [phone] (Direct)  [phone] (Main)  [phone] (Appointment Scheduling)      "

## 2024-10-18 ENCOUNTER — TELEPHONE (OUTPATIENT)
Dept: CARDIOLOGY | Facility: CLINIC | Age: 79
End: 2024-10-18
Payer: MEDICARE

## 2024-10-18 DIAGNOSIS — I50.33 ACUTE ON CHRONIC HEART FAILURE WITH PRESERVED EJECTION FRACTION (HFPEF) (H): Primary | ICD-10-CM

## 2024-10-18 NOTE — TELEPHONE ENCOUNTER
Called patient's wife and she endorsed understanding    Labs BMP and BNP ordered and anticipated for 10/31/2024 at his pulmonology appointment.    Thank you!    Cornelio Ryder RN    ------------------------------------------    Olivia Brown APRN CNP Cebuhar, David B, RN; [facility]  Thanks Cornelio for the update!  Continue on Furosemide 20 mg daily.  Call us if persistent wt gain with worsening HF symptoms.  Repeat BMP and NTproBNP level in 2 weeks.  CY

## 2024-10-18 NOTE — TELEPHONE ENCOUNTER
Nurse spoke with Wife, Alka.    Tai is currently getting blood work done for Pirfenidone. They said palliative advised the ER. Writer explained that they should decide if that is what they want to do knowing he'd likely be admitted and because of his progressive disease there may not be much they can do. The other option would be to talk about Hospice care. She said that is likely what they will do. They are going to converse about it and either call us and let us know or plan to discuss further at the 10/31 clinic visit with Dr. Estrada.    Tai is ok at rest with 5-6 liters but as soon as he gets up to move his sats drop to the 60s on 6 lpm. Writer advised to increase O2 to 10 lpm with any activity. Updated Dr. Estrada.    Lydia Amor RN

## 2024-10-18 NOTE — TELEPHONE ENCOUNTER
"----- Message from Olivia Brown sent at 10/18/2024 11:55 AM CDT -----  Thanks Cornelio for the update!  Continue on Furosemide 20 mg daily.  Call us if persistent wt gain with worsening HF symptoms.  Repeat BMP and NTproBNP level in 2 weeks.  CY  ----- Message -----  From: Manny Ryder RN  Sent: 10/18/2024  11:53 AM CDT  To: WALTER Dominguez CNP      Patient's wife called  to report:    Wt: 192 lbs (187 on 10/16, 190 on 10/15)    BP:118/73    HR: 88    HF S/S: Reports SOB has improved, \"not as bad today\", swelling in ankles and feet are the same.  Denies pain.    Thank you!    Cornelio Ryder RN    ------------------  Olivia Brown APRN CNP  [facility]  NTproBNP is slightly worse from August.  Stable renal function.  Please obtain wt and HF status and let me know.  Thanks  JOLEEN  "

## 2024-10-25 ENCOUNTER — TELEPHONE (OUTPATIENT)
Dept: PULMONOLOGY | Facility: CLINIC | Age: 79
End: 2024-10-25
Payer: MEDICARE

## 2024-10-25 DIAGNOSIS — J84.112 IPF (IDIOPATHIC PULMONARY FIBROSIS) (H): Primary | ICD-10-CM

## 2024-10-25 RX ORDER — PREDNISONE 20 MG/1
40 TABLET ORAL DAILY
Qty: 28 TABLET | Refills: 0 | Status: SHIPPED | OUTPATIENT
Start: 2024-10-25 | End: 2024-11-08

## 2024-10-31 ENCOUNTER — OFFICE VISIT (OUTPATIENT)
Dept: PULMONOLOGY | Facility: CLINIC | Age: 79
End: 2024-10-31
Attending: INTERNAL MEDICINE
Payer: MEDICARE

## 2024-10-31 VITALS
BODY MASS INDEX: 26.83 KG/M2 | WEIGHT: 187 LBS | DIASTOLIC BLOOD PRESSURE: 79 MMHG | HEART RATE: 72 BPM | TEMPERATURE: 98.3 F | OXYGEN SATURATION: 99 % | SYSTOLIC BLOOD PRESSURE: 136 MMHG

## 2024-10-31 DIAGNOSIS — R06.02 SHORTNESS OF BREATH: Primary | ICD-10-CM

## 2024-10-31 DIAGNOSIS — J84.112 IPF (IDIOPATHIC PULMONARY FIBROSIS) (H): Primary | ICD-10-CM

## 2024-10-31 DIAGNOSIS — J84.112 IPF (IDIOPATHIC PULMONARY FIBROSIS) (H): ICD-10-CM

## 2024-10-31 DIAGNOSIS — I27.20 PULMONARY HYPERTENSION (H): ICD-10-CM

## 2024-10-31 PROCEDURE — G2211 COMPLEX E/M VISIT ADD ON: HCPCS | Performed by: INTERNAL MEDICINE

## 2024-10-31 PROCEDURE — G0463 HOSPITAL OUTPT CLINIC VISIT: HCPCS | Performed by: INTERNAL MEDICINE

## 2024-10-31 PROCEDURE — 99214 OFFICE O/P EST MOD 30 MIN: CPT | Performed by: INTERNAL MEDICINE

## 2024-10-31 RX ORDER — MORPHINE SULFATE 10 MG/5ML
SOLUTION ORAL
Qty: 240 ML | Refills: 0 | Status: SHIPPED | OUTPATIENT
Start: 2024-10-31 | End: 2024-11-30

## 2024-10-31 RX ORDER — PREDNISONE 10 MG/1
TABLET ORAL
Qty: 215 TABLET | Refills: 0 | Status: SHIPPED | OUTPATIENT
Start: 2024-10-31 | End: 2025-03-14

## 2024-10-31 RX ORDER — SULFAMETHOXAZOLE AND TRIMETHOPRIM 800; 160 MG/1; MG/1
1 TABLET ORAL
Qty: 36 TABLET | Refills: 1 | Status: SHIPPED | OUTPATIENT
Start: 2024-11-01 | End: 2025-04-18

## 2024-10-31 RX ORDER — AZITHROMYCIN 250 MG/1
TABLET, FILM COATED ORAL
Qty: 90 TABLET | Refills: 3 | Status: SHIPPED | OUTPATIENT
Start: 2024-10-31

## 2024-10-31 ASSESSMENT — PAIN SCALES - GENERAL: PAINLEVEL_OUTOF10: NO PAIN (0)

## 2024-10-31 NOTE — NURSING NOTE
Chief Complaint   Patient presents with    RECHECK     Follow up.      Vitals:    10/31/24 1204   BP: 136/79   BP Location: Right arm   Patient Position: Chair   Cuff Size: Adult Regular   Pulse: 72   Temp: 98.3  F (36.8  C)   TempSrc: Oral   SpO2: 99%   Weight: 84.8 kg (187 lb)       BP Readings from Last 3 Encounters:   10/31/24 136/79   09/25/24 124/50   08/14/24 105/58       /79 (BP Location: Right arm, Patient Position: Chair, Cuff Size: Adult Regular)   Pulse 72   Temp 98.3  F (36.8  C) (Oral)   Wt 84.8 kg (187 lb)   SpO2 99%   BMI 26.83 kg/m       Maddy Haq

## 2024-10-31 NOTE — PROGRESS NOTES
"[facility]  Pulmonary Medicine  Visit Clinic Note  Dear patient. Thank you for visiting with me. I want you to feel respected, understood, and empowered. \"Respect\" is valuing you as much as I would a close family member. \"Empowerment\" happens when you are fully informed, and can make the best possible decision for you.  Please ask me questions!  Challenge anything that is not clear.       ASSESSMENT & PLAN     Patient is a 77 year old male who has presented for further work up of IPF.     #IPF:   -Diagnosed based on Ct chest in 2021. . Had been taking Ofev sinec May 2022.  Switched to Esbeiret on 2023 as unable to tolerate angelo dose of ofev and after lowering the dose his pfts worsneedd.  No w switched to perfenidone . 2 tabs TID.   -  He has been doing pulmonary rehab.  He has severe lung disease which has worsened after possible exacerbation in January.  His age is a limitation for lung transplant.   - Discussed clinical trials but he is not interested in it for now.   -  On PPI.   -Prescribed BReo inhaler as there was some air reactivity noted.   He was not able to use it. Switched him to nebulized treatment.   -I am worried that he has rapid worsening of disease.    -[facility] goals of care was held with this patient.   Palliative care referral was placed.   -He is Ok to intuabted but not more then 2 days. Explaiend that 2 days is not enough either it should be no intubation or may be 7 days. Patient would like to stay at 2 days.    He has created living will. . Short term intubation for a clearly identifiable infectious pneumonia may be a possiblly reasonable.   -Face to face encounter for nebulizer is held.  -Duonebs is prescribed.     -He responds well to prednisone. So I will taper it down to 15 and leave him there. And also put him on bactrim.     #Cardiac:  -Severe Aortic stenosis noted on echo Follows with cardiology.     #Chronic Cough  -Tessalon perles has not worked.  -Most elver " due to his IPF.    -He has responded well to Azithromycin three times a week with significant improvement.  Switched It to 250 mg daily.   -ENT referral is placed.   -EKg is normal in August 2024. Will repeat.     #Hypoxemic Respiratory failure  -? 10/15 lt with activity. Oximizer is prescribed      #vaccines:  -Uptodate       RTC in 3 months  Can be virtual.     The longitudinal plan of care for the diagnosis(es)/condition(s) as documented were addressed during this visit. Due to the added complexity in care, I will continue to support Tai in the subsequent management and with ongoing continuity of care.      31 minutes excluding the time spent on cigarette cessation was  spent on the date of the encounter doing chart review, history and exam, documentation and further activities as noted above.    These conclusions are made at the best of one's knowledge and belief based on the provided evidence such as patient's history and allergy test results and they can change over time or can be incomplete because of missing information's.    I explained the lab values, imagings and findings to the patient.  Patient expressed understanding I did not recognize any barriers to the understanding of the patient.    The above note was dictated using voice recognition software and may include typographical errors. Please contact the author for any clarifications.    Gilson Estrada MD   RN Coordinators: Maureen/Meme/Radha: [phone]  ILD RN Coordinators: [phone]  Clinic Number: [phone]  Pager: [phone]       Today's visit note:     Chief Complaint: Wyatt Gutierrez is a 77 year old year old male who is being seen for RECHECK (Follow up. )      HPI:    Wyatt Gutierrez is a 75 y.o. male, previous smoker with PMH sig for KAITY, HTN, HPL who was referred to us back on 12/20/2019 for abnormal chest x-ray that was concerning for ILD.  Patient has had chronic shortness of breath since at least 2010.  He quit smoking tobacco in Jan  2022.     -He had his Ild diagnosed in June 2021. He was not on oxygen.  At that time he was followed eventually he was started on Ofev in May 2022.  His major decompensation happened in 2022 January when he was in [place] and admitted to hospital and had to be started on oxygen after that.     Interval History: 9/29/22:  -Since than he has had worsening oxygen requirement.  He continues to use treadmill with oxygen.  He will ntio be a transplant candidate.      -His mainly dry cough is bopthering him.     #Interval History: 10/4  -Discussed goals of care in detail.    #  Interval History: 4/13  -Patient is doing well.  His trip to [place] was well.  No significant IPF exacerbation was noted.  He was able to play golf.  - He is establish care with Dr. Maciel at [facility].  He just finished his Ofev 1 week ago.  He will be switched to Esbriet starting today.     #Interval History: 7/2023  -  Patient is doing well. No significant worsening is noted.   - I don't think he is using his Breo well.  He has increased sputum production.    #Interval History: 1/2024  -Increasing oxygen needs     #8/2024:  -Worsening pulmonary infiltrates.     #10/2024:  -Over all worsening. Was hospitalized recently.    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   Hot tub:  No   Portable humidifier:  Dehumidifier.   Brass or woodwind instruments:  Smoking tobacco or marijuana: Stopped smoking in Jan 2022. Smoked for almost 50 years.  Less than PPD.    Feather pillow/blanket:  Gardening:   Hobbies: golfing .  Chemotherapy or radiation therapy:  Fam hx of ILD:           Medications:     Current Outpatient Medications   Medication Sig Dispense Refill    acyclovir (ZOVIRAX) 400 MG tablet [ACYCLOVIR (ZOVIRAX) 400 MG TABLET] Take 400 mg by mouth 3 (three) times a day as needed.             arformoterol (BROVANA) 15 MCG/2ML NEBU neb solution Take 2 mLs  (15 mcg) by nebulization 2 times daily 360 mL 1    aspirin (ASA) 81 MG EC tablet Take 81 mg by mouth daily.      atorvastatin (LIPITOR) 80 MG tablet Take 1 tablet (80 mg) by mouth at bedtime 90 tablet 3    azelastine (ASTELIN) 0.1 % nasal spray Spray 2 sprays into both nostrils 2 times daily as needed for rhinitis      azithromycin (ZITHROMAX) 250 MG tablet Take 1 tablet (250 mg) daily. 90 tablet 3    furosemide (LASIX) 20 MG tablet Take 1 tablet (20 mg) by mouth once daily for 3 days. Save the other tablets for future use at the direction of cardiology. 90 tablet 0    ipratropium - albuterol 0.5 mg/2.5 mg/3 mL (DUONEB) 0.5-2.5 (3) MG/3ML neb solution Take 1 vial (3 mLs) by nebulization every 6 hours as needed for shortness of breath, wheezing or cough 360 mL 3    loperamide (IMODIUM) 2 MG capsule Take 2 mg by mouth daily as needed      metoprolol succinate (TOPROL-XL) 50 MG 24 hr tablet [METOPROLOL SUCCINATE (TOPROL-XL) 50 MG 24 HR TABLET] Take 50 mg by mouth daily.      multivitamin therapeutic tablet Take 1 tablet by mouth daily      omeprazole (PRILOSEC) 40 MG DR capsule Take 40 mg by mouth 2 times daily      OXYGEN-HELIUM IN Inhale 4 L into the lungs continuous prn. At home while sitting around the house, more when out and about      pirfenidone (ESBRIET) 267 MG capsule Take 1 capsule three times daily with food days 1-7, then 2 capsules three times daily with food days 8-14, then 3 capsules three times daily with food days 15 and onward. 270 capsule 11    predniSONE (DELTASONE) 20 MG tablet Take 2 tablets (40 mg) by mouth daily for 14 days. 28 tablet 0     No current facility-administered medications for this visit.            Review of Systems:       A complete 10 point review of systems was otherwise negative except as noted in the HPI.        PHYSICAL EXAM:  /79 (BP Location: Right arm, Patient Position: Chair, Cuff Size: Adult Regular)   Pulse 72   Temp 98.3  F (36.8  C) (Oral)   Wt 84.8 kg (187  lb)   SpO2 99%   BMI 26.83 kg/m       General: Well developed, well nourished, No apparent distress  Eyes: Anicteric  Nose: Nasal mucosa with no edema or hyperemia.  No polyps  Ears: Hearing grossly normal  Mouth: Oral mucosa is moist, without any lesions. No oropharyngeal exudate.  Respiratory: Coarse breathing is noted.   Cardiac: RRR, normal S1, S2. No murmurs. No JVD  Abdomen: Soft, NT/ND  Musculoskeletal: Extremities normal. No clubbing. No cyanosis. No edema.  Skin: No rash on limited exam  Neuro: Normal mentation. Normal speech.  Psych:Normal affect           Data:   All laboratory and imaging data reviewed.      PFT:           9/2022 6.2021    PFT Interpretation:  I personally reviewed and interpreted the PFTs.    ECHO:  2022  Interpretation Summary     Left ventricular size, wall motion and function are normal. The ejection  fraction is > 65%.  There is mild concentric left ventricular hypertrophy.  Normal right ventricle size and systolic function.  Moderate valvular aortic stenosis.  The ascending aorta is Mildly dilated.      Chest CT: I personally reviewed and interpreted the CT scan.  4/2022    No results found for this or any previous visit (from the past week).

## 2024-10-31 NOTE — PROGRESS NOTES
I spoke with Dr. Estrada about Tai's worsening dyspnea.  I agree with his idea of a trial of morphine and I sent a prescription for MS Concentrate 10 mg/5ml with a sig of 4 mg po TID routinely and a fourth dose available prn.  I sent this to the [facility] on [address].  I will ask my RNCC to do a symptom check in 10 days.  I left a vm with this information on his wife's identified cell phone.    Geoff Santoyo MD MS FAAFP    [facility]  Office [phone]  Fax [phone]

## 2024-10-31 NOTE — LETTER
"10/31/2024      [place]att Gutierrez  [address] [facility] [address]      Dear Colleague,    Thank you for referring your patient, Wyatt Gutierrez, to the [facility] LUNG SCIENCE AND [facility]. Please see a copy of my visit note below.    [facility]  Pulmonary Medicine  Visit Clinic Note  Dear patient. Thank you for visiting with me. I want you to feel respected, understood, and empowered. \"Respect\" is valuing you as much as I would a close family member. \"Empowerment\" happens when you are fully informed, and can make the best possible decision for you.  Please ask me questions!  Challenge anything that is not clear.       ASSESSMENT & PLAN     Patient is a 77 year old male who has presented for further work up of IPF.     #IPF:   -Diagnosed based on Ct chest in 2021. . Had been taking Ofev sinec May 2022.  Switched to Esbeiret on 2023 as unable to tolerate angelo dose of ofev and after lowering the dose his pfts worsneedd.  No w switched to perfenidone . 2 tabs TID.   -  He has been doing pulmonary rehab.  He has severe lung disease which has worsened after possible exacerbation in January.  His age is a limitation for lung transplant.   - Discussed clinical trials but he is not interested in it for now.   -  On PPI.   -Prescribed BReo inhaler as there was some air reactivity noted.   He was not able to use it. Switched him to nebulized treatment.   -I am worried that he has rapid worsening of disease.    -[facility] goals of care was held with this patient.   Palliative care referral was placed.   -He is Ok to intuabted but not more then 2 days. Explaiend that 2 days is not enough either it should be no intubation or may be 7 days. Patient would like to stay at 2 days.    He has created living will. . Short term intubation for a clearly identifiable infectious pneumonia may be a possiblly reasonable.   -Face to face encounter for nebulizer is held.  -Jose Luis is " prescribed.     -He responds well to prednisone. So I will taper it down to 15 and leave him there. And also put him on bactrim.     #Cardiac:  -Severe Aortic stenosis noted on echo Follows with cardiology.     #Chronic Cough  -Tessalon perles has not worked.  -Most lijkley due to his IPF.    -He has responded well to Azithromycin three times a week with significant improvement.  Switched It to 250 mg daily.   -ENT referral is placed.   -EKg is normal in August 2024. Will repeat.     #Hypoxemic Respiratory failure  -? 10/15 lt with activity. Oximizer is prescribed      #vaccines:  -Uptodate       RTC in 3 months  Can be virtual.     The longitudinal plan of care for the diagnosis(es)/condition(s) as documented were addressed during this visit. Due to the added complexity in care, I will continue to support Tai in the subsequent management and with ongoing continuity of care.      31 minutes excluding the time spent on cigarette cessation was  spent on the date of the encounter doing chart review, history and exam, documentation and further activities as noted above.    These conclusions are made at the best of one's knowledge and belief based on the provided evidence such as patient's history and allergy test results and they can change over time or can be incomplete because of missing information's.    I explained the lab values, imagings and findings to the patient.  Patient expressed understanding I did not recognize any barriers to the understanding of the patient.    The above note was dictated using voice recognition software and may include typographical errors. Please contact the author for any clarifications.    Gilson Estrada MD   RN Coordinators: Maureen/Meme/Radha: [phone]  ILD RN Coordinators: [phone]  Clinic Number: [phone]  Pager: [phone]       Today's visit note:     Chief Complaint: Wyatt Gutierrez is a 77 year old year old male who is being seen for RECHECK (Follow up. )      HPI:     Wyatt Gutierrez is a 75 y.o. male, previous smoker with PMH sig for KAITY, HTN, HPL who was referred to us back on 12/20/2019 for abnormal chest x-ray that was concerning for ILD.  Patient has had chronic shortness of breath since at least 2010.  He quit smoking tobacco in Jan 2022.     -He had his Ild diagnosed in June 2021. He was not on oxygen.  At that time he was followed eventually he was started on Ofev in May 2022.  His major decompensation happened in 2022 January when he was in [place] and admitted to hospital and had to be started on oxygen after that.     Interval History: 9/29/22:  -Since than he has had worsening oxygen requirement.  He continues to use treadmill with oxygen.  He will ntio be a transplant candidate.      -His mainly dry cough is bopthering him.     #Interval History: 10/4  -Discussed goals of care in detail.    #  Interval History: 4/13  -Patient is doing well.  His trip to [place] was well.  No significant IPF exacerbation was noted.  He was able to play golf.  - He is establish care with Dr. Maciel at [facility].  He just finished his Ofev 1 week ago.  He will be switched to Esbriet starting today.     #Interval History: 7/2023  -  Patient is doing well. No significant worsening is noted.   - I don't think he is using his Breo well.  He has increased sputum production.    #Interval History: 1/2024  -Increasing oxygen needs     #8/2024:  -Worsening pulmonary infiltrates.     #10/2024:  -Over all worsening. Was hospitalized recently.    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   Hot tub:  No   Portable humidifier:  Dehumidifier.   Brass or woodwind instruments:  Smoking tobacco or marijuana: Stopped smoking in Jan 2022. Smoked for almost 50 years.  Less than PPD.    Feather pillow/blanket:  Gardening:   Hobbies: golfing .  Chemotherapy or radiation therapy:  Fam hx of ILD:            Medications:     Current Outpatient Medications   Medication Sig Dispense Refill     acyclovir (ZOVIRAX) 400 MG tablet [ACYCLOVIR (ZOVIRAX) 400 MG TABLET] Take 400 mg by mouth 3 (three) times a day as needed.              arformoterol (BROVANA) 15 MCG/2ML NEBU neb solution Take 2 mLs (15 mcg) by nebulization 2 times daily 360 mL 1     aspirin (ASA) 81 MG EC tablet Take 81 mg by mouth daily.       atorvastatin (LIPITOR) 80 MG tablet Take 1 tablet (80 mg) by mouth at bedtime 90 tablet 3     azelastine (ASTELIN) 0.1 % nasal spray Spray 2 sprays into both nostrils 2 times daily as needed for rhinitis       azithromycin (ZITHROMAX) 250 MG tablet Take 1 tablet (250 mg) daily. 90 tablet 3     furosemide (LASIX) 20 MG tablet Take 1 tablet (20 mg) by mouth once daily for 3 days. Save the other tablets for future use at the direction of cardiology. 90 tablet 0     ipratropium - albuterol 0.5 mg/2.5 mg/3 mL (DUONEB) 0.5-2.5 (3) MG/3ML neb solution Take 1 vial (3 mLs) by nebulization every 6 hours as needed for shortness of breath, wheezing or cough 360 mL 3     loperamide (IMODIUM) 2 MG capsule Take 2 mg by mouth daily as needed       metoprolol succinate (TOPROL-XL) 50 MG 24 hr tablet [METOPROLOL SUCCINATE (TOPROL-XL) 50 MG 24 HR TABLET] Take 50 mg by mouth daily.       multivitamin therapeutic tablet Take 1 tablet by mouth daily       omeprazole (PRILOSEC) 40 MG DR capsule Take 40 mg by mouth 2 times daily       OXYGEN-HELIUM IN Inhale 4 L into the lungs continuous prn. At home while sitting around the house, more when out and about       pirfenidone (ESBRIET) 267 MG capsule Take 1 capsule three times daily with food days 1-7, then 2 capsules three times daily with food days 8-14, then 3 capsules three times daily with food days 15 and onward. 270 capsule 11     predniSONE (DELTASONE) 20 MG tablet Take 2 tablets (40 mg) by mouth daily for 14 days. 28 tablet 0     No current facility-administered medications for this visit.             Review of Systems:       A complete 10 point review of systems was otherwise negative except as noted in the HPI.        PHYSICAL EXAM:  /79 (BP Location: Right arm, Patient Position: Chair, Cuff Size: Adult Regular)   Pulse 72   Temp 98.3  F (36.8  C) (Oral)   Wt 84.8 kg (187 lb)   SpO2 99%   BMI 26.83 kg/m       General: Well developed, well nourished, No apparent distress  Eyes: Anicteric  Nose: Nasal mucosa with no edema or hyperemia.  No polyps  Ears: Hearing grossly normal  Mouth: Oral mucosa is moist, without any lesions. No oropharyngeal exudate.  Respiratory: Coarse breathing is noted.   Cardiac: RRR, normal S1, S2. No murmurs. No JVD  Abdomen: Soft, NT/ND  Musculoskeletal: Extremities normal. No clubbing. No cyanosis. No edema.  Skin: No rash on limited exam  Neuro: Normal mentation. Normal speech.  Psych:Normal affect           Data:   All laboratory and imaging data reviewed.      PFT:           9/2022 6.2021    PFT Interpretation:  I personally reviewed and interpreted the PFTs.    ECHO:  2022  Interpretation Summary     Left ventricular size, wall motion and function are normal. The ejection  fraction is > 65%.  There is mild concentric left ventricular hypertrophy.  Normal right ventricle size and systolic function.  Moderate valvular aortic stenosis.  The ascending aorta is Mildly dilated.      Chest CT: I personally reviewed and interpreted the CT scan.  4/2022    No results found for this or any previous visit (from the past week).                                      Again, thank you for allowing me to participate in the care of your patient.        Sincerely,        Gilson Estrada MD

## 2024-12-03 ENCOUNTER — LAB (OUTPATIENT)
Dept: LAB | Facility: CLINIC | Age: 79
End: 2024-12-03
Payer: COMMERCIAL

## 2024-12-03 DIAGNOSIS — I50.33 ACUTE ON CHRONIC HEART FAILURE WITH PRESERVED EJECTION FRACTION (HFPEF) (H): ICD-10-CM

## 2024-12-03 DIAGNOSIS — J84.112 IPF (IDIOPATHIC PULMONARY FIBROSIS) (H): ICD-10-CM

## 2024-12-03 LAB
ALBUMIN SERPL BCG-MCNC: 3.9 G/DL (ref 3.5–5.2)
ALP SERPL-CCNC: 108 U/L (ref 40–150)
ALT SERPL W P-5'-P-CCNC: 37 U/L (ref 0–70)
ANION GAP SERPL CALCULATED.3IONS-SCNC: 9 MMOL/L (ref 7–15)
AST SERPL W P-5'-P-CCNC: 27 U/L (ref 0–45)
BILIRUB DIRECT SERPL-MCNC: <0.2 MG/DL (ref 0–0.3)
BILIRUB SERPL-MCNC: 0.3 MG/DL
BUN SERPL-MCNC: 12.5 MG/DL (ref 8–23)
CALCIUM SERPL-MCNC: 9.6 MG/DL (ref 8.8–10.4)
CHLORIDE SERPL-SCNC: 94 MMOL/L (ref 98–107)
CREAT SERPL-MCNC: 0.95 MG/DL (ref 0.67–1.17)
CRP SERPL-MCNC: 29.4 MG/L
EGFRCR SERPLBLD CKD-EPI 2021: 82 ML/MIN/1.73M2
GLUCOSE SERPL-MCNC: 100 MG/DL (ref 70–99)
HCO3 SERPL-SCNC: 31 MMOL/L (ref 22–29)
NT-PROBNP SERPL-MCNC: 4913 PG/ML (ref 0–1800)
POTASSIUM SERPL-SCNC: 3.9 MMOL/L (ref 3.4–5.3)
PROT SERPL-MCNC: 6.2 G/DL (ref 6.4–8.3)
SODIUM SERPL-SCNC: 134 MMOL/L (ref 135–145)

## 2024-12-03 PROCEDURE — 82248 BILIRUBIN DIRECT: CPT

## 2024-12-03 PROCEDURE — 80053 COMPREHEN METABOLIC PANEL: CPT

## 2024-12-03 PROCEDURE — 83880 ASSAY OF NATRIURETIC PEPTIDE: CPT

## 2024-12-03 PROCEDURE — 86140 C-REACTIVE PROTEIN: CPT

## 2024-12-03 PROCEDURE — 36415 COLL VENOUS BLD VENIPUNCTURE: CPT

## 2024-12-10 ENCOUNTER — TELEPHONE (OUTPATIENT)
Dept: PALLIATIVE CARE | Facility: CLINIC | Age: 79
End: 2024-12-10

## 2024-12-10 DIAGNOSIS — J47.9 BRONCHIECTASIS WITHOUT COMPLICATION (H): Primary | ICD-10-CM

## 2024-12-10 RX ORDER — PREDNISONE 20 MG/1
20 TABLET ORAL DAILY
Qty: 90 TABLET | Refills: 1 | Status: SHIPPED | OUTPATIENT
Start: 2024-12-10 | End: 2025-06-08

## 2024-12-10 RX ORDER — LEVOFLOXACIN 750 MG/1
750 TABLET, FILM COATED ORAL DAILY
Qty: 10 TABLET | Refills: 0 | Status: SHIPPED | OUTPATIENT
Start: 2024-12-10

## 2024-12-10 NOTE — TELEPHONE ENCOUNTER
Received call from pt's wife asking about a dose increase for morphine. Called her back to discuss further. She said he's mostly taking 2 doses daily, however has the ability to take 4 doses daily. He usually takes it when he's already experiencing significant shortness of breath.    Reviewed with her that he should take a dose before doing any activity that causes him shortness of breath, for example before he gets out of bed in the morning. She is going to have him take 4 doses daily and be sure to take them proactively.    I'll call to check in on Th to see if this has made a difference.    MARIO EdmondN, RN  Palliative Care Nurse Clinician    [phone] (Direct)  [phone] (Main)  [phone] (Appointment Scheduling)

## 2024-12-11 DIAGNOSIS — J84.112 IPF (IDIOPATHIC PULMONARY FIBROSIS) (H): ICD-10-CM

## 2024-12-11 DIAGNOSIS — I27.20 PULMONARY HYPERTENSION (H): ICD-10-CM

## 2024-12-11 DIAGNOSIS — R06.02 SHORTNESS OF BREATH: Primary | ICD-10-CM

## 2024-12-11 RX ORDER — MORPHINE SULFATE 10 MG/5ML
SOLUTION ORAL
Qty: 240 ML | Refills: 0 | Status: SHIPPED | OUTPATIENT
Start: 2024-12-11

## 2024-12-11 NOTE — TELEPHONE ENCOUNTER
Received telephone call from patient's wife requesting refill of morphine solution. She reports since we talked yesterday that pt's dyspnea has improved with the increased frequency of dosing.    Last refill: 11/1/24 (56 mL)  Last office visit: 10/31/24  Scheduled for follow up 1/2/25     Will route request to NP for review.     Reviewed [place]  Report.

## 2024-12-12 ENCOUNTER — TELEPHONE (OUTPATIENT)
Dept: PALLIATIVE CARE | Facility: CLINIC | Age: 79
End: 2024-12-12
Payer: MEDICARE

## 2024-12-12 DIAGNOSIS — J84.9 ILD (INTERSTITIAL LUNG DISEASE) (H): Primary | ICD-10-CM

## 2024-12-12 DIAGNOSIS — R06.00 DYSPNEA, UNSPECIFIED TYPE: ICD-10-CM

## 2024-12-12 NOTE — TELEPHONE ENCOUNTER
Received phone call update from pt's with Alka. She reports today has been a difficult day. Tai was struggling with breathing earlier and she felt it was partially due to an issue with his oxygen machine. She has changed over to tanks and he's doing better and seems to be close to baseline. She asked pt earlier if he wanted her to call 911 to be brought to the ER. He adamantly said no. Alka asked about hospice. We had a long discussion about it and I answered the questions she had about hospice involvement. She feels it's time to have a referral made. She will talk with Tai and call me back to discuss further.    Update:   Alka called back. She talked to Tai and their dtr. All are in agreement to get hospice involved. Dr. Santoyo updated and an order has been placed. Will send referral to [facility].    MARIO EdmondN, RN  Palliative Care Nurse Clinician    [phone] (Direct)  [phone] (Main)  [phone] (Appointment Scheduling)

## (undated) DEVICE — ELECTRODE DEFIB CADENCE 22550R

## (undated) DEVICE — SYR ANGIOGRAPHY MULTIUSE KIT ACIST 014612

## (undated) DEVICE — GUIDEWIRE STR .035IN X 145CM FXCR TSF-35-145

## (undated) DEVICE — SUCTION CANISTER MEDIVAC LINER 3000ML W/LID 65651-530

## (undated) DEVICE — MANIFOLD KIT ANGIO AUTOMATED 014613

## (undated) DEVICE — INTRO MICRO MINI STICK 4FR STIFF NITINOL 45-753

## (undated) DEVICE — CATH DIAGNOSTIC RADIAL 5FR TIG 4.0

## (undated) DEVICE — FORCEP BIOPSY 2.3MM DISP COATED 000388

## (undated) DEVICE — INTRO SHEATH 4FRX10CM PINNACLE RSS402

## (undated) DEVICE — GLOVE GAMMEX NEOPRENE ULTRA SZ 7 LF 8514

## (undated) DEVICE — SHTH INTRO 0.021IN ID 6FR DIA

## (undated) DEVICE — GLOVE BIOGEL PI SZ 6.5 40865

## (undated) DEVICE — CLOSURE DEVICE 6FR VASC PROGLIDE MEDICATED SUTURE 12673-03

## (undated) DEVICE — GUIDEWIRE VASCULAR 0.035IN DIA 145CML 15CML/6CML STAINLESS

## (undated) DEVICE — SOL WATER IRRIG 1000ML BOTTLE 2F7114

## (undated) DEVICE — CARDIO VASC SHEET 9162

## (undated) DEVICE — CUSTOM PACK PERIPH VASCULAR SCV5BPVHEA

## (undated) DEVICE — KIT VALVE AORTIC SAPEIN 3 TAVR 26MM COMMANDER S3UCM226A

## (undated) DEVICE — Device

## (undated) DEVICE — CUSTOM PACK CORONARY SAN5BCRHEA

## (undated) DEVICE — SUCTION MANIFOLD NEPTUNE 2 SYS 1 PORT 702-025-000

## (undated) DEVICE — KIT HAND CONTROL ACIST 014644 AR-P54

## (undated) DEVICE — INTRO TERUMO 6FRX25CM W/MARKER RSB603

## (undated) DEVICE — INTRO SHEATH 6FRX10CM PINNACLE RSS602

## (undated) DEVICE — ESU GROUND PAD ADULT REM W/15' CORD E7507DB

## (undated) DEVICE — PITCHER STERILE 1000ML  SSK9004A

## (undated) DEVICE — TUBING SUCTION MEDI-VAC 1/4"X20' N620A

## (undated) DEVICE — PREP CHLORAPREP 26ML TINTED HI-LITE ORANGE 930815

## (undated) DEVICE — TRANSDUCER TRAY ARTERIAL 42646-06

## (undated) DEVICE — VALVE DELIVERY SYSTEM 26MM SAPIEN3 ULTRA COMMANDER 9750CM26

## (undated) DEVICE — SLEEVE TR BAND RADIAL COMPRESSION DEVICE 24CM TRB24-REG

## (undated) DEVICE — TRANSDUCER W/MONITORING TRAY 42632-05

## (undated) DEVICE — WIRE GUIDE 0.035"X260CM AMPTLAZ XSTIFF CVD THSCF-35-260-3-A

## (undated) DEVICE — CATH ANGIO SUPERTORQUE AL1 6FRX100CM 532-645

## (undated) DEVICE — CATH ANGIO INFINITI VESTAN STAINLESS STEEL 155 D 534554S

## (undated) DEVICE — GUIDEWIRE FORTE FLOPPY J TOP 34949-05J

## (undated) DEVICE — CATH ANGIO INFINITI JR4 4FRX100CM 538421

## (undated) RX ORDER — HEPARIN SODIUM 1000 [USP'U]/ML
INJECTION, SOLUTION INTRAVENOUS; SUBCUTANEOUS
Status: DISPENSED
Start: 2024-05-13

## (undated) RX ORDER — PROTAMINE SULFATE 10 MG/ML
INJECTION, SOLUTION INTRAVENOUS
Status: DISPENSED
Start: 2024-06-11

## (undated) RX ORDER — FENTANYL CITRATE 50 UG/ML
INJECTION, SOLUTION INTRAMUSCULAR; INTRAVENOUS
Status: DISPENSED
Start: 2024-06-11

## (undated) RX ORDER — ASPIRIN 325 MG
TABLET ORAL
Status: DISPENSED
Start: 2024-06-11

## (undated) RX ORDER — MAGNESIUM SULFATE 4 G/50ML
INJECTION INTRAVENOUS
Status: DISPENSED
Start: 2024-06-11

## (undated) RX ORDER — FENTANYL CITRATE 50 UG/ML
INJECTION, SOLUTION INTRAMUSCULAR; INTRAVENOUS
Status: DISPENSED
Start: 2024-05-13

## (undated) RX ORDER — LIDOCAINE HYDROCHLORIDE 10 MG/ML
INJECTION, SOLUTION EPIDURAL; INFILTRATION; INTRACAUDAL; PERINEURAL
Status: DISPENSED
Start: 2024-05-13